# Patient Record
Sex: FEMALE | Race: BLACK OR AFRICAN AMERICAN | Employment: OTHER | URBAN - METROPOLITAN AREA
[De-identification: names, ages, dates, MRNs, and addresses within clinical notes are randomized per-mention and may not be internally consistent; named-entity substitution may affect disease eponyms.]

---

## 2019-04-17 ENCOUNTER — OFFICE VISIT (OUTPATIENT)
Dept: HEMATOLOGY | Age: 69
End: 2019-04-17

## 2019-04-17 ENCOUNTER — HOSPITAL ENCOUNTER (OUTPATIENT)
Dept: LAB | Age: 69
Discharge: HOME | End: 2019-04-17
Payer: MEDICARE

## 2019-04-17 VITALS
SYSTOLIC BLOOD PRESSURE: 113 MMHG | HEART RATE: 103 BPM | DIASTOLIC BLOOD PRESSURE: 55 MMHG | HEIGHT: 63 IN | OXYGEN SATURATION: 98 % | WEIGHT: 223 LBS | TEMPERATURE: 99.8 F | BODY MASS INDEX: 39.51 KG/M2 | RESPIRATION RATE: 16 BRPM

## 2019-04-17 DIAGNOSIS — K74.60 CIRRHOSIS OF LIVER WITHOUT ASCITES, UNSPECIFIED HEPATIC CIRRHOSIS TYPE (HCC): ICD-10-CM

## 2019-04-17 DIAGNOSIS — K74.60 CIRRHOSIS OF LIVER WITHOUT ASCITES, UNSPECIFIED HEPATIC CIRRHOSIS TYPE (HCC): Primary | ICD-10-CM

## 2019-04-17 PROBLEM — R74.8 ELEVATED LIVER ENZYMES: Status: ACTIVE | Noted: 2019-04-17

## 2019-04-17 PROBLEM — D69.6 THROMBOCYTOPENIA (HCC): Status: ACTIVE | Noted: 2019-04-17

## 2019-04-17 PROBLEM — E66.01 SEVERE OBESITY (HCC): Status: ACTIVE | Noted: 2019-04-17

## 2019-04-17 LAB
ALBUMIN SERPL-MCNC: 2.5 G/DL (ref 3.4–5)
ALBUMIN/GLOB SERPL: 0.6 {RATIO} (ref 0.8–1.7)
ALP SERPL-CCNC: 252 U/L (ref 45–117)
ALT SERPL-CCNC: 104 U/L (ref 13–56)
ANION GAP SERPL CALC-SCNC: 8 MMOL/L (ref 3–18)
AST SERPL-CCNC: 114 U/L (ref 15–37)
BASOPHILS # BLD: 0 K/UL (ref 0–0.1)
BASOPHILS NFR BLD: 0 % (ref 0–2)
BILIRUB DIRECT SERPL-MCNC: 0.3 MG/DL (ref 0–0.2)
BILIRUB SERPL-MCNC: 0.5 MG/DL (ref 0.2–1)
BUN SERPL-MCNC: 11 MG/DL (ref 7–18)
BUN/CREAT SERPL: 12 (ref 12–20)
CALCIUM SERPL-MCNC: 8.1 MG/DL (ref 8.5–10.1)
CHLORIDE SERPL-SCNC: 112 MMOL/L (ref 100–108)
CO2 SERPL-SCNC: 24 MMOL/L (ref 21–32)
CREAT SERPL-MCNC: 0.94 MG/DL (ref 0.6–1.3)
DIFFERENTIAL METHOD BLD: ABNORMAL
EOSINOPHIL # BLD: 0.1 K/UL (ref 0–0.4)
EOSINOPHIL NFR BLD: 2 % (ref 0–5)
ERYTHROCYTE [DISTWIDTH] IN BLOOD BY AUTOMATED COUNT: 14.5 % (ref 11.6–14.5)
GLOBULIN SER CALC-MCNC: 4.5 G/DL (ref 2–4)
GLUCOSE SERPL-MCNC: 73 MG/DL (ref 74–99)
HCT VFR BLD AUTO: 28.2 % (ref 35–45)
HGB BLD-MCNC: 8.7 G/DL (ref 12–16)
INR PPP: 1.2 (ref 0.8–1.2)
LYMPHOCYTES # BLD: 2.6 K/UL (ref 0.9–3.6)
LYMPHOCYTES NFR BLD: 40 % (ref 21–52)
MCH RBC QN AUTO: 25.3 PG (ref 24–34)
MCHC RBC AUTO-ENTMCNC: 30.9 G/DL (ref 31–37)
MCV RBC AUTO: 82 FL (ref 74–97)
MONOCYTES # BLD: 1.5 K/UL (ref 0.05–1.2)
MONOCYTES NFR BLD: 22 % (ref 3–10)
NEUTS SEG # BLD: 2.4 K/UL (ref 1.8–8)
NEUTS SEG NFR BLD: 36 % (ref 40–73)
PLATELET # BLD AUTO: 232 K/UL (ref 135–420)
PMV BLD AUTO: 9.6 FL (ref 9.2–11.8)
POTASSIUM SERPL-SCNC: 3.3 MMOL/L (ref 3.5–5.5)
PROT SERPL-MCNC: 7 G/DL (ref 6.4–8.2)
PROTHROMBIN TIME: 15.4 SEC (ref 11.5–15.2)
RBC # BLD AUTO: 3.44 M/UL (ref 4.2–5.3)
SODIUM SERPL-SCNC: 144 MMOL/L (ref 136–145)
WBC # BLD AUTO: 6.6 K/UL (ref 4.6–13.2)

## 2019-04-17 PROCEDURE — 36415 COLL VENOUS BLD VENIPUNCTURE: CPT

## 2019-04-17 PROCEDURE — 82107 ALPHA-FETOPROTEIN L3: CPT

## 2019-04-17 PROCEDURE — 80076 HEPATIC FUNCTION PANEL: CPT

## 2019-04-17 PROCEDURE — 85025 COMPLETE CBC W/AUTO DIFF WBC: CPT

## 2019-04-17 PROCEDURE — 85610 PROTHROMBIN TIME: CPT

## 2019-04-17 PROCEDURE — 80048 BASIC METABOLIC PNL TOTAL CA: CPT

## 2019-04-17 RX ORDER — AMLODIPINE BESYLATE 10 MG/1
10 TABLET ORAL
COMMUNITY
Start: 2019-03-29

## 2019-04-17 RX ORDER — FERROUS FUMARATE 324(106)MG
324 TABLET ORAL
COMMUNITY
Start: 2019-03-29

## 2019-04-17 RX ORDER — LOSARTAN POTASSIUM 100 MG/1
100 TABLET ORAL
COMMUNITY
Start: 2019-03-29 | End: 2020-08-27

## 2019-04-17 RX ORDER — OMEPRAZOLE 20 MG/1
1 TABLET, DELAYED RELEASE ORAL
COMMUNITY
Start: 2017-09-19 | End: 2020-08-27 | Stop reason: DRUGHIGH

## 2019-04-17 RX ORDER — SPIRONOLACTONE 100 MG/1
100 TABLET, FILM COATED ORAL DAILY
Qty: 180 TAB | Refills: 3 | Status: SHIPPED | OUTPATIENT
Start: 2019-04-17 | End: 2022-05-25 | Stop reason: SDUPTHER

## 2019-04-17 RX ORDER — OXYCODONE AND ACETAMINOPHEN 5; 325 MG/1; MG/1
1 TABLET ORAL
COMMUNITY
End: 2020-08-27 | Stop reason: SDUPTHER

## 2019-04-17 RX ORDER — FUROSEMIDE 40 MG/1
40 TABLET ORAL
COMMUNITY
Start: 2019-03-29 | End: 2022-07-21 | Stop reason: SDUPTHER

## 2019-04-17 RX ORDER — ALBUTEROL SULFATE 90 UG/1
2 AEROSOL, METERED RESPIRATORY (INHALATION)
COMMUNITY
Start: 2019-03-29

## 2019-04-17 RX ORDER — BUDESONIDE AND FORMOTEROL FUMARATE DIHYDRATE 160; 4.5 UG/1; UG/1
2 AEROSOL RESPIRATORY (INHALATION)
COMMUNITY
Start: 2019-03-29

## 2019-04-17 RX ORDER — OMEPRAZOLE 40 MG/1
40 CAPSULE, DELAYED RELEASE ORAL
COMMUNITY
Start: 2019-03-29 | End: 2022-07-21 | Stop reason: SDUPTHER

## 2019-04-17 RX ORDER — GLIPIZIDE 5 MG/1
5 TABLET, FILM COATED, EXTENDED RELEASE ORAL
COMMUNITY
Start: 2019-03-29

## 2019-04-17 RX ORDER — CYCLOBENZAPRINE HCL 10 MG
1 TABLET ORAL
COMMUNITY
End: 2020-08-27 | Stop reason: ALTCHOICE

## 2019-04-17 RX ORDER — OXYCODONE HYDROCHLORIDE 5 MG/1
TABLET ORAL
Refills: 0 | COMMUNITY
Start: 2019-04-01 | End: 2020-08-27

## 2019-04-17 RX ORDER — MINOXIDIL 2.5 MG/1
2.5 TABLET ORAL
COMMUNITY
Start: 2019-03-29

## 2019-04-17 RX ORDER — BUTALBITAL, ACETAMINOPHEN AND CAFFEINE 50; 325; 40 MG/1; MG/1; MG/1
TABLET ORAL
Refills: 0 | COMMUNITY
Start: 2019-01-29 | End: 2021-12-02 | Stop reason: SDUPTHER

## 2019-04-17 RX ORDER — ASPIRIN 81 MG/1
81 TABLET ORAL
COMMUNITY
End: 2021-01-05

## 2019-04-17 RX ORDER — HYDROCHLOROTHIAZIDE 25 MG/1
1 TABLET ORAL
COMMUNITY
End: 2020-08-27

## 2019-04-17 NOTE — Clinical Note
4/20/19 Patient: Darryle Jeans YOB: 1950 Date of Visit: 4/17/2019 Jaclyn Burks MD 
[facility] [place] [address] VIA Facsimile: [phone] Dear Jaclyn Burks MD, Thank you for referring Ms. Franklyn Alejandra to [address] [place] [address],[address] for evaluation. My notes for this consultation are attached. If you have questions, please do not hesitate to call me. I look forward to following your patient along with you. Sincerely, Osmar Figueredo MD

## 2019-04-17 NOTE — PROGRESS NOTES
[address] [facility] [place], [place], [address], [place] [place], [place]       Fitos Heidi, ANNEMARIE Cole President, [facility]   Gabby Sharp, SUPRIYA Vargas [facility] [address]    at [address], [address] [address]  22.    [phone]    FAX: 126 [address], [address]    [phone]    FAX: [phone]         Patient Care Team:  Zainab Garcia MD as PCP - General (Family Practice)      Problem List  Never Reviewed          Codes Class Noted    Severe obesity ([facility] [facility] ICD-10-CM: E66.01  ICD-9-CM: 278.01  4/17/2019        Elevated liver enzymes ICD-10-CM: R74.8  ICD-9-CM: 790.5  4/17/2019        Thrombocytopenia ([facility] ICD-10-CM: D69.6  ICD-9-CM: 287.5  4/17/2019        Cirrhosis ([facility] [facility] ICD-10-CM: K74.60  ICD-9-CM: 571.5  4/17/2019                The clinicians listed above have asked me to see Teo Carlton in consultation regarding 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 All medical records sent by the referring physicians were reviewed including imaging studies and pathology. The patient is a 78 y.o.  female who was found to have chronic liver disease in 2007 when she was found to have elevated liver enzymes in the 1000 range. Serologic evaluation for markers of chronic liver disease was positive for VICKI,     The patient underwent a liver biopsy at [facility] in 8/2017. This demonstrated active hepatitis with PMN and stage 3 bridging fibrosis.     The patient was found to have cirrhosis in 2019 when she developed lower extremity edema, thrombocytopenia and an ultrasound suggested cirrhosis    The patient has developed the following complications of cirrhosis: edema,     The patient notes swelling of the lower extremities,     The patient has not experienced fatigue, pain in the right side over the liver, problems concentrating, swelling of the abdomen, hematemesis, hematochezia. The patient completes all daily activities without any functional limitations. She has back surgery scheduled for 4/24/2019      ASSESSMENT AND PLAN:  Autoimmune Hepatitis   The diagnosis was based upon serology, liver biopsy in 2017. A liver biopsy performed in 8/2017 demonstrated moderate to severe inflammation with interface hepatitis, with piecemeal necrosis and stage 2-3 fibrosis. The patient was never treated for AIH with immune suppression. I will request that the liver biopsy slides be sent to me for review. Will perform laboratory testing to monitor liver function and degree of liver injury. This will include BMP, hepatic panel, CBC with platelet count,   Liver transaminases are elevated. ALP is elevated. Liver function is normal.  Serum albumin is depressed. The platelet count is normal.      The need to assess liver fibrosis was discussed. Sheer wave elastography can assess liver fibrosis and determine if a patient has advanced fibrosis or cirrhosis without the need for liver biopsy. This will be scheduled. Cirrhosis  It is not clear if the patient has cirrhosis. The diagnosis of cirrhosis is based upon edema and thrombocytopenia. ANTI-HCV positive HCV RNA negative  This is either due to exposure to HCV many yers ago with spontaneous resolution or a false positive anti-HCV because of the autoimmune disease. The patient is HCV RNA undetectable and does not have HCV. Lower extremity edema  Edema has resolved with current dose of diuretics. Will continue the current dose of diuretics at step 1. Screening for Esophageal varices   The patient has not had an EGD to screen for varices. Will schedule for EGD to assess for varices and need for banding.     Hepatic encephalopathy   Overt HE has not developed to date. There is no need for treatment with lactulose and/or Xifaxan at this time. There is no need to restrict dietary protein at this time. Thrombocytopenia   The 2 most recent platelet counts are either normal or low. This is secondary to cirrhosis. Screening for Hepatocellular Carcinoma  HCC screening has recently been performed and does not suggest [address] The next liver imaging study will be performed in 8/2019. Risk of elective surgery in a patient with chronic liver disease and cirrhosis  The patient has either advanced fibrosis or cirrhosis Child class A and MELD of under 10. The risk of complications including hepatic decompensation is about 20-30%. Operative mortality risk is under 5%. The ultimate decision regarding whether or not to proceed with surgery will depend upon conversations between the surgeon and patient/and/or family and careful assessment of the risk and benefit of the surgical procedure. Treatment of other medical problems in patients with chronic liver disease  There are no contraindications for the patient to take most medications that are necessary for treatment of other medical issues. Counseling for alcohol in patients with chronic liver disease  The patient was counseled regarding alcohol consumption and the effect of alcohol on chronic liver disease. Osteoporosis  The risk of osteoporosis is increased in patients with cirrhosis. DEXA bone density to assess for osteoporosis has not been performed. This should be ordered by the patients primary care physician. Vaccinations   Vaccination for viral hepatitis B is not needed. The patient has serologic evidence of prior exposure or vaccination with immunity. The need for vaccination against viral hepatitis A will be assessed with serologic and instituted as appropriate.   Routine vaccinations against other bacterial and viral agents can be performed as indicated. Annual flu vaccination should be administered if indicated. ALLERGIES  No Known Allergies    MEDICATIONS  Current Outpatient Medications   Medication Sig    omeprazole (PRILOSEC OTC) 20 mg tablet Take 1 Cap by mouth.  albuterol (PROVENTIL HFA) 90 mcg/actuation inhaler Take 2 Puffs by inhalation.  amLODIPine (NORVASC) 10 mg tablet Take 10 mg by mouth.  aspirin delayed-release 81 mg tablet Take 81 mg by mouth.  budesonide-formoterol (SYMBICORT) 160-4.5 mcg/actuation HFAA Take 2 Puffs by inhalation.  butalbital-acetaminophen-caffeine (FIORICET, ESGIC) -40 mg per tablet TAKE 1 TABLET BY MOUTH EVERY 4 HOURS AS NEEDED FOR HEADACHE PAIN    cyclobenzaprine (FLEXERIL) 10 mg tablet Take 1 Tab by mouth.  ferrous fumarate 324 mg (106 mg iron) tab Take 324 mg by mouth.  furosemide (LASIX) 40 mg tablet Take 40 mg by mouth.  glipiZIDE SR (GLUCOTROL XL) 5 mg CR tablet Take 5 mg by mouth.  hydroCHLOROthiazide (HYDRODIURIL) 25 mg tablet Take 1 Tab by mouth.  losartan (COZAAR) 100 mg tablet Take 100 mg by mouth.  minoxidil (LONITEN) 2.5 mg tablet Take 2.5 mg by mouth.  omeprazole (PRILOSEC) 40 mg capsule Take 40 mg by mouth.  oxyCODONE IR (ROXICODONE) 5 mg immediate release tablet TAKE 1 TABLET BY MOUTH EVERY 12 HOURS AS NEEDED FOR PAIN    oxyCODONE-acetaminophen (PERCOCET) 5-325 mg per tablet Take 1 Tab by mouth. No current facility-administered medications for this visit. SYSTEM REVIEW NOT RELATED TO LIVER DISEASE OR REVIEWED ABOVE:  Constitution systems: Negative for fever, chills, weight gain, weight loss. Eyes: Negative for visual changes. ENT: Negative for sore throat, painful swallowing. Respiratory: Negative for cough, hemoptysis, SOB. Cardiology: Swelling of the legs. GI:  Negative for constipation or diarrhea. : Negative for urinary frequency, dysuria, hematuria, nocturia. Skin: Negative for rash.   Hematology: Negative for easy bruising, blood clots. Musculo-skelatal: Negative for back pain, muscle pain, weakness. Neurologic: Negative for headaches, dizziness, vertigo, memory problems not related to HE. Psychology: Negative for anxiety, depression. FAMILY HISTORY:  The father  of MI. The mother  of MI. The following family members have liver disease: a brother    SOCIAL HISTORY:  The patient is . The patient has 2 children, and 8 grandchildren. The patient stopped using tobacco products in . The patient has never consumed significant amounts of alcohol. The patient used to work as as a  at [employer]. The patient retired in . PHYSICAL EXAMINATION:  Visit Vitals  /55 (BP 1 Location: Right arm, BP Patient Position: Sitting)   Pulse (!) 103   Temp 99.8 °F (37.7 °C) (Tympanic)   Resp 16   Ht 5' 3\" (1.6 m)   Wt 223 lb (101.2 kg)   SpO2 98%   BMI 39.50 kg/m²     General: No acute distress. Eyes: Sclera anicteric. ENT: No oral lesions. Thyroid normal.  Nodes: No adenopathy. Skin: No spider angiomata. No jaundice. No palmar erythema. Respiratory: Lungs clear to auscultation. Cardiovascular: Regular heart rate. No murmurs. No JVD. Abdomen: Soft non-tender. Liver size normal to percussion/palpation. Spleen not palpable. No obvious ascites. Extremities: No edema. No muscle wasting. No gross arthritic changes. Neurologic: Alert and oriented. Cranial nerves grossly intact. No asterixis.     LABORATORY STUDIES:  From 2019  AST/ALT/ALP/T Bili/ALB:  323/268/213/2.0/3.0  WBC/HB/PLT/INR:  5.9/12.2/110  BUN/CREAT:  11/0.5    Liver [place] of [address] Units 2019   WBC 4.6 - 13.2 K/uL 6.6   ANC 1.8 - 8.0 K/UL 2.4   HGB 12.0 - 16.0 g/dL 8.7 (L)    - 420 K/uL 232   INR 0.8 - 1.2   1.2   AST 15 - 37 U/L 114 (H)   ALT 13 - 56 U/L 104 (H)   Alk Phos 45 - 117 U/L 252 (H)   Bili, Total 0.2 - 1.0 MG/DL 0.5   Bili, Direct 0.0 - 0.2 MG/DL 0.3 (H)   Albumin 3.4 - 5.0 g/dL 2.5 (L)   BUN 7.0 - 18 MG/DL 11   Creat 0.6 - 1.3 MG/DL 0.94   Na 136 - 145 mmol/L 144   K 3.5 - 5.5 mmol/L 3.3 (L)   Cl 100 - 108 mmol/L 112 (H)   CO2 21 - 32 mmol/L 24   Glucose 74 - 99 mg/dL 73 (L)     SEROLOGIES:  6/2017. Anti-HBsurface positive  9/2017. HBsAntigen negative, anti-HCV positive, HCV RNA undetectable, VICKI positive, ASMA positive, ANCA positive, AMA negative, RF positive, alpha-1-antitrypsin 150    LIVER HISTOLOGY:  8/2017. Active hepatitis with portal and lobular inflammation, interface hepatitis, PMN and stage 2-3 septal fibrosis. Biopsy slides not reviewed by MLS. ENDOSCOPIC PROCEDURES:  Not available or performed    RADIOLOGY:  1/2019. Ultrasound of liver. Echogenic consistent with chronic liver disease. No liver mass lesions. No dilated bile ducts. No ascites. OTHER TESTING:  Not available or performed    FOLLOW-UP:  All of the issues listed above in the Assessment and Plan were discussed with the patient. All questions were answered. The patient expressed a clear understanding of the above. [address] [place] [address] in 2 weeks for elastography to review all data and determine the treatment plan.       Osmar Figueredo MD  [address]  [address] [place] [address], [address]  12 Replaced by [facility]

## 2019-04-19 LAB
AFP L3 MFR SERPL: 7.4 % (ref 0–9.9)
AFP SERPL-MCNC: 19.2 NG/ML (ref 0–8)

## 2019-04-24 ENCOUNTER — TELEPHONE (OUTPATIENT)
Dept: HEMATOLOGY | Age: 69
End: 2019-04-24

## 2019-04-24 NOTE — TELEPHONE ENCOUNTER
----- Message from [address] [place] [address] sent at 4/22/2019  8:14 AM EDT -----      ----- Message -----  From: Nasim Cruz MD  Sent: 4/20/2019   6:10 PM  To: Ren[address] the FU appt to [facility] May [facility] 6 or 8. Make sure she gets US elastography prior to the appointment.   [facility]

## 2019-05-01 ENCOUNTER — HOSPITAL ENCOUNTER (OUTPATIENT)
Dept: ULTRASOUND IMAGING | Age: 69
Discharge: HOME | End: 2019-05-01
Attending: INTERNAL MEDICINE
Payer: MEDICARE

## 2019-05-01 DIAGNOSIS — K74.60 CIRRHOSIS OF LIVER WITHOUT ASCITES, UNSPECIFIED HEPATIC CIRRHOSIS TYPE (HCC): ICD-10-CM

## 2019-05-01 PROCEDURE — 76981 USE PARENCHYMA: CPT

## 2019-05-06 ENCOUNTER — HOSPITAL ENCOUNTER (OUTPATIENT)
Dept: LAB | Age: 69
Discharge: HOME | End: 2019-05-06
Payer: MEDICARE

## 2019-05-06 ENCOUNTER — OFFICE VISIT (OUTPATIENT)
Dept: HEMATOLOGY | Age: 69
End: 2019-05-06

## 2019-05-06 VITALS
SYSTOLIC BLOOD PRESSURE: 145 MMHG | TEMPERATURE: 98.3 F | HEIGHT: 63 IN | BODY MASS INDEX: 38.98 KG/M2 | DIASTOLIC BLOOD PRESSURE: 77 MMHG | WEIGHT: 220 LBS

## 2019-05-06 DIAGNOSIS — K75.4 AUTOIMMUNE HEPATITIS (HCC): ICD-10-CM

## 2019-05-06 DIAGNOSIS — K75.4 AUTOIMMUNE HEPATITIS (HCC): Primary | ICD-10-CM

## 2019-05-06 LAB
ALBUMIN SERPL-MCNC: 2.6 G/DL (ref 3.4–5)
ALBUMIN/GLOB SERPL: 0.5 {RATIO} (ref 0.8–1.7)
ALP SERPL-CCNC: 170 U/L (ref 45–117)
ALT SERPL-CCNC: 88 U/L (ref 13–56)
ANION GAP SERPL CALC-SCNC: 4 MMOL/L (ref 3–18)
AST SERPL-CCNC: 99 U/L (ref 15–37)
BASOPHILS # BLD: 0 K/UL (ref 0–0.1)
BASOPHILS NFR BLD: 0 % (ref 0–2)
BILIRUB SERPL-MCNC: 0.5 MG/DL (ref 0.2–1)
BUN SERPL-MCNC: 11 MG/DL (ref 7–18)
BUN/CREAT SERPL: 11 (ref 12–20)
CALCIUM SERPL-MCNC: 8.2 MG/DL (ref 8.5–10.1)
CHLORIDE SERPL-SCNC: 109 MMOL/L (ref 100–108)
CO2 SERPL-SCNC: 25 MMOL/L (ref 21–32)
CREAT SERPL-MCNC: 0.96 MG/DL (ref 0.6–1.3)
DIFFERENTIAL METHOD BLD: ABNORMAL
EOSINOPHIL # BLD: 0.2 K/UL (ref 0–0.4)
EOSINOPHIL NFR BLD: 2 % (ref 0–5)
ERYTHROCYTE [DISTWIDTH] IN BLOOD BY AUTOMATED COUNT: 16.2 % (ref 11.6–14.5)
GLOBULIN SER CALC-MCNC: 4.9 G/DL (ref 2–4)
GLUCOSE SERPL-MCNC: 133 MG/DL (ref 74–99)
HCT VFR BLD AUTO: 31 % (ref 35–45)
HGB BLD-MCNC: 9.7 G/DL (ref 12–16)
LYMPHOCYTES # BLD: 2.1 K/UL (ref 0.9–3.6)
LYMPHOCYTES NFR BLD: 30 % (ref 21–52)
MCH RBC QN AUTO: 25.4 PG (ref 24–34)
MCHC RBC AUTO-ENTMCNC: 31.3 G/DL (ref 31–37)
MCV RBC AUTO: 81.2 FL (ref 74–97)
MONOCYTES # BLD: 1 K/UL (ref 0.05–1.2)
MONOCYTES NFR BLD: 15 % (ref 3–10)
NEUTS SEG # BLD: 3.5 K/UL (ref 1.8–8)
NEUTS SEG NFR BLD: 53 % (ref 40–73)
PLATELET # BLD AUTO: 240 K/UL (ref 135–420)
PMV BLD AUTO: 9.6 FL (ref 9.2–11.8)
POTASSIUM SERPL-SCNC: 4.6 MMOL/L (ref 3.5–5.5)
PROT SERPL-MCNC: 7.5 G/DL (ref 6.4–8.2)
RBC # BLD AUTO: 3.82 M/UL (ref 4.2–5.3)
SODIUM SERPL-SCNC: 138 MMOL/L (ref 136–145)
WBC # BLD AUTO: 6.8 K/UL (ref 4.6–13.2)

## 2019-05-06 PROCEDURE — 36415 COLL VENOUS BLD VENIPUNCTURE: CPT

## 2019-05-06 PROCEDURE — 80197 ASSAY OF TACROLIMUS: CPT

## 2019-05-06 PROCEDURE — 85025 COMPLETE CBC W/AUTO DIFF WBC: CPT

## 2019-05-06 PROCEDURE — 80053 COMPREHEN METABOLIC PANEL: CPT

## 2019-05-06 RX ORDER — TACROLIMUS 1 MG/1
2 CAPSULE ORAL EVERY 12 HOURS
Qty: 120 CAP | Refills: 6 | Status: SHIPPED | OUTPATIENT
Start: 2019-05-06 | End: 2020-01-25 | Stop reason: SDUPTHER

## 2019-05-06 NOTE — Clinical Note
5/6/19 Patient: Jacqui Henriquez YOB: 1950 Date of Visit: 5/6/2019 Sumit George MD 
[facility] [place] [address] VIA Facsimile: [phone] Dear Sumit George MD, Thank you for referring Ms. Mirta Moss to [facility] Peter Mcghee liborio [facility] for evaluation. My notes for this consultation are attached. If you have questions, please do not hesitate to call me. I look forward to following your patient along with you. Sincerely, Maeve Waters MD

## 2019-05-06 NOTE — PROGRESS NOTES
[address] [facility] Fabi [place] MD, Ajith Sarah, SHAHRIARSLD Escobar Teran, SUPRIYA Parker, ANNEMARIE Segal, [facility]   Juancarlos Gonzales, SUPRIYA Gonzalez [facility] 136 
  at [address] [place], [address] [address] 
  [phone] FAX: [address] 
  [facility] 
  [address], [address] 98 [facility], [address] 
  [phone] FAX: [phone] Patient Care Team: 
Courtney Rivas MD as PCP - [facility]) Cale Marquis MD (Orthopedic Surgery) Ashley Dorantes MD (Gastroenterology) Problem List  Date Reviewed: 4/20/2019 Codes Class Noted Severe [facility] ICD-10-CM: E66.01 
ICD-9-CM: 278.01  4/17/2019 Autoimmune hepatitis ([facility] [facility] ICD-10-CM: K75.4 ICD-9-CM: 571.42  4/17/2019 Thrombocytopenia ([facility] [facility] ICD-10-CM: D69.6 ICD-9-CM: 287.5  4/17/2019 Cirrhosis ([facility] ICD-10-CM: K74.60 ICD-9-CM: 571.5  4/17/2019 Brian Zambrano returns to the [place] [address] for management 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 The active problem list, all pertinent past medical history, medications, liver histology, radiologic findings and laboratory findings related to the liver disorder were reviewed with the patient. The patient is a 78 y.o.  female who was found to have marked elevation ion liver enzymes into the 500-800 range in 2017 The patient was found to have cirrhosis in 2019 when she developed lower extremity edema, thrombocytopenia and an ultrasound suggested cirrhosis The patient has developed the following complications of cirrhosis: edema, The patient notes swelling of the lower extremities,  
 
 The patient has not experienced fatigue, pain in the right side over the liver, problems concentrating, swelling of the abdomen, hematemesis, hematochezia. The patient completes all daily activities without any functional limitations. She has back surgery scheduled for 4/24/2019 Serologic evaluation for markers of chronic liver disease was positive for VICKI,  
 
 
ASSESSMENT AND PLAN: 
Autoimmune Hepatitis The diagnosis was based upon serology, liver biopsy A liver biopsy performed in 8/2017 demonstrated moderate to severe inflammation with interface hepatitis, with piecemeal necrosis and stage 2-3 fibrosis. Elastography performed in 4/2019 suggests Cirrhosis. Liver transaminases are elevated. ALP is elevated. Liver function is normal.  Serum albumin is depressed. The platelet count is normal.   
 
The patient is not currently receiving treatment for the liver disease. Will start tacrolimus at 2 mg BID. Discussed side effects of tacrolimus including hypertension, renal dysfunction and headaches. Will check TAC level in 3 weeks. Will repeat liver enzymes in 3 weeks. Cirrhosis It is not clear if the patient has cirrhosis. The liver biopsy showed on stage 2-3 fibrosis The elastography suggested cirrhosis There is thrombocytopenia. I will request that the liver biopsy slides be sent to me for review. ANTI-HCV positive HCV RNA negative This is either due to exposure to HCV many yers ago with spontaneous resolution or a false positive anti-HCV because of the autoimmune disease. The patient is HCV RNA undetectable and does not have HCV. Lower extremity edema Edema has resolved with current dose of diuretics. Will continue the current dose of diuretics at step 1. Screening for Esophageal varices The patient has not had an EGD to screen for varices. Will schedule for EGD to assess for varices and need for banding. Hepatic encephalopathy Overt HE has not developed to date. There is no need for treatment with lactulose and/or Xifaxan at this time. There is no need to restrict dietary protein at this time. Thrombocytopenia The 2 most recent platelet counts are either normal or low. This may be secondary to cirrhosis or ITP. Can check anti-platelet antibodies. Screening for Hepatocellular Carcinoma [facility] screening has recently been performed and does not suggest [address] The next liver imaging study will be performed in 8/2019. Risk of elective surgery in a patient with chronic liver disease and cirrhosis The patient has either advanced fibrosis or cirrhosis Child class A and MELD of under 10. The risk of complications including hepatic decompensation is about 20-30%. Operative mortality risk is under 5%. The ultimate decision regarding whether or not to proceed with surgery will depend upon conversations between the surgeon and patient/and/or family and careful assessment of the risk and benefit of the surgical procedure. I would suggest holding elective surgery until after the immune liver disease is under control and the lvier enzymes are normal. 
 
Treatment of other medical problems in patients with chronic liver disease There are no contraindications for the patient to take most medications that are necessary for treatment of other medical issues. Counseling for alcohol in patients with chronic liver disease The patient was counseled regarding alcohol consumption and the effect of alcohol on chronic liver disease. Osteoporosis The risk of osteoporosis is increased in patients with cirrhosis. DEXA bone density to assess for osteoporosis has not been performed. This should be ordered by the patients primary care physician. Vaccinations Vaccination for viral hepatitis B is not needed. The patient has serologic evidence of prior exposure or vaccination with immunity. The need for vaccination against viral hepatitis A will be assessed with serologic and instituted as appropriate. Routine vaccinations against other bacterial and viral agents can be performed as indicated. Annual flu vaccination should be administered if indicated. ALLERGIES No Known Allergies MEDICATIONS Current Outpatient Medications Medication Sig  
 tacrolimus (PROGRAF) 1 mg capsule Take 2 Caps by mouth every twelve (12) hours.  omeprazole (PRILOSEC OTC) 20 mg tablet Take 1 Cap by mouth.  albuterol (PROVENTIL HFA) 90 mcg/actuation inhaler Take 2 Puffs by inhalation.  amLODIPine (NORVASC) 10 mg tablet Take 10 mg by mouth.  aspirin delayed-release 81 mg tablet Take 81 mg by mouth.  budesonide-formoterol (SYMBICORT) 160-4.5 mcg/actuation HFAA Take 2 Puffs by inhalation.  butalbital-acetaminophen-caffeine (FIORICET, ESGIC) -40 mg per tablet TAKE 1 TABLET BY MOUTH EVERY 4 HOURS AS NEEDED FOR HEADACHE PAIN  
 cyclobenzaprine (FLEXERIL) 10 mg tablet Take 1 Tab by mouth.  ferrous fumarate 324 mg (106 mg iron) tab Take 324 mg by mouth.  furosemide (LASIX) 40 mg tablet Take 40 mg by mouth.  glipiZIDE SR (GLUCOTROL XL) 5 mg CR tablet Take 5 mg by mouth.  hydroCHLOROthiazide (HYDRODIURIL) 25 mg tablet Take 1 Tab by mouth.  losartan (COZAAR) 100 mg tablet Take 100 mg by mouth.  minoxidil (LONITEN) 2.5 mg tablet Take 2.5 mg by mouth.  omeprazole (PRILOSEC) 40 mg capsule Take 40 mg by mouth.  oxyCODONE IR (ROXICODONE) 5 mg immediate release tablet TAKE 1 TABLET BY MOUTH EVERY 12 HOURS AS NEEDED FOR PAIN  
 oxyCODONE-acetaminophen (PERCOCET) 5-325 mg per tablet Take 1 Tab by mouth.  spironolactone (ALDACTONE) 100 mg tablet Take 1 Tab by mouth daily. No current facility-administered medications for this visit. SYSTEM REVIEW NOT RELATED TO LIVER DISEASE OR REVIEWED ABOVE: 
Constitution systems: Negative for fever, chills, weight gain, weight loss. Eyes: Negative for visual changes. ENT: Negative for sore throat, painful swallowing. Respiratory: Negative for cough, hemoptysis, SOB. Cardiology: Swelling of the legs. GI:  Negative for constipation or diarrhea. : Negative for urinary frequency, dysuria, hematuria, nocturia. Skin: Negative for rash. Hematology: Negative for easy bruising, blood clots. Musculo-skelatal: Negative for back pain, muscle pain, weakness. Neurologic: Negative for headaches, dizziness, vertigo, memory problems not related to HE. Psychology: Negative for anxiety, depression. FAMILY HISTORY: 
The father  of MI. The mother  of MI. The following family members have liver disease: a brother SOCIAL HISTORY: 
The patient is . The patient has 2 children, and 8 grandchildren. The patient stopped using tobacco products in . The patient has never consumed significant amounts of alcohol. The patient used to work as as a  at [employer]. The patient retired in . PHYSICAL EXAMINATION: 
Visit Vitals /77 Temp 98.3 °F (36.8 °C) (Tympanic) Ht 5' 3\" (1.6 m) Wt 220 lb (99.8 kg) BMI 38.97 kg/m² General: No acute distress. Eyes: Sclera anicteric. ENT: No oral lesions. Thyroid normal. 
Nodes: No adenopathy. Skin: No spider angiomata. No jaundice. No palmar erythema. Respiratory: Lungs clear to auscultation. Cardiovascular: Regular heart rate. No murmurs. No JVD. Abdomen: Soft non-tender. Liver size normal to percussion/palpation. Spleen not palpable. No obvious ascites. Extremities: No edema. No muscle wasting. No gross arthritic changes. Neurologic: Alert and oriented. Cranial nerves grossly intact. No asterixis. LABORATORY STUDIES: 
From 2019 AST/ALT/ALP/T Bili/ALB:  323/268/213/2.0/3.0 WBC/HB/PLT/INR:  5.9/12.2/110 BUN/CREAT:  11/0.5 Liver [place] of [address] Units 2019 WBC 4.6 - 13.2 K/uL 6.6 ANC 1.8 - 8.0 K/UL 2.4 HGB 12.0 - 16.0 g/dL 8.7 (L)  - 420 K/uL 232 INR 0.8 - 1.2   1.2 AST 15 - 37 U/L 114 (H) ALT 13 - 56 U/L 104 (H) Alk Phos 45 - 117 U/L 252 (H) Bili, Total 0.2 - 1.0 MG/DL 0.5 Bili, Direct 0.0 - 0.2 MG/DL 0.3 (H) Albumin 3.4 - 5.0 g/dL 2.5 (L) BUN 7.0 - 18 MG/DL 11 Creat 0.6 - 1.3 MG/DL 0.94 Na 136 - 145 mmol/L 144  
K 3.5 - 5.5 mmol/L 3.3 (L) Cl 100 - 108 mmol/L 112 (H)  
CO2 21 - 32 mmol/L 24 Glucose 74 - 99 mg/dL 73 (L) SEROLOGIES: 
6/2017. Anti-HBsurface positive 9/2017. HBsAntigen negative, anti-HCV positive, HCV RNA undetectable, VICKI positive, ASMA positive, ANCA positive, AMA negative, RF positive, alpha-1-antitrypsin 150 LIVER HISTOLOGY: 
8/2017. Active hepatitis with portal and lobular inflammation, interface hepatitis, PMN and stage 2-3 septal fibrosis. Biopsy slides not reviewed by MLS. 
4/2019. Sheer wave elastography performed at [facility]. Range 10.09- 17.05 kPa, Mean 14.07 kPa, Median 14.9 kPa, Standard deviation 2.41 kPa. The results suggested a fibrosis level of F4. ENDOSCOPIC PROCEDURES: 
Not available or performed RADIOLOGY: 
1/2019. Ultrasound of liver. Echogenic consistent with chronic liver disease. No liver mass lesions. No dilated bile ducts. No ascites. OTHER TESTING: 
Not available or performed FOLLOW-UP: 
All of the issues listed above in the Assessment and Plan were discussed with the patient. All questions were answered. The patient expressed a clear understanding of the above. [address] [address] in 4 weeks to assess resposne to treatment. Maeve Waters MD 
[address] [facility] [address] [facility], [address] [facility], [address] 
[phone] [facility]7 [address]

## 2019-05-08 LAB — TACROLIMUS BLD-MCNC: NORMAL NG/ML (ref 2–20)

## 2019-06-10 ENCOUNTER — HOSPITAL ENCOUNTER (OUTPATIENT)
Dept: LAB | Age: 69
Discharge: HOME | End: 2019-06-10
Payer: MEDICARE

## 2019-06-10 ENCOUNTER — OFFICE VISIT (OUTPATIENT)
Dept: HEMATOLOGY | Age: 69
End: 2019-06-10

## 2019-06-10 VITALS
HEART RATE: 112 BPM | TEMPERATURE: 98.7 F | BODY MASS INDEX: 36.94 KG/M2 | HEIGHT: 63 IN | SYSTOLIC BLOOD PRESSURE: 123 MMHG | OXYGEN SATURATION: 99 % | WEIGHT: 208.5 LBS | DIASTOLIC BLOOD PRESSURE: 87 MMHG

## 2019-06-10 DIAGNOSIS — K75.4 AUTOIMMUNE HEPATITIS (HCC): Primary | ICD-10-CM

## 2019-06-10 DIAGNOSIS — K75.4 AUTOIMMUNE HEPATITIS (HCC): ICD-10-CM

## 2019-06-10 PROBLEM — K31.819 GAVE (GASTRIC ANTRAL VASCULAR ECTASIA): Status: ACTIVE | Noted: 2019-06-10

## 2019-06-10 LAB
ALBUMIN SERPL-MCNC: 3 G/DL (ref 3.4–5)
ALBUMIN/GLOB SERPL: 0.6 {RATIO} (ref 0.8–1.7)
ALP SERPL-CCNC: 168 U/L (ref 45–117)
ALT SERPL-CCNC: 68 U/L (ref 13–56)
ANION GAP SERPL CALC-SCNC: 7 MMOL/L (ref 3–18)
AST SERPL-CCNC: 60 U/L (ref 15–37)
BASOPHILS # BLD: 0 K/UL (ref 0–0.1)
BASOPHILS NFR BLD: 0 % (ref 0–2)
BILIRUB DIRECT SERPL-MCNC: 0.3 MG/DL (ref 0–0.2)
BILIRUB SERPL-MCNC: 0.7 MG/DL (ref 0.2–1)
BUN SERPL-MCNC: 14 MG/DL (ref 7–18)
BUN/CREAT SERPL: 10 (ref 12–20)
CALCIUM SERPL-MCNC: 8.7 MG/DL (ref 8.5–10.1)
CHLORIDE SERPL-SCNC: 103 MMOL/L (ref 100–108)
CO2 SERPL-SCNC: 23 MMOL/L (ref 21–32)
CREAT SERPL-MCNC: 1.4 MG/DL (ref 0.6–1.3)
DIFFERENTIAL METHOD BLD: ABNORMAL
EOSINOPHIL # BLD: 0.3 K/UL (ref 0–0.4)
EOSINOPHIL NFR BLD: 5 % (ref 0–5)
ERYTHROCYTE [DISTWIDTH] IN BLOOD BY AUTOMATED COUNT: 16.2 % (ref 11.6–14.5)
GLOBULIN SER CALC-MCNC: 4.9 G/DL (ref 2–4)
GLUCOSE SERPL-MCNC: 273 MG/DL (ref 74–99)
HCT VFR BLD AUTO: 31.3 % (ref 35–45)
HGB BLD-MCNC: 10 G/DL (ref 12–16)
INR PPP: 1.2 (ref 0.8–1.2)
LYMPHOCYTES # BLD: 1.9 K/UL (ref 0.9–3.6)
LYMPHOCYTES NFR BLD: 32 % (ref 21–52)
MAGNESIUM SERPL-MCNC: 1.4 MG/DL (ref 1.6–2.6)
MCH RBC QN AUTO: 24.8 PG (ref 24–34)
MCHC RBC AUTO-ENTMCNC: 31.9 G/DL (ref 31–37)
MCV RBC AUTO: 77.5 FL (ref 74–97)
MONOCYTES # BLD: 1 K/UL (ref 0.05–1.2)
MONOCYTES NFR BLD: 17 % (ref 3–10)
NEUTS SEG # BLD: 2.8 K/UL (ref 1.8–8)
NEUTS SEG NFR BLD: 46 % (ref 40–73)
PLATELET # BLD AUTO: 216 K/UL (ref 135–420)
PMV BLD AUTO: 9.9 FL (ref 9.2–11.8)
POTASSIUM SERPL-SCNC: 4.1 MMOL/L (ref 3.5–5.5)
PROT SERPL-MCNC: 7.9 G/DL (ref 6.4–8.2)
PROTHROMBIN TIME: 15.4 SEC (ref 11.5–15.2)
RBC # BLD AUTO: 4.04 M/UL (ref 4.2–5.3)
SODIUM SERPL-SCNC: 133 MMOL/L (ref 136–145)
WBC # BLD AUTO: 6.1 K/UL (ref 4.6–13.2)

## 2019-06-10 PROCEDURE — 80048 BASIC METABOLIC PNL TOTAL CA: CPT

## 2019-06-10 PROCEDURE — 80076 HEPATIC FUNCTION PANEL: CPT

## 2019-06-10 PROCEDURE — 83735 ASSAY OF MAGNESIUM: CPT

## 2019-06-10 PROCEDURE — 36415 COLL VENOUS BLD VENIPUNCTURE: CPT

## 2019-06-10 PROCEDURE — 80197 ASSAY OF TACROLIMUS: CPT

## 2019-06-10 PROCEDURE — 85610 PROTHROMBIN TIME: CPT

## 2019-06-10 PROCEDURE — 85025 COMPLETE CBC W/AUTO DIFF WBC: CPT

## 2019-06-10 NOTE — Clinical Note
8/3/19 Patient: Lisa Colon YOB: 1950 Date of Visit: 6/10/2019 Garrett Ambriz MD 
[facility] [place] [address] VIA Facsimile: [phone] Dear Garrett Ambriz MD, Thank you for referring Ms. Mendy Nuno to [address] [place] [address],[address] for evaluation. My notes for this consultation are attached. If you have questions, please do not hesitate to call me. I look forward to following your patient along with you. Sincerely, Gypsy Sanchez MD

## 2019-06-10 NOTE — PROGRESS NOTES
[address] [place] [address], Mery ORDAZ Micael Asper, MD Sarita Ireland, ANNEMARIE Ulrich, ACNP-BC     Nida Delacruz, PCSUPRIYA-SHERRY Rizvi-JOSH Almanzar, [facility]       Sandeep Zimmerman 136    at [address], [address] [place] [address], [facility] 22.    [phone]    FAX: [address] [place] [address], [address]    [phone]    FAX: [phone]       Patient Care Team:  Malu Grover MD as PCP - General (Family Practice)  Megha Hinojosa MD (Orthopedic Surgery)  Roni Dolan MD (Gastroenterology)      Problem List  Date Reviewed: 6/10/2019          Codes Class Noted    GAVE (gastric antral vascular ectasia) ICD-10-CM: K31.819  ICD-9-CM: 537.82  6/10/2019        Severe obesity ([facility] ICD-10-CM: E66.01  ICD-9-CM: 278.01  4/17/2019        Autoimmune hepatitis ([facility] ICD-10-CM: K75.4  ICD-9-CM: 571.42  4/17/2019        Thrombocytopenia ([facility] ICD-10-CM: D69.6  ICD-9-CM: 287.5  4/17/2019        Cirrhosis ([facility] ICD-10-CM: K74.60  ICD-9-CM: 571.5  4/17/2019              Army Reagan returns to the [address] for management 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 The active problem list, all pertinent past medical history, medications, liver histology, radiologic findings and laboratory findings related to the liver disorder were reviewed with the patient. The patient is a 78 y.o.   female who was found to have marked elevation ion liver enzymes into the 500-800 range in 2017     The patient was found to have cirrhosis in 2019 when she developed lower extremity edema, thrombocytopenia and an ultrasound suggested cirrhosis    Since the last office appointment the patient has:  Had hematemesis and was hospitalized at [address] [place] [address]. EGD demonstrated GAVE and small esophageal varices. GAVE was treated with APC    The patient has developed the following complications of cirrhosis: edema, esophageal varices, GAVE    The patient notes swelling of the lower extremities,     The patient has not experienced fatigue, pain in the right side over the liver, problems concentrating, swelling of the abdomen, hematemesis, hematochezia. The patient completes all daily activities without any functional limitations. ASSESSMENT AND PLAN:  Autoimmune Hepatitis   The diagnosis was based upon serology, liver biopsy   A liver biopsy performed in 8/2017 demonstrated moderate to severe inflammation with interface hepatitis, with piecemeal necrosis and stage 2-3 fibrosis. Elastography performed in 4/2019 suggests Cirrhosis. Liver transaminases are elevated. ALP is elevated. Liver function is normal.  Serum albumin is depressed. The platelet count is normal.      The patient was started on TAC 2 mg BID in 5/2019. The liver enzymes are comng down from the 100 range to the 60s. Serum albumin has increased from 2.5 to 3 gm. Will continue the current dose of TAC. Cirrhosis  It is not clear if the patient has cirrhosis. The liver biopsy showed stage 2-3 fibrosis  The elastography suggested cirrhosis  There is thrombocytopenia. I will request that the liver biopsy slides be sent to me for review. Acute kidney injury  The patient has developed an elevation in serum creatinine   This is most likely from TAC. Will repeat the BMP  Will encourage the patient to drink plentyu of fluids. 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 The patient is HCV RNA undetectable and does not have HCV.     Lower extremity edema  Edema has resolved with current dose of diuretics. Will continue the current dose of diuretics at step 1. Screening for Esophageal varices   The patient has not had an EGD to screen for varices. Will schedule for EGD to assess for varices and need for banding. Hepatic encephalopathy   Overt HE has not developed to date. There is no need for treatment with lactulose and/or Xifaxan at this time. There is no need to restrict dietary protein at this time. Thrombocytopenia   The 2 most recent platelet counts are either normal or low. This may be secondary to cirrhosis or ITP. Can check anti-platelet antibodies. Screening for Hepatocellular Carcinoma  HCC screening has recently been performed and does not suggest [address] The next liver imaging study will be performed in 8/2019. Risk of elective surgery in a patient with chronic liver disease and cirrhosis  The patient has either advanced fibrosis or cirrhosis Child class A and MELD of under 10. The risk of complications including hepatic decompensation is about 20-30%. Operative mortality risk is under 5%. The ultimate decision regarding whether or not to proceed with surgery will depend upon conversations between the surgeon and patient/and/or family and careful assessment of the risk and benefit of the surgical procedure. I would suggest holding elective surgery until after the immune liver disease is under control and the lvier enzymes are normal.    Treatment of other medical problems in patients with chronic liver disease  There are no contraindications for the patient to take most medications that are necessary for treatment of other medical issues. Counseling for alcohol in patients with chronic liver disease  The patient was counseled regarding alcohol consumption and the effect of alcohol on chronic liver disease. Osteoporosis  The risk of osteoporosis is increased in patients with cirrhosis.     DEXA bone density to assess for osteoporosis has not been performed. This should be ordered by the patients primary care physician. Vaccinations   Vaccination for viral hepatitis B is not needed. The patient has serologic evidence of prior exposure or vaccination with immunity. The need for vaccination against viral hepatitis A will be assessed with serologic and instituted as appropriate. Routine vaccinations against other bacterial and viral agents can be performed as indicated. Annual flu vaccination should be administered if indicated. ALLERGIES  No Known Allergies    MEDICATIONS  Current Outpatient Medications   Medication Sig    tacrolimus (PROGRAF) 1 mg capsule Take 2 Caps by mouth every twelve (12) hours.  omeprazole (PRILOSEC OTC) 20 mg tablet Take 1 Cap by mouth.  albuterol (PROVENTIL HFA) 90 mcg/actuation inhaler Take 2 Puffs by inhalation.  amLODIPine (NORVASC) 10 mg tablet Take 10 mg by mouth.  aspirin delayed-release 81 mg tablet Take 81 mg by mouth.  budesonide-formoterol (SYMBICORT) 160-4.5 mcg/actuation HFAA Take 2 Puffs by inhalation.  butalbital-acetaminophen-caffeine (FIORICET, ESGIC) -40 mg per tablet TAKE 1 TABLET BY MOUTH EVERY 4 HOURS AS NEEDED FOR HEADACHE PAIN    cyclobenzaprine (FLEXERIL) 10 mg tablet Take 1 Tab by mouth.  ferrous fumarate 324 mg (106 mg iron) tab Take 324 mg by mouth.  furosemide (LASIX) 40 mg tablet Take 40 mg by mouth.  glipiZIDE SR (GLUCOTROL XL) 5 mg CR tablet Take 5 mg by mouth.  hydroCHLOROthiazide (HYDRODIURIL) 25 mg tablet Take 1 Tab by mouth.  losartan (COZAAR) 100 mg tablet Take 100 mg by mouth.  minoxidil (LONITEN) 2.5 mg tablet Take 2.5 mg by mouth.  omeprazole (PRILOSEC) 40 mg capsule Take 40 mg by mouth.  oxyCODONE IR (ROXICODONE) 5 mg immediate release tablet TAKE 1 TABLET BY MOUTH EVERY 12 HOURS AS NEEDED FOR PAIN    oxyCODONE-acetaminophen (PERCOCET) 5-325 mg per tablet Take 1 Tab by mouth.     spironolactone (ALDACTONE) 100 mg tablet Take 1 Tab by mouth daily. No current facility-administered medications for this visit. SYSTEM REVIEW NOT RELATED TO LIVER DISEASE OR REVIEWED ABOVE:  Constitution systems: Negative for fever, chills, weight gain, weight loss. Eyes: Negative for visual changes. ENT: Negative for sore throat, painful swallowing. Respiratory: Negative for cough, hemoptysis, SOB. Cardiology: Swelling of the legs. GI:  Negative for constipation or diarrhea. : Negative for urinary frequency, dysuria, hematuria, nocturia. Skin: Negative for rash. Hematology: Negative for easy bruising, blood clots. Musculo-skelatal: Negative for back pain, muscle pain, weakness. Neurologic: Negative for headaches, dizziness, vertigo, memory problems not related to HE. Psychology: Negative for anxiety, depression. FAMILY HISTORY:  The father  of MI. The mother  of MI. The following family members have liver disease: a brother    SOCIAL HISTORY:  The patient is . The patient has 2 children, and 8 grandchildren. The patient stopped using tobacco products in . The patient has never consumed significant amounts of alcohol. The patient used to work as as a  at [employer]. The patient retired in . PHYSICAL EXAMINATION:  Visit Vitals  /87   Pulse (!) 112   Temp 98.7 °F (37.1 °C) (Tympanic)   Ht 5' 3\" (1.6 m)   Wt 208 lb 8 oz (94.6 kg)   SpO2 99%   BMI 36.93 kg/m²     General: No acute distress. Eyes: Sclera anicteric. ENT: No oral lesions. Thyroid normal.  Nodes: No adenopathy. Skin: No spider angiomata. No jaundice. No palmar erythema. Respiratory: Lungs clear to auscultation. Cardiovascular: Regular heart rate. No murmurs. No JVD. Abdomen: Soft non-tender. Liver size normal to percussion/palpation. Spleen not palpable. No obvious ascites. Extremities: No edema. No muscle wasting.   No gross arthritic changes. Neurologic: Alert and oriented. Cranial nerves grossly intact. No asterixis. LABORATORY STUDIES:  Liver [place] of [address] [place] [address] & Units 6/10/2019 5/6/2019   WBC 4.6 - 13.2 K/uL 6.1 6.8   ANC 1.8 - 8.0 K/UL 2.8 3.5   HGB 12.0 - 16.0 g/dL 10.0 (L) 9.7 (L)    - 420 K/uL 216 240   INR 0.8 - 1.2   1.2    AST 15 - 37 U/L 60 (H) 99 (H)   ALT 13 - 56 U/L 68 (H) 88 (H)   Alk Phos 45 - 117 U/L 168 (H) 170 (H)   Bili, Total 0.2 - 1.0 MG/DL 0.7 0.5   Bili, Direct 0.0 - 0.2 MG/DL 0.3 (H)    Albumin 3.4 - 5.0 g/dL 3.0 (L) 2.6 (L)   BUN 7.0 - 18 MG/DL 14 11   Creat 0.6 - 1.3 MG/DL 1.40 (H) 0.96   Na 136 - 145 mmol/L 133 (L) 138   K 3.5 - 5.5 mmol/L 4.1 4.6   Cl 100 - 111 mmol/L 103 109 (H)   CO2 21 - 32 mmol/L 23 25   Glucose 74 - 99 mg/dL 273 (H) 133 (H)   Magnesium 1.6 - 2.6 mg/dL 1.4 (L)      Liver Virology and Transplant Immune Suppression Latest Ref Rng & Units 6/10/2019   Tacrolimus Level 2.0 - 20.0 ng/mL 9.2     Liver Virology and Transplant Immune Suppression Latest Ref Rng & Units 5/6/2019   Tacrolimus Level 2.0 - 20.0 ng/mL None detected       SEROLOGIES:  6/2017. Anti-HBsurface positive  9/2017. HBsAntigen negative, anti-HCV positive, HCV RNA undetectable, VICKI positive, ASMA positive, ANCA positive, AMA negative, RF positive, alpha-1-antitrypsin 150    LIVER HISTOLOGY:  8/2017. Active hepatitis with portal and lobular inflammation, interface hepatitis, PMN and stage 2-3 septal fibrosis. Biopsy slides not reviewed by MLS.  4/2019. Sheer wave elastography performed at THE [facility]. Range 10.09- 17.05 kPa, Mean 14.07 kPa, Median 14.9 kPa, Standard deviation 2.41 kPa. The results suggested a fibrosis level of F4. ENDOSCOPIC PROCEDURES:  Not available or performed    RADIOLOGY:  1/2019. Ultrasound of liver. Echogenic consistent with chronic liver disease. No liver mass lesions. No dilated bile ducts. No ascites.     OTHER TESTING:  Not available or performed    FOLLOW-UP:  All of the issues listed above in the Assessment and Plan were discussed with the patient. All questions were answered. The patient expressed a clear understanding of the above. [address] [address] in 4 weeks to assess resposne to treatment.       Dalton Garner MD  [address] [address], [address] [place] [address], [address] [place] [address] - [address]  12 [facility]

## 2019-06-12 LAB — TACROLIMUS BLD-MCNC: 9.2 NG/ML (ref 2–20)

## 2019-07-31 ENCOUNTER — HOSPITAL ENCOUNTER (OUTPATIENT)
Dept: LAB | Age: 69
Discharge: HOME | End: 2019-07-31

## 2019-07-31 LAB
ANION GAP SERPL CALC-SCNC: 6 MMOL/L (ref 3–18)
BUN SERPL-MCNC: 10 MG/DL (ref 7–18)
BUN/CREAT SERPL: 11 (ref 12–20)
CALCIUM SERPL-MCNC: 8.7 MG/DL (ref 8.5–10.1)
CHLORIDE SERPL-SCNC: 110 MMOL/L (ref 100–111)
CO2 SERPL-SCNC: 25 MMOL/L (ref 21–32)
CREAT SERPL-MCNC: 0.92 MG/DL (ref 0.6–1.3)
GLUCOSE SERPL-MCNC: 149 MG/DL (ref 74–99)
POTASSIUM SERPL-SCNC: 4.7 MMOL/L (ref 3.5–5.5)
SODIUM SERPL-SCNC: 141 MMOL/L (ref 136–145)

## 2019-07-31 PROCEDURE — 80048 BASIC METABOLIC PNL TOTAL CA: CPT

## 2019-08-21 ENCOUNTER — OFFICE VISIT (OUTPATIENT)
Dept: HEMATOLOGY | Age: 69
End: 2019-08-21

## 2019-08-21 ENCOUNTER — HOSPITAL ENCOUNTER (OUTPATIENT)
Dept: LAB | Age: 69
Discharge: HOME | End: 2019-08-21
Payer: MEDICARE

## 2019-08-21 VITALS
SYSTOLIC BLOOD PRESSURE: 132 MMHG | HEIGHT: 63 IN | BODY MASS INDEX: 34.97 KG/M2 | TEMPERATURE: 99.5 F | DIASTOLIC BLOOD PRESSURE: 77 MMHG | HEART RATE: 95 BPM | WEIGHT: 197.38 LBS

## 2019-08-21 DIAGNOSIS — K75.4 AUTOIMMUNE HEPATITIS (HCC): Primary | ICD-10-CM

## 2019-08-21 DIAGNOSIS — K75.4 AUTOIMMUNE HEPATITIS (HCC): ICD-10-CM

## 2019-08-21 LAB
ALBUMIN SERPL-MCNC: 3.2 G/DL (ref 3.4–5)
ALBUMIN/GLOB SERPL: 0.7 {RATIO} (ref 0.8–1.7)
ALP SERPL-CCNC: 139 U/L (ref 45–117)
ALT SERPL-CCNC: 61 U/L (ref 13–56)
ANION GAP SERPL CALC-SCNC: 9 MMOL/L (ref 3–18)
AST SERPL-CCNC: 68 U/L (ref 10–38)
BASOPHILS # BLD: 0 K/UL (ref 0–0.1)
BASOPHILS NFR BLD: 0 % (ref 0–2)
BILIRUB DIRECT SERPL-MCNC: 0.3 MG/DL (ref 0–0.2)
BILIRUB SERPL-MCNC: 0.7 MG/DL (ref 0.2–1)
BUN SERPL-MCNC: 15 MG/DL (ref 7–18)
BUN/CREAT SERPL: 17 (ref 12–20)
CALCIUM SERPL-MCNC: 9 MG/DL (ref 8.5–10.1)
CHLORIDE SERPL-SCNC: 109 MMOL/L (ref 100–111)
CO2 SERPL-SCNC: 24 MMOL/L (ref 21–32)
CREAT SERPL-MCNC: 0.9 MG/DL (ref 0.6–1.3)
DIFFERENTIAL METHOD BLD: ABNORMAL
EOSINOPHIL # BLD: 0.1 K/UL (ref 0–0.4)
EOSINOPHIL NFR BLD: 2 % (ref 0–5)
ERYTHROCYTE [DISTWIDTH] IN BLOOD BY AUTOMATED COUNT: 18.4 % (ref 11.6–14.5)
GLOBULIN SER CALC-MCNC: 4.3 G/DL (ref 2–4)
GLUCOSE SERPL-MCNC: 127 MG/DL (ref 74–99)
HCT VFR BLD AUTO: 31.6 % (ref 35–45)
HGB BLD-MCNC: 10.2 G/DL (ref 12–16)
INR PPP: 1.2 (ref 0.8–1.2)
LYMPHOCYTES # BLD: 1.7 K/UL (ref 0.9–3.6)
LYMPHOCYTES NFR BLD: 31 % (ref 21–52)
MCH RBC QN AUTO: 25.3 PG (ref 24–34)
MCHC RBC AUTO-ENTMCNC: 32.3 G/DL (ref 31–37)
MCV RBC AUTO: 78.4 FL (ref 74–97)
MONOCYTES # BLD: 1.2 K/UL (ref 0.05–1.2)
MONOCYTES NFR BLD: 21 % (ref 3–10)
NEUTS SEG # BLD: 2.6 K/UL (ref 1.8–8)
NEUTS SEG NFR BLD: 46 % (ref 40–73)
PLATELET # BLD AUTO: 170 K/UL (ref 135–420)
PMV BLD AUTO: 10.1 FL (ref 9.2–11.8)
POTASSIUM SERPL-SCNC: 4.1 MMOL/L (ref 3.5–5.5)
PROT SERPL-MCNC: 7.5 G/DL (ref 6.4–8.2)
PROTHROMBIN TIME: 15.2 SEC (ref 11.5–15.2)
RBC # BLD AUTO: 4.03 M/UL (ref 4.2–5.3)
SODIUM SERPL-SCNC: 142 MMOL/L (ref 136–145)
WBC # BLD AUTO: 5.6 K/UL (ref 4.6–13.2)

## 2019-08-21 PROCEDURE — 80197 ASSAY OF TACROLIMUS: CPT

## 2019-08-21 PROCEDURE — 36415 COLL VENOUS BLD VENIPUNCTURE: CPT

## 2019-08-21 PROCEDURE — 80048 BASIC METABOLIC PNL TOTAL CA: CPT

## 2019-08-21 PROCEDURE — 80076 HEPATIC FUNCTION PANEL: CPT

## 2019-08-21 PROCEDURE — 85610 PROTHROMBIN TIME: CPT

## 2019-08-21 PROCEDURE — 85025 COMPLETE CBC W/AUTO DIFF WBC: CPT

## 2019-08-21 NOTE — PROGRESS NOTES
[address] [facility], Jf ORDAZ [address], MD Rexford Carota, PA-C Hildreth Elder, [facility]     Nida Delacruz, [facility]   KEI Carson, [facility]       Sandeep Deputado Carlos De Byrd 136    at [facility]    [address] [facility], [address] [facility], [facility] 22.    [phone]    FAX: [address] [place] [address], [address], [address]    [phone]    FAX: [phone]       Patient Care Team:  Rizwana Mccoy MD as PCP - General (Family Practice)  Netta Banks MD (Orthopedic Surgery)  David Soliman MD (Gastroenterology)      Problem List  Date Reviewed: 8/3/2019          Codes Class Noted    GAVE (gastric antral vascular ectasia) ICD-10-CM: K31.819  ICD-9-CM: 537.82  6/10/2019        Severe obesity ([facility] ICD-10-CM: E66.01  ICD-9-CM: 278.01  4/17/2019        Autoimmune hepatitis ([facility] ICD-10-CM: K75.4  ICD-9-CM: 571.42  4/17/2019        Thrombocytopenia ([facility] ICD-10-CM: D69.6  ICD-9-CM: 287.5  4/17/2019        Cirrhosis ([facility] ICD-10-CM: K74.60  ICD-9-CM: 571.5  4/17/2019                Boy Skinner returns to the [place] [address] for management 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 The active problem list, all pertinent past medical history, medications, liver histology, radiologic findings and laboratory findings related to the liver disorder were reviewed with the patient. The patient is a 78 y.o.   female who was found to have marked elevation ion liver enzymes into the 500-800 range in 2017     The patient was found to have cirrhosis in 2019 when she developed lower extremity edema, thrombocytopenia and an ultrasound suggested cirrhosis    Since the last office appointment the patient has:  Been hospitalized for 5 weeks with DM Coma and BS in the 700s. After hospital discharge she spend 2 weeks in rehab facility  She is still taking TAC for AIH    The patient has developed the following complications of cirrhosis: esophageal varices, GAVE    The patient notes fatigue,     The patient has not experienced pain in the right side over the liver, problems concentrating, swelling of the abdomen, swelling of the lower extremities, hematemesis, hematochezia. The patient has limitations in daily activities due to liver disease and other medical issues. ASSESSMENT AND PLAN:  Autoimmune Hepatitis   The diagnosis was based upon serology, liver biopsy   A liver biopsy performed in 8/2017 demonstrated moderate to severe inflammation with interface hepatitis, with piecemeal necrosis and stage 2-3 fibrosis. Elastography performed in 4/2019 suggests Cirrhosis. Liver transaminases are normal.  ALP is elevated. Liver function is normal.  The platelet count is depressed. The patient was started on TAC 2 mg BID in 5/2019. The liver enzymes are now normal.    Will continue the current dose of TAC. Elevation in ALP  Should perform MRCP to exclude the possibility of PSC. This is most likely due to cirrhosis    Cirrhosis  It is not clear if the patient has cirrhosis. The liver biopsy showed stage 2-3 fibrosis  The elastography suggested cirrhosis  There is thrombocytopenia. I will request that the liver biopsy slides be sent to me for review. Acute kidney injury  The patient has Scr that varies between normal and elevated. This is most likely due to the timing of TAC dosing. 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   The patient is HCV RNA undetectable and does not have HCV.    Lower extremity edema  Edema has resolved with current dose of diuretics. Will continue the current dose of diuretics at step 1. Screening for Esophageal varices   The patient had an EGD at [facility] in 7/2019. This demonstrated small esophageal varices and GAVE    Hepatic encephalopathy   Overt HE has not developed to date. There is no need for treatment with lactulose and/or Xifaxan at this time. There is no need to restrict dietary protein at this time. Thrombocytopenia   The 2 most recent platelet counts are either normal or low. This may be secondary to cirrhosis or ITP. Can check anti-platelet antibodies. Screening for Hepatocellular Carcinoma  HCC screening has recently been performed and does not suggest [address] The next liver imaging study will be performed in 8/2019. Risk of elective surgery in a patient with chronic liver disease and cirrhosis  The patient has either advanced fibrosis or cirrhosis Child class A and MELD of under 10. The risk of complications including hepatic decompensation is about 20-30%. Operative mortality risk is under 5%. The ultimate decision regarding whether or not to proceed with surgery will depend upon conversations between the surgeon and patient/and/or family and careful assessment of the risk and benefit of the surgical procedure. I would suggest holding elective surgery until after the immune liver disease is under control and the lvier enzymes are normal.    Treatment of other medical problems in patients with chronic liver disease  There are no contraindications for the patient to take most medications that are necessary for treatment of other medical issues. Counseling for alcohol in patients with chronic liver disease  The patient was counseled regarding alcohol consumption and the effect of alcohol on chronic liver disease. Osteoporosis  The risk of osteoporosis is increased in patients with cirrhosis.     DEXA bone density to assess for osteoporosis has not been performed. This should be ordered by the patients primary care physician. Vaccinations   Vaccination for viral hepatitis B is not needed. The patient has serologic evidence of prior exposure or vaccination with immunity. The need for vaccination against viral hepatitis A will be assessed with serologic and instituted as appropriate. Routine vaccinations against other bacterial and viral agents can be performed as indicated. Annual flu vaccination should be administered if indicated. ALLERGIES  No Known Allergies    MEDICATIONS  Current Outpatient Medications   Medication Sig    tacrolimus (PROGRAF) 1 mg capsule Take 2 Caps by mouth every twelve (12) hours.  omeprazole (PRILOSEC OTC) 20 mg tablet Take 1 Cap by mouth.  albuterol (PROVENTIL HFA) 90 mcg/actuation inhaler Take 2 Puffs by inhalation.  amLODIPine (NORVASC) 10 mg tablet Take 10 mg by mouth.  budesonide-formoterol (SYMBICORT) 160-4.5 mcg/actuation HFAA Take 2 Puffs by inhalation.  butalbital-acetaminophen-caffeine (FIORICET, ESGIC) -40 mg per tablet TAKE 1 TABLET BY MOUTH EVERY 4 HOURS AS NEEDED FOR HEADACHE PAIN    ferrous fumarate 324 mg (106 mg iron) tab Take 324 mg by mouth.  glipiZIDE SR (GLUCOTROL XL) 5 mg CR tablet Take 5 mg by mouth.  minoxidil (LONITEN) 2.5 mg tablet Take 2.5 mg by mouth.  omeprazole (PRILOSEC) 40 mg capsule Take 40 mg by mouth.  oxyCODONE IR (ROXICODONE) 5 mg immediate release tablet TAKE 1 TABLET BY MOUTH EVERY 12 HOURS AS NEEDED FOR PAIN    oxyCODONE-acetaminophen (PERCOCET) 5-325 mg per tablet Take 1 Tab by mouth.  aspirin delayed-release 81 mg tablet Take 81 mg by mouth.  cyclobenzaprine (FLEXERIL) 10 mg tablet Take 1 Tab by mouth.  furosemide (LASIX) 40 mg tablet Take 40 mg by mouth.  hydroCHLOROthiazide (HYDRODIURIL) 25 mg tablet Take 1 Tab by mouth.  losartan (COZAAR) 100 mg tablet Take 100 mg by mouth.     spironolactone (ALDACTONE) 100 mg tablet Take 1 Tab by mouth daily. No current facility-administered medications for this visit. SYSTEM REVIEW NOT RELATED TO LIVER DISEASE OR REVIEWED ABOVE:  Constitution systems: Negative for fever, chills, weight gain, weight loss. Eyes: Negative for visual changes. ENT: Negative for sore throat, painful swallowing. Respiratory: Negative for cough, hemoptysis, SOB. Cardiology: Swelling of the legs. GI:  Negative for constipation or diarrhea. : Negative for urinary frequency, dysuria, hematuria, nocturia. Skin: Negative for rash. Hematology: Negative for easy bruising, blood clots. Musculo-skelatal: Negative for back pain, muscle pain, weakness. Neurologic: Negative for headaches, dizziness, vertigo, memory problems not related to HE. Psychology: Negative for anxiety, depression. FAMILY HISTORY:  The father  of MI. The mother  of MI. The following family members have liver disease: a brother    SOCIAL HISTORY:  The patient is . The patient has 2 children, and 8 grandchildren. The patient stopped using tobacco products in . The patient has never consumed significant amounts of alcohol. The patient used to work as as a  at [employer]. The patient retired in . PHYSICAL EXAMINATION:  Visit Vitals  /77   Pulse 95   Temp 99.5 °F (37.5 °C) (Tympanic)   Ht 5' 3\" (1.6 m)   Wt 197 lb 6 oz (89.5 kg)   BMI 34.96 kg/m²     General: No acute distress. Eyes: Sclera anicteric. ENT: No oral lesions. Thyroid normal.  Nodes: No adenopathy. Skin: No spider angiomata. No jaundice. No palmar erythema. Respiratory: Lungs clear to auscultation. Cardiovascular: Regular heart rate. No murmurs. No JVD. Abdomen: Soft non-tender. Liver size normal to percussion/palpation. Spleen not palpable. No obvious ascites. Extremities: No edema. No muscle wasting.   No gross arthritic changes. Neurologic: Alert and oriented. Cranial nerves grossly intact. No asterixis. LABORATORY STUDIES:  Liver [place] of [address] Units 8/21/2019   WBC 4.6 - 13.2 K/uL 5.6   ANC 1.8 - 8.0 K/UL 2.6   HGB 12.0 - 16.0 g/dL 10.2 (L)    - 420 K/uL 170   INR 0.8 - 1.2   1.2   AST 10 - 38 U/L 68 (H)   ALT 13 - 56 U/L 61 (H)   Alk Phos 45 - 117 U/L 139 (H)   Bili, Total 0.2 - 1.0 MG/DL 0.7   Bili, Direct 0.0 - 0.2 MG/DL 0.3 (H)   Albumin 3.4 - 5.0 g/dL 3.2 (L)   BUN 7.0 - 18 MG/DL 15   Creat 0.6 - 1.3 MG/DL 0.90   Na 136 - 145 mmol/L 142   K 3.5 - 5.5 mmol/L 4.1   Cl 100 - 111 mmol/L 109   CO2 21 - 32 mmol/L 24   Glucose 74 - 99 mg/dL 127 (H)     Liver Virology and Transplant Immune Suppression Tacrolimus Level   Latest Ref Rng & Units 2.0 - 20.0 ng/mL   8/21/2019 5.0   6/10/2019 9.2     SEROLOGIES:  6/2017. Anti-HBsurface positive  9/2017. HBsAntigen negative, anti-HCV positive, HCV RNA undetectable, VICKI positive, ASMA positive, ANCA positive, AMA negative, RF positive, alpha-1-antitrypsin 150    LIVER HISTOLOGY:  8/2017. Active hepatitis with portal and lobular inflammation, interface hepatitis, PMN and stage 2-3 septal fibrosis. Biopsy slides not reviewed by MLS.  4/2019. Sheer wave elastography performed at THE [facility]. Range 10.09- 17.05 kPa, Mean 14.07 kPa, Median 14.9 kPa, Standard deviation 2.41 kPa. The results suggested a fibrosis level of F4. ENDOSCOPIC PROCEDURES:  EGD by Dr Sofie Mondragon. Small esophageal varices. GAVE. RADIOLOGY:  1/2019. Ultrasound of liver. Echogenic consistent with chronic liver disease. No liver mass lesions. No dilated bile ducts. No ascites. 5/2019. Ultrasound of liver. Echogenic consistent with cirrhosis. No liver mass lesions. No dilated bile ducts. No ascites. OTHER TESTING:  Not available or performed    FOLLOW-UP:  All of the issues listed above in the Assessment and Plan were discussed with the patient.   All questions were answered. The patient expressed a clear understanding of the above. [address] [facility] in 4 weeks for monitoring of tacrolimus level.       Lyndsey Palomino MD  [address]  4 [facility], [address] [place] [address], [address] [place] [address] - [address]  12 [facility]

## 2019-08-23 LAB — TACROLIMUS BLD-MCNC: 5 NG/ML (ref 2–20)

## 2019-10-02 ENCOUNTER — HOSPITAL ENCOUNTER (OUTPATIENT)
Dept: LAB | Age: 69
Discharge: HOME | End: 2019-10-02
Payer: MEDICARE

## 2019-10-02 ENCOUNTER — OFFICE VISIT (OUTPATIENT)
Dept: HEMATOLOGY | Age: 69
End: 2019-10-02

## 2019-10-02 VITALS
HEIGHT: 63 IN | OXYGEN SATURATION: 98 % | SYSTOLIC BLOOD PRESSURE: 166 MMHG | TEMPERATURE: 98.5 F | WEIGHT: 187.06 LBS | DIASTOLIC BLOOD PRESSURE: 91 MMHG | BODY MASS INDEX: 33.14 KG/M2 | HEART RATE: 101 BPM

## 2019-10-02 DIAGNOSIS — K75.4 AUTOIMMUNE HEPATITIS (HCC): Primary | ICD-10-CM

## 2019-10-02 DIAGNOSIS — K75.4 AUTOIMMUNE HEPATITIS (HCC): ICD-10-CM

## 2019-10-02 LAB
ALBUMIN SERPL-MCNC: 3.5 G/DL (ref 3.4–5)
ALBUMIN/GLOB SERPL: 0.8 {RATIO} (ref 0.8–1.7)
ALP SERPL-CCNC: 246 U/L (ref 45–117)
ALT SERPL-CCNC: 56 U/L (ref 13–56)
ANION GAP SERPL CALC-SCNC: 9 MMOL/L (ref 3–18)
AST SERPL-CCNC: 37 U/L (ref 10–38)
BASOPHILS # BLD: 0 K/UL (ref 0–0.1)
BASOPHILS NFR BLD: 0 % (ref 0–2)
BILIRUB DIRECT SERPL-MCNC: 0.2 MG/DL (ref 0–0.2)
BILIRUB SERPL-MCNC: 0.5 MG/DL (ref 0.2–1)
BUN SERPL-MCNC: 20 MG/DL (ref 7–18)
BUN/CREAT SERPL: 14 (ref 12–20)
CALCIUM SERPL-MCNC: 9 MG/DL (ref 8.5–10.1)
CHLORIDE SERPL-SCNC: 106 MMOL/L (ref 100–111)
CO2 SERPL-SCNC: 22 MMOL/L (ref 21–32)
CREAT SERPL-MCNC: 1.41 MG/DL (ref 0.6–1.3)
DIFFERENTIAL METHOD BLD: ABNORMAL
EOSINOPHIL # BLD: 0.1 K/UL (ref 0–0.4)
EOSINOPHIL NFR BLD: 2 % (ref 0–5)
ERYTHROCYTE [DISTWIDTH] IN BLOOD BY AUTOMATED COUNT: 16.4 % (ref 11.6–14.5)
GLOBULIN SER CALC-MCNC: 4.4 G/DL (ref 2–4)
GLUCOSE SERPL-MCNC: 326 MG/DL (ref 74–99)
HCT VFR BLD AUTO: 37.9 % (ref 35–45)
HGB BLD-MCNC: 12.2 G/DL (ref 12–16)
LYMPHOCYTES # BLD: 1.7 K/UL (ref 0.9–3.6)
LYMPHOCYTES NFR BLD: 26 % (ref 21–52)
MCH RBC QN AUTO: 26 PG (ref 24–34)
MCHC RBC AUTO-ENTMCNC: 32.2 G/DL (ref 31–37)
MCV RBC AUTO: 80.6 FL (ref 74–97)
MONOCYTES # BLD: 1 K/UL (ref 0.05–1.2)
MONOCYTES NFR BLD: 15 % (ref 3–10)
NEUTS SEG # BLD: 3.8 K/UL (ref 1.8–8)
NEUTS SEG NFR BLD: 57 % (ref 40–73)
PLATELET # BLD AUTO: 125 K/UL (ref 135–420)
PMV BLD AUTO: 10.5 FL (ref 9.2–11.8)
POTASSIUM SERPL-SCNC: 4.8 MMOL/L (ref 3.5–5.5)
PROT SERPL-MCNC: 7.9 G/DL (ref 6.4–8.2)
RBC # BLD AUTO: 4.7 M/UL (ref 4.2–5.3)
SODIUM SERPL-SCNC: 137 MMOL/L (ref 136–145)
WBC # BLD AUTO: 6.6 K/UL (ref 4.6–13.2)

## 2019-10-02 PROCEDURE — 85025 COMPLETE CBC W/AUTO DIFF WBC: CPT

## 2019-10-02 PROCEDURE — 36415 COLL VENOUS BLD VENIPUNCTURE: CPT

## 2019-10-02 PROCEDURE — 80048 BASIC METABOLIC PNL TOTAL CA: CPT

## 2019-10-02 PROCEDURE — 80076 HEPATIC FUNCTION PANEL: CPT

## 2019-10-02 PROCEDURE — 80197 ASSAY OF TACROLIMUS: CPT

## 2019-10-02 NOTE — Clinical Note
11/23/19 Patient: Jamal Ribeiro YOB: 1950 Date of Visit: 10/2/2019 Rebecca Mauro MD 
[facility] [place] [address] VIA Facsimile: [phone] Dear Rebecca Mauro MD, Thank you for referring Ms. Vamsi Umana to [address] [place] [address],[address] for evaluation. My notes for this consultation are attached. If you have questions, please do not hesitate to call me. I look forward to following your patient along with you. Sincerely, Sada Hernandez MD

## 2019-10-02 NOTE — PROGRESS NOTES
[address] [facility], MD, Roberto Carlos Barrera MD Sandra Donalds, PA-C Zola Bleacher, [facility]     Nida Delacruz, [facility]   Ellis Do DERIK-JOSH Raines, [facility]       Sandeep Gonzalez [address]    at [address], [facility] Wes Marcus  22.    [phone]    FAX: [address] [place] [address], [address]    [phone]    FAX: [phone]       Patient Care Team:  Karely Parrish MD as PCP - General (Family Practice)  Mora Ortez MD (Orthopedic Surgery)  Emily Yi MD (Gastroenterology)  Aaliyah Ortez MD (Internal Medicine)      Problem List  Date Reviewed: 11/23/2019          Codes Class Noted    GAVE (gastric antral vascular ectasia) ICD-10-CM: K31.819  ICD-9-CM: 537.82  6/10/2019        Severe obesity ([facility] [address] ICD-10-CM: E66.01  ICD-9-CM: 278.01  4/17/2019        Autoimmune hepatitis ([facility] [address] ICD-10-CM: K75.4  ICD-9-CM: 571.42  4/17/2019        Thrombocytopenia ([facility] [address] ICD-10-CM: D69.6  ICD-9-CM: 287.5  4/17/2019        Cirrhosis ([facility] [address] ICD-10-CM: K74.60  ICD-9-CM: 571.5  4/17/2019                Felicita Tilley returns to the [address] for management 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 The active problem list, all pertinent past medical history, medications, liver histology, radiologic findings and laboratory findings related to the liver disorder were reviewed with the patient. The patient is a 78 y.o.   female who was found to have marked elevation ion liver enzymes into the 500-800 range in 2017     The patient was found to have cirrhosis in 2019 when she developed lower extremity edema, thrombocytopenia and an ultrasound suggested cirrhosis    The patient has developed the following complications of cirrhosis: esophageal varices, GAVE    The patient notes fatigue,     The patient has not experienced pain in the right side over the liver, problems concentrating, swelling of the abdomen, swelling of the lower extremities, hematemesis, hematochezia. The patient has limitations in daily activities due to liver disease and other medical issues. 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 The diagnosis of cirrhosis is based upon elastography, imaging, laboratory studies, complications of cirrhosis. Autoimmune Hepatitis   The diagnosis was based upon serology, liver biopsy   A liver biopsy performed in 8/2017 demonstrated moderate to severe inflammation with interface hepatitis, with piecemeal necrosis and stage 2-3 fibrosis. Elastography performed in 4/2019 suggests Cirrhosis. Liver transaminases are normal.  ALP is elevated. Liver function is normal.  The platelet count is depressed. The patient is taking TAC 2 mg BID since 5/2019. The liver enzymes are now normal.    Can reduce TAC to 1 mg BID because of intermittent elevation in Scr and normal liver enzymes. Should not have to add cellcept. Elevation in ALP  Should perform MRCP to exclude the possibility of PSC. This is most likely due to cirrhosis    Acute kidney injury  The patient has Scr that varies between normal and elevated. This is most likely due to the timing of TAC dosing. 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 The patient is HCV RNA undetectable and does not have HCV. Lower extremity edema  Edema has resolved with current dose of diuretics.   Will continue the current dose of diuretics at step 1.    Screening for Esophageal varices   The patient had an EGD at [facility] in 7/2019. This demonstrated small esophageal varices and GAVE    Hepatic encephalopathy   Overt HE has not developed to date. There is no need for treatment with lactulose and/or Xifaxan at this time. There is no need to restrict dietary protein at this time. Thrombocytopenia   The most recent platelet counts is 983. This may be secondary to cirrhosis or ITP. Can check anti-platelet antibodies. Screening for Hepatocellular Carcinoma  HCC screening has recently been performed and does not suggest [facility] [address] The next liver imaging study will be performed in 8/2019. Risk of elective surgery in a patient with chronic liver disease and cirrhosis  The patient has either advanced fibrosis or cirrhosis Child class A and MELD of under 10. The risk of complications including hepatic decompensation is about 20-30%. Operative mortality risk is under 5%. The ultimate decision regarding whether or not to proceed with surgery will depend upon conversations between the surgeon and patient/and/or family and careful assessment of the risk and benefit of the surgical procedure. I would suggest holding elective surgery until after the immune liver disease is under control and the lvier enzymes are normal.    Treatment of other medical problems in patients with chronic liver disease  There are no contraindications for the patient to take most medications that are necessary for treatment of other medical issues. Counseling for alcohol in patients with chronic liver disease  The patient was counseled regarding alcohol consumption and the effect of alcohol on chronic liver disease. Osteoporosis  The risk of osteoporosis is increased in patients with cirrhosis. DEXA bone density to assess for osteoporosis has not been performed. This should be ordered by the patients primary care physician.     Vaccinations   Vaccination for viral hepatitis B is not needed. The patient has serologic evidence of prior exposure or vaccination with immunity. The need for vaccination against viral hepatitis A will be assessed with serologic and instituted as appropriate. Routine vaccinations against other bacterial and viral agents can be performed as indicated. Annual flu vaccination should be administered if indicated. ALLERGIES  No Known Allergies    MEDICATIONS  Current Outpatient Medications   Medication Sig    tacrolimus (PROGRAF) 1 mg capsule Take 2 Caps by mouth every twelve (12) hours.  omeprazole (PRILOSEC OTC) 20 mg tablet Take 1 Cap by mouth.  albuterol (PROVENTIL HFA) 90 mcg/actuation inhaler Take 2 Puffs by inhalation.  amLODIPine (NORVASC) 10 mg tablet Take 10 mg by mouth.  aspirin delayed-release 81 mg tablet Take 81 mg by mouth.  budesonide-formoterol (SYMBICORT) 160-4.5 mcg/actuation HFAA Take 2 Puffs by inhalation.  butalbital-acetaminophen-caffeine (FIORICET, ESGIC) -40 mg per tablet TAKE 1 TABLET BY MOUTH EVERY 4 HOURS AS NEEDED FOR HEADACHE PAIN    cyclobenzaprine (FLEXERIL) 10 mg tablet Take 1 Tab by mouth.  ferrous fumarate 324 mg (106 mg iron) tab Take 324 mg by mouth.  furosemide (LASIX) 40 mg tablet Take 40 mg by mouth.  glipiZIDE SR (GLUCOTROL XL) 5 mg CR tablet Take 5 mg by mouth.  hydroCHLOROthiazide (HYDRODIURIL) 25 mg tablet Take 1 Tab by mouth.  losartan (COZAAR) 100 mg tablet Take 100 mg by mouth.  minoxidil (LONITEN) 2.5 mg tablet Take 2.5 mg by mouth.  omeprazole (PRILOSEC) 40 mg capsule Take 40 mg by mouth.  oxyCODONE IR (ROXICODONE) 5 mg immediate release tablet TAKE 1 TABLET BY MOUTH EVERY 12 HOURS AS NEEDED FOR PAIN    oxyCODONE-acetaminophen (PERCOCET) 5-325 mg per tablet Take 1 Tab by mouth.  spironolactone (ALDACTONE) 100 mg tablet Take 1 Tab by mouth daily. No current facility-administered medications for this visit.         SYSTEM REVIEW NOT RELATED TO LIVER DISEASE OR REVIEWED ABOVE:  Constitution systems: Negative for fever, chills, weight gain, weight loss. Eyes: Negative for visual changes. ENT: Negative for sore throat, painful swallowing. Respiratory: Negative for cough, hemoptysis, SOB. Cardiology: Swelling of the legs. GI:  Negative for constipation or diarrhea. : Negative for urinary frequency, dysuria, hematuria, nocturia. Skin: Negative for rash. Hematology: Negative for easy bruising, blood clots. Musculo-skelatal: Negative for back pain, muscle pain, weakness. Neurologic: Negative for headaches, dizziness, vertigo, memory problems not related to HE. Psychology: Negative for anxiety, depression. FAMILY HISTORY:  The father  of MI. The mother  of MI. The following family members have liver disease: a brother    SOCIAL HISTORY:  The patient is . The patient has 2 children, and 8 grandchildren. The patient stopped using tobacco products in . The patient has never consumed significant amounts of alcohol. The patient used to work as as a  at [employer]. The patient retired in . PHYSICAL EXAMINATION:  Visit Vitals  BP (!) 166/91   Pulse (!) 101   Temp 98.5 °F (36.9 °C) (Tympanic)   Ht 5' 3\" (1.6 m)   Wt 187 lb 1 oz (84.9 kg)   SpO2 98%   BMI 33.14 kg/m²     General: No acute distress. Eyes: Sclera anicteric. ENT: No oral lesions. Thyroid normal.  Nodes: No adenopathy. Skin: No spider angiomata. No jaundice. No palmar erythema. Respiratory: Lungs clear to auscultation. Cardiovascular: Regular heart rate. No murmurs. No JVD. Abdomen: Soft non-tender. Liver size normal to percussion/palpation. Spleen not palpable. No obvious ascites. Extremities: No edema. No muscle wasting. No gross arthritic changes. Neurologic: Alert and oriented. Cranial nerves grossly intact. No asterixis.     LABORATORY STUDIES:  [facility] [place] of [address] [place] [address] Ref Rng & Units 10/2/2019 8/21/2019   WBC 4.6 - 13.2 K/uL 6.6 5.6   ANC 1.8 - 8.0 K/UL 3.8 2.6   HGB 12.0 - 16.0 g/dL 12.2 10.2 (L)    - 420 K/uL 125 (L) 170   INR 0.8 - 1.2    1.2   AST 10 - 38 U/L 37 68 (H)   ALT 13 - 56 U/L 56 61 (H)   Alk Phos 45 - 117 U/L 246 (H) 139 (H)   Bili, Total 0.2 - 1.0 MG/DL 0.5 0.7   Bili, Direct 0.0 - 0.2 MG/DL 0.2 0.3 (H)   Albumin 3.4 - 5.0 g/dL 3.5 3.2 (L)   BUN 7.0 - 18 MG/DL 20 (H) 15   Creat 0.6 - 1.3 MG/DL 1.41 (H) 0.90   Na 136 - 145 mmol/L 137 142   K 3.5 - 5.5 mmol/L 4.8 4.1   Cl 100 - 111 mmol/L 106 109   CO2 21 - 32 mmol/L 22 24   Glucose 74 - 99 mg/dL 326 (H) 127 (H)     Liver Virology and Transplant Immune Suppression Tacrolimus Level   Latest Ref Rng & Units 2.0 - 20.0 ng/mL   10/2/2019 8.8   8/21/2019 5.0   6/10/2019 9.2       SEROLOGIES:  6/2017. Anti-HBsurface positive  9/2017. HBsAntigen negative, anti-HCV positive, HCV RNA undetectable, VICKI positive, ASMA positive, ANCA positive, AMA negative, RF positive, alpha-1-antitrypsin 150    LIVER HISTOLOGY:  8/2017. Active hepatitis with portal and lobular inflammation, interface hepatitis, PMN and stage 2-3 septal fibrosis. Biopsy slides not reviewed by MLS.  4/2019. Sheer wave elastography performed at [facility]. Range 10.09- 17.05 kPa, Mean 14.07 kPa, Median 14.9 kPa, Standard deviation 2.41 kPa. The results suggested a fibrosis level of F4. ENDOSCOPIC PROCEDURES:  7/2019. EGD by Dr Shasha Rivas. Small esophageal varices. GAVE. RADIOLOGY:  1/2019. Ultrasound of liver. Echogenic consistent with chronic liver disease. No liver mass lesions. No dilated bile ducts. No ascites. 5/2019. Ultrasound of liver. Echogenic consistent with cirrhosis. No liver mass lesions. No dilated bile ducts. No ascites. OTHER TESTING:  Not available or performed    FOLLOW-UP:  All of the issues listed above in the Assessment and Plan were discussed with the patient. All questions were answered.   The patient expressed a clear understanding of the above.     [facility] 87 in 3 months for monitoring       Jeffery Sinclair MD  [address]  [address] [place], [place] [address]liborio LudwigCrittenton Behavioral Health [facility], [address]  [address] [place] [address]

## 2019-10-04 LAB — TACROLIMUS BLD-MCNC: 8.8 NG/ML (ref 2–20)

## 2020-01-06 ENCOUNTER — OFFICE VISIT (OUTPATIENT)
Dept: HEMATOLOGY | Age: 70
End: 2020-01-06

## 2020-01-06 ENCOUNTER — HOSPITAL ENCOUNTER (OUTPATIENT)
Dept: LAB | Age: 70
Discharge: HOME | End: 2020-01-06
Payer: MEDICARE

## 2020-01-06 VITALS
DIASTOLIC BLOOD PRESSURE: 89 MMHG | RESPIRATION RATE: 18 BRPM | HEART RATE: 105 BPM | HEIGHT: 63 IN | BODY MASS INDEX: 34.55 KG/M2 | SYSTOLIC BLOOD PRESSURE: 138 MMHG | TEMPERATURE: 99 F | OXYGEN SATURATION: 98 % | WEIGHT: 195 LBS

## 2020-01-06 DIAGNOSIS — D59.10 AIHA (AUTOIMMUNE HEMOLYTIC ANEMIA) (HCC): Primary | ICD-10-CM

## 2020-01-06 DIAGNOSIS — K75.4 AUTOIMMUNE HEPATITIS (HCC): ICD-10-CM

## 2020-01-06 LAB
ALBUMIN SERPL-MCNC: 3.4 G/DL (ref 3.4–5)
ALBUMIN/GLOB SERPL: 0.9 {RATIO} (ref 0.8–1.7)
ALP SERPL-CCNC: 189 U/L (ref 45–117)
ALT SERPL-CCNC: 27 U/L (ref 13–56)
ANION GAP SERPL CALC-SCNC: 3 MMOL/L (ref 3–18)
AST SERPL-CCNC: 26 U/L (ref 10–38)
BASOPHILS # BLD: 0 K/UL (ref 0–0.1)
BASOPHILS NFR BLD: 0 % (ref 0–2)
BILIRUB DIRECT SERPL-MCNC: 0.2 MG/DL (ref 0–0.2)
BILIRUB SERPL-MCNC: 0.4 MG/DL (ref 0.2–1)
BUN SERPL-MCNC: 15 MG/DL (ref 7–18)
BUN/CREAT SERPL: 11 (ref 12–20)
CALCIUM SERPL-MCNC: 8.9 MG/DL (ref 8.5–10.1)
CHLORIDE SERPL-SCNC: 111 MMOL/L (ref 100–111)
CO2 SERPL-SCNC: 26 MMOL/L (ref 21–32)
CREAT SERPL-MCNC: 1.36 MG/DL (ref 0.6–1.3)
DIFFERENTIAL METHOD BLD: ABNORMAL
EOSINOPHIL # BLD: 0.1 K/UL (ref 0–0.4)
EOSINOPHIL NFR BLD: 1 % (ref 0–5)
ERYTHROCYTE [DISTWIDTH] IN BLOOD BY AUTOMATED COUNT: 15.8 % (ref 11.6–14.5)
GLOBULIN SER CALC-MCNC: 3.9 G/DL (ref 2–4)
GLUCOSE SERPL-MCNC: 107 MG/DL (ref 74–99)
HCT VFR BLD AUTO: 33.1 % (ref 35–45)
HGB BLD-MCNC: 10.6 G/DL (ref 12–16)
LYMPHOCYTES # BLD: 2 K/UL (ref 0.9–3.6)
LYMPHOCYTES NFR BLD: 30 % (ref 21–52)
MCH RBC QN AUTO: 25.1 PG (ref 24–34)
MCHC RBC AUTO-ENTMCNC: 32 G/DL (ref 31–37)
MCV RBC AUTO: 78.4 FL (ref 74–97)
MONOCYTES # BLD: 1.1 K/UL (ref 0.05–1.2)
MONOCYTES NFR BLD: 15 % (ref 3–10)
NEUTS SEG # BLD: 3.6 K/UL (ref 1.8–8)
NEUTS SEG NFR BLD: 54 % (ref 40–73)
PLATELET # BLD AUTO: 218 K/UL (ref 135–420)
PMV BLD AUTO: 9.9 FL (ref 9.2–11.8)
POTASSIUM SERPL-SCNC: 4.5 MMOL/L (ref 3.5–5.5)
PROT SERPL-MCNC: 7.3 G/DL (ref 6.4–8.2)
RBC # BLD AUTO: 4.22 M/UL (ref 4.2–5.3)
SODIUM SERPL-SCNC: 140 MMOL/L (ref 136–145)
WBC # BLD AUTO: 6.8 K/UL (ref 4.6–13.2)

## 2020-01-06 PROCEDURE — 80076 HEPATIC FUNCTION PANEL: CPT

## 2020-01-06 PROCEDURE — 85025 COMPLETE CBC W/AUTO DIFF WBC: CPT

## 2020-01-06 PROCEDURE — 36415 COLL VENOUS BLD VENIPUNCTURE: CPT

## 2020-01-06 PROCEDURE — 80048 BASIC METABOLIC PNL TOTAL CA: CPT

## 2020-01-06 RX ORDER — INSULIN ASPART 100 [IU]/ML
INJECTION, SUSPENSION SUBCUTANEOUS
COMMUNITY
End: 2022-10-20 | Stop reason: ALTCHOICE

## 2020-01-06 NOTE — PROGRESS NOTES
[address] [facility], Ajith ORDAZ Theone Norway, MD Veleta Spice, ANNEMARIE Segal, [facility]     Nida Delacruz, [facility]   Juancarlos Gonzales DERIK-JOSH Walton [facility]       Sandeep Zavala [facility]    at [address]    [address] [place] [address], [facility] Wes Marcus  22.    [phone]    FAX: [address] [place] [address], [address]    [phone]    FAX: [phone]       Patient Care Team:  Courtney Rivas MD as PCP - General (Family Practice)  Cale Marquis MD (Orthopedic Surgery)  Ashley Dorantes MD (Gastroenterology)  Ricco Hernández MD (Internal Medicine)      Problem List  Date Reviewed: 11/23/2019          Codes Class Noted    GAVE (gastric antral vascular ectasia) ICD-10-CM: K31.819  ICD-9-CM: 537.82  6/10/2019        Severe obesity ([facility] [address] ICD-10-CM: E66.01  ICD-9-CM: 278.01  4/17/2019        Autoimmune hepatitis ([facility] [address] ICD-10-CM: K75.4  ICD-9-CM: 571.42  4/17/2019        Thrombocytopenia ([facility] [address] ICD-10-CM: D69.6  ICD-9-CM: 287.5  4/17/2019        Cirrhosis ([facility] [address] ICD-10-CM: K74.60  ICD-9-CM: 571.5  4/17/2019                Brian Zambrano [facility] the [facility] for management 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 The active problem list, all pertinent past medical history, medications, liver histology, radiologic findings and laboratory findings related to the liver disorder were reviewed with the patient. The patient is a 78 y.o.   female who was found to have marked elevation in liver enzymes into the 500-800 range in 2017     The patient was found to have cirrhosis in 2019 when she developed lower extremity edema, thrombocytopenia and an ultrasound suggested cirrhosis    The patient has developed the following complications of cirrhosis: esophageal varices, GAVE    The patient notes fatigue,     The patient has not experienced pain in the right side over the liver, problems concentrating, swelling of the abdomen, swelling of the lower extremities, hematemesis, hematochezia. The patient has limitations in daily activities due to liver disease and other medical issues. 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 The diagnosis of cirrhosis is based upon elastography, imaging, laboratory studies, complications of cirrhosis. The patient has normal liver function. The CTP is 7. Child class B. The MELD score is 12. Autoimmune Hepatitis   The diagnosis was based upon serology, liver biopsy   A liver biopsy performed in 8/2017 demonstrated moderate to severe inflammation with interface hepatitis, with piecemeal necrosis and stage 2-3 fibrosis. Elastography performed in 4/2019 suggests Cirrhosis. Liver transaminases are normal.  ALP is elevated. Liver function is normal.  The platelet count is normal.    The patient is taking TAC 1 mg BID since 10/2019. At this lower dose the SCr has declined from 1.41 to 1.36 mg.    Elevation in ALP  Should perform MRCP to exclude the possibility of PSC. The MRI was ordered but she never has this scheduled. I have asked her to get this scheduled. The elevation in ALP is most likely due to cirrhosis    Acute kidney injury  The patient has an elevation in SCr to 1.41 mg  THis is now down to 1.36 with reduction in FAC dose to 1 mg BID. ANTI-HCV positive HCV RNA negative  This is either due to exposure to HCV many yers ago with spontaneous resolution or a false positive anti-HCV because of the autoimmune disease. The patient is HCV RNA undetectable and does not have HCV. Lower extremity edema  Edema has resolved with current dose of diuretics.   Will continue the current dose of diuretics at step 1. Screening for Esophageal varices   The patient had an EGD at [facility] in 7/2019. This demonstrated small esophageal varices and GAVE    Hepatic encephalopathy   Overt HE has not developed to date. There is no need for treatment with lactulose and/or Xifaxan at this time. There is no need to restrict dietary protein at this time. Thrombocytopenia   The most recent platelet counts is 427. This may be secondary to cirrhosis or ITP. Can check anti-platelet antibodies. Screening for Hepatocellular Carcinoma  HCC screening has recently been performed and does not suggest [address] The next liver imaging study will be performed in 8/2019. Risk of elective surgery in a patient with chronic liver disease and cirrhosis  The patient has either advanced fibrosis or cirrhosis Child class A and MELD of under 10. The risk of complications including hepatic decompensation is about 20-30%. Operative mortality risk is under 5%. The ultimate decision regarding whether or not to proceed with surgery will depend upon conversations between the surgeon and patient/and/or family and careful assessment of the risk and benefit of the surgical procedure. I would suggest holding elective surgery until after the immune liver disease is under control and the lvier enzymes are normal.    Treatment of other medical problems in patients with chronic liver disease  There are no contraindications for the patient to take most medications that are necessary for treatment of other medical issues. Counseling for alcohol in patients with chronic liver disease  The patient was counseled regarding alcohol consumption and the effect of alcohol on chronic liver disease. Osteoporosis  The risk of osteoporosis is increased in patients with cirrhosis. DEXA bone density to assess for osteoporosis has not been performed.     This should be ordered by the patients primary care physician. Vaccinations   Vaccination for viral hepatitis B is not needed. The patient has serologic evidence of prior exposure or vaccination with immunity. The need for vaccination against viral hepatitis A will be assessed with serologic and instituted as appropriate. Routine vaccinations against other bacterial and viral agents can be performed as indicated. Annual flu vaccination should be administered if indicated. ALLERGIES  Allergies   Allergen Reactions    Adhesive Tape-Silicones Other (comments)    Naproxen Nausea Only    Penicillins Other (comments)    Tramadol Nausea Only    Vicodin [Hydrocodone-Acetaminophen] Hives       MEDICATIONS  Current Outpatient Medications   Medication Sig    insulin aspart protamine/insulin aspart (NOVOLOG MIX 70-30FLEXPEN U-100) 100 unit/mL (70-30) inpn by SubCUTAneous route.  tacrolimus (PROGRAF) 1 mg capsule Take 2 Caps by mouth every twelve (12) hours.  omeprazole (PRILOSEC OTC) 20 mg tablet Take 1 Cap by mouth.  albuterol (PROVENTIL HFA) 90 mcg/actuation inhaler Take 2 Puffs by inhalation.  amLODIPine (NORVASC) 10 mg tablet Take 10 mg by mouth.  budesonide-formoterol (SYMBICORT) 160-4.5 mcg/actuation HFAA Take 2 Puffs by inhalation.  butalbital-acetaminophen-caffeine (FIORICET, ESGIC) -40 mg per tablet TAKE 1 TABLET BY MOUTH EVERY 4 HOURS AS NEEDED FOR HEADACHE PAIN    ferrous fumarate 324 mg (106 mg iron) tab Take 324 mg by mouth.  furosemide (LASIX) 40 mg tablet Take 40 mg by mouth.  glipiZIDE SR (GLUCOTROL XL) 5 mg CR tablet Take 5 mg by mouth.  hydroCHLOROthiazide (HYDRODIURIL) 25 mg tablet Take 1 Tab by mouth.  omeprazole (PRILOSEC) 40 mg capsule Take 40 mg by mouth.  oxyCODONE IR (ROXICODONE) 5 mg immediate release tablet TAKE 1 TABLET BY MOUTH EVERY 12 HOURS AS NEEDED FOR PAIN    oxyCODONE-acetaminophen (PERCOCET) 5-325 mg per tablet Take 1 Tab by mouth.     spironolactone (ALDACTONE) 100 mg tablet Take 1 Tab by mouth daily.  aspirin delayed-release 81 mg tablet Take 81 mg by mouth.  cyclobenzaprine (FLEXERIL) 10 mg tablet Take 1 Tab by mouth.  losartan (COZAAR) 100 mg tablet Take 100 mg by mouth.  minoxidil (LONITEN) 2.5 mg tablet Take 2.5 mg by mouth. No current facility-administered medications for this visit. SYSTEM REVIEW NOT RELATED TO LIVER DISEASE OR REVIEWED ABOVE:  Constitution systems: Negative for fever, chills, weight gain, weight loss. Eyes: Negative for visual changes. ENT: Negative for sore throat, painful swallowing. Respiratory: Negative for cough, hemoptysis, SOB. Cardiology: Swelling of the legs. GI:  Negative for constipation or diarrhea. : Negative for urinary frequency, dysuria, hematuria, nocturia. Skin: Negative for rash. Hematology: Negative for easy bruising, blood clots. Musculo-skelatal: Negative for back pain, muscle pain, weakness. Neurologic: Negative for headaches, dizziness, vertigo, memory problems not related to HE. Psychology: Negative for anxiety, depression. FAMILY HISTORY:  The father  of MI. The mother  of MI. The following family members have liver disease: a brother    SOCIAL HISTORY:  The patient is . The patient has 2 children, and 8 grandchildren. The patient stopped using tobacco products in . The patient has never consumed significant amounts of alcohol. The patient used to work as as a  at [employer]. The patient retired in . PHYSICAL EXAMINATION:  Visit Vitals  /89 (BP 1 Location: Right arm, BP Patient Position: Sitting)   Pulse (!) 105   Temp 99 °F (37.2 °C) (Tympanic)   Resp 18   Ht 5' 3\" (1.6 m)   Wt 195 lb (88.5 kg)   SpO2 98%   BMI 34.54 kg/m²     General: No acute distress. Eyes: Sclera anicteric. ENT: No oral lesions. Thyroid normal.  Nodes: No adenopathy. Skin: No spider angiomata. No jaundice. No palmar erythema.   Respiratory: Lungs clear to auscultation. Cardiovascular: Regular heart rate. No murmurs. No JVD. Abdomen: Soft non-tender. Liver size normal to percussion/palpation. Spleen not palpable. No obvious ascites. Extremities: No edema. No muscle wasting. No gross arthritic changes. Neurologic: Alert and oriented. Cranial nerves grossly intact. No asterixis. LABORATORY STUDIES:  [place] [address] Units 1/6/2020 10/2/2019   WBC 4.6 - 13.2 K/uL 6.8 6.6   ANC 1.8 - 8.0 K/UL 3.6 3.8   HGB 12.0 - 16.0 g/dL 10.6 (L) 12.2    - 420 K/uL 218 125 (L)   INR 0.8 - 1.2       AST 10 - 38 U/L 26 37   ALT 13 - 56 U/L 27 56   Alk Phos 45 - 117 U/L 189 (H) 246 (H)   Bili, Total 0.2 - 1.0 MG/DL 0.4 0.5   Bili, Direct 0.0 - 0.2 MG/DL 0.2 0.2   Albumin 3.4 - 5.0 g/dL 3.4 3.5   BUN 7.0 - 18 MG/DL 15 20 (H)   Creat 0.6 - 1.3 MG/DL 1.36 (H) 1.41 (H)   Na 136 - 145 mmol/L 140 137   K 3.5 - 5.5 mmol/L 4.5 4.8   Cl 100 - 111 mmol/L 111 106   CO2 21 - 32 mmol/L 26 22   Glucose 74 - 99 mg/dL 107 (H) 326 (H)     Liver Virology and Transplant Immune Suppression Tacrolimus Level   Latest Ref Rng & Units 2.0 - 20.0 ng/mL   10/2/2019 8.8   8/21/2019 5.0   6/10/2019 9.2       SEROLOGIES:  6/2017. Anti-HBsurface positive  9/2017. HBsAntigen negative, anti-HCV positive, HCV RNA undetectable, VICKI positive, ASMA positive, ANCA positive, AMA negative, RF positive, alpha-1-antitrypsin 150    LIVER HISTOLOGY:  8/2017. Active hepatitis with portal and lobular inflammation, interface hepatitis, PMN and stage 2-3 septal fibrosis. Biopsy slides not reviewed by MLS.  4/2019. Sheer wave elastography performed at [facility]. Range 10.09- 17.05 kPa, Mean 14.07 kPa, Median 14.9 kPa, Standard deviation 2.41 kPa. The results suggested a fibrosis level of F4. ENDOSCOPIC PROCEDURES:  7/2019. EGD by Dr Gabby Cook. Small esophageal varices. GAVE. RADIOLOGY:  1/2019. Ultrasound of liver. Echogenic consistent with chronic liver disease. No liver mass lesions. No dilated bile ducts. No ascites. 5/2019. Ultrasound of liver. Echogenic consistent with cirrhosis. No liver mass lesions. No dilated bile ducts. No ascites. OTHER TESTING:  Not available or performed    FOLLOW-UP:  All of the issues listed above in the Assessment and Plan were discussed with the patient. All questions were answered. The patient expressed a clear understanding of the above.     [address] [address] in 4 weeks for monitoring       Richie Altamirano MD  [address]  [address] [place] [address] 58, [address]  [address] [place] [address]

## 2020-01-06 NOTE — Clinical Note
1/25/20 Patient: Edmund Phalen YOB: 1950 Date of Visit: 1/6/2020 Simin Anderson MD 
[facility] [place] [address] VIA Facsimile: [phone] Dear Simin Anderson MD, Thank you for referring Ms. Lemuel Weems to [address] for evaluation. My notes for this consultation are attached. If you have questions, please do not hesitate to call me. I look forward to following your patient along with you. Sincerely, Moisés Burns MD

## 2020-01-25 RX ORDER — TACROLIMUS 1 MG/1
1 CAPSULE ORAL EVERY 12 HOURS
Qty: 120 CAP | Refills: 6
Start: 2020-01-25 | End: 2022-05-25 | Stop reason: SDUPTHER

## 2020-02-18 ENCOUNTER — HOSPITAL ENCOUNTER (OUTPATIENT)
Dept: LAB | Age: 70
Discharge: HOME | End: 2020-02-18
Payer: MEDICARE

## 2020-02-18 LAB
ALBUMIN SERPL-MCNC: 3.1 G/DL (ref 3.4–5)
ALBUMIN/GLOB SERPL: 0.7 {RATIO} (ref 0.8–1.7)
ALP SERPL-CCNC: 193 U/L (ref 45–117)
ALT SERPL-CCNC: 43 U/L (ref 13–56)
AMMONIA PLAS-SCNC: 46 UMOL/L (ref 11–32)
ANION GAP SERPL CALC-SCNC: 5 MMOL/L (ref 3–18)
AST SERPL-CCNC: 54 U/L (ref 10–38)
BASOPHILS # BLD: 0 K/UL (ref 0–0.1)
BASOPHILS NFR BLD: 0 % (ref 0–2)
BILIRUB DIRECT SERPL-MCNC: 0.2 MG/DL (ref 0–0.2)
BILIRUB SERPL-MCNC: 0.4 MG/DL (ref 0.2–1)
BUN SERPL-MCNC: 15 MG/DL (ref 7–18)
BUN/CREAT SERPL: 12 (ref 12–20)
CALCIUM SERPL-MCNC: 8.8 MG/DL (ref 8.5–10.1)
CHLORIDE SERPL-SCNC: 104 MMOL/L (ref 100–111)
CO2 SERPL-SCNC: 29 MMOL/L (ref 21–32)
CREAT SERPL-MCNC: 1.3 MG/DL (ref 0.6–1.3)
DIFFERENTIAL METHOD BLD: ABNORMAL
EOSINOPHIL # BLD: 0.7 K/UL (ref 0–0.4)
EOSINOPHIL NFR BLD: 8 % (ref 0–5)
ERYTHROCYTE [DISTWIDTH] IN BLOOD BY AUTOMATED COUNT: 16.5 % (ref 11.6–14.5)
GLOBULIN SER CALC-MCNC: 4.4 G/DL (ref 2–4)
GLUCOSE SERPL-MCNC: 141 MG/DL (ref 74–99)
HCT VFR BLD AUTO: 31.6 % (ref 35–45)
HGB BLD-MCNC: 9.9 G/DL (ref 12–16)
LYMPHOCYTES # BLD: 1.7 K/UL (ref 0.9–3.6)
LYMPHOCYTES NFR BLD: 20 % (ref 21–52)
MCH RBC QN AUTO: 24.6 PG (ref 24–34)
MCHC RBC AUTO-ENTMCNC: 31.3 G/DL (ref 31–37)
MCV RBC AUTO: 78.6 FL (ref 74–97)
MONOCYTES # BLD: 1.4 K/UL (ref 0.05–1.2)
MONOCYTES NFR BLD: 16 % (ref 3–10)
NEUTS SEG # BLD: 4.9 K/UL (ref 1.8–8)
NEUTS SEG NFR BLD: 56 % (ref 40–73)
PLATELET # BLD AUTO: 279 K/UL (ref 135–420)
PMV BLD AUTO: 8.9 FL (ref 9.2–11.8)
POTASSIUM SERPL-SCNC: 4.7 MMOL/L (ref 3.5–5.5)
PROT SERPL-MCNC: 7.5 G/DL (ref 6.4–8.2)
RBC # BLD AUTO: 4.02 M/UL (ref 4.2–5.3)
SODIUM SERPL-SCNC: 138 MMOL/L (ref 136–145)
WBC # BLD AUTO: 8.6 K/UL (ref 4.6–13.2)

## 2020-02-18 PROCEDURE — 82248 BILIRUBIN DIRECT: CPT

## 2020-02-18 PROCEDURE — 82140 ASSAY OF AMMONIA: CPT

## 2020-02-18 PROCEDURE — 85025 COMPLETE CBC W/AUTO DIFF WBC: CPT

## 2020-02-18 PROCEDURE — 80053 COMPREHEN METABOLIC PANEL: CPT

## 2020-02-19 ENCOUNTER — HOSPITAL ENCOUNTER (OUTPATIENT)
Dept: LAB | Age: 70
Discharge: HOME | End: 2020-02-19
Payer: MEDICARE

## 2020-02-19 LAB
APPEARANCE UR: CLEAR
BILIRUB UR QL: NEGATIVE
COLOR UR: YELLOW
GLUCOSE UR STRIP.AUTO-MCNC: NEGATIVE MG/DL
HGB UR QL STRIP: NEGATIVE
KETONES UR QL STRIP.AUTO: NEGATIVE MG/DL
LEUKOCYTE ESTERASE UR QL STRIP.AUTO: NEGATIVE
NITRITE UR QL STRIP.AUTO: NEGATIVE
PH UR STRIP: 7.5 [PH] (ref 5–8)
PROT UR STRIP-MCNC: NEGATIVE MG/DL
SP GR UR REFRACTOMETRY: 1.01 (ref 1–1.03)
UROBILINOGEN UR QL STRIP.AUTO: 1 EU/DL (ref 0.2–1)

## 2020-02-19 PROCEDURE — 87086 URINE CULTURE/COLONY COUNT: CPT

## 2020-02-19 PROCEDURE — 87077 CULTURE AEROBIC IDENTIFY: CPT

## 2020-02-19 PROCEDURE — 87186 SC STD MICRODIL/AGAR DIL: CPT

## 2020-02-19 PROCEDURE — 81003 URINALYSIS AUTO W/O SCOPE: CPT

## 2020-02-21 LAB
BACTERIA SPEC CULT: ABNORMAL
SERVICE CMNT-IMP: ABNORMAL

## 2020-02-23 ENCOUNTER — HOSPITAL ENCOUNTER (EMERGENCY)
Age: 70
Discharge: HOME | End: 2020-02-23
Attending: EMERGENCY MEDICINE
Payer: MEDICARE

## 2020-02-23 ENCOUNTER — APPOINTMENT (OUTPATIENT)
Dept: GENERAL RADIOLOGY | Age: 70
End: 2020-02-23
Attending: PHYSICIAN ASSISTANT
Payer: MEDICARE

## 2020-02-23 VITALS
WEIGHT: 197.5 LBS | HEIGHT: 63 IN | OXYGEN SATURATION: 100 % | RESPIRATION RATE: 17 BRPM | DIASTOLIC BLOOD PRESSURE: 67 MMHG | SYSTOLIC BLOOD PRESSURE: 126 MMHG | BODY MASS INDEX: 34.99 KG/M2 | TEMPERATURE: 98 F | HEART RATE: 118 BPM

## 2020-02-23 DIAGNOSIS — F11.90 OPIOID USE: ICD-10-CM

## 2020-02-23 DIAGNOSIS — K59.00 CONSTIPATION, UNSPECIFIED CONSTIPATION TYPE: Primary | ICD-10-CM

## 2020-02-23 LAB
ALBUMIN SERPL-MCNC: 3.4 G/DL (ref 3.4–5)
ALBUMIN/GLOB SERPL: 0.7 {RATIO} (ref 0.8–1.7)
ALP SERPL-CCNC: 189 U/L (ref 45–117)
ALT SERPL-CCNC: 44 U/L (ref 13–56)
ANION GAP SERPL CALC-SCNC: 10 MMOL/L (ref 3–18)
APPEARANCE UR: CLEAR
AST SERPL-CCNC: 44 U/L (ref 10–38)
BASOPHILS # BLD: 0 K/UL (ref 0–0.1)
BASOPHILS NFR BLD: 1 % (ref 0–2)
BILIRUB SERPL-MCNC: 0.8 MG/DL (ref 0.2–1)
BILIRUB UR QL: NEGATIVE
BUN SERPL-MCNC: 15 MG/DL (ref 7–18)
BUN/CREAT SERPL: 11 (ref 12–20)
CALCIUM SERPL-MCNC: 9.3 MG/DL (ref 8.5–10.1)
CHLORIDE SERPL-SCNC: 105 MMOL/L (ref 100–111)
CO2 SERPL-SCNC: 23 MMOL/L (ref 21–32)
COLOR UR: YELLOW
CREAT SERPL-MCNC: 1.33 MG/DL (ref 0.6–1.3)
DIFFERENTIAL METHOD BLD: ABNORMAL
EOSINOPHIL # BLD: 0.1 K/UL (ref 0–0.4)
EOSINOPHIL NFR BLD: 2 % (ref 0–5)
ERYTHROCYTE [DISTWIDTH] IN BLOOD BY AUTOMATED COUNT: 17.1 % (ref 11.6–14.5)
GLOBULIN SER CALC-MCNC: 4.7 G/DL (ref 2–4)
GLUCOSE SERPL-MCNC: 160 MG/DL (ref 74–99)
GLUCOSE UR STRIP.AUTO-MCNC: NEGATIVE MG/DL
HCT VFR BLD AUTO: 33.7 % (ref 35–45)
HGB BLD-MCNC: 10.6 G/DL (ref 12–16)
HGB UR QL STRIP: NEGATIVE
KETONES UR QL STRIP.AUTO: NEGATIVE MG/DL
LEUKOCYTE ESTERASE UR QL STRIP.AUTO: NEGATIVE
LIPASE SERPL-CCNC: 29 U/L (ref 73–393)
LYMPHOCYTES # BLD: 1.6 K/UL (ref 0.9–3.6)
LYMPHOCYTES NFR BLD: 21 % (ref 21–52)
MCH RBC QN AUTO: 24.7 PG (ref 24–34)
MCHC RBC AUTO-ENTMCNC: 31.5 G/DL (ref 31–37)
MCV RBC AUTO: 78.4 FL (ref 74–97)
MONOCYTES # BLD: 1.2 K/UL (ref 0.05–1.2)
MONOCYTES NFR BLD: 16 % (ref 3–10)
NEUTS SEG # BLD: 4.7 K/UL (ref 1.8–8)
NEUTS SEG NFR BLD: 60 % (ref 40–73)
NITRITE UR QL STRIP.AUTO: NEGATIVE
PH UR STRIP: 6.5 [PH] (ref 5–8)
PLATELET # BLD AUTO: 253 K/UL (ref 135–420)
PMV BLD AUTO: 8.7 FL (ref 9.2–11.8)
POTASSIUM SERPL-SCNC: 4.2 MMOL/L (ref 3.5–5.5)
PROT SERPL-MCNC: 8.1 G/DL (ref 6.4–8.2)
PROT UR STRIP-MCNC: NEGATIVE MG/DL
RBC # BLD AUTO: 4.3 M/UL (ref 4.2–5.3)
SODIUM SERPL-SCNC: 138 MMOL/L (ref 136–145)
SP GR UR REFRACTOMETRY: 1.01 (ref 1–1.03)
UROBILINOGEN UR QL STRIP.AUTO: 1 EU/DL (ref 0.2–1)
WBC # BLD AUTO: 7.7 K/UL (ref 4.6–13.2)

## 2020-02-23 PROCEDURE — 81003 URINALYSIS AUTO W/O SCOPE: CPT

## 2020-02-23 PROCEDURE — 74011250636 HC RX REV CODE- 250/636: Performed by: PHYSICIAN ASSISTANT

## 2020-02-23 PROCEDURE — 83690 ASSAY OF LIPASE: CPT

## 2020-02-23 PROCEDURE — 74018 RADEX ABDOMEN 1 VIEW: CPT

## 2020-02-23 PROCEDURE — 99285 EMERGENCY DEPT VISIT HI MDM: CPT

## 2020-02-23 PROCEDURE — 80053 COMPREHEN METABOLIC PANEL: CPT

## 2020-02-23 PROCEDURE — 87086 URINE CULTURE/COLONY COUNT: CPT

## 2020-02-23 PROCEDURE — 85025 COMPLETE CBC W/AUTO DIFF WBC: CPT

## 2020-02-23 RX ADMIN — SODIUM CHLORIDE 1000 ML: 900 INJECTION, SOLUTION INTRAVENOUS at 14:40

## 2020-02-23 NOTE — ED NOTES
Pt continent of a very large formed brown bm in bsc. Pt dressed and put in gown and adult diaper to go home.  Pt put in wheelchair and taken to front door at er lobby exit. dgt to take pt back to facility

## 2020-02-23 NOTE — ED NOTES
Pt attempted to use bedpan but missed and wet entire bed. All new linens applied and pt cleaned.  purewick applied because of pt complaint of how much it hurts to move around in bed with recent back surgery

## 2020-02-23 NOTE — ED PROVIDER NOTES
EMERGENCY DEPARTMENT HISTORY AND PHYSICAL EXAM    Date: 2/23/2020  Patient Name: Huber Gloria    History of Presenting Illness     Chief Complaint   Patient presents with    Abdominal Pain         History Provided By: Patient    Chief Complaint: abdominal pain    HPI(Context):   1:48 PM  Huber Gloria is a 78 y.o. female with PMHX of DMII, constipation, HTN, COPD who presents to the emergency department C/O constipation. Associated sxs include rectal pain. Sxs x 1-2 days. No relief with mag citrate. Pt states she can feel stool at her anus but cannot pass bowels. Pt denies fever, chills, nausea, vomiting, and any other sxs or complaints. Pt is on oxycodone following surgery on lower back    PCP: Yee Gibson MD    Current Outpatient Medications   Medication Sig Dispense Refill    insulin aspart protamine/insulin aspart (NOVOLOG MIX 70-30FLEXPEN U-100) 100 unit/mL (70-30) inpn by SubCUTAneous route.  albuterol (PROVENTIL HFA) 90 mcg/actuation inhaler Take 2 Puffs by inhalation.  amLODIPine (NORVASC) 10 mg tablet Take 10 mg by mouth.  budesonide-formoterol (SYMBICORT) 160-4.5 mcg/actuation HFAA Take 2 Puffs by inhalation.  glipiZIDE SR (GLUCOTROL XL) 5 mg CR tablet Take 5 mg by mouth.  minoxidil (LONITEN) 2.5 mg tablet Take 2.5 mg by mouth.  omeprazole (PRILOSEC) 40 mg capsule Take 40 mg by mouth.  oxyCODONE-acetaminophen (PERCOCET) 5-325 mg per tablet Take 1 Tab by mouth.  spironolactone (ALDACTONE) 100 mg tablet Take 1 Tab by mouth daily. 180 Tab 3    tacrolimus (PROGRAF) 1 mg capsule Take 1 Cap by mouth every twelve (12) hours. 120 Cap 6    omeprazole (PRILOSEC OTC) 20 mg tablet Take 1 Cap by mouth.  aspirin delayed-release 81 mg tablet Take 81 mg by mouth.       butalbital-acetaminophen-caffeine (FIORICET, ESGIC) -40 mg per tablet TAKE 1 TABLET BY MOUTH EVERY 4 HOURS AS NEEDED FOR HEADACHE PAIN  0    cyclobenzaprine (FLEXERIL) 10 mg tablet Take 1 Tab by mouth.  ferrous fumarate 324 mg (106 mg iron) tab Take 324 mg by mouth.  furosemide (LASIX) 40 mg tablet Take 40 mg by mouth.  hydroCHLOROthiazide (HYDRODIURIL) 25 mg tablet Take 1 Tab by mouth.  losartan (COZAAR) 100 mg tablet Take 100 mg by mouth.  oxyCODONE IR (ROXICODONE) 5 mg immediate release tablet TAKE 1 TABLET BY MOUTH EVERY 12 HOURS AS NEEDED FOR PAIN  0       Past History     Past Medical History:  Past Medical History:   Diagnosis Date    Arthritis     Autoimmune disease ([facility]     Chronic obstructive pulmonary disease ([facility]     Diabetes ([facility]     Gastrointestinal disorder     Hypertension     Liver disease        Past Surgical History:  Past Surgical History:   Procedure Laterality Date    BIOPSY LIVER      HX ORTHOPAEDIC         Family History:  History reviewed. No pertinent family history. Social History:  Social History     Tobacco Use    Smoking status: Never Smoker    Smokeless tobacco: Never Used   Substance Use Topics    Alcohol use: Yes     Comment: CELEBRATORY    Drug use: Never       Allergies: Allergies   Allergen Reactions    Adhesive Tape-Silicones Other (comments)    Naproxen Nausea Only    Penicillins Other (comments)    Tramadol Nausea Only    Vicodin [Hydrocodone-Acetaminophen] Hives         Review of Systems   Review of Systems   Constitutional: Negative for fever. Gastrointestinal: Positive for constipation. Negative for abdominal pain, diarrhea, nausea and vomiting. Rectal pain     Genitourinary: Negative for dysuria. Musculoskeletal: Negative for back pain. All other systems reviewed and are negative. Physical Exam     Vitals:    02/23/20 1530 02/23/20 1600 02/23/20 1630 02/23/20 1813   BP: 131/78 130/76 133/64 126/67   Pulse: (!) 116 (!) 122 (!) 118    Resp: 19 15 17    Temp:       SpO2:    100%   Weight:       Height:         Physical Exam  Vitals signs and nursing note reviewed.  Exam conducted with a chaperone present. Constitutional:       General: She is not in acute distress. Appearance: She is well-developed. She is not diaphoretic. Comments: AA geriatric female that appears uncomfortable. Daughter at bedside. HENT:      Head: Normocephalic and atraumatic. Right Ear: External ear normal.      Left Ear: External ear normal. Tympanic membrane is not bulging. Nose: Nose normal.   Eyes:      General: No scleral icterus. Right eye: No discharge. Left eye: No discharge. Conjunctiva/sclera: Conjunctivae normal.   Neck:      Musculoskeletal: Normal range of motion. Cardiovascular:      Rate and Rhythm: Normal rate and regular rhythm. Heart sounds: Normal heart sounds. No murmur. No friction rub. No gallop. Pulmonary:      Effort: Pulmonary effort is normal. No tachypnea, accessory muscle usage or respiratory distress. Breath sounds: Normal breath sounds. No decreased breath sounds, wheezing, rhonchi or rales. Abdominal:      Palpations: Abdomen is soft. Tenderness: There is no abdominal tenderness. There is no right CVA tenderness, left CVA tenderness, guarding or rebound. Negative signs include Cardoza's sign. Genitourinary:     Rectum: No mass or tenderness. Normal anal tone. Musculoskeletal: Normal range of motion. Skin:     General: Skin is warm and dry. Neurological:      Mental Status: She is alert and oriented to person, place, and time. Psychiatric:         Judgment: Judgment normal.             Diagnostic Study Results     Labs -   No results found for this or any previous visit (from the past 12 hour(s)). XR ABD (KUB)   Final Result   IMPRESSION:      No bowel obstruction seen.         CT Results  (Last 48 hours)    None        CXR Results  (Last 48 hours)    None          Medications given in the ED-  Medications   sodium chloride 0.9 % bolus infusion 1,000 mL (0 mL IntraVENous IV Completed 2/23/20 4867)         Medical Decision Making I am the first provider for this patient. I reviewed the vital signs, available nursing notes, past medical history, past surgical history, family history and social history. Vital Signs-Reviewed the patient's vital signs. Pulse Oximetry Analysis - 100% on RA     Records Reviewed: Nursing Notes and Old Medical Records    Provider Notes (Medical Decision Making): constipation, hemorrhoid, fissure, fecal impaction, UTI    Procedures:  Procedures    ED Course:   1:48 PM Initial assessment performed. The patients presenting problems have been discussed, and they are in agreement with the care plan formulated and outlined with them. I have encouraged them to ask questions as they arise throughout their visit. Diagnosis and Disposition       Manually disimpacted patient with copious hard stool removed. Soap suds enema performed with large BM in ED and resolution of sxs. PO hydration, Miralax BID. High fiber diet. Restrict narcs as much as possible. Reasons to RTED discussed with pt. All questions answered. Pt feels comfortable going home at this time. Pt expressed understanding and she agrees with plan. 1. Constipation, unspecified constipation type    2. Opioid use        PLAN:  1. D/C Home  2. Discharge Medication List as of 2/23/2020  6:17 PM        3. Follow-up Information     Follow up With Specialties Details Why [address] [facility] [address], [address] [place] [address]  [phone]      [facility] EMERGENCY DEPT Emergency Medicine  As needed, If symptoms worsen [address] [place] [address]  [phone]        _______________________________    Attestations: This note is prepared by Nelson Mayen PA-C.  _______________________________        Please note that this dictation was completed with [employer], the [employer] voice recognition software.   Quite often unanticipated grammatical, syntax, homophones, and other interpretive errors are inadvertently transcribed by the computer software. Please disregard these errors. Please excuse any errors that have escaped final proofreading.

## 2020-02-23 NOTE — ED NOTES
Pt tolerated the remainder of a full liter of soap suds enema. Held for 10 min. Pt placed on bsc.  While placing enema tubing rn felt hard stool and had to maneuver tubing back and forth gently while infusing enema until got past the hard stool

## 2020-02-25 LAB
BACTERIA SPEC CULT: NORMAL
SERVICE CMNT-IMP: NORMAL

## 2020-06-10 ENCOUNTER — HOSPITAL ENCOUNTER (OUTPATIENT)
Dept: MRI IMAGING | Age: 70
Discharge: HOME | End: 2020-06-10
Attending: INTERNAL MEDICINE
Payer: MEDICARE

## 2020-06-10 LAB — CREAT UR-MCNC: 1 MG/DL (ref 0.6–1.3)

## 2020-06-10 PROCEDURE — 82565 ASSAY OF CREATININE: CPT

## 2020-06-10 PROCEDURE — A9575 INJ GADOTERATE MEGLUMI 0.1ML: HCPCS | Performed by: INTERNAL MEDICINE

## 2020-06-10 PROCEDURE — 74011636320 HC RX REV CODE- 636/320: Performed by: INTERNAL MEDICINE

## 2020-06-10 PROCEDURE — 74183 MRI ABD W/O CNTR FLWD CNTR: CPT

## 2020-06-10 RX ADMIN — GADOTERATE MEGLUMINE 15 ML: 376.9 INJECTION INTRAVENOUS at 10:09

## 2020-08-27 ENCOUNTER — TELEPHONE (OUTPATIENT)
Dept: HEMATOLOGY | Age: 70
End: 2020-08-27

## 2020-08-27 ENCOUNTER — HOSPITAL ENCOUNTER (OUTPATIENT)
Dept: LAB | Age: 70
Discharge: HOME | End: 2020-08-27
Payer: MEDICARE

## 2020-08-27 ENCOUNTER — OFFICE VISIT (OUTPATIENT)
Dept: HEMATOLOGY | Age: 70
End: 2020-08-27

## 2020-08-27 VITALS
WEIGHT: 216.4 LBS | HEART RATE: 96 BPM | RESPIRATION RATE: 18 BRPM | BODY MASS INDEX: 38.34 KG/M2 | HEIGHT: 63 IN | TEMPERATURE: 96.7 F | DIASTOLIC BLOOD PRESSURE: 84 MMHG | SYSTOLIC BLOOD PRESSURE: 143 MMHG | OXYGEN SATURATION: 98 %

## 2020-08-27 DIAGNOSIS — K74.60 CIRRHOSIS OF LIVER WITHOUT ASCITES, UNSPECIFIED HEPATIC CIRRHOSIS TYPE (HCC): ICD-10-CM

## 2020-08-27 DIAGNOSIS — K74.60 CIRRHOSIS OF LIVER WITHOUT ASCITES, UNSPECIFIED HEPATIC CIRRHOSIS TYPE (HCC): Primary | ICD-10-CM

## 2020-08-27 LAB
ALBUMIN SERPL-MCNC: 3.4 G/DL (ref 3.4–5)
ALBUMIN/GLOB SERPL: 0.7 {RATIO} (ref 0.8–1.7)
ALP SERPL-CCNC: 212 U/L (ref 45–117)
ALT SERPL-CCNC: 86 U/L (ref 13–56)
ANION GAP SERPL CALC-SCNC: 4 MMOL/L (ref 3–18)
AST SERPL-CCNC: 69 U/L (ref 10–38)
BASOPHILS # BLD: 0 K/UL (ref 0–0.1)
BASOPHILS NFR BLD: 0 % (ref 0–2)
BILIRUB DIRECT SERPL-MCNC: 0.3 MG/DL (ref 0–0.2)
BILIRUB SERPL-MCNC: 0.7 MG/DL (ref 0.2–1)
BUN SERPL-MCNC: 11 MG/DL (ref 7–18)
BUN/CREAT SERPL: 11 (ref 12–20)
CALCIUM SERPL-MCNC: 8.8 MG/DL (ref 8.5–10.1)
CHLORIDE SERPL-SCNC: 106 MMOL/L (ref 100–111)
CO2 SERPL-SCNC: 28 MMOL/L (ref 21–32)
CREAT SERPL-MCNC: 1.04 MG/DL (ref 0.6–1.3)
DIFFERENTIAL METHOD BLD: ABNORMAL
EOSINOPHIL # BLD: 0.3 K/UL (ref 0–0.4)
EOSINOPHIL NFR BLD: 7 % (ref 0–5)
ERYTHROCYTE [DISTWIDTH] IN BLOOD BY AUTOMATED COUNT: 14.8 % (ref 11.6–14.5)
GLOBULIN SER CALC-MCNC: 4.9 G/DL (ref 2–4)
GLUCOSE SERPL-MCNC: 163 MG/DL (ref 74–99)
HCT VFR BLD AUTO: 37.7 % (ref 35–45)
HGB BLD-MCNC: 12.1 G/DL (ref 12–16)
INR PPP: 1.2 (ref 0.8–1.2)
LYMPHOCYTES # BLD: 1.8 K/UL (ref 0.9–3.6)
LYMPHOCYTES NFR BLD: 35 % (ref 21–52)
MCH RBC QN AUTO: 27.3 PG (ref 24–34)
MCHC RBC AUTO-ENTMCNC: 32.1 G/DL (ref 31–37)
MCV RBC AUTO: 84.9 FL (ref 74–97)
MONOCYTES # BLD: 0.7 K/UL (ref 0.05–1.2)
MONOCYTES NFR BLD: 13 % (ref 3–10)
NEUTS SEG # BLD: 2.3 K/UL (ref 1.8–8)
NEUTS SEG NFR BLD: 45 % (ref 40–73)
PLATELET # BLD AUTO: 214 K/UL (ref 135–420)
PMV BLD AUTO: 9.9 FL (ref 9.2–11.8)
POTASSIUM SERPL-SCNC: 3.4 MMOL/L (ref 3.5–5.5)
PROT SERPL-MCNC: 8.3 G/DL (ref 6.4–8.2)
PROTHROMBIN TIME: 15.2 SEC (ref 11.5–15.2)
RBC # BLD AUTO: 4.44 M/UL (ref 4.2–5.3)
SODIUM SERPL-SCNC: 138 MMOL/L (ref 136–145)
WBC # BLD AUTO: 5.2 K/UL (ref 4.6–13.2)

## 2020-08-27 PROCEDURE — 86038 ANTINUCLEAR ANTIBODIES: CPT

## 2020-08-27 PROCEDURE — 82107 ALPHA-FETOPROTEIN L3: CPT

## 2020-08-27 PROCEDURE — 80048 BASIC METABOLIC PNL TOTAL CA: CPT

## 2020-08-27 PROCEDURE — 86256 FLUORESCENT ANTIBODY TITER: CPT

## 2020-08-27 PROCEDURE — 80197 ASSAY OF TACROLIMUS: CPT

## 2020-08-27 PROCEDURE — 83516 IMMUNOASSAY NONANTIBODY: CPT

## 2020-08-27 PROCEDURE — 36415 COLL VENOUS BLD VENIPUNCTURE: CPT

## 2020-08-27 PROCEDURE — 85610 PROTHROMBIN TIME: CPT

## 2020-08-27 PROCEDURE — 80076 HEPATIC FUNCTION PANEL: CPT

## 2020-08-27 PROCEDURE — 85025 COMPLETE CBC W/AUTO DIFF WBC: CPT

## 2020-08-27 RX ORDER — INSULIN LISPRO 100 [IU]/ML
INJECTION, SOLUTION INTRAVENOUS; SUBCUTANEOUS ONCE
COMMUNITY
Start: 2019-07-26 | End: 2022-03-18

## 2020-08-27 RX ORDER — OXYCODONE AND ACETAMINOPHEN 5; 325 MG/1; MG/1
1 TABLET ORAL
Qty: 45 TAB | Refills: 0 | Status: SHIPPED | OUTPATIENT
Start: 2020-08-27 | End: 2020-09-26

## 2020-08-27 RX ORDER — SODIUM BICARBONATE 650 MG/1
1300 TABLET ORAL 4 TIMES DAILY
COMMUNITY
Start: 2020-08-06 | End: 2022-03-10 | Stop reason: ALTCHOICE

## 2020-08-27 RX ORDER — METHOCARBAMOL 750 MG/1
750 TABLET, FILM COATED ORAL
COMMUNITY
Start: 2020-08-06 | End: 2022-03-18

## 2020-08-27 RX ORDER — NALOXONE HYDROCHLORIDE 4 MG/.1ML
4 SPRAY NASAL
COMMUNITY
Start: 2020-02-09 | End: 2022-03-18

## 2020-08-27 RX ORDER — ONDANSETRON 4 MG/1
4 TABLET, ORALLY DISINTEGRATING ORAL
COMMUNITY
Start: 2020-08-19 | End: 2022-03-18

## 2020-08-27 NOTE — PROGRESS NOTES
[address] [place] [address], MD, Scarlet Chaudhary MD Allen Hoose, PA-C Reymundo Furlong, [facility]     Nida ELAM Jayme, [facility]   Maurizio Renee FNP-JOSH Ferguson, [facility]       [facility] 136    at [address], [address] [place] [address], [facility] 22.    [phone]    FAX: [address] [place] [address], [address]    [phone]    FAX: [phone]       Patient Care Team:  Erica Grace MD as PCP - General (Family Medicine)  Bryson Lees MD (Orthopedic Surgery)  Sharri Caputo MD (Gastroenterology)  Marcelo Chin MD (Internal Medicine)      Problem List  Date Reviewed: 8/27/2020          Codes Class Noted    GAVE (gastric antral vascular ectasia) ICD-10-CM: K31.819  ICD-9-CM: 537.82  6/10/2019        Severe obesity ([facility] ICD-10-CM: E66.01  ICD-9-CM: 278.01  4/17/2019        Autoimmune hepatitis ([facility] ICD-10-CM: K75.4  ICD-9-CM: 571.42  4/17/2019        Thrombocytopenia ([facility] ICD-10-CM: D69.6  ICD-9-CM: 287.5  4/17/2019        Cirrhosis ([facility] ICD-10-CM: K74.60  ICD-9-CM: 571.5  4/17/2019                Sachin Wyatt returns to the [address] for management 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 The active problem list, all pertinent past medical history, medications, liver histology, radiologic findings and laboratory findings related to the liver disorder were reviewed with the patient. The patient is a 69 y.o.   female who was found to have marked elevation in liver enzymes into the 500-800 range in 2017     The patient was found to have cirrhosis in 2019 when she developed lower extremity edema, thrombocytopenia and an ultrasound suggested cirrhosis    The patient has developed the following complications of cirrhosis: esophageal varices, GAVE    The patient notes fatigue. The patient has not experienced pain in the right side over the liver, problems concentrating, swelling of the abdomen, swelling of the lower extremities, hematemesis, hematochezia. The patient has limitations in daily activities due to liver disease and other medical issues. 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 The diagnosis of cirrhosis is based upon elastography, imaging, laboratory studies, complications of cirrhosis. The patient has normal liver function. The CTP is 6. Child class A. The MELD score is 8. Autoimmune Hepatitis   The diagnosis was based upon serology, liver biopsy   A liver biopsy performed in 8/2017 demonstrated moderate to severe inflammation with interface hepatitis, with piecemeal necrosis and stage 2-3 fibrosis. Elastography performed in 4/2019 suggests Cirrhosis. Liver transaminases are normal.  ALP is elevated. Liver function is normal.  The platelet count is normal.    The patient is taking TAC 1 mg QD since 10/2019. At this lower dose the SCr has declined from 1.41 to 1.04 mg. I believe the patient misunderstood the direction to reduce her tacrolimus to 1 mg BID. After lab review, I have called the patient and instructed her to increase the tacrolimus to 1 mg BID. Will repeat labs in 4 weeks. Primary Sclerosing Cholangitis. This diagnosis is based on MRI/MRCP performed in 06/2020. Intrahepatic biliary ducts have a mildly beaded appearance, suggestive of primary sclerosing cholangitis. The natural history of PSC was discussed in detail. I explained that there is cure for PSC. I explained that 75% of people affected by the disease are male. I explained that at least 75% of those people who suffer from [facility] also have ulcerative colitis.   I explained that the single greatest risk factor associated with PSC is cholangiocarcinomas. Elevation in ALP  The elevation in ALP is most likely due to cirrhosis and PSC. Acute kidney injury  The patient had an elevation in SCr to 1.41 mg  THis is now down to 1.04 with reduction in TAC dose to 1 mg every day. ANTI-HCV positive HCV RNA negative  This is either due to exposure to HCV many yers ago with spontaneous resolution or a false positive anti-HCV because of the autoimmune disease. The patient is HCV RNA undetectable and does not have HCV. Lower extremity edema  Edema has resolved with current dose of diuretics. Will continue the current dose of diuretics at step 1. Screening for Esophageal varices   The patient had an EGD at [facility] in 7/2019. This demonstrated small esophageal varices and GAVE  Repeat EGD ordered today. Hepatic encephalopathy   Overt HE has not developed to date. There is no need for treatment with lactulose and/or Xifaxan at this time. There is no need to restrict dietary protein at this time. Thrombocytopenia   The most recent platelet counts is 224. Screening for Hepatocellular Carcinoma  HCC screening has recently been performed and does not suggest [facility] The next liver imaging study will be performed in 12/2020. Ultrasound was ordered today. Treatment of other medical problems in patients with chronic liver disease  There are no contraindications for the patient to take most medications that are necessary for treatment of other medical issues. Counseling for alcohol in patients with chronic liver disease  The patient was counseled regarding alcohol consumption and the effect of alcohol on chronic liver disease. Osteoporosis  The risk of osteoporosis is increased in patients with cirrhosis. DEXA bone density to assess for osteoporosis has not been performed. This should be ordered by the patients primary care physician.     Vaccinations Vaccination for viral hepatitis B is not needed. The patient has serologic evidence of prior exposure or vaccination with immunity. The need for vaccination against viral hepatitis A will be assessed with serologic and instituted as appropriate. Routine vaccinations against other bacterial and viral agents can be performed as indicated. Annual flu vaccination should be administered if indicated. ALLERGIES  Allergies   Allergen Reactions    Adhesive Tape-Silicones Other (comments)    Naproxen Nausea Only    Penicillins Other (comments)    Tramadol Nausea Only    Vicodin [Hydrocodone-Acetaminophen] Hives       MEDICATIONS  Current Outpatient Medications   Medication Sig    tacrolimus (PROGRAF) 1 mg capsule Take 1 Cap by mouth every twelve (12) hours.  insulin aspart protamine/insulin aspart (NOVOLOG MIX 70-30FLEXPEN U-100) 100 unit/mL (70-30) inpn by SubCUTAneous route.  omeprazole (PRILOSEC OTC) 20 mg tablet Take 1 Cap by mouth.  albuterol (PROVENTIL HFA) 90 mcg/actuation inhaler Take 2 Puffs by inhalation.  amLODIPine (NORVASC) 10 mg tablet Take 10 mg by mouth.  aspirin delayed-release 81 mg tablet Take 81 mg by mouth.  budesonide-formoterol (SYMBICORT) 160-4.5 mcg/actuation HFAA Take 2 Puffs by inhalation.  butalbital-acetaminophen-caffeine (FIORICET, ESGIC) -40 mg per tablet TAKE 1 TABLET BY MOUTH EVERY 4 HOURS AS NEEDED FOR HEADACHE PAIN    cyclobenzaprine (FLEXERIL) 10 mg tablet Take 1 Tab by mouth.  ferrous fumarate 324 mg (106 mg iron) tab Take 324 mg by mouth.  furosemide (LASIX) 40 mg tablet Take 40 mg by mouth.  glipiZIDE SR (GLUCOTROL XL) 5 mg CR tablet Take 5 mg by mouth.  hydroCHLOROthiazide (HYDRODIURIL) 25 mg tablet Take 1 Tab by mouth.  losartan (COZAAR) 100 mg tablet Take 100 mg by mouth.  minoxidil (LONITEN) 2.5 mg tablet Take 2.5 mg by mouth.  omeprazole (PRILOSEC) 40 mg capsule Take 40 mg by mouth.     oxyCODONE IR (ROXICODONE) 5 mg immediate release tablet TAKE 1 TABLET BY MOUTH EVERY 12 HOURS AS NEEDED FOR PAIN    oxyCODONE-acetaminophen (PERCOCET) 5-325 mg per tablet Take 1 Tab by mouth.  spironolactone (ALDACTONE) 100 mg tablet Take 1 Tab by mouth daily. No current facility-administered medications for this visit. SYSTEM REVIEW NOT RELATED TO LIVER DISEASE OR REVIEWED ABOVE:  Constitution systems: Negative for fever, chills, weight gain, weight loss. Eyes: Negative for visual changes. ENT: Negative for sore throat, painful swallowing. Respiratory: Negative for cough, hemoptysis, SOB. Cardiology: Swelling of the legs. GI:  Negative for constipation or diarrhea. : Negative for urinary frequency, dysuria, hematuria, nocturia. Skin: Negative for rash. Hematology: Negative for easy bruising, blood clots. Musculo-skelatal: Negative for back pain, muscle pain, weakness. Neurologic: Negative for headaches, dizziness, vertigo, memory problems not related to HE. Psychology: Negative for anxiety, depression. FAMILY HISTORY:  The father  of MI. The mother  of MI. The following family members have liver disease: a brother    SOCIAL HISTORY:  The patient is . The patient has 2 children, and 8 grandchildren. The patient stopped using tobacco products in . The patient has never consumed significant amounts of alcohol. The patient used to work as as a  at [employer]. The patient retired in . PHYSICAL EXAMINATION:  Visit Vitals  /84 (BP 1 Location: Right arm, BP Patient Position: Sitting)   Pulse 96   Temp (!) 96.7 °F (35.9 °C)   Resp 18   Ht 5' 3\" (1.6 m)   Wt 216 lb 6.4 oz (98.2 kg)   SpO2 98%   BMI 38.33 kg/m²     General: No acute distress. Eyes: Sclera anicteric. ENT: No oral lesions. Thyroid normal.  Nodes: No adenopathy. Skin: No spider angiomata. No jaundice. No palmar erythema. Respiratory: Lungs clear to auscultation. Cardiovascular: Regular heart rate. No murmurs. No JVD. Abdomen: Soft non-tender. Liver size normal to percussion/palpation. Spleen not palpable. No obvious ascites. Extremities: No edema. No muscle wasting. No gross arthritic changes. Neurologic: Alert and oriented. Cranial nerves grossly intact. No asterixis. LABORATORY STUDIES:  Liver [address] & Units 8/27/2020   WBC 4.6 - 13.2 K/uL 5.2   ANC 1.8 - 8.0 K/UL 2.3   HGB 12.0 - 16.0 g/dL 12.1    - 420 K/uL 214   INR 0.8 - 1.2      AST 10 - 38 U/L 69 (H)   ALT 13 - 56 U/L 86 (H)   Alk Phos 45 - 117 U/L 212 (H)   Bili, Total 0.2 - 1.0 MG/DL 0.7   Bili, Direct 0.0 - 0.2 MG/DL 0.3 (H)   Albumin 3.4 - 5.0 g/dL 3.4   BUN 7.0 - 18 MG/DL 11   Creat 0.6 - 1.3 MG/DL 1.04   Creat (iSTAT) 0.6 - 1.3 MG/DL    Na 136 - 145 mmol/L 138   K 3.5 - 5.5 mmol/L 3.4 (L)   Cl 100 - 111 mmol/L 106   CO2 21 - 32 mmol/L 28   Glucose 74 - 99 mg/dL 163 (H)   Magnesium 1.6 - 2.6 mg/dL    Ammonia 11 - 32 UMOL/L      Liver [facility] Latest Ref Rng & Units 1/6/2020 10/2/2019   WBC 4.6 - 13.2 K/uL 6.8 6.6   ANC 1.8 - 8.0 K/UL 3.6 3.8   HGB 12.0 - 16.0 g/dL 10.6 (L) 12.2    - 420 K/uL 218 125 (L)   INR 0.8 - 1.2       AST 10 - 38 U/L 26 37   ALT 13 - 56 U/L 27 56   Alk Phos 45 - 117 U/L 189 (H) 246 (H)   Bili, Total 0.2 - 1.0 MG/DL 0.4 0.5   Bili, Direct 0.0 - 0.2 MG/DL 0.2 0.2   Albumin 3.4 - 5.0 g/dL 3.4 3.5   BUN 7.0 - 18 MG/DL 15 20 (H)   Creat 0.6 - 1.3 MG/DL 1.36 (H) 1.41 (H)   Na 136 - 145 mmol/L 140 137   K 3.5 - 5.5 mmol/L 4.5 4.8   Cl 100 - 111 mmol/L 111 106   CO2 21 - 32 mmol/L 26 22   Glucose 74 - 99 mg/dL 107 (H) 326 (H)     Liver Virology and Transplant Immune Suppression Tacrolimus Level   Latest Ref Rng & Units 2.0 - 20.0 ng/mL   10/2/2019 8.8   8/21/2019 5.0   6/10/2019 9.2       SEROLOGIES:  6/2017. Anti-HBsurface positive  9/2017.   HBsAntigen negative, anti-HCV positive, HCV RNA undetectable, VICKI positive, ASMA positive, ANCA positive, AMA negative, RF positive, alpha-1-antitrypsin 150    LIVER HISTOLOGY:  8/2017. Active hepatitis with portal and lobular inflammation, interface hepatitis, PMN and stage 2-3 septal fibrosis. Biopsy slides not reviewed by MLS.  4/2019. Sheer wave elastography performed at [facility]. Range 10.09- 17.05 kPa, Mean 14.07 kPa, Median 14.9 kPa, Standard deviation 2.41 kPa. The results suggested a fibrosis level of F4. ENDOSCOPIC PROCEDURES:  7/2019. EGD by Dr Leny Oliver. Small esophageal varices. GAVE. RADIOLOGY:  1/2019. Ultrasound of liver. Echogenic consistent with chronic liver disease. No liver mass lesions. No dilated bile ducts. No ascites. 5/2019. Ultrasound of liver. Echogenic consistent with cirrhosis. No liver mass lesions. No dilated bile ducts. No ascites. 06/2020. MRI/MRCP w/wo contrast.  Liver has a mildly nodular contour. No suspicious liver lesions. Hepatic biliary ducts have a mildly beaded appearance, which is suggestive of mild primary sclerosing cholangitis. There is no focal area of intrahepatic biliary dilation to suggest a dominant stricture. OTHER TESTING:  Not available or performed    FOLLOW-UP:  All of the issues listed above in the Assessment and Plan were discussed with the patient. All questions were answered. The patient expressed a clear understanding of the above. [facility] 87 in 4 months, after the ultrasound and EGD.     KEI Ortiz  Liver [place] [facility]  [address], [address]   [address], [facility]   [phone]

## 2020-08-27 NOTE — TELEPHONE ENCOUNTER
Instructed the patient to increase her tacrolimus to 1 mg BID. Will repeat labs in 4 weeks. Patient verbalized understanding of these instructions.

## 2020-08-28 LAB
ACTIN IGG SERPL-ACNC: 59 UNITS (ref 0–19)
AFP L3 MFR SERPL: NORMAL % (ref 0–9.9)
AFP SERPL-MCNC: 4.8 NG/ML (ref 0–8)
ANA TITR SER IF: NEGATIVE {TITER}

## 2020-08-29 LAB — TACROLIMUS BLD-MCNC: 1.7 NG/ML (ref 2–20)

## 2020-08-31 LAB
C-ANCA TITR SER IF: NORMAL TITER
P-ANCA ATYPICAL TITR SER IF: NORMAL TITER
P-ANCA TITR SER IF: NORMAL TITER

## 2020-10-06 DIAGNOSIS — Z01.812 PRE-PROCEDURE LAB EXAM: Primary | ICD-10-CM

## 2020-10-08 ENCOUNTER — HOSPITAL ENCOUNTER (OUTPATIENT)
Dept: PREADMISSION TESTING | Age: 70
Discharge: HOME | End: 2020-10-08
Payer: MEDICAID

## 2020-10-08 PROCEDURE — 87635 SARS-COV-2 COVID-19 AMP PRB: CPT

## 2020-10-09 LAB — SARS-COV-2, COV2NT: NOT DETECTED

## 2020-10-11 ENCOUNTER — ANESTHESIA EVENT (OUTPATIENT)
Dept: ENDOSCOPY | Age: 70
End: 2020-10-11
Payer: MEDICAID

## 2020-10-11 RX ORDER — INSULIN LISPRO 100 [IU]/ML
INJECTION, SOLUTION INTRAVENOUS; SUBCUTANEOUS ONCE
Status: CANCELLED | OUTPATIENT
Start: 2020-10-11 | End: 2020-10-12

## 2020-10-11 RX ORDER — SODIUM CHLORIDE, SODIUM LACTATE, POTASSIUM CHLORIDE, CALCIUM CHLORIDE 600; 310; 30; 20 MG/100ML; MG/100ML; MG/100ML; MG/100ML
125 INJECTION, SOLUTION INTRAVENOUS CONTINUOUS
Status: CANCELLED | OUTPATIENT
Start: 2020-10-11

## 2020-10-11 RX ORDER — MAGNESIUM SULFATE 100 %
4 CRYSTALS MISCELLANEOUS AS NEEDED
Status: CANCELLED | OUTPATIENT
Start: 2020-10-11

## 2020-10-11 RX ORDER — SODIUM CHLORIDE 0.9 % (FLUSH) 0.9 %
5-40 SYRINGE (ML) INJECTION EVERY 8 HOURS
Status: CANCELLED | OUTPATIENT
Start: 2020-10-11

## 2020-10-11 RX ORDER — SODIUM CHLORIDE 0.9 % (FLUSH) 0.9 %
5-40 SYRINGE (ML) INJECTION AS NEEDED
Status: CANCELLED | OUTPATIENT
Start: 2020-10-11

## 2020-10-12 ENCOUNTER — HOSPITAL ENCOUNTER (OUTPATIENT)
Age: 70
Setting detail: OUTPATIENT SURGERY
Discharge: HOME | End: 2020-10-12
Attending: INTERNAL MEDICINE | Admitting: INTERNAL MEDICINE
Payer: MEDICAID

## 2020-10-12 ENCOUNTER — ANESTHESIA (OUTPATIENT)
Dept: ENDOSCOPY | Age: 70
End: 2020-10-12
Payer: MEDICAID

## 2020-10-12 VITALS
OXYGEN SATURATION: 98 % | HEIGHT: 63 IN | WEIGHT: 216 LBS | DIASTOLIC BLOOD PRESSURE: 85 MMHG | HEART RATE: 88 BPM | TEMPERATURE: 97.8 F | BODY MASS INDEX: 38.27 KG/M2 | SYSTOLIC BLOOD PRESSURE: 130 MMHG | RESPIRATION RATE: 16 BRPM

## 2020-10-12 DIAGNOSIS — K31.7 MULTIPLE GASTRIC POLYPS: ICD-10-CM

## 2020-10-12 LAB — GLUCOSE BLD STRIP.AUTO-MCNC: 150 MG/DL (ref 70–110)

## 2020-10-12 PROCEDURE — 88305 TISSUE EXAM BY PATHOLOGIST: CPT

## 2020-10-12 PROCEDURE — 2709999900 HC NON-CHARGEABLE SUPPLY: Performed by: INTERNAL MEDICINE

## 2020-10-12 PROCEDURE — 77030013991 HC SNR POLYP ENDOSC BSC -A: Performed by: INTERNAL MEDICINE

## 2020-10-12 PROCEDURE — 82962 GLUCOSE BLOOD TEST: CPT

## 2020-10-12 PROCEDURE — 76040000007: Performed by: INTERNAL MEDICINE

## 2020-10-12 PROCEDURE — 74011250636 HC RX REV CODE- 250/636: Performed by: INTERNAL MEDICINE

## 2020-10-12 PROCEDURE — 74011000250 HC RX REV CODE- 250: Performed by: ANESTHESIOLOGY

## 2020-10-12 PROCEDURE — 74011250636 HC RX REV CODE- 250/636: Performed by: ANESTHESIOLOGY

## 2020-10-12 PROCEDURE — 76060000032 HC ANESTHESIA 0.5 TO 1 HR: Performed by: INTERNAL MEDICINE

## 2020-10-12 PROCEDURE — 43251 EGD REMOVE LESION SNARE: CPT | Performed by: INTERNAL MEDICINE

## 2020-10-12 RX ORDER — GLYCOPYRROLATE 0.2 MG/ML
INJECTION INTRAMUSCULAR; INTRAVENOUS AS NEEDED
Status: DISCONTINUED | OUTPATIENT
Start: 2020-10-12 | End: 2020-10-12 | Stop reason: HOSPADM

## 2020-10-12 RX ORDER — SODIUM CHLORIDE 9 MG/ML
100 INJECTION, SOLUTION INTRAVENOUS CONTINUOUS
Status: CANCELLED | OUTPATIENT
Start: 2020-10-12 | End: 2020-10-12

## 2020-10-12 RX ORDER — FLUMAZENIL 0.1 MG/ML
0.2 INJECTION INTRAVENOUS
Status: CANCELLED | OUTPATIENT
Start: 2020-10-12 | End: 2020-10-12

## 2020-10-12 RX ORDER — ATROPINE SULFATE 0.1 MG/ML
0.5 INJECTION INTRAVENOUS
Status: CANCELLED | OUTPATIENT
Start: 2020-10-12 | End: 2020-10-13

## 2020-10-12 RX ORDER — DEXTROMETHORPHAN/PSEUDOEPHED 2.5-7.5/.8
1.2 DROPS ORAL
Status: CANCELLED | OUTPATIENT
Start: 2020-10-12

## 2020-10-12 RX ORDER — SODIUM CHLORIDE 0.9 % (FLUSH) 0.9 %
5-40 SYRINGE (ML) INJECTION AS NEEDED
Status: CANCELLED | OUTPATIENT
Start: 2020-10-12

## 2020-10-12 RX ORDER — PROPOFOL 10 MG/ML
INJECTION, EMULSION INTRAVENOUS AS NEEDED
Status: DISCONTINUED | OUTPATIENT
Start: 2020-10-12 | End: 2020-10-12 | Stop reason: HOSPADM

## 2020-10-12 RX ORDER — EPINEPHRINE 0.1 MG/ML
1 INJECTION INTRACARDIAC; INTRAVENOUS
Status: CANCELLED | OUTPATIENT
Start: 2020-10-12 | End: 2020-10-13

## 2020-10-12 RX ORDER — MIDAZOLAM HYDROCHLORIDE 1 MG/ML
INJECTION, SOLUTION INTRAMUSCULAR; INTRAVENOUS AS NEEDED
Status: DISCONTINUED | OUTPATIENT
Start: 2020-10-12 | End: 2020-10-12 | Stop reason: HOSPADM

## 2020-10-12 RX ORDER — SODIUM CHLORIDE 9 MG/ML
150 INJECTION, SOLUTION INTRAVENOUS CONTINUOUS
Status: DISCONTINUED | OUTPATIENT
Start: 2020-10-12 | End: 2020-10-12 | Stop reason: HOSPADM

## 2020-10-12 RX ORDER — NALOXONE HYDROCHLORIDE 0.4 MG/ML
0.4 INJECTION, SOLUTION INTRAMUSCULAR; INTRAVENOUS; SUBCUTANEOUS
Status: CANCELLED | OUTPATIENT
Start: 2020-10-12 | End: 2020-10-12

## 2020-10-12 RX ORDER — LIDOCAINE HYDROCHLORIDE 20 MG/ML
INJECTION, SOLUTION EPIDURAL; INFILTRATION; INTRACAUDAL; PERINEURAL AS NEEDED
Status: DISCONTINUED | OUTPATIENT
Start: 2020-10-12 | End: 2020-10-12 | Stop reason: HOSPADM

## 2020-10-12 RX ORDER — SODIUM CHLORIDE 0.9 % (FLUSH) 0.9 %
5-40 SYRINGE (ML) INJECTION EVERY 8 HOURS
Status: CANCELLED | OUTPATIENT
Start: 2020-10-12

## 2020-10-12 RX ORDER — ONDANSETRON 2 MG/ML
INJECTION INTRAMUSCULAR; INTRAVENOUS AS NEEDED
Status: DISCONTINUED | OUTPATIENT
Start: 2020-10-12 | End: 2020-10-12 | Stop reason: HOSPADM

## 2020-10-12 RX ADMIN — PROPOFOL 20 MG: 10 INJECTION, EMULSION INTRAVENOUS at 09:39

## 2020-10-12 RX ADMIN — PROPOFOL 40 MG: 10 INJECTION, EMULSION INTRAVENOUS at 09:27

## 2020-10-12 RX ADMIN — PROPOFOL 20 MG: 10 INJECTION, EMULSION INTRAVENOUS at 09:42

## 2020-10-12 RX ADMIN — GLYCOPYRROLATE 0.1 MG: 0.2 INJECTION INTRAMUSCULAR; INTRAVENOUS at 09:18

## 2020-10-12 RX ADMIN — PROPOFOL 20 MG: 10 INJECTION, EMULSION INTRAVENOUS at 09:35

## 2020-10-12 RX ADMIN — PROPOFOL 20 MG: 10 INJECTION, EMULSION INTRAVENOUS at 09:37

## 2020-10-12 RX ADMIN — PROPOFOL 20 MG: 10 INJECTION, EMULSION INTRAVENOUS at 09:41

## 2020-10-12 RX ADMIN — PROPOFOL 20 MG: 10 INJECTION, EMULSION INTRAVENOUS at 09:43

## 2020-10-12 RX ADMIN — SODIUM CHLORIDE 150 ML/HR: 900 INJECTION, SOLUTION INTRAVENOUS at 09:15

## 2020-10-12 RX ADMIN — LIDOCAINE HYDROCHLORIDE 100 MG: 20 INJECTION, SOLUTION EPIDURAL; INFILTRATION; INTRACAUDAL; PERINEURAL at 09:26

## 2020-10-12 RX ADMIN — PROPOFOL 20 MG: 10 INJECTION, EMULSION INTRAVENOUS at 09:34

## 2020-10-12 RX ADMIN — ONDANSETRON HYDROCHLORIDE 4 MG: 2 INJECTION INTRAMUSCULAR; INTRAVENOUS at 09:18

## 2020-10-12 RX ADMIN — MIDAZOLAM HYDROCHLORIDE 2 MG: 1 INJECTION, SOLUTION INTRAMUSCULAR; INTRAVENOUS at 09:18

## 2020-10-12 RX ADMIN — PROPOFOL 40 MG: 10 INJECTION, EMULSION INTRAVENOUS at 09:28

## 2020-10-12 RX ADMIN — PROPOFOL 20 MG: 10 INJECTION, EMULSION INTRAVENOUS at 09:44

## 2020-10-12 RX ADMIN — PROPOFOL 20 MG: 10 INJECTION, EMULSION INTRAVENOUS at 09:31

## 2020-10-12 RX ADMIN — PROPOFOL 50 MG: 10 INJECTION, EMULSION INTRAVENOUS at 09:26

## 2020-10-12 NOTE — ANESTHESIA PREPROCEDURE EVALUATION
Relevant Problems   No relevant active problems       Anesthetic History   No history of anesthetic complications            Review of Systems / Medical History  Patient summary reviewed, nursing notes reviewed and pertinent labs reviewed    Pulmonary    COPD               Neuro/Psych   Within defined limits           Cardiovascular  Within defined limits  Hypertension              Exercise tolerance: >4 METS     GI/Hepatic/Renal           Liver disease     Endo/Other    Diabetes    Morbid obesity and arthritis     Other Findings              Physical Exam    Airway  Mallampati: III  TM Distance: 4 - 6 cm  Neck ROM: decreased range of motion        Cardiovascular  Regular rate and rhythm,  S1 and S2 normal,  no murmur, click, rub, or gallop  Rhythm: regular  Rate: normal         Dental    Dentition: Caps/crowns     Pulmonary  Breath sounds clear to auscultation               Abdominal  GI exam deferred       Other Findings            Anesthetic Plan    ASA: 3  Anesthesia type: MAC            Anesthetic plan and risks discussed with: Patient and Spouse

## 2020-10-12 NOTE — ANESTHESIA POSTPROCEDURE EVALUATION
Post-Anesthesia Evaluation and Assessment    Cardiovascular Function/Vital Signs  Visit Vitals  BP (!) 143/85   Pulse 99   Temp 36.6 °C (97.8 °F)   Resp 18   Ht 5' 3\" (1.6 m)   Wt 98 kg (216 lb)   SpO2 100%   BMI 38.26 kg/m²       Patient is status post Procedure(s):  EGD WITH MAC POLYPECTOMY. Nausea/Vomiting: Controlled. Postoperative hydration reviewed and adequate. Pain:  Pain Scale 1: Numeric (0 - 10) (10/12/20 0956)  Pain Intensity 1: 0 (10/12/20 0956)   Managed. Neurological Status: At baseline. Mental Status and Level of Consciousness: Baseline and stable. Pulmonary Status:   O2 Device: Room air (10/12/20 0956)   Adequate oxygenation and airway patent. Complications related to anesthesia: None    Post-anesthesia assessment completed. No concerns. Patient has met all discharge requirements.     Signed By: Jorge Martinez MD

## 2020-10-12 NOTE — PROCEDURES
[address] [facility], MD, [address], [place] Deneen Barfield, Maryjean Leech, MD Chaya Favre, ANNEMARIE Mckeon, [facility] Delacruz [facility]   Kristy Mina, KEI Villasenor, [facility]       Sandeep Zavala [facility] [address] 136    at [address], [facility] Wes Marcus  22.    [phone]    FAX: [address] [place] [address], [address]    [phone]    FAX: [phone]         UPPER ENDOSCOPY PROCEDURE NOTE    Lemuel Ferguson  [birth year]    INDICATION: Cirrhosis. Screening for esophageal varices with variceal ligation if  indicated. : Nakul Mix MD    SURGICAL ASSISTANT:  None    PROSTHETIC DEVISES, TISSUE GRAFTS, ORGAN TRANSPLANTS:  Not applicable     ANESTHESIA/SEDATION: Sedation per anesthesiology      PROCEDURE DESCRIPTION:  [employer] consent was obtained from the patient for the procedure. All risks and benefits of the procedure explained. The procedure was performed in the endoscopy suite. The patient was laying on a stretcher and moved to the left lateral decubitus position prior to administration of sedation. Sedation was administered by anesthesiology. See their note for details. The endoscope was inserted into the mouth and advanced under direct vision to the second portion of the duodenum. Careful inspection of upper gastrointestinal tract was made as the endoscope was inserted and withdrawn. Retroflexion of the endoscope to view of the cardia of the stomach was performed. The findings and interventions are described below.       FINDINGS:  Esophagus:    Normal.      Stomach:   No portal hypertensive gastropathy   Multiple large gastric polyps in the antrum  3 polys removed by snare polypectomy. Mucosal ulcerations left by polypectomy clean without bleeding    No gastric varices    Duodenum:   Normal bulb and second portion    INTERVENTION:   3 polyps removed from the antrum by snare technique. COMPLICATIONS: None. The patient tolerated the procedure well. EBL: Negligible. RECOMMENDATIONS:  Observe until discharge parameters are achieved. May resume previous diet. Repeat endoscopy to reassess varices and need for banding in   Check biopsy results. Repeat EGD to remove rest of polyps depending upon pathology in 1-2 months  [address] office as scheduled.       Noah Sage MD  [address]  4 [facility], [address] [place] [address], [address] [place] [address] Street - [address]  12 [facility]

## 2020-10-12 NOTE — DISCHARGE INSTRUCTIONS
0320 [facility], MD, [address], [facility] [place] Deneen Barfield, MD Zoë Armendariz PA-C Alvis Langton, [facility]     Nida Delacruz, [facility]   KEI Gray Arm, [facility]       Sandeep Zavala [facility] [address]    at [address], [place] Wes Marcus  22.    [phone]    FAX: [address] [place] [address], [address]    [phone]    FAX: [phone]         ENDOSCOPY DISCHARGE INSTRUCTIONS      Penelope Almaraz  [birth year]  Date: 10/12/2020    DISCOMFORT:  Use lozenges or warm salt water gargle for sore thoat  Apply warm compress to IV site if red. If redness or soreness persists call the office. You may experience gas and bloating. Walking and belching will help relieve this. You may experience chest discomfort or pressure especially if banding of esophageal varices was performed. DIET:  You may resume your normal diet. ACTIVITY:  Spend the remainder of the day resting. Avoid any strenuous activity. You may not operate a vehicle for 12 hours. You may not engage in an occupation involving machinery or appliances for rest of today. Avoid making any critical or financial decisions for at least 24 hour. Call the [facility] [phone] if you have any of the following:  Increasing chest or abdominal pain, nausea, vomiting, vomiting blood, abdominal distension or swelling, fever or chills, bloody discharge from nose or mouth or shortness of breath. Follow-up Instructions:  Call Dr. Ale Winters for any questions or problems at the phone number listed above. If a biopsy was performed, you will be contacted by the office staff or Dr Ale Winters within 1 week.    If you have not heard from us by then you may call the office at the phone number listed above to inquire about the results. ENDOSCOPY FINDINGS:  Your endoscopy demonstrated Several polyps in the stomach. 3 biopsy of the stomach were removed. Will repeat EGD to look for development of varices in 1-2 months depending upon pathology result. The office will call to schedule this. DISCHARGE SUMMARY from the Nurse: The following personal items collected during your admission are returned to you:   Dental Appliance: Dental Appliances: At home  Vision: Visual Aid: None  Hearing Aid:    Jewelry:    Clothing:    Other Valuables:    Valuables sent to safe:                          Learning About Coronavirus (COVID-19)  Coronavirus (COVID-19): Overview  What is coronavirus (COVID-19)? The coronavirus disease (COVID-19) is caused by a virus. It is an illness that was first found in [place] in December 2019. It has since spread worldwide. The virus can cause fever, cough, and trouble breathing. In severe cases, it can cause pneumonia and make it hard to breathe without help. It can cause death. Coronaviruses are a large group of viruses. They cause the common cold. They also cause more serious illnesses like Middle East respiratory syndrome (MERS) and severe acute respiratory syndrome (SARS). COVID-19 is caused by a novel coronavirus. That means it's a new type that has not been seen in people before. This virus spreads person-to-person through droplets from coughing and sneezing. It can also spread when you are close to someone who is infected. And it can spread when you touch something that has the virus on it, such as a doorknob or a tabletop. What can you do to protect yourself from coronavirus (COVID-19)? The best way to protect yourself from getting sick is to:  · Avoid areas where there is an outbreak. · Avoid contact with people who may be infected. · Wash your hands often with soap or alcohol-based hand sanitizers.   · Avoid crowds and try to stay at least 6 feet away from other people. · Wash your hands often, especially after you cough or sneeze. Use soap and water, and scrub for at least 20 seconds. If soap and water aren't available, use an alcohol-based hand . · Avoid touching your mouth, nose, and eyes. What can you do to avoid spreading the virus to others? To help avoid spreading the virus to others:  · Cover your mouth with a tissue when you cough or sneeze. Then throw the tissue in the trash. · Use a disinfectant to clean things that you touch often. · Stay home if you are sick or have been exposed to the virus. Don't go to school, work, or public areas. And don't use public transportation. · If you are sick:  ? Leave your home only if you need to get medical care. But call the doctor's office first so they know you're coming. And wear a face mask, if you have one.  ? If you have a face mask, wear it whenever you're around other people. It can help stop the spread of the virus when you cough or sneeze. ? Clean and disinfect your home every day. Use household  and disinfectant wipes or sprays. Take special care to clean things that you grab with your hands. These include doorknobs, remote controls, phones, and handles on your refrigerator and microwave. And don't forget countertops, tabletops, bathrooms, and computer keyboards. When to call for help  Call 911 anytime you think you may need emergency care. For example, call if:  · You have severe trouble breathing. (You can't talk at all.)  · You have constant chest pain or pressure. · You are severely dizzy or lightheaded. · You are confused or can't think clearly. · Your face and lips have a blue color. · You pass out (lose consciousness) or are very hard to wake up. Call your doctor now if you develop symptoms such as:  · Shortness of breath. · Fever. · Cough. If you need to get care, call ahead to the doctor's office for instructions before you go. Make sure you wear a face mask, if you have one, to prevent exposing other people to the virus. Where can you get the latest information? The following health organizations are tracking and studying this virus. Their websites contain the most up-to-date information. Brionna Prince also learn what to do if you think you may have been exposed to the virus. · U.S. Centers for Disease Control and Prevention (CDC): The CDC provides updated news about the disease and travel advice. The website also tells you how to prevent the spread of infection. www.cdc.gov  · World Health Organization [facility]): WHO offers information about the virus outbreaks. WHO also has travel advice. www.who.int  Current as of: April 1, 2020               Content Version: 12.4  © 2006-2020 [employer]. Care instructions adapted under license by your healthcare professional. If you have questions about a medical condition or this instruction, always ask your healthcare professional. [address] any warranty or liability for your use of this information. DISCHARGE SUMMARY from Nurse    PATIENT INSTRUCTIONS:    After general anesthesia or intravenous sedation, for 24 hours or while taking prescription Narcotics:  · Limit your activities  · Do not drive and operate hazardous machinery  · Do not make important personal or business decisions  · Do  not drink alcoholic beverages  · If you have not urinated within 8 hours after discharge, please contact your surgeon on call.     Report the following to your surgeon:  · Excessive pain, swelling, redness or odor of or around the surgical area  · Temperature over 100.5  · Nausea and vomiting lasting longer than 4 hours or if unable to take medications  · Any signs of decreased circulation or nerve impairment to extremity: change in color, persistent  numbness, tingling, coldness or increase pain  · Any questions    What to do at Home:  Recommended activity: AS ABOVE,     If you experience any of the following symptoms AS ABOVE, please follow up with .DR. GONZALEZ    *  Please give a list of your current medications to your Primary Care Provider. *  Please update this list whenever your medications are discontinued, doses are      changed, or new medications (including over-the-counter products) are added. *  Please carry medication information at all times in case of emergency situations. These are general instructions for a healthy lifestyle:    No smoking/ No tobacco products/ Avoid exposure to second hand smoke  Surgeon General's Warning:  Quitting smoking now greatly reduces serious risk to your health. Obesity, smoking, and sedentary lifestyle greatly increases your risk for illness    A healthy diet, regular physical exercise & weight monitoring are important for maintaining a healthy lifestyle    You may be retaining fluid if you have a history of heart failure or if you experience any of the following symptoms:  Weight gain of 3 pounds or more overnight or 5 pounds in a week, increased swelling in our hands or feet or shortness of breath while lying flat in bed. Please call your doctor as soon as you notice any of these symptoms; do not wait until your next office visit. The discharge information has been reviewed with the patient. The patient verbalized understanding. Discharge medications reviewed with the patient and appropriate educational materials and side effects teaching were provided.   ___________________________________________________________________________________________________________________________________    Patient armband removed and shredded

## 2020-12-10 ENCOUNTER — HOSPITAL ENCOUNTER (OUTPATIENT)
Dept: ULTRASOUND IMAGING | Age: 70
Discharge: HOME | End: 2020-12-10
Payer: MEDICARE

## 2020-12-10 ENCOUNTER — HOSPITAL ENCOUNTER (EMERGENCY)
Age: 70
Discharge: HOME | End: 2020-12-10
Attending: EMERGENCY MEDICINE
Payer: MEDICARE

## 2020-12-10 VITALS
DIASTOLIC BLOOD PRESSURE: 80 MMHG | RESPIRATION RATE: 15 BRPM | OXYGEN SATURATION: 100 % | SYSTOLIC BLOOD PRESSURE: 129 MMHG | HEIGHT: 63 IN | HEART RATE: 93 BPM | BODY MASS INDEX: 37.39 KG/M2 | TEMPERATURE: 98.4 F | WEIGHT: 211 LBS

## 2020-12-10 DIAGNOSIS — E16.2 HYPOGLYCEMIA: Primary | ICD-10-CM

## 2020-12-10 DIAGNOSIS — K74.60 CIRRHOSIS OF LIVER WITHOUT ASCITES, UNSPECIFIED HEPATIC CIRRHOSIS TYPE (HCC): ICD-10-CM

## 2020-12-10 LAB
GLUCOSE BLD STRIP.AUTO-MCNC: 170 MG/DL (ref 70–110)
GLUCOSE BLD STRIP.AUTO-MCNC: 64 MG/DL (ref 70–110)

## 2020-12-10 PROCEDURE — 90471 IMMUNIZATION ADMIN: CPT

## 2020-12-10 PROCEDURE — 76705 ECHO EXAM OF ABDOMEN: CPT

## 2020-12-10 PROCEDURE — 90686 IIV4 VACC NO PRSV 0.5 ML IM: CPT | Performed by: EMERGENCY MEDICINE

## 2020-12-10 PROCEDURE — 74011250636 HC RX REV CODE- 250/636: Performed by: EMERGENCY MEDICINE

## 2020-12-10 PROCEDURE — G0008 ADMIN INFLUENZA VIRUS VAC: HCPCS

## 2020-12-10 PROCEDURE — 99285 EMERGENCY DEPT VISIT HI MDM: CPT

## 2020-12-10 PROCEDURE — 82962 GLUCOSE BLOOD TEST: CPT

## 2020-12-10 RX ADMIN — INFLUENZA VIRUS VACCINE 0.5 ML: 15; 15; 15; 15 SUSPENSION INTRAMUSCULAR at 11:22

## 2020-12-10 NOTE — ED TRIAGE NOTES
Pt arrives from radiology registration w/ c/o dizziness and generalized weakness. Pt reports her BG prior to appt for US was 200 and took her insulin per sliding scale but did not eat. Pt BG 64 mg/dL during triage, given orange juice and crackers.

## 2020-12-10 NOTE — ED PROVIDER NOTES
EMERGENCY DEPARTMENT HISTORY AND PHYSICAL EXAM    Date: 12/10/2020  Patient Name: Jf Reyes    History of Presenting Illness     Chief Complaint   Patient presents with    Low Blood Sugar         History Provided By: Patient    Jf Reyes is a 69 y.o. female who presents to the emergency department C/O dizziness/lightheadedness/fatigue. Patient states she was at our facility waiting for an outpatient ultrasound when she start developing the symptoms. She does admit to being a diabetic and took her Metformin and insulin this morning. She states she had to stay n.p.o. for the ultrasound   Of her liver and gallbladder area. Patient states she felt the symptoms coming on and figured it was due to her blood sugar being low and she has similar symptoms in the past.    PCP: Araceli Leos MD    Current Outpatient Medications   Medication Sig Dispense Refill    insulin lispro (HUMALOG) 100 unit/mL injection by SubCUTAneous route.  methocarbamoL (ROBAXIN) 750 mg tablet Take 750 mg by mouth every eight (8) hours as needed.  naloxone (NARCAN) 4 mg/actuation nasal spray 4 mg.  ondansetron (ZOFRAN ODT) 4 mg disintegrating tablet Take 4 mg by mouth every eight (8) hours as needed.  sodium bicarbonate 650 mg tablet Take 1,300 mg by mouth four (4) times daily.  tacrolimus (PROGRAF) 1 mg capsule Take 1 Cap by mouth every twelve (12) hours. 120 Cap 6    insulin aspart protamine/insulin aspart (NOVOLOG MIX 70-30FLEXPEN U-100) 100 unit/mL (70-30) inpn by SubCUTAneous route.  albuterol (PROVENTIL HFA) 90 mcg/actuation inhaler Take 2 Puffs by inhalation.  amLODIPine (NORVASC) 10 mg tablet Take 10 mg by mouth.  aspirin delayed-release 81 mg tablet Take 81 mg by mouth.  budesonide-formoterol (SYMBICORT) 160-4.5 mcg/actuation HFAA Take 2 Puffs by inhalation.       butalbital-acetaminophen-caffeine (FIORICET, ESGIC) -40 mg per tablet TAKE 1 TABLET BY MOUTH EVERY 4 HOURS AS NEEDED FOR HEADACHE PAIN  0    ferrous fumarate 324 mg (106 mg iron) tab Take 324 mg by mouth.  furosemide (LASIX) 40 mg tablet Take 40 mg by mouth.  glipiZIDE SR (GLUCOTROL XL) 5 mg CR tablet Take 5 mg by mouth.  minoxidil (LONITEN) 2.5 mg tablet Take 2.5 mg by mouth.  omeprazole (PRILOSEC) 40 mg capsule Take 40 mg by mouth.  spironolactone (ALDACTONE) 100 mg tablet Take 1 Tab by mouth daily. 180 Tab 3       Past History     Past Medical History:  Past Medical History:   Diagnosis Date    Arthritis     Autoimmune disease [facility]     HEPATITIS     Chronic obstructive pulmonary disease ([facility]     Diabetes ([facility]     Gastrointestinal disorder     Hypertension     Liver disease        Past Surgical History:  Past Surgical History:   Procedure Laterality Date    BIOPSY LIVER      HX ORTHOPAEDIC  03/2020    BACK X5       Family History:  History reviewed. No pertinent family history. Social History:  Social History     Tobacco Use    Smoking status: Never Smoker    Smokeless tobacco: Never Used   Substance Use Topics    Alcohol use: Yes     Comment: CELEBRATORY    Drug use: Never       Allergies: Allergies   Allergen Reactions    Adhesive Tape-Silicones Other (comments)    Naproxen Nausea Only    Penicillins Other (comments)    Tramadol Nausea Only    Vicodin [Hydrocodone-Acetaminophen] Hives         Review of Systems   Review of Systems   Constitutional: Negative for fever. Respiratory: Negative for shortness of breath. Cardiovascular: Negative for chest pain. Gastrointestinal: Positive for nausea. Neurological: Positive for dizziness, weakness and light-headedness. All other systems reviewed and are negative.     Physical Exam     Vitals:    12/10/20 1045 12/10/20 1051 12/10/20 1100 12/10/20 1115   BP: (!) 144/79  122/70 129/80   Pulse:  (!) 101 92 93   Resp:  21 21 15   Temp:       SpO2: 100% 100% 100% 100%   Weight:       Height:         Physical Exam    Nursing notes and vital signs reviewed    Airway: intact, speaking normally  Breathing: No apparent distress, no cyanosis  Circulation: Peripheral pulses equal    Constitutional: Non toxic appearing, no acute distress, appearing stated age  [de-identified]:  Head: Normocephalic, Atraumatic  Eyes: PERRL, EOMI, No conjunctival injection  Ears: external ears normal  Nose: No rhinorrhea, external nose normal  Throat: mucous membranes moist  Neck: symmetric, trachea midline, no obvious swelling, no JVD  Cardiovascular: Regular rate and rhythm, no murmurs  Lungs: Clear to ausculation bilaterally, No stridor, Normal work of breathing and chest excursion bilaterally  Abdomen: Soft, non tender, non distended, normoactive bowel sounds, No rigidity, no peritoneal signs  Musculoskeletal:  No evidence of obvious deformity to the back, neck or extremities, no LE edema  Skin: Warm, dry, No obvious rashes  Neuro: Alert and oriented x 3, CN 2-12 intact, normal speech, strength and sensation full and symmetric bilaterally  Psychiatric: Normal mood and affect      Diagnostic Study Results     Labs -     Recent Results (from the past 72 hour(s))   GLUCOSE, POC    Collection Time: 12/10/20 10:33 AM   Result Value Ref Range    Glucose (POC) 64 (L) 70 - 110 mg/dL   GLUCOSE, POC    Collection Time: 12/10/20 11:26 AM   Result Value Ref Range    Glucose (POC) 170 (H) 70 - 110 mg/dL       Radiologic Studies -   No orders to display     CT Results  (Last 48 hours)    None        CXR Results  (Last 48 hours)    None          Medications given in the ED-  Medications   influenza vaccine 2020-21 (6 mos+)(PF) (FLUARIX/FLULAVAL/FLUZONE QUAD) injection 0.5 mL (0.5 mL IntraMUSCular Given 12/10/20 1122)         Medical Decision Making     I reviewed the vital signs, available nursing notes, past medical history, past surgical history, family history and social history. Vital Signs interpretation- I have reviewed the patient's vital signs.     Pulse Oximetry interpretation - 100% on Room air     Cardiac Monitor interpretation:  Rate: 101 bpm  Rhythm: sinus      Records Reviewed: Nursing Notes and Old Medical Records    Procedures:  Procedures    ED Course & MDM:   Risk stratification: Patient's blood sugar noted to be 64. Likely the cause of her symptoms. Patient states she is fine with only receiving the glucose test and declined any further IV or blood work. We will give juice and some food and reassess her blood sugar and symptoms    Data review: Patient's repeat blood sugar normal.  She states she is feeling much better. States she is ready to go home    Problem follow up:  Discussed with the patient to continue with her diabetes regimen and follow-up with her primary care physician for long-term management of her diabetes otherwise come back to the emergency department if symptoms worsen. She understands and agrees to plan    Diagnosis and Disposition       DISCHARGE NOTE:    Kailey Masters  results have been reviewed with her. She has been counseled regarding her diagnosis, treatment, and plan. She verbally conveys understanding and agreement of the signs, symptoms, diagnosis, treatment and prognosis and additionally agrees to follow up as discussed. She also agrees with the care-plan and conveys that all of her questions have been answered. I have also provided discharge instructions for her that include: educational information regarding their diagnosis and treatment, and list of reasons why they would want to return to the ED prior to their follow-up appointment, should her condition change. She has been provided with education for proper emergency department utilization. CLINICAL IMPRESSION:    1. Hypoglycemia        PLAN:  1. D/C Home  2. Discharge Medication List as of 12/10/2020 11:39 AM        3.    Follow-up Information     Follow up With Specialties Details Why Contact Esdras Mccollum MD Family Medicine Schedule an appointment as soon as possible for a visit  As needed [address]  [address] [facility]      [facility] EMERGENCY DEPT Emergency Medicine Go to  If symptoms worsen [address]  [address] [facility] [address] [phone]        _______________________________      Please note that this dictation was completed with [employer], the computer voice recognition software. Quite often unanticipated grammatical, syntax, homophones, and other interpretive errors are inadvertently transcribed by the computer software. Please disregard these errors. Please excuse any errors that have escaped final proofreading.

## 2020-12-15 ENCOUNTER — TELEPHONE (OUTPATIENT)
Dept: HEMATOLOGY | Age: 70
End: 2020-12-15

## 2021-01-05 ENCOUNTER — OFFICE VISIT (OUTPATIENT)
Dept: HEMATOLOGY | Age: 71
End: 2021-01-05
Payer: MEDICARE

## 2021-01-05 ENCOUNTER — HOSPITAL ENCOUNTER (OUTPATIENT)
Dept: LAB | Age: 71
Discharge: HOME | End: 2021-01-05
Payer: MEDICAID

## 2021-01-05 VITALS
RESPIRATION RATE: 19 BRPM | BODY MASS INDEX: 44.3 KG/M2 | TEMPERATURE: 97 F | HEIGHT: 63 IN | SYSTOLIC BLOOD PRESSURE: 140 MMHG | OXYGEN SATURATION: 98 % | WEIGHT: 250 LBS | DIASTOLIC BLOOD PRESSURE: 86 MMHG | HEART RATE: 104 BPM

## 2021-01-05 DIAGNOSIS — K74.60 CIRRHOSIS OF LIVER WITHOUT ASCITES, UNSPECIFIED HEPATIC CIRRHOSIS TYPE (HCC): ICD-10-CM

## 2021-01-05 DIAGNOSIS — K74.60 CIRRHOSIS OF LIVER WITHOUT ASCITES, UNSPECIFIED HEPATIC CIRRHOSIS TYPE (HCC): Primary | ICD-10-CM

## 2021-01-05 LAB
ALBUMIN SERPL-MCNC: 3.3 G/DL (ref 3.4–5)
ALBUMIN/GLOB SERPL: 0.7 {RATIO} (ref 0.8–1.7)
ALP SERPL-CCNC: 241 U/L (ref 45–117)
ALT SERPL-CCNC: 46 U/L (ref 13–56)
ANION GAP SERPL CALC-SCNC: 6 MMOL/L (ref 3–18)
AST SERPL-CCNC: 35 U/L (ref 10–38)
BASOPHILS # BLD: 0 K/UL (ref 0–0.1)
BASOPHILS NFR BLD: 0 % (ref 0–2)
BILIRUB DIRECT SERPL-MCNC: 0.2 MG/DL (ref 0–0.2)
BILIRUB SERPL-MCNC: 0.5 MG/DL (ref 0.2–1)
BUN SERPL-MCNC: 20 MG/DL (ref 7–18)
BUN/CREAT SERPL: 14 (ref 12–20)
CALCIUM SERPL-MCNC: 9.2 MG/DL (ref 8.5–10.1)
CHLORIDE SERPL-SCNC: 107 MMOL/L (ref 100–111)
CO2 SERPL-SCNC: 26 MMOL/L (ref 21–32)
CREAT SERPL-MCNC: 1.44 MG/DL (ref 0.6–1.3)
DIFFERENTIAL METHOD BLD: ABNORMAL
EOSINOPHIL # BLD: 0.3 K/UL (ref 0–0.4)
EOSINOPHIL NFR BLD: 5 % (ref 0–5)
ERYTHROCYTE [DISTWIDTH] IN BLOOD BY AUTOMATED COUNT: 14.8 % (ref 11.6–14.5)
GLOBULIN SER CALC-MCNC: 4.6 G/DL (ref 2–4)
GLUCOSE SERPL-MCNC: 173 MG/DL (ref 74–99)
HCT VFR BLD AUTO: 40.5 % (ref 35–45)
HGB BLD-MCNC: 13.2 G/DL (ref 12–16)
INR PPP: 1.3 (ref 0.8–1.2)
LYMPHOCYTES # BLD: 1.6 K/UL (ref 0.9–3.6)
LYMPHOCYTES NFR BLD: 31 % (ref 21–52)
MCH RBC QN AUTO: 27.4 PG (ref 24–34)
MCHC RBC AUTO-ENTMCNC: 32.6 G/DL (ref 31–37)
MCV RBC AUTO: 84 FL (ref 74–97)
MONOCYTES # BLD: 0.9 K/UL (ref 0.05–1.2)
MONOCYTES NFR BLD: 16 % (ref 3–10)
NEUTS SEG # BLD: 2.5 K/UL (ref 1.8–8)
NEUTS SEG NFR BLD: 48 % (ref 40–73)
PLATELET # BLD AUTO: 196 K/UL (ref 135–420)
PMV BLD AUTO: 10 FL (ref 9.2–11.8)
POTASSIUM SERPL-SCNC: 4.6 MMOL/L (ref 3.5–5.5)
PROT SERPL-MCNC: 7.9 G/DL (ref 6.4–8.2)
PROTHROMBIN TIME: 15.8 SEC (ref 11.5–15.2)
RBC # BLD AUTO: 4.82 M/UL (ref 4.2–5.3)
SODIUM SERPL-SCNC: 139 MMOL/L (ref 136–145)
WBC # BLD AUTO: 5.3 K/UL (ref 4.6–13.2)

## 2021-01-05 PROCEDURE — G8417 CALC BMI ABV UP PARAM F/U: HCPCS | Performed by: NURSE PRACTITIONER

## 2021-01-05 PROCEDURE — 85025 COMPLETE CBC W/AUTO DIFF WBC: CPT

## 2021-01-05 PROCEDURE — 85610 PROTHROMBIN TIME: CPT

## 2021-01-05 PROCEDURE — G8536 NO DOC ELDER MAL SCRN: HCPCS | Performed by: NURSE PRACTITIONER

## 2021-01-05 PROCEDURE — 1101F PT FALLS ASSESS-DOCD LE1/YR: CPT | Performed by: NURSE PRACTITIONER

## 2021-01-05 PROCEDURE — G8427 DOCREV CUR MEDS BY ELIG CLIN: HCPCS | Performed by: NURSE PRACTITIONER

## 2021-01-05 PROCEDURE — G8400 PT W/DXA NO RESULTS DOC: HCPCS | Performed by: NURSE PRACTITIONER

## 2021-01-05 PROCEDURE — 3017F COLORECTAL CA SCREEN DOC REV: CPT | Performed by: NURSE PRACTITIONER

## 2021-01-05 PROCEDURE — 80076 HEPATIC FUNCTION PANEL: CPT

## 2021-01-05 PROCEDURE — 36415 COLL VENOUS BLD VENIPUNCTURE: CPT

## 2021-01-05 PROCEDURE — G8432 DEP SCR NOT DOC, RNG: HCPCS | Performed by: NURSE PRACTITIONER

## 2021-01-05 PROCEDURE — 80048 BASIC METABOLIC PNL TOTAL CA: CPT

## 2021-01-05 PROCEDURE — 99214 OFFICE O/P EST MOD 30 MIN: CPT | Performed by: NURSE PRACTITIONER

## 2021-01-05 PROCEDURE — 1090F PRES/ABSN URINE INCON ASSESS: CPT | Performed by: NURSE PRACTITIONER

## 2021-01-05 PROCEDURE — 82107 ALPHA-FETOPROTEIN L3: CPT

## 2021-01-05 RX ORDER — OXYCODONE AND ACETAMINOPHEN 5; 325 MG/1; MG/1
1 TABLET ORAL
COMMUNITY
End: 2021-03-29 | Stop reason: SDUPTHER

## 2021-01-05 RX ORDER — BACITRACIN 500 [USP'U]/G
OINTMENT TOPICAL 3 TIMES DAILY
COMMUNITY
Start: 2020-04-30 | End: 2022-03-18

## 2021-01-05 NOTE — PROGRESS NOTES
[address] [place] [address], MD, MD Mahendra Fleming PA-C Jacqui Lord, [facility]-BC     Nida Delacruz, [facility]   Sanaz Amin FNP-JOSH Perez, [facility]       Sandeep Deputado [address]    at [address], 81 [facility], [facility] 22.    [phone]    FAX: [address] [place] [address], [address], [address]    [phone]    FAX: [phone]       Patient Care Team:  Steff Martel MD as PCP - General (Family Medicine)  Shanda Bang MD (Gastroenterology)  Ubaldo Kenyon MD (Neurosurgery)      Problem List  Date Reviewed: 1/5/2021          Codes Class Noted    Hypoglycemia ICD-10-CM: E16.2  ICD-9-CM: 251.2  12/10/2020        GAVE (gastric antral vascular ectasia) ICD-10-CM: K31.819  ICD-9-CM: 537.82  6/10/2019        Severe obesity ([facility] ICD-10-CM: E66.01  ICD-9-CM: 278.01  4/17/2019        Autoimmune hepatitis ([facility] ICD-10-CM: K75.4  ICD-9-CM: 571.42  4/17/2019        Thrombocytopenia ([facility] ICD-10-CM: D69.6  ICD-9-CM: 287.5  4/17/2019        Cirrhosis ([facility] ICD-10-CM: K74.60  ICD-9-CM: 571.5  4/17/2019                Altagracia Carlton returns to the [address] for management 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 The active problem list, all pertinent past medical history, medications, liver histology, radiologic findings and laboratory findings related to the liver disorder were reviewed with the patient. The patient is a 69 y.o.   female who was found to have marked elevation in liver enzymes into the 500-800 range in 2017     The patient was found to have cirrhosis in 2019 when she developed lower extremity edema, thrombocytopenia and an ultrasound suggested cirrhosis    The patient has developed the following complications of cirrhosis: esophageal varices, GAVE    The patient notes fatigue. The patient has not experienced pain in the right side over the liver, problems concentrating, swelling of the abdomen, swelling of the lower extremities, hematemesis, hematochezia. The patient has limitations in daily activities due to liver disease and other medical issues. 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 The diagnosis of cirrhosis is based upon elastography, imaging, laboratory studies, complications of cirrhosis. The patient has normal liver function. The CTP is 6. Child class A. The MELD score is 8. Autoimmune Hepatitis   The diagnosis was based upon serology, liver biopsy   A liver biopsy performed in 8/2017 demonstrated moderate to severe inflammation with interface hepatitis, with piecemeal necrosis and stage 2-3 fibrosis. Elastography performed in 4/2019 suggests Cirrhosis. Liver transaminases are normal.  ALP is elevated. Liver function is normal.  The platelet count is normal.    The patient is taking TAC 1 mg QD since 10/2019. Primary Sclerosing Cholangitis. This diagnosis is based on MRI/MRCP performed in 06/2020. Intrahepatic biliary ducts have a mildly beaded appearance, suggestive of primary sclerosing cholangitis. The natural history of PSC was discussed in detail. I explained that there is cure for PSC. I explained that 75% of people affected by the disease are male. I explained that at least 75% of those people who suffer from [facility] [place] by [facility] also have ulcerative colitis. I explained that the single greatest risk factor associated with PSC is cholangiocarcinomas. Elevation in ALP  The elevation in ALP is most likely due to cirrhosis and PSC. Acute kidney injury  The patient had an elevation in SCr to 1.44 mg on today's labs. Will continue TAC at 1 mg BID for now.   Will continue diuretics at step one for now. ANTI-HCV positive HCV RNA negative  This is either due to exposure to HCV many yers ago with spontaneous resolution or a false positive anti-HCV because of the autoimmune disease. The patient is HCV RNA undetectable and does not have HCV. Lower extremity edema  Edema has resolved with current dose of diuretics. Will continue the current dose of diuretics at step 1. Screening for Esophageal varices   The patient had an EGD at [facility] in 7/2019. This demonstrated small esophageal varices and GAVE  Dr. Dang Stroud performed a repeat EGD in 10/2020. Multiple large gastric polyps in the antrum. 3 polys removed. Repeat EGD ordered today. Hepatic encephalopathy   Overt HE has not developed to date. There is no need for treatment with lactulose and/or Xifaxan at this time. There is no need to restrict dietary protein at this time. Thrombocytopenia   The most recent platelet counts is 376. Screening for Hepatocellular Carcinoma  HCC screening has recently been performed and does not suggest [address] The next liver imaging study will be performed in 06/2021. Treatment of other medical problems in patients with chronic liver disease  There are no contraindications for the patient to take most medications that are necessary for treatment of other medical issues. Counseling for alcohol in patients with chronic liver disease  The patient was counseled regarding alcohol consumption and the effect of alcohol on chronic liver disease. Osteoporosis  The risk of osteoporosis is increased in patients with cirrhosis. DEXA bone density to assess for osteoporosis has not been performed. This should be ordered by the patients primary care physician. Vaccinations   Vaccination for viral hepatitis B is not needed. The patient has serologic evidence of prior exposure or vaccination with immunity.   The need for vaccination against viral hepatitis A will be assessed with serologic and instituted as appropriate. Routine vaccinations against other bacterial and viral agents can be performed as indicated. Annual flu vaccination should be administered if indicated. ALLERGIES  Allergies   Allergen Reactions    Adhesive Tape-Silicones Other (comments)    Naproxen Nausea Only    Penicillins Other (comments)    Tramadol Nausea Only    Vicodin [Hydrocodone-Acetaminophen] Hives       MEDICATIONS  Current Outpatient Medications   Medication Sig    bacitracin (BACITRACIN) 500 unit/gram oint Apply  to affected area three (3) times daily.  oxyCODONE-acetaminophen (Percocet) 5-325 mg per tablet Take 1 Tab by mouth.  insulin lispro (HUMALOG) 100 unit/mL injection by SubCUTAneous route.  methocarbamoL (ROBAXIN) 750 mg tablet Take 750 mg by mouth every eight (8) hours as needed.  naloxone (NARCAN) 4 mg/actuation nasal spray 4 mg.  ondansetron (ZOFRAN ODT) 4 mg disintegrating tablet Take 4 mg by mouth every eight (8) hours as needed.  sodium bicarbonate 650 mg tablet Take 1,300 mg by mouth four (4) times daily.  tacrolimus (PROGRAF) 1 mg capsule Take 1 Cap by mouth every twelve (12) hours.  insulin aspart protamine/insulin aspart (NOVOLOG MIX 70-30FLEXPEN U-100) 100 unit/mL (70-30) inpn by SubCUTAneous route.  albuterol (PROVENTIL HFA) 90 mcg/actuation inhaler Take 2 Puffs by inhalation.  amLODIPine (NORVASC) 10 mg tablet Take 10 mg by mouth.  budesonide-formoterol (SYMBICORT) 160-4.5 mcg/actuation HFAA Take 2 Puffs by inhalation.  butalbital-acetaminophen-caffeine (FIORICET, ESGIC) -40 mg per tablet TAKE 1 TABLET BY MOUTH EVERY 4 HOURS AS NEEDED FOR HEADACHE PAIN    ferrous fumarate 324 mg (106 mg iron) tab Take 324 mg by mouth.  furosemide (LASIX) 40 mg tablet Take 40 mg by mouth.  glipiZIDE SR (GLUCOTROL XL) 5 mg CR tablet Take 5 mg by mouth.  minoxidil (LONITEN) 2.5 mg tablet Take 2.5 mg by mouth.     omeprazole (PRILOSEC) 40 mg capsule Take 40 mg by mouth.  spironolactone (ALDACTONE) 100 mg tablet Take 1 Tab by mouth daily. No current facility-administered medications for this visit. SYSTEM REVIEW NOT RELATED TO LIVER DISEASE OR REVIEWED ABOVE:  Constitution systems: Negative for fever, chills, weight gain, weight loss. Eyes: Negative for visual changes. ENT: Negative for sore throat, painful swallowing. Respiratory: Negative for cough, hemoptysis, SOB. Cardiology: Swelling of the legs. GI:  Negative for constipation or diarrhea. : Negative for urinary frequency, dysuria, hematuria, nocturia. Skin: Negative for rash. Hematology: Negative for easy bruising, blood clots. Musculo-skelatal: Negative for back pain, muscle pain, weakness. Neurologic: Negative for headaches, dizziness, vertigo, memory problems not related to HE. Psychology: Negative for anxiety, depression. FAMILY HISTORY:  The father  of MI. The mother  of MI. The following family members have liver disease: a brother    SOCIAL HISTORY:  The patient is . The patient has 2 children, and 8 grandchildren. The patient stopped using tobacco products in . The patient has never consumed significant amounts of alcohol. The patient used to work as as a  at [employer]. The patient retired in . PHYSICAL EXAMINATION:  Visit Vitals  BP (!) 140/86 (BP 1 Location: Right arm, BP Patient Position: Sitting)   Pulse (!) 104   Temp 97 °F (36.1 °C)   Resp 19   Ht 5' 3\" (1.6 m)   Wt 250 lb (113.4 kg)   SpO2 98%   BMI 44.29 kg/m²     General: No acute distress. Eyes: Sclera anicteric. ENT: No oral lesions. Thyroid normal.  Nodes: No adenopathy. Skin: No spider angiomata. No jaundice. No palmar erythema. Respiratory: Lungs clear to auscultation. Cardiovascular: Regular heart rate. No murmurs. No JVD. Abdomen: Soft non-tender.   Liver size normal to percussion/palpation. Spleen not palpable. No obvious ascites. Extremities: No edema. No muscle wasting. No gross arthritic changes. Neurologic: Alert and oriented. Cranial nerves grossly intact. No asterixis. LABORATORY STUDIES:  Liver [place] [address] & Units 1/5/2021 8/27/2020   WBC 4.6 - 13.2 K/uL 5.3 5.2   ANC 1.8 - 8.0 K/UL 2.5 2.3   HGB 12.0 - 16.0 g/dL 13.2 12.1    - 420 K/uL 196 214   INR 0.8 - 1.2   1.3 (H) 1.2   AST 10 - 38 U/L 35 69 (H)   ALT 13 - 56 U/L 46 86 (H)   Alk Phos 45 - 117 U/L 241 (H) 212 (H)   Bili, Total 0.2 - 1.0 MG/DL 0.5 0.7   Bili, Direct 0.0 - 0.2 MG/DL 0.2 0.3 (H)   Albumin 3.4 - 5.0 g/dL 3.3 (L) 3.4   BUN 7.0 - 18 MG/DL 20 (H) 11   Creat 0.6 - 1.3 MG/DL 1.44 (H) 1.04   Creat (iSTAT) 0.6 - 1.3 MG/DL     Na 136 - 145 mmol/L 139 138   K 3.5 - 5.5 mmol/L 4.6 3.4 (L)   Cl 100 - 111 mmol/L 107 106   CO2 21 - 32 mmol/L 26 28   Glucose 74 - 99 mg/dL 173 (H) 163 (H)   Magnesium 1.6 - 2.6 mg/dL     Ammonia 11 - 32 UMOL/L       Cancer Screening Latest Ref Rng & Units 8/27/2020   AFP, Serum 0.0 - 8.0 ng/mL 4.8   AFP-L3% 0.0 - 9.9 % Comment     Liver Virology and Transplant Immune Suppression Latest Ref Rng & Units 8/27/2020   Tacrolimus Level 2.0 - 20.0 ng/mL 1.7 (L)     SEROLOGIES:  6/2017. Anti-HBsurface positive  9/2017. HBsAntigen negative, anti-HCV positive, HCV RNA undetectable, VICKI positive, ASMA positive, ANCA positive, AMA negative, RF positive, alpha-1-antitrypsin 150    LIVER HISTOLOGY:  8/2017. Active hepatitis with portal and lobular inflammation, interface hepatitis, PMN and stage 2-3 septal fibrosis. Biopsy slides not reviewed by MLS.  4/2019. Sheer wave elastography performed at [facility]. Range 10.09- 17.05 kPa, Mean 14.07 kPa, Median 14.9 kPa, Standard deviation 2.41 kPa. The results suggested a fibrosis level of F4. ENDOSCOPIC PROCEDURES:  7/2019. EGD by Dr Bill Barbosa. Small esophageal varices. GAVE. 10/2020. EGD by Dr. Gayathri Mendez. Dr. Macdonald Ahr performed a repeat EGD in 10/2020. Multiple large gastric polyps in the antrum. 3 polys removed. Repeat EGD in 1 to 2 months. Repeat EGD ordered today. RADIOLOGY:  1/2019. Ultrasound of liver. Echogenic consistent with chronic liver disease. No liver mass lesions. No dilated bile ducts. No ascites. 5/2019. Ultrasound of liver. Echogenic consistent with cirrhosis. No liver mass lesions. No dilated bile ducts. No ascites. 06/2020. MRI/MRCP w/wo contrast.  Liver has a mildly nodular contour. No suspicious liver lesions. Hepatic biliary ducts have a mildly beaded appearance, which is suggestive of mild primary sclerosing cholangitis. There is no focal area of intrahepatic biliary dilation to suggest a dominant stricture. 12/2020. Ultrasound of the liver. Lobular surface contour in course and periapical echotexture redemonstrated. No focal mass. OTHER TESTING:  Not available or performed    FOLLOW-UP:  All of the issues listed above in the Assessment and Plan were discussed with the patient. All questions were answered. The patient expressed a clear understanding of the above. [address] in 1 month to repeat labs and review EGD.      KEI Gunderson  Liver [place] [facility]  [address], [address] [facility], [facility]0 [facility]   [phone]

## 2021-01-06 LAB
AFP L3 MFR SERPL: NORMAL % (ref 0–9.9)
AFP SERPL-MCNC: 5.6 NG/ML (ref 0–8)

## 2021-02-04 DIAGNOSIS — K74.60 CIRRHOSIS OF LIVER WITHOUT ASCITES, UNSPECIFIED HEPATIC CIRRHOSIS TYPE (HCC): ICD-10-CM

## 2021-02-09 ENCOUNTER — HOSPITAL ENCOUNTER (OUTPATIENT)
Dept: PREADMISSION TESTING | Age: 71
Discharge: HOME | End: 2021-02-09
Payer: MEDICARE

## 2021-02-09 PROCEDURE — U0003 INFECTIOUS AGENT DETECTION BY NUCLEIC ACID (DNA OR RNA); SEVERE ACUTE RESPIRATORY SYNDROME CORONAVIRUS 2 (SARS-COV-2) (CORONAVIRUS DISEASE [COVID-19]), AMPLIFIED PROBE TECHNIQUE, MAKING USE OF HIGH THROUGHPUT TECHNOLOGIES AS DESCRIBED BY CMS-2020-01-R: HCPCS

## 2021-02-10 LAB — SARS-COV-2, COV2NT: NOT DETECTED

## 2021-02-15 ENCOUNTER — ANESTHESIA (OUTPATIENT)
Dept: ENDOSCOPY | Age: 71
End: 2021-02-15
Payer: MEDICARE

## 2021-02-15 ENCOUNTER — OFFICE VISIT (OUTPATIENT)
Dept: HEMATOLOGY | Age: 71
End: 2021-02-15

## 2021-02-15 ENCOUNTER — ANESTHESIA EVENT (OUTPATIENT)
Dept: ENDOSCOPY | Age: 71
End: 2021-02-15
Payer: MEDICARE

## 2021-02-15 ENCOUNTER — HOSPITAL ENCOUNTER (OUTPATIENT)
Age: 71
Setting detail: OUTPATIENT SURGERY
Discharge: HOME | End: 2021-02-15
Attending: INTERNAL MEDICINE | Admitting: INTERNAL MEDICINE
Payer: MEDICARE

## 2021-02-15 VITALS
OXYGEN SATURATION: 98 % | TEMPERATURE: 96.8 F | HEART RATE: 86 BPM | HEIGHT: 63 IN | BODY MASS INDEX: 44.3 KG/M2 | WEIGHT: 250 LBS | RESPIRATION RATE: 16 BRPM | DIASTOLIC BLOOD PRESSURE: 58 MMHG | SYSTOLIC BLOOD PRESSURE: 131 MMHG

## 2021-02-15 DIAGNOSIS — K31.7 MULTIPLE GASTRIC POLYPS: ICD-10-CM

## 2021-02-15 LAB — GLUCOSE BLD STRIP.AUTO-MCNC: 195 MG/DL (ref 70–110)

## 2021-02-15 PROCEDURE — 88305 TISSUE EXAM BY PATHOLOGIST: CPT

## 2021-02-15 PROCEDURE — 74011636637 HC RX REV CODE- 636/637: Performed by: INTERNAL MEDICINE

## 2021-02-15 PROCEDURE — 88342 IMHCHEM/IMCYTCHM 1ST ANTB: CPT

## 2021-02-15 PROCEDURE — 77030007289 HC DEV ROTH NET RETRV STRC -B: Performed by: INTERNAL MEDICINE

## 2021-02-15 PROCEDURE — 82962 GLUCOSE BLOOD TEST: CPT

## 2021-02-15 PROCEDURE — 74011250636 HC RX REV CODE- 250/636: Performed by: NURSE ANESTHETIST, CERTIFIED REGISTERED

## 2021-02-15 PROCEDURE — 76040000007: Performed by: INTERNAL MEDICINE

## 2021-02-15 PROCEDURE — 2709999900 HC NON-CHARGEABLE SUPPLY: Performed by: INTERNAL MEDICINE

## 2021-02-15 PROCEDURE — 74011250636 HC RX REV CODE- 250/636: Performed by: INTERNAL MEDICINE

## 2021-02-15 PROCEDURE — 74011000250 HC RX REV CODE- 250: Performed by: NURSE ANESTHETIST, CERTIFIED REGISTERED

## 2021-02-15 PROCEDURE — 43251 EGD REMOVE LESION SNARE: CPT | Performed by: INTERNAL MEDICINE

## 2021-02-15 PROCEDURE — 76060000032 HC ANESTHESIA 0.5 TO 1 HR: Performed by: INTERNAL MEDICINE

## 2021-02-15 PROCEDURE — 77030013992 HC SNR POLYP ENDOSC BSC -B: Performed by: INTERNAL MEDICINE

## 2021-02-15 RX ORDER — NALOXONE HYDROCHLORIDE 0.4 MG/ML
0.4 INJECTION, SOLUTION INTRAMUSCULAR; INTRAVENOUS; SUBCUTANEOUS
Status: CANCELLED | OUTPATIENT
Start: 2021-02-15 | End: 2021-02-15

## 2021-02-15 RX ORDER — ONDANSETRON 4 MG/1
4 TABLET, FILM COATED ORAL
COMMUNITY
End: 2021-03-29

## 2021-02-15 RX ORDER — INSULIN LISPRO 100 [IU]/ML
INJECTION, SOLUTION INTRAVENOUS; SUBCUTANEOUS ONCE
Status: CANCELLED | OUTPATIENT
Start: 2021-02-15 | End: 2021-02-15

## 2021-02-15 RX ORDER — MAGNESIUM SULFATE 100 %
4 CRYSTALS MISCELLANEOUS AS NEEDED
Status: CANCELLED | OUTPATIENT
Start: 2021-02-15

## 2021-02-15 RX ORDER — SODIUM CHLORIDE 0.9 % (FLUSH) 0.9 %
5-40 SYRINGE (ML) INJECTION EVERY 8 HOURS
Status: CANCELLED | OUTPATIENT
Start: 2021-02-15

## 2021-02-15 RX ORDER — DEXTROSE MONOHYDRATE 100 MG/ML
125-250 INJECTION, SOLUTION INTRAVENOUS AS NEEDED
Status: CANCELLED | OUTPATIENT
Start: 2021-02-15

## 2021-02-15 RX ORDER — SODIUM CHLORIDE 0.9 % (FLUSH) 0.9 %
5-40 SYRINGE (ML) INJECTION AS NEEDED
Status: CANCELLED | OUTPATIENT
Start: 2021-02-15

## 2021-02-15 RX ORDER — ATROPINE SULFATE 0.1 MG/ML
0.5 INJECTION INTRAVENOUS
Status: CANCELLED | OUTPATIENT
Start: 2021-02-15 | End: 2021-02-16

## 2021-02-15 RX ORDER — EPINEPHRINE 0.1 MG/ML
1 INJECTION INTRACARDIAC; INTRAVENOUS
Status: CANCELLED | OUTPATIENT
Start: 2021-02-15 | End: 2021-02-16

## 2021-02-15 RX ORDER — NALOXONE HYDROCHLORIDE 0.4 MG/ML
0.2 INJECTION, SOLUTION INTRAMUSCULAR; INTRAVENOUS; SUBCUTANEOUS AS NEEDED
Status: CANCELLED | OUTPATIENT
Start: 2021-02-15

## 2021-02-15 RX ORDER — SODIUM CHLORIDE 9 MG/ML
100 INJECTION, SOLUTION INTRAVENOUS CONTINUOUS
Status: DISCONTINUED | OUTPATIENT
Start: 2021-02-15 | End: 2021-02-15 | Stop reason: HOSPADM

## 2021-02-15 RX ORDER — PROPOFOL 10 MG/ML
INJECTION, EMULSION INTRAVENOUS AS NEEDED
Status: DISCONTINUED | OUTPATIENT
Start: 2021-02-15 | End: 2021-02-15 | Stop reason: HOSPADM

## 2021-02-15 RX ORDER — DEXTROMETHORPHAN/PSEUDOEPHED 2.5-7.5/.8
1.2 DROPS ORAL
Status: CANCELLED | OUTPATIENT
Start: 2021-02-15

## 2021-02-15 RX ORDER — FENTANYL CITRATE 50 UG/ML
25 INJECTION, SOLUTION INTRAMUSCULAR; INTRAVENOUS
Status: CANCELLED | OUTPATIENT
Start: 2021-02-15

## 2021-02-15 RX ORDER — FENTANYL CITRATE 50 UG/ML
25 INJECTION, SOLUTION INTRAMUSCULAR; INTRAVENOUS AS NEEDED
Status: CANCELLED | OUTPATIENT
Start: 2021-02-15

## 2021-02-15 RX ORDER — LIDOCAINE HYDROCHLORIDE 20 MG/ML
INJECTION, SOLUTION EPIDURAL; INFILTRATION; INTRACAUDAL; PERINEURAL AS NEEDED
Status: DISCONTINUED | OUTPATIENT
Start: 2021-02-15 | End: 2021-02-15 | Stop reason: HOSPADM

## 2021-02-15 RX ORDER — INSULIN LISPRO 100 [IU]/ML
INJECTION, SOLUTION INTRAVENOUS; SUBCUTANEOUS ONCE
Status: COMPLETED | OUTPATIENT
Start: 2021-02-15 | End: 2021-02-15

## 2021-02-15 RX ORDER — SODIUM CHLORIDE, SODIUM LACTATE, POTASSIUM CHLORIDE, CALCIUM CHLORIDE 600; 310; 30; 20 MG/100ML; MG/100ML; MG/100ML; MG/100ML
75 INJECTION, SOLUTION INTRAVENOUS CONTINUOUS
Status: CANCELLED | OUTPATIENT
Start: 2021-02-15

## 2021-02-15 RX ORDER — FLUMAZENIL 0.1 MG/ML
0.2 INJECTION INTRAVENOUS
Status: CANCELLED | OUTPATIENT
Start: 2021-02-15 | End: 2021-02-15

## 2021-02-15 RX ADMIN — PROPOFOL 50 MG: 10 INJECTION, EMULSION INTRAVENOUS at 09:05

## 2021-02-15 RX ADMIN — LIDOCAINE HYDROCHLORIDE 80 MG: 20 INJECTION, SOLUTION EPIDURAL; INFILTRATION; INTRACAUDAL; PERINEURAL at 08:35

## 2021-02-15 RX ADMIN — PROPOFOL 50 MG: 10 INJECTION, EMULSION INTRAVENOUS at 08:50

## 2021-02-15 RX ADMIN — SODIUM CHLORIDE 100 ML/HR: 900 INJECTION, SOLUTION INTRAVENOUS at 08:10

## 2021-02-15 RX ADMIN — PROPOFOL 50 MG: 10 INJECTION, EMULSION INTRAVENOUS at 09:06

## 2021-02-15 RX ADMIN — PROPOFOL 30 MG: 10 INJECTION, EMULSION INTRAVENOUS at 09:01

## 2021-02-15 RX ADMIN — PROPOFOL 50 MG: 10 INJECTION, EMULSION INTRAVENOUS at 08:44

## 2021-02-15 RX ADMIN — INSULIN LISPRO 3 UNITS: 100 INJECTION, SOLUTION INTRAVENOUS; SUBCUTANEOUS at 08:15

## 2021-02-15 RX ADMIN — PROPOFOL 50 MG: 10 INJECTION, EMULSION INTRAVENOUS at 09:07

## 2021-02-15 RX ADMIN — PROPOFOL 50 MG: 10 INJECTION, EMULSION INTRAVENOUS at 08:38

## 2021-02-15 RX ADMIN — PROPOFOL 50 MG: 10 INJECTION, EMULSION INTRAVENOUS at 08:35

## 2021-02-15 RX ADMIN — PROPOFOL 50 MG: 10 INJECTION, EMULSION INTRAVENOUS at 08:36

## 2021-02-15 RX ADMIN — PROPOFOL 50 MG: 10 INJECTION, EMULSION INTRAVENOUS at 08:41

## 2021-02-15 NOTE — PROCEDURES
Esteban Potter MD, Yrn Duckworth MD, MPH      Kelly Ramirez, ANNEMARIE Vega, [facility]     Nida Delacruz, [facility]   Julia Graff ELLIE Alejandre, [facility]       Sandeep Zavala [facility] 136    at [address], [address] [place] [address], [place]  22.    [phone]    FAX: [address] [place] [address], [address]    [phone]    FAX: [phone]         UPPER ENDOSCOPY PROCEDURE NOTE    Doug Robertson  8/01/2492    INDICATION: Cirrhosis. Screening for esophageal varices with variceal ligation if  indicated. : Valma Dakins, MD    SURGICAL ASSISTANT:  None    PROSTHETIC DEVISES, TISSUE GRAFTS, ORGAN TRANSPLANTS:  Not applicable     ANESTHESIA/SEDATION: Sedation per anesthesiology      PROCEDURE DESCRIPTION:  [employer] consent was obtained from the patient for the procedure. All risks and benefits of the procedure explained. The procedure was performed in the endoscopy suite. The patient was laying on a stretcher and moved to the left lateral decubitus position prior to administration of sedation. Sedation was administered by anesthesiology. See their note for details. The endoscope was inserted into the mouth and advanced under direct vision to the second portion of the duodenum. Careful inspection of upper gastrointestinal tract was made as the endoscope was inserted and withdrawn. Retroflexion of the endoscope to view of the cardia of the stomach was performed. The findings and interventions are described below. FINDINGS:  Esophagus:    Normal.      Stomach:   No portal hypertensive gastropathy   At least least 10 polyps in the antrum.   Several of these were > 5 mm in diameter. 7 of the polyps were resected by snare cautery. All bases were noted to be clear and without bleeding or perforation at end of procedure. 3-4 polyps with wide bases were left behind. No gastric varices identified. Duodenum:   Normal bulb and second portion    SPECIMENS COLLECTED:   7 polyps removed from the antrum  The specimens were placed in preservative and transported to Pathology after the procedure. INTERVENTIONS:  None    COMPLICATIONS: None. The patient tolerated the procedure well. EBL: Negligible. RECOMMENDATIONS:  Observe until discharge parameters are achieved. May resume previous diet. Repeat endoscopy to reassess varices and need for banding in 3 months. Follow-up Liver [place] [facility] office as scheduled.       Antonio Mckenzie MD  [address]  4 [facility], [address] [place] [address] - [address]  [address] [place] [address]

## 2021-02-15 NOTE — ANESTHESIA PREPROCEDURE EVALUATION
Relevant Problems   No relevant active problems       Anesthetic History   No history of anesthetic complications            Review of Systems / Medical History  Patient summary reviewed, nursing notes reviewed and pertinent labs reviewed    Pulmonary    COPD               Neuro/Psych   Within defined limits           Cardiovascular    Hypertension: well controlled                   GI/Hepatic/Renal     GERD: well controlled      Liver disease     Endo/Other    Diabetes: poorly controlled    Morbid obesity and arthritis     Other Findings              Physical Exam    Airway  Mallampati: I  TM Distance: 4 - 6 cm  Neck ROM: normal range of motion   Mouth opening: Normal     Cardiovascular    Rhythm: regular  Rate: normal         Dental    Dentition: Caps/crowns     Pulmonary  Breath sounds clear to auscultation               Abdominal  GI exam deferred       Other Findings            Anesthetic Plan    ASA: 3  Anesthesia type: MAC          Induction: Intravenous  Anesthetic plan and risks discussed with: Patient and Family

## 2021-02-15 NOTE — ANESTHESIA POSTPROCEDURE EVALUATION
Post-Anesthesia Evaluation and Assessment    Cardiovascular Function/Vital Signs  Visit Vitals  BP (!) 131/58   Pulse 86   Temp 36 °C (96.8 °F)   Resp 16   Ht 5' 3\" (1.6 m)   Wt 113.4 kg (250 lb)   SpO2 98%   BMI 44.29 kg/m²       Patient is status post Procedure(s):  EGD WITH MAC/polypectomy /RETRIEVED WITH MCKENZIE NET/. Nausea/Vomiting: Controlled. Postoperative hydration reviewed and adequate. Pain:  Pain Scale 1: Numeric (0 - 10) (02/15/21 6536)  Pain Intensity 1: 0 (02/15/21 6744)   Managed. Neurological Status: At baseline. Mental Status and Level of Consciousness: Arousable. Pulmonary Status:   O2 Device: Room air (02/15/21 1605)   Adequate oxygenation and airway patent. Complications related to anesthesia: None    Post-anesthesia assessment completed. No concerns. Patient has met all discharge requirements.     Signed By: Vandana Paredes CRNA    February 15, 2021

## 2021-02-15 NOTE — H&P
Stephon Palacio MD, Akin Wadsworth MD, MPH      Jus Liu, ANNEMARIE Holden, ACNP-BC     Nida Delacruz [facility]   Alexei Centeno FNDERIK-JOSH Hardwick [facility]       Sandeep Zavala [address]    at [place] [id number], [address] [facility], [place]  22.    [phone]    FAX: [address] [place] [address], [address]    [phone]    FAX: [phone]         PRE-PROCEDURE NOTE - EGD    H and P from last office visit reviewed. Allergies reviewed. Out-patient medicaton list reviewed. Patient Active Problem List   Diagnosis Code    Severe obesity ([facility] E66.01    Autoimmune hepatitis ([facility] K75.4    Thrombocytopenia (HCC) D69.6    Cirrhosis ([facility] K74.60    GAVE (gastric antral vascular ectasia) K31.819    Hypoglycemia E16.2       Allergies   Allergen Reactions    Adhesive Tape-Silicones Other (comments)    Naproxen Nausea Only    Penicillins Other (comments)    Tramadol Nausea Only    Vicodin [Hydrocodone-Acetaminophen] Hives       No current facility-administered medications on file prior to encounter. Current Outpatient Medications on File Prior to Encounter   Medication Sig Dispense Refill    ondansetron hcl (Zofran) 4 mg tablet Take 4 mg by mouth every eight (8) hours as needed for Nausea or Vomiting.  oxyCODONE-acetaminophen (Percocet) 5-325 mg per tablet Take 1 Tab by mouth.  bacitracin (BACITRACIN) 500 unit/gram oint Apply  to affected area three (3) times daily.  insulin lispro (HUMALOG) 100 unit/mL injection by SubCUTAneous route.  methocarbamoL (ROBAXIN) 750 mg tablet Take 750 mg by mouth every eight (8) hours as needed.       ondansetron (ZOFRAN ODT) 4 mg disintegrating tablet Take 4 mg by mouth every eight (8) hours as needed.  sodium bicarbonate 650 mg tablet Take 1,300 mg by mouth four (4) times daily.  tacrolimus (PROGRAF) 1 mg capsule Take 1 Cap by mouth every twelve (12) hours. 120 Cap 6    insulin aspart protamine/insulin aspart (NOVOLOG MIX 70-30FLEXPEN U-100) 100 unit/mL (70-30) inpn by SubCUTAneous route.  albuterol (PROVENTIL HFA) 90 mcg/actuation inhaler Take 2 Puffs by inhalation.  amLODIPine (NORVASC) 10 mg tablet Take 10 mg by mouth.  budesonide-formoterol (SYMBICORT) 160-4.5 mcg/actuation HFAA Take 2 Puffs by inhalation.  butalbital-acetaminophen-caffeine (FIORICET, ESGIC) -40 mg per tablet TAKE 1 TABLET BY MOUTH EVERY 4 HOURS AS NEEDED FOR HEADACHE PAIN  0    ferrous fumarate 324 mg (106 mg iron) tab Take 324 mg by mouth.  glipiZIDE SR (GLUCOTROL XL) 5 mg CR tablet Take 5 mg by mouth.  minoxidil (LONITEN) 2.5 mg tablet Take 2.5 mg by mouth.  omeprazole (PRILOSEC) 40 mg capsule Take 40 mg by mouth.  spironolactone (ALDACTONE) 100 mg tablet Take 1 Tab by mouth daily. 180 Tab 3    naloxone (NARCAN) 4 mg/actuation nasal spray 4 mg.  furosemide (LASIX) 40 mg tablet Take 40 mg by mouth. For EGD to assess for esophageal and gastric varices. Plan to perform banding if indicated based upon variceal size and appearance. The risks of the procedure were discussed with the patient. These included reaction to anesthesia, pain, perforation and bleeding. All questions were answered. The patient wishes to proceed with the procedure. The patient was counseled at length about the risks of suzie Covid-19 in the cnydie-operative and post-operative states including the recovery window of their procedure.   The patient was made aware that suzie Covid-19 after a surgical procedure may worsen their prognosis for recovering from the virus and lend to a higher morbidity and or mortality risk.  The patient was given the options of postponing their procedure. All of the risks, benefits, and alternatives were discussed. The patient does  wish to proceed with the procedure. PHYSICAL EXAMINATION:  Visit Vitals  BP (!) 152/72   Pulse 90   Temp 97.8 °F (36.6 °C)   Resp 16   Ht 5' 3\" (1.6 m)   Wt 250 lb (113.4 kg)   SpO2 98%   BMI 44.29 kg/m²       General: No acute distress. Eyes: Sclera anicteric. ENT: No oral lesions. Thyroid normal.  Nodes: No adenopathy. Skin: No spider angiomata. No jaundice. No palmar erythema. Respiratory: Lungs clear to auscultation. Cardiovascular: Regular heart rate. No murmurs. No JVD. Abdomen: Soft non-tender, liver size normal to percussion/palpation. Spleen not palpable. No obvious ascites. Extremities: No edema. No muscle wasting. No gross arthritic changes. Neurologic: Alert and oriented. Cranial nerves grossly intact. No asterixis. MOST RECENT LABORATORY STUDIES:  Lab Results   Component Value Date/Time    WBC 5.3 01/05/2021 10:18 AM    HGB 13.2 01/05/2021 10:18 AM    HCT 40.5 01/05/2021 10:18 AM    PLATELET 173 21/22/6854 10:18 AM    MCV 84.0 01/05/2021 10:18 AM     Lab Results   Component Value Date/Time    INR 1.3 (H) 01/05/2021 10:18 AM    INR 1.2 08/27/2020 11:16 AM    INR 1.2 08/21/2019 12:24 PM    Prothrombin time 15.8 (H) 01/05/2021 10:18 AM    Prothrombin time 15.2 08/27/2020 11:16 AM    Prothrombin time 15.2 08/21/2019 12:24 PM       ASSESSMENT AND PLAN:  EGD to assess for esophageal and/or gastric varices.   Sedation per anesthesiology      Edith Peabody, MD [address], [address]  [phone]  [address] [place] [address]

## 2021-02-15 NOTE — DISCHARGE INSTRUCTIONS
[facility]      David Villavicencio MD, FACP, FACG, FAASLD    Fazal Carr MD, MPH      KASHMIR Johnson-JOSH Singh, [facility]     April S Jayme, [facility]   Elvin Dooley, [facility]-    Mila Hobbs, [facility]    at [facility]    [address], [address]    [place]  [address] [phone]    FAX: [phone]   [facility]    at [facility]    [address], [address]    [place]  [address]    [phone]    FAX: [phone]         ENDOSCOPY DISCHARGE INSTRUCTIONS      Luisa Alejandro  1950  Date: 2/15/2021    DISCOMFORT:  Use lozenges or warm salt water gargle for sore thoat  Apply warm compress to IV site if red.  If redness or soreness persists call the office.  You may experience gas and bloating.  Walking and belching will help relieve this.  You may experience chest discomfort or pressure especially if banding of esophageal varices was performed.    DIET:  You may resume your normal diet.    ACTIVITY:  Spend the remainder of the day resting.  Avoid any strenuous activity.  You may not operate a vehicle for 12 hours.  You may not engage in an occupation involving machinery or appliances for rest of today.   Avoid making any critical or financial decisions for at least 24 hour.    Call the [facility] Office if you have any of the following:  Increasing chest or abdominal pain, nausea, vomiting, vomiting blood, abdominal distension or swelling, fever or chills, bloody discharge from nose or mouth or shortness of breath.    Follow-up Instructions:  Call Dr. Villavicencio for any questions or problems at the phone number listed above.  If a biopsy was performed, you will be contacted by the office staff or Dr Villavicencio within 1 week.   If you have not heard from us by then  you may call the office at the phone number listed above to inquire about the results. ENDOSCOPY FINDINGS:  Your endoscopy demonstrated at least polyps in the stoamch. 7 of the polyps were removed. Will repeat EGD to look for development of varices in 3 months. The office will call to schedule this. Keep follow-up appointment with Severa Both on 2/22/2021. DISCHARGE SUMMARY from the Nurse: The following personal items collected during your admission are returned to you:   Dental Appliance: Dental Appliances: At home  Vision: Visual Aid: Glasses  Hearing Aid:    Jewelry:    Clothing:    Other Valuables:    Valuables sent to safe:          Learning About Coronavirus (COVID-19)  Coronavirus (COVID-19): Overview  What is coronavirus (COVID-19)? The coronavirus disease (COVID-19) is caused by a virus. It is an illness that was first found in [place], [place], in December 2019. It has since spread worldwide. The virus can cause fever, cough, and trouble breathing. In severe cases, it can cause pneumonia and make it hard to breathe without help. It can cause death. Coronaviruses are a large group of viruses. They cause the common cold. They also cause more serious illnesses like Middle East respiratory syndrome (MERS) and severe acute respiratory syndrome (SARS). COVID-19 is caused by a novel coronavirus. That means it's a new type that has not been seen in people before. This virus spreads person-to-person through droplets from coughing and sneezing. It can also spread when you are close to someone who is infected. And it can spread when you touch something that has the virus on it, such as a doorknob or a tabletop. What can you do to protect yourself from coronavirus (COVID-19)? The best way to protect yourself from getting sick is to:  · Avoid areas where there is an outbreak. · Avoid contact with people who may be infected. · Wash your hands often with soap or alcohol-based hand sanitizers.   · Avoid crowds and try to stay at least 6 feet away from other people. · Wash your hands often, especially after you cough or sneeze. Use soap and water, and scrub for at least 20 seconds. If soap and water aren't available, use an alcohol-based hand . · Avoid touching your mouth, nose, and eyes. What can you do to avoid spreading the virus to others? To help avoid spreading the virus to others:  · Cover your mouth with a tissue when you cough or sneeze. Then throw the tissue in the trash. · Use a disinfectant to clean things that you touch often. · Stay home if you are sick or have been exposed to the virus. Don't go to school, work, or public areas. And don't use public transportation. · If you are sick:  ? Leave your home only if you need to get medical care. But call the doctor's office first so they know you're coming. And wear a face mask, if you have one.  ? If you have a face mask, wear it whenever you're around other people. It can help stop the spread of the virus when you cough or sneeze. ? Clean and disinfect your home every day. Use household  and disinfectant wipes or sprays. Take special care to clean things that you grab with your hands. These include doorknobs, remote controls, phones, and handles on your refrigerator and microwave. And don't forget countertops, tabletops, bathrooms, and computer keyboards. When to call for help  Call 911 anytime you think you may need emergency care. For example, call if:  · You have severe trouble breathing. (You can't talk at all.)  · You have constant chest pain or pressure. · You are severely dizzy or lightheaded. · You are confused or can't think clearly. · Your face and lips have a blue color. · You pass out (lose consciousness) or are very hard to wake up. Call your doctor now if you develop symptoms such as:  · Shortness of breath. · Fever. · Cough. If you need to get care, call ahead to the doctor's office for instructions before you go. Make sure you wear a face mask, if you have one, to prevent exposing other people to the virus. Where can you get the latest information? The following health organizations are tracking and studying this virus. Their websites contain the most up-to-date information. Julia Rojas also learn what to do if you think you may have been exposed to the virus. · U.[place]. Centers for Disease Control and Prevention (CDC): The CDC provides updated news about the disease and travel advice. The website also tells you how to prevent the spread of infection. www.cdc.gov  · World Health Organization [facility]): WHO offers information about the virus outbreaks. WHO also has travel advice. www.who.int  Current as of: April 1, 2020               Content Version: 12.4  © 2006-2020 [employer]. Care instructions adapted under license by your healthcare professional. If you have questions about a medical condition or this instruction, always ask your healthcare professional. [address] any warranty or liability for your use of this information. DISCHARGE SUMMARY from Nurse    The following personal items collected during your admission are returned to you:   Dental Appliance: Dental Appliances: At home  Vision: Visual Aid: Glasses  Hearing Aid:    Jewelry:    Clothing:    Other Valuables:    Valuables sent to safe:              PATIENT INSTRUCTIONS:    After general anesthesia or intravenous sedation, for 24 hours or while taking prescription Narcotics:  · Limit your activities  · Do not drive and operate hazardous machinery  · Do not make important personal or business decisions  · Do  not drink alcoholic beverages  · If you have not urinated within 8 hours after discharge, please contact your surgeon on call.     Report the following to your surgeon:  · Excessive pain, swelling, redness or odor of or around the surgical area  · Temperature over 100.5  · Nausea and vomiting lasting longer than 4 hours or if unable to take medications  · Any signs of decreased circulation or nerve impairment to extremity: change in color, persistent  numbness, tingling, coldness or increase pain  · Any questions      Follow-up Appointments   Procedures    FOLLOW UP VISIT Appointment in: Other ([address]) As scheduled     As scheduled     Standing Status:   Standing     Number of Occurrences:   1     Order Specific Question:   Appointment in     Answer: Other (Specify)       What to do at Home:  Recommended activity: as above,     If you experience any of the following symptoms as above, please follow up with Dr. Lesly Lamar. *  Please give a list of your current medications to your Primary Care Provider. *  Please update this list whenever your medications are discontinued, doses are      changed, or new medications (including over-the-counter products) are added. *  Please carry medication information at all times in case of emergency situations. These are general instructions for a healthy lifestyle:    No smoking/ No tobacco products/ Avoid exposure to second hand smoke    Surgeon General's Warning:  Quitting smoking now greatly reduces serious risk to your health. Obesity, smoking, and sedentary lifestyle greatly increases your risk for illness    A healthy diet, regular physical exercise & weight monitoring are important for maintaining a healthy lifestyle    You may be retaining fluid if you have a history of heart failure or if you experience any of the following symptoms:  Weight gain of 3 pounds or more overnight or 5 pounds in a week, increased swelling in our hands or feet or shortness of breath while lying flat in bed. Please call your doctor as soon as you notice any of these symptoms; do not wait until your next office visit.     Recognize signs and symptoms of STROKE:    F-face looks uneven    A-arms unable to move or move unevenly    S-speech slurred or non-existent    T-time-call 911 as soon as signs and symptoms begin-DO NOT go       Back to bed or wait to see if you get better-TIME IS BRAIN. The discharge information has been reviewed with the patient and caregiver. The patient and caregiver verbalized understanding. Warning Signs of HEART ATTACK     Call 911 if you have these symptoms:   Chest discomfort. Most heart attacks involve discomfort in the center of the chest that lasts more than a few minutes, or that goes away and comes back. It can feel like uncomfortable pressure, squeezing, fullness, or pain.  Discomfort in other areas of the upper body. Symptoms can include pain or discomfort in one or both arms, the back, neck, jaw, or stomach.  Shortness of breath with or without chest discomfort.  Other signs may include breaking out in a cold sweat, nausea, or lightheadedness. Don't wait more than five minutes to call 911 - MINUTES MATTER! Fast action can save your life. Calling 911 is almost always the fastest way to get lifesaving treatment. Emergency Medical Services staff can begin treatment when they arrive -- up to an hour sooner than if someone gets to the hospital by car. The discharge information has been reviewed with the patient and caregiver. The patient and caregiver verbalized understanding. Discharge medications reviewed with the patient and guardian and appropriate educational materials and side effects teaching were provided.     Patient armband removed and shredded

## 2021-03-29 ENCOUNTER — HOSPITAL ENCOUNTER (OUTPATIENT)
Dept: LAB | Age: 71
Discharge: HOME | End: 2021-03-29
Payer: MEDICARE

## 2021-03-29 ENCOUNTER — OFFICE VISIT (OUTPATIENT)
Dept: HEMATOLOGY | Age: 71
End: 2021-03-29
Payer: MEDICARE

## 2021-03-29 VITALS
HEIGHT: 63 IN | RESPIRATION RATE: 17 BRPM | TEMPERATURE: 98 F | DIASTOLIC BLOOD PRESSURE: 110 MMHG | OXYGEN SATURATION: 98 % | BODY MASS INDEX: 39.69 KG/M2 | HEART RATE: 89 BPM | WEIGHT: 224 LBS | SYSTOLIC BLOOD PRESSURE: 157 MMHG

## 2021-03-29 DIAGNOSIS — K74.60 CIRRHOSIS OF LIVER WITHOUT ASCITES, UNSPECIFIED HEPATIC CIRRHOSIS TYPE (HCC): Primary | ICD-10-CM

## 2021-03-29 DIAGNOSIS — K74.60 CIRRHOSIS OF LIVER WITHOUT ASCITES, UNSPECIFIED HEPATIC CIRRHOSIS TYPE (HCC): ICD-10-CM

## 2021-03-29 PROBLEM — K83.01 PRIMARY SCLEROSING CHOLANGITIS: Status: ACTIVE | Noted: 2021-03-29

## 2021-03-29 LAB
ALBUMIN SERPL-MCNC: 3.5 G/DL (ref 3.4–5)
ALBUMIN/GLOB SERPL: 0.8 {RATIO} (ref 0.8–1.7)
ALP SERPL-CCNC: 207 U/L (ref 45–117)
ALT SERPL-CCNC: 48 U/L (ref 13–56)
ANION GAP SERPL CALC-SCNC: 2 MMOL/L (ref 3–18)
AST SERPL-CCNC: 39 U/L (ref 10–38)
BASOPHILS # BLD: 0 K/UL (ref 0–0.1)
BASOPHILS NFR BLD: 0 % (ref 0–2)
BILIRUB DIRECT SERPL-MCNC: 0.1 MG/DL (ref 0–0.2)
BILIRUB SERPL-MCNC: 0.5 MG/DL (ref 0.2–1)
BUN SERPL-MCNC: 16 MG/DL (ref 7–18)
BUN/CREAT SERPL: 15 (ref 12–20)
CALCIUM SERPL-MCNC: 9 MG/DL (ref 8.5–10.1)
CHLORIDE SERPL-SCNC: 110 MMOL/L (ref 100–111)
CO2 SERPL-SCNC: 27 MMOL/L (ref 21–32)
CREAT SERPL-MCNC: 1.1 MG/DL (ref 0.6–1.3)
DIFFERENTIAL METHOD BLD: ABNORMAL
EOSINOPHIL # BLD: 0.2 K/UL (ref 0–0.4)
EOSINOPHIL NFR BLD: 4 % (ref 0–5)
ERYTHROCYTE [DISTWIDTH] IN BLOOD BY AUTOMATED COUNT: 14.3 % (ref 11.6–14.5)
EST. AVERAGE GLUCOSE BLD GHB EST-MCNC: 148 MG/DL
GLOBULIN SER CALC-MCNC: 4.5 G/DL (ref 2–4)
GLUCOSE SERPL-MCNC: 162 MG/DL (ref 74–99)
HBA1C MFR BLD: 6.8 % (ref 4.2–5.6)
HCT VFR BLD AUTO: 39.6 % (ref 35–45)
HGB BLD-MCNC: 12.8 G/DL (ref 12–16)
INR PPP: 1.3 (ref 0.8–1.2)
LYMPHOCYTES # BLD: 1.9 K/UL (ref 0.9–3.6)
LYMPHOCYTES NFR BLD: 34 % (ref 21–52)
MCH RBC QN AUTO: 27.3 PG (ref 24–34)
MCHC RBC AUTO-ENTMCNC: 32.3 G/DL (ref 31–37)
MCV RBC AUTO: 84.4 FL (ref 74–97)
MONOCYTES # BLD: 0.8 K/UL (ref 0.05–1.2)
MONOCYTES NFR BLD: 14 % (ref 3–10)
NEUTS SEG # BLD: 2.6 K/UL (ref 1.8–8)
NEUTS SEG NFR BLD: 48 % (ref 40–73)
PLATELET # BLD AUTO: 202 K/UL (ref 135–420)
PMV BLD AUTO: 9.8 FL (ref 9.2–11.8)
POTASSIUM SERPL-SCNC: 4.1 MMOL/L (ref 3.5–5.5)
PROT SERPL-MCNC: 8 G/DL (ref 6.4–8.2)
PROTHROMBIN TIME: 15.6 SEC (ref 11.5–15.2)
RBC # BLD AUTO: 4.69 M/UL (ref 4.2–5.3)
SODIUM SERPL-SCNC: 139 MMOL/L (ref 136–145)
WBC # BLD AUTO: 5.6 K/UL (ref 4.6–13.2)

## 2021-03-29 PROCEDURE — G8536 NO DOC ELDER MAL SCRN: HCPCS | Performed by: NURSE PRACTITIONER

## 2021-03-29 PROCEDURE — G8427 DOCREV CUR MEDS BY ELIG CLIN: HCPCS | Performed by: NURSE PRACTITIONER

## 2021-03-29 PROCEDURE — 3017F COLORECTAL CA SCREEN DOC REV: CPT | Performed by: NURSE PRACTITIONER

## 2021-03-29 PROCEDURE — 99214 OFFICE O/P EST MOD 30 MIN: CPT | Performed by: NURSE PRACTITIONER

## 2021-03-29 PROCEDURE — 82107 ALPHA-FETOPROTEIN L3: CPT

## 2021-03-29 PROCEDURE — 80048 BASIC METABOLIC PNL TOTAL CA: CPT

## 2021-03-29 PROCEDURE — 85610 PROTHROMBIN TIME: CPT

## 2021-03-29 PROCEDURE — G8432 DEP SCR NOT DOC, RNG: HCPCS | Performed by: NURSE PRACTITIONER

## 2021-03-29 PROCEDURE — 1090F PRES/ABSN URINE INCON ASSESS: CPT | Performed by: NURSE PRACTITIONER

## 2021-03-29 PROCEDURE — 36415 COLL VENOUS BLD VENIPUNCTURE: CPT

## 2021-03-29 PROCEDURE — G8400 PT W/DXA NO RESULTS DOC: HCPCS | Performed by: NURSE PRACTITIONER

## 2021-03-29 PROCEDURE — 85025 COMPLETE CBC W/AUTO DIFF WBC: CPT

## 2021-03-29 PROCEDURE — 80197 ASSAY OF TACROLIMUS: CPT

## 2021-03-29 PROCEDURE — 80076 HEPATIC FUNCTION PANEL: CPT

## 2021-03-29 PROCEDURE — G8417 CALC BMI ABV UP PARAM F/U: HCPCS | Performed by: NURSE PRACTITIONER

## 2021-03-29 PROCEDURE — 83036 HEMOGLOBIN GLYCOSYLATED A1C: CPT

## 2021-03-29 PROCEDURE — 1101F PT FALLS ASSESS-DOCD LE1/YR: CPT | Performed by: NURSE PRACTITIONER

## 2021-03-29 RX ORDER — TRAZODONE HYDROCHLORIDE 50 MG/1
50 TABLET ORAL
COMMUNITY
Start: 2021-01-19 | End: 2022-03-18

## 2021-03-29 RX ORDER — OXYCODONE AND ACETAMINOPHEN 5; 325 MG/1; MG/1
1 TABLET ORAL
Qty: 60 TAB | Refills: 0 | Status: SHIPPED | OUTPATIENT
Start: 2021-03-29 | End: 2021-04-28

## 2021-03-29 NOTE — PROGRESS NOTES
[address] [place] [address], MD, MD Mahendra Fleming PAALYSHA Boyer, [facility]     April MARIALUISA Delacruz, [facility]   Sanaz Amin P-JOSH Perez, [facility]       Sandeepava Zavala [facility] 136    at [address]    217 [facility], [address] [place] [address], [facility] 22.    [phone]    FAX: [address] [place] [address], [address]    [phone]    FAX: [phone]       Patient Care Team:  Steff Martel MD as PCP - General (Family Medicine)  Shanda Bang MD (Gastroenterology)  Ubaldo Kenyon MD (Neurosurgery)      Problem List  Date Reviewed: 1/5/2021          Codes Class Noted    Hypoglycemia ICD-10-CM: E16.2  ICD-9-CM: 251.2  12/10/2020        GAVE (gastric antral vascular ectasia) ICD-10-CM: K31.819  ICD-9-CM: 537.82  6/10/2019        Severe obesity ([facility] ICD-10-CM: E66.01  ICD-9-CM: 278.01  4/17/2019        Autoimmune hepatitis ([facility] ICD-10-CM: K75.4  ICD-9-CM: 571.42  4/17/2019        Thrombocytopenia ([facility] ICD-10-CM: D69.6  ICD-9-CM: 287.5  4/17/2019        Cirrhosis ([facility] ICD-10-CM: K74.60  ICD-9-CM: 571.5  4/17/2019                Altagracia Carlton returns to the [facility] [employer] of [address] [place] [address] for management 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 The active problem list, all pertinent past medical history, medications, liver histology, radiologic findings and laboratory findings related to the liver disorder were reviewed with the patient. The patient is a 69 y.o.   female who was found to have marked elevation in liver enzymes into the 500-800 range in 2017     The patient was found to have cirrhosis in 2019 when she developed lower extremity edema, thrombocytopenia and an ultrasound suggested cirrhosis    The patient has developed the following complications of cirrhosis: esophageal varices, GAVE    The patient notes fatigue. The patient has not experienced pain in the right side over the liver, problems concentrating, swelling of the abdomen, swelling of the lower extremities, hematemesis, hematochezia. The patient has limitations in daily activities due to liver disease and other medical issues. Since the last office appointment:  Had one vaccination to COVID 19. 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 The diagnosis of cirrhosis is based upon elastography, imaging, laboratory studies, complications of cirrhosis. The patient has normal liver function. The CTP is 6. Child class A. The MELD score is 8. Autoimmune Hepatitis   The diagnosis was based upon serology, liver biopsy   A liver biopsy performed in 8/2017 demonstrated moderate to severe inflammation with interface hepatitis, with piecemeal necrosis and stage 2-3 fibrosis. Elastography performed in 4/2019 suggests Cirrhosis. Liver transaminases are normal.  ALP is elevated. Liver function is normal.  The platelet count is normal.    The patient is taking TAC 1 mg QD since 10/2019. Primary Sclerosing Cholangitis. This diagnosis is based on MRI/MRCP performed in 06/2020. Intrahepatic biliary ducts have a mildly beaded appearance, suggestive of primary sclerosing cholangitis. The natural history of PSC was discussed in detail. I explained that there is cure for PSC. I explained that 75% of people affected by the disease are male. I explained that at least 75% of those people who suffer from [facility] [place] by [facility] also have ulcerative colitis. I explained that the single greatest risk factor associated with PSC is cholangiocarcinomas. Elevation in ALP  The elevation in ALP is most likely due to cirrhosis and PSC.      Acute kidney injury  The patient had an elevation in SCr to 1.44 mg on today's labs. Will continue TAC at 1 mg BID for now. Will continue diuretics at step one for now. ANTI-HCV positive HCV RNA negative  This is either due to exposure to HCV many yers ago with spontaneous resolution or a false positive anti-HCV because of the autoimmune disease. The patient is HCV RNA undetectable and does not have HCV. Lower extremity edema  Edema has resolved with current dose of diuretics. Will continue the current dose of diuretics at step 1. Screening for Esophageal varices   The patient had an EGD at [facility] in 7/2019. This demonstrated small esophageal varices and GAVE  Dr. Harrison Etienne performed a repeat EGD in 10/2020. Multiple large gastric polyps in the antrum. 3 polys removed. EGD was performed by Dr. Harrison Etienne in 02/2021. No  portal hypertensive gastropathy. There were at least least 10 polyps in the antrum. Several of these were > 5 mm in diameter. 7 of the polyps were resected by snare cautery. All bases were noted to be clear and without bleeding or perforation at end of procedure. 3-4 polyps with wide bases were left behind. No gastric varices identified. Repeat EGD was ordered today to be performed in 052021. Hepatic encephalopathy   Overt HE has not developed to date. There is no need for treatment with lactulose and/or Xifaxan at this time. There is no need to restrict dietary protein at this time. Thrombocytopenia   The most recent platelet counts is 377. Screening for Hepatocellular Carcinoma  HCC screening has recently been performed and does not suggest [address] The next liver imaging study will be performed in 06/2021. This was ordered today. Treatment of other medical problems in patients with chronic liver disease  There are no contraindications for the patient to take most medications that are necessary for treatment of other medical issues.     Counseling for alcohol in patients with chronic liver disease  The patient was counseled regarding alcohol consumption and the effect of alcohol on chronic liver disease. Osteoporosis  The risk of osteoporosis is increased in patients with cirrhosis. DEXA bone density to assess for osteoporosis has not been performed. This should be ordered by the patients primary care physician. Vaccinations   Vaccination for viral hepatitis B is not needed. The patient has serologic evidence of prior exposure or vaccination with immunity. The need for vaccination against viral hepatitis A will be assessed with serologic and instituted as appropriate. Routine vaccinations against other bacterial and viral agents can be performed as indicated. Annual flu vaccination should be administered if indicated. ALLERGIES  Allergies   Allergen Reactions    Adhesive Tape-Silicones Other (comments)    Naproxen Nausea Only    Penicillins Other (comments)    Tramadol Nausea Only    Vicodin [Hydrocodone-Acetaminophen] Hives       MEDICATIONS  Current Outpatient Medications   Medication Sig    ondansetron hcl (Zofran) 4 mg tablet Take 4 mg by mouth every eight (8) hours as needed for Nausea or Vomiting.  oxyCODONE-acetaminophen (Percocet) 5-325 mg per tablet Take 1 Tab by mouth.  bacitracin (BACITRACIN) 500 unit/gram oint Apply  to affected area three (3) times daily.  insulin lispro (HUMALOG) 100 unit/mL injection by SubCUTAneous route.  methocarbamoL (ROBAXIN) 750 mg tablet Take 750 mg by mouth every eight (8) hours as needed.  naloxone (NARCAN) 4 mg/actuation nasal spray 4 mg.  ondansetron (ZOFRAN ODT) 4 mg disintegrating tablet Take 4 mg by mouth every eight (8) hours as needed.  sodium bicarbonate 650 mg tablet Take 1,300 mg by mouth four (4) times daily.  tacrolimus (PROGRAF) 1 mg capsule Take 1 Cap by mouth every twelve (12) hours.  insulin aspart protamine/insulin aspart (NOVOLOG MIX 70-30FLEXPEN U-100) 100 unit/mL (70-30) inpn by SubCUTAneous route.  albuterol (PROVENTIL HFA) 90 mcg/actuation inhaler Take 2 Puffs by inhalation.  amLODIPine (NORVASC) 10 mg tablet Take 10 mg by mouth.  budesonide-formoterol (SYMBICORT) 160-4.5 mcg/actuation HFAA Take 2 Puffs by inhalation.  butalbital-acetaminophen-caffeine (FIORICET, ESGIC) -40 mg per tablet TAKE 1 TABLET BY MOUTH EVERY 4 HOURS AS NEEDED FOR HEADACHE PAIN    ferrous fumarate 324 mg (106 mg iron) tab Take 324 mg by mouth.  furosemide (LASIX) 40 mg tablet Take 40 mg by mouth.  glipiZIDE SR (GLUCOTROL XL) 5 mg CR tablet Take 5 mg by mouth.  minoxidil (LONITEN) 2.5 mg tablet Take 2.5 mg by mouth.  omeprazole (PRILOSEC) 40 mg capsule Take 40 mg by mouth.  spironolactone (ALDACTONE) 100 mg tablet Take 1 Tab by mouth daily. No current facility-administered medications for this visit. SYSTEM REVIEW NOT RELATED TO LIVER DISEASE OR REVIEWED ABOVE:  Constitution systems: Negative for fever, chills, weight gain, weight loss. Eyes: Negative for visual changes. ENT: Negative for sore throat, painful swallowing. Respiratory: Negative for cough, hemoptysis, SOB. Cardiology: Swelling of the legs. GI:  Negative for constipation or diarrhea. : Negative for urinary frequency, dysuria, hematuria, nocturia. Skin: Negative for rash. Hematology: Negative for easy bruising, blood clots. Musculo-skelatal: Negative for back pain, muscle pain, weakness. Neurologic: Negative for headaches, dizziness, vertigo, memory problems not related to HE. Psychology: Negative for anxiety, depression. FAMILY HISTORY:  The father  of MI. The mother  of MI. The following family members have liver disease: a brother    SOCIAL HISTORY:  The patient is . The patient has 2 children, and 8 grandchildren. The patient stopped using tobacco products in . The patient has never consumed significant amounts of alcohol.     The patient used to work as as a  at the [employer]. The patient retired in 2005. PHYSICAL EXAMINATION:  Visit Vitals  BP (!) 157/110   Pulse 89   Temp 98 °F (36.7 °C)   Resp 17   Ht 5' 3\" (1.6 m)   Wt 224 lb (101.6 kg)   SpO2 98%   BMI 39.68 kg/m²     General: No acute distress. Eyes: Sclera anicteric. ENT: No oral lesions. Thyroid normal.  Nodes: No adenopathy. Skin: No spider angiomata. No jaundice. No palmar erythema. Respiratory: Lungs clear to auscultation. Cardiovascular: Regular heart rate. No murmurs. No JVD. Abdomen: Soft non-tender. Liver size normal to percussion/palpation. Spleen not palpable. No obvious ascites. Extremities: No edema. No muscle wasting. No gross arthritic changes. Neurologic: Alert and oriented. Cranial nerves grossly intact. No asterixis. LABORATORY STUDIES:  [facility] of [address] [place] [address] & Units 1/5/2021 8/27/2020   WBC 4.6 - 13.2 K/uL 5.3 5.2   ANC 1.8 - 8.0 K/UL 2.5 2.3   HGB 12.0 - 16.0 g/dL 13.2 12.1    - 420 K/uL 196 214   INR 0.8 - 1.2   1.3 (H) 1.2   AST 10 - 38 U/L 35 69 (H)   ALT 13 - 56 U/L 46 86 (H)   Alk Phos 45 - 117 U/L 241 (H) 212 (H)   Bili, Total 0.2 - 1.0 MG/DL 0.5 0.7   Bili, Direct 0.0 - 0.2 MG/DL 0.2 0.3 (H)   Albumin 3.4 - 5.0 g/dL 3.3 (L) 3.4   BUN 7.0 - 18 MG/DL 20 (H) 11   Creat 0.6 - 1.3 MG/DL 1.44 (H) 1.04   Creat (iSTAT) 0.6 - 1.3 MG/DL     Na 136 - 145 mmol/L 139 138   K 3.5 - 5.5 mmol/L 4.6 3.4 (L)   Cl 100 - 111 mmol/L 107 106   CO2 21 - 32 mmol/L 26 28   Glucose 74 - 99 mg/dL 173 (H) 163 (H)   Magnesium 1.6 - 2.6 mg/dL     Ammonia 11 - 32 UMOL/L       Cancer Screening Latest Ref Rng & Units 8/27/2020   AFP, Serum 0.0 - 8.0 ng/mL 4.8   AFP-L3% 0.0 - 9.9 % Comment     Liver Virology and Transplant Immune Suppression Latest Ref Rng & Units 8/27/2020   Tacrolimus Level 2.0 - 20.0 ng/mL 1.7 (L)     SEROLOGIES:  6/2017. Anti-HBsurface positive  9/2017.   HBsAntigen negative, anti-HCV positive, HCV RNA undetectable, VICKI positive, ASMA positive, ANCA positive, AMA negative, RF positive, alpha-1-antitrypsin 150    LIVER HISTOLOGY:  8/2017. Active hepatitis with portal and lobular inflammation, interface hepatitis, PMN and stage 2-3 septal fibrosis. Biopsy slides not reviewed by MLS.  4/2019. Sheer wave elastography performed at [facility]. Range 10.09- 17.05 kPa, Mean 14.07 kPa, Median 14.9 kPa, Standard deviation 2.41 kPa. The results suggested a fibrosis level of F4. ENDOSCOPIC PROCEDURES:  7/2019. EGD by Dr Flaco Salas. Small esophageal varices. GAVE. 10/2020. EGD by Dr. Kali Martin. Dr. Kali Martin performed a repeat EGD in 10/2020. Multiple large gastric polyps in the antrum. 3 polys removed. Repeat EGD in 1 to 2 months. 02/2021. EGD by Dr. Kali Martin. Normal esophagus. No portal hypertensive gastropathy. At least least 10 polyps in the antrum. Several of these were > 5 mm in diameter. 7 of the polyps were resected by snare cautery. All bases were noted to be clear and without bleeding or perforation at end of procedure. 3-4 polyps with wide bases were left behind. No gastric varices identified. Repeat in 3 months. RADIOLOGY:  1/2019. Ultrasound of liver. Echogenic consistent with chronic liver disease. No liver mass lesions. No dilated bile ducts. No ascites. 5/2019. Ultrasound of liver. Echogenic consistent with cirrhosis. No liver mass lesions. No dilated bile ducts. No ascites. 06/2020. MRI/MRCP w/wo contrast.  Liver has a mildly nodular contour. No suspicious liver lesions. Hepatic biliary ducts have a mildly beaded appearance, which is suggestive of mild primary sclerosing cholangitis. There is no focal area of intrahepatic biliary dilation to suggest a dominant stricture. 12/2020. Ultrasound of the liver. Lobular surface contour in course and periapical echotexture redemonstrated. No focal mass.      OTHER TESTING:  Not available or performed    FOLLOW-UP:  All of the issues listed above in the Assessment and Plan were discussed with the patient. All questions were answered. The patient expressed a clear understanding of the above. [address] in 3 month to repeat labs and review EGD and ultrasound.      KEI Haney  [facility] [place] of [address], [address] [place] [address]   [phone]

## 2021-03-30 LAB
AFP L3 MFR SERPL: NORMAL % (ref 0–9.9)
AFP SERPL-MCNC: 4.8 NG/ML (ref 0–8)

## 2021-04-01 LAB — TACROLIMUS BLD-MCNC: 4.4 NG/ML (ref 2–20)

## 2021-06-01 ENCOUNTER — DOCUMENTATION ONLY (OUTPATIENT)
Dept: HEMATOLOGY | Age: 71
End: 2021-06-01

## 2021-06-02 DIAGNOSIS — Z01.812 PRE-PROCEDURE LAB EXAM: Primary | ICD-10-CM

## 2021-06-05 ENCOUNTER — ANESTHESIA EVENT (OUTPATIENT)
Dept: ENDOSCOPY | Age: 71
End: 2021-06-05
Payer: MEDICARE

## 2021-06-06 RX ORDER — DEXTROSE 50 % IN WATER (D50W) INTRAVENOUS SYRINGE
25-50 AS NEEDED
Status: CANCELLED | OUTPATIENT
Start: 2021-06-06

## 2021-06-06 RX ORDER — MAGNESIUM SULFATE 100 %
16 CRYSTALS MISCELLANEOUS AS NEEDED
Status: CANCELLED | OUTPATIENT
Start: 2021-06-06

## 2021-06-06 RX ORDER — MAGNESIUM SULFATE 100 %
4 CRYSTALS MISCELLANEOUS AS NEEDED
Status: CANCELLED | OUTPATIENT
Start: 2021-06-06

## 2021-06-06 RX ORDER — SODIUM CHLORIDE 9 MG/ML
125 INJECTION, SOLUTION INTRAVENOUS CONTINUOUS
Status: CANCELLED | OUTPATIENT
Start: 2021-06-06

## 2021-06-06 RX ORDER — FENTANYL CITRATE 50 UG/ML
50 INJECTION, SOLUTION INTRAMUSCULAR; INTRAVENOUS
Status: CANCELLED | OUTPATIENT
Start: 2021-06-06

## 2021-06-06 RX ORDER — NALOXONE HYDROCHLORIDE 0.4 MG/ML
0.1 INJECTION, SOLUTION INTRAMUSCULAR; INTRAVENOUS; SUBCUTANEOUS AS NEEDED
Status: CANCELLED | OUTPATIENT
Start: 2021-06-06

## 2021-06-06 RX ORDER — ONDANSETRON 2 MG/ML
4 INJECTION INTRAMUSCULAR; INTRAVENOUS ONCE
Status: CANCELLED | OUTPATIENT
Start: 2021-06-06 | End: 2021-06-06

## 2021-06-06 RX ORDER — DEXTROSE 50 % IN WATER (D50W) INTRAVENOUS SYRINGE
25 AS NEEDED
Status: CANCELLED | OUTPATIENT
Start: 2021-06-06

## 2021-06-06 RX ORDER — FENTANYL CITRATE 50 UG/ML
25 INJECTION, SOLUTION INTRAMUSCULAR; INTRAVENOUS AS NEEDED
Status: CANCELLED | OUTPATIENT
Start: 2021-06-06

## 2021-06-07 ENCOUNTER — OFFICE VISIT (OUTPATIENT)
Dept: HEMATOLOGY | Age: 71
End: 2021-06-07

## 2021-06-07 ENCOUNTER — ANESTHESIA (OUTPATIENT)
Dept: ENDOSCOPY | Age: 71
End: 2021-06-07
Payer: MEDICARE

## 2021-06-07 ENCOUNTER — HOSPITAL ENCOUNTER (OUTPATIENT)
Age: 71
Setting detail: OUTPATIENT SURGERY
Discharge: HOME | End: 2021-06-07
Attending: INTERNAL MEDICINE | Admitting: INTERNAL MEDICINE
Payer: MEDICARE

## 2021-06-07 DIAGNOSIS — K31.7 MULTIPLE GASTRIC POLYPS: ICD-10-CM

## 2021-06-07 DIAGNOSIS — I85.10 SECONDARY ESOPHAGEAL VARICES WITHOUT BLEEDING (HCC): ICD-10-CM

## 2021-06-07 LAB — GLUCOSE BLD STRIP.AUTO-MCNC: 222 MG/DL (ref 70–110)

## 2021-06-07 PROCEDURE — 77030014243 HC BND LIG VRCES BSC -D: Performed by: INTERNAL MEDICINE

## 2021-06-07 PROCEDURE — 43244 EGD VARICES LIGATION: CPT | Performed by: INTERNAL MEDICINE

## 2021-06-07 PROCEDURE — 76060000031 HC ANESTHESIA FIRST 0.5 HR: Performed by: INTERNAL MEDICINE

## 2021-06-07 PROCEDURE — 74011250636 HC RX REV CODE- 250/636: Performed by: SPECIALIST

## 2021-06-07 PROCEDURE — 76040000019: Performed by: INTERNAL MEDICINE

## 2021-06-07 PROCEDURE — 82962 GLUCOSE BLOOD TEST: CPT

## 2021-06-07 PROCEDURE — 2709999900 HC NON-CHARGEABLE SUPPLY: Performed by: INTERNAL MEDICINE

## 2021-06-07 PROCEDURE — 74011000250 HC RX REV CODE- 250: Performed by: SPECIALIST

## 2021-06-07 PROCEDURE — 74011636637 HC RX REV CODE- 636/637: Performed by: SPECIALIST

## 2021-06-07 PROCEDURE — 74011250636 HC RX REV CODE- 250/636: Performed by: INTERNAL MEDICINE

## 2021-06-07 RX ORDER — INSULIN LISPRO 100 [IU]/ML
INJECTION, SOLUTION INTRAVENOUS; SUBCUTANEOUS ONCE
Status: CANCELLED | OUTPATIENT
Start: 2021-06-07 | End: 2021-06-07

## 2021-06-07 RX ORDER — LIDOCAINE HYDROCHLORIDE 20 MG/ML
INJECTION, SOLUTION EPIDURAL; INFILTRATION; INTRACAUDAL; PERINEURAL AS NEEDED
Status: DISCONTINUED | OUTPATIENT
Start: 2021-06-07 | End: 2021-06-07 | Stop reason: HOSPADM

## 2021-06-07 RX ORDER — SODIUM CHLORIDE 9 MG/ML
100 INJECTION, SOLUTION INTRAVENOUS CONTINUOUS
Status: DISCONTINUED | OUTPATIENT
Start: 2021-06-07 | End: 2021-06-07 | Stop reason: HOSPADM

## 2021-06-07 RX ORDER — SODIUM CHLORIDE 0.9 % (FLUSH) 0.9 %
5-40 SYRINGE (ML) INJECTION AS NEEDED
Status: CANCELLED | OUTPATIENT
Start: 2021-06-07

## 2021-06-07 RX ORDER — EPINEPHRINE 0.1 MG/ML
1 INJECTION INTRACARDIAC; INTRAVENOUS
Status: CANCELLED | OUTPATIENT
Start: 2021-06-07 | End: 2021-06-08

## 2021-06-07 RX ORDER — ATROPINE SULFATE 0.1 MG/ML
0.5 INJECTION INTRAVENOUS
Status: CANCELLED | OUTPATIENT
Start: 2021-06-07 | End: 2021-06-08

## 2021-06-07 RX ORDER — PROPOFOL 10 MG/ML
INJECTION, EMULSION INTRAVENOUS AS NEEDED
Status: DISCONTINUED | OUTPATIENT
Start: 2021-06-07 | End: 2021-06-07 | Stop reason: HOSPADM

## 2021-06-07 RX ORDER — FLUMAZENIL 0.1 MG/ML
0.2 INJECTION INTRAVENOUS
Status: CANCELLED | OUTPATIENT
Start: 2021-06-07 | End: 2021-06-07

## 2021-06-07 RX ORDER — DEXTROSE 50 % IN WATER (D50W) INTRAVENOUS SYRINGE
25 AS NEEDED
Status: CANCELLED | OUTPATIENT
Start: 2021-06-07

## 2021-06-07 RX ORDER — NALOXONE HYDROCHLORIDE 0.4 MG/ML
0.4 INJECTION, SOLUTION INTRAMUSCULAR; INTRAVENOUS; SUBCUTANEOUS
Status: CANCELLED | OUTPATIENT
Start: 2021-06-07 | End: 2021-06-07

## 2021-06-07 RX ORDER — DEXTROMETHORPHAN/PSEUDOEPHED 2.5-7.5/.8
1.2 DROPS ORAL
Status: CANCELLED | OUTPATIENT
Start: 2021-06-07

## 2021-06-07 RX ORDER — SODIUM CHLORIDE 0.9 % (FLUSH) 0.9 %
5-40 SYRINGE (ML) INJECTION EVERY 8 HOURS
Status: CANCELLED | OUTPATIENT
Start: 2021-06-07

## 2021-06-07 RX ORDER — MAGNESIUM SULFATE 100 %
16 CRYSTALS MISCELLANEOUS AS NEEDED
Status: CANCELLED | OUTPATIENT
Start: 2021-06-07

## 2021-06-07 RX ORDER — INSULIN LISPRO 100 [IU]/ML
INJECTION, SOLUTION INTRAVENOUS; SUBCUTANEOUS ONCE
Status: COMPLETED | OUTPATIENT
Start: 2021-06-07 | End: 2021-06-07

## 2021-06-07 RX ADMIN — LIDOCAINE HYDROCHLORIDE 100 MG: 20 INJECTION, SOLUTION INTRAVENOUS at 09:10

## 2021-06-07 RX ADMIN — PROPOFOL 50 MG: 10 INJECTION, EMULSION INTRAVENOUS at 09:12

## 2021-06-07 RX ADMIN — SODIUM CHLORIDE 100 ML/HR: 900 INJECTION, SOLUTION INTRAVENOUS at 08:28

## 2021-06-07 RX ADMIN — PROPOFOL 30 MG: 10 INJECTION, EMULSION INTRAVENOUS at 09:14

## 2021-06-07 RX ADMIN — PROPOFOL 40 MG: 10 INJECTION, EMULSION INTRAVENOUS at 09:19

## 2021-06-07 RX ADMIN — PROPOFOL 40 MG: 10 INJECTION, EMULSION INTRAVENOUS at 09:16

## 2021-06-07 RX ADMIN — PROPOFOL 50 MG: 10 INJECTION, EMULSION INTRAVENOUS at 09:10

## 2021-06-07 RX ADMIN — PROPOFOL 50 MG: 10 INJECTION, EMULSION INTRAVENOUS at 09:09

## 2021-06-07 RX ADMIN — PROPOFOL 40 MG: 10 INJECTION, EMULSION INTRAVENOUS at 09:11

## 2021-06-07 RX ADMIN — INSULIN LISPRO 6 UNITS: 100 INJECTION, SOLUTION INTRAVENOUS; SUBCUTANEOUS at 08:46

## 2021-06-07 RX ADMIN — PROPOFOL 30 MG: 10 INJECTION, EMULSION INTRAVENOUS at 09:17

## 2021-06-07 NOTE — PROCEDURES
Aminta Escalona MD, Diana Collins MD, MPH      ANNEMARIE Delgado, ACNP-BC     April S Jayme, [facility]-BC   SHERRY Johnson-JOSH Nunes, [facility]       aSndeep Zavala On license of [facility] 136    at [place] [address], [address] Wes Marcus  22.    [phone]    FAX: [address] [place] [address], [address] [place] [address] - [address]    [phone]    FAX: [phone]         UPPER ENDOSCOPY PROCEDURE NOTE    Keiry Lund  [birth year]    INDICATION: Cirrhosis. Screening for esophageal varices with variceal ligation if indicated. : Brittani Aguilar MD    SURGICAL ASSISTANT:  None    PROSTHETIC DEVISES, TISSUE GRAFTS, ORGAN TRANSPLANTS:  Not applicable     ANESTHESIA/SEDATION: Propofol was administered by anesthesia      PROCEDURE DESCRIPTION:  Informed consent was obtained from the patient for the procedure. All risks and benefits of the procedure explained. The procedure was performed in the endoscopy suite. The patient was laying on a stretcher and moved to the left lateral decubitus position prior to administration of sedation. Sedation was administered by anesthesiology. See their note for details. The endoscope was inserted into the mouth and advanced under direct vision to the second portion of the duodenum. Careful inspection of upper gastrointestinal tract was made as the endoscope was inserted and withdrawn. Retroflexion of the endoscope to view of the cardia of the stomach was performed. After withdrawing the endoscope the banding devise was placed on the tip of the endoscope. The scope was then reinserted under direct inspection and advanced to the esophagus.   Banding of esophageal varices was performed as described below. The scope was then removed. FINDINGS:  Esophagus:    Two Medium varices were present. There was no stigmata of recent bleeding. Banding of esophageal varices was performed. Good hemostsis with no active bleeding was observed at the end of the procedure. Stomach:   Mild portal hypertensive gastropathy of the body of the stomach  No gastric varices identified. Mulitple gastric polyps remain in the antrum  Polyp mass much small then at previous EGD 1 month ago when multiple large polyps resected with snare. Pathology demonstrated those were all hyperplastic polyps. HPylori negative    Duodenum:   Normal bulb and second portion    SPECIMENS COLLECTED:   None    INTERVENTIONS:   2 bands placed were placed on esophageal varices. COMPLICATIONS: None. The patient tolerated the procedure well. EBL: Negligible. RECOMMENDATIONS:  Observe until discharge parameters are achieved. Liquid diet today. Soft food tomorrow. Resume general diet thereafter. Repeat endoscopy to reassess varices and need for additional banding in 3 months. Follow-up Liver [facility] office as scheduled.       Luisa Lake MD  [address]  [address], [address] [place] [address] [place], [address]  12 [facility]

## 2021-06-07 NOTE — ANESTHESIA PREPROCEDURE EVALUATION
Relevant Problems   No relevant active problems       Anesthetic History   No history of anesthetic complications     Pertinent negatives: No PONV       Review of Systems / Medical History  Patient summary reviewed, nursing notes reviewed and pertinent labs reviewed    Pulmonary    COPD: mild          Pertinent negatives: No asthma and smoker     Neuro/Psych   Within defined limits           Cardiovascular    Hypertension: well controlled            Pertinent negatives: No past MI and CAD       GI/Hepatic/Renal     GERD: well controlled      Liver disease     Endo/Other    Diabetes: poorly controlled    Morbid obesity and arthritis     Other Findings              Physical Exam    Airway  Mallampati: I  TM Distance: 4 - 6 cm  Neck ROM: normal range of motion   Mouth opening: Normal     Cardiovascular               Dental    Dentition: Caps/crowns     Pulmonary                 Abdominal         Other Findings            Anesthetic Plan    ASA: 3  Anesthesia type: MAC          Induction: Intravenous  Anesthetic plan and risks discussed with: Patient

## 2021-06-07 NOTE — DISCHARGE INSTRUCTIONS
DISCHARGE SUMMARY from Nurse    PATIENT INSTRUCTIONS:    After general anesthesia or intravenous sedation, for 24 hours or while taking prescription Narcotics:  · Limit your activities  · Do not drive and operate hazardous machinery  · Do not make important personal or business decisions  · Do  not drink alcoholic beverages  · If you have not urinated within 8 hours after discharge, please contact your surgeon on call. Report the following to your surgeon:  · Excessive pain, swelling, redness or odor of or around the surgical area  · Temperature over 100.5  · Nausea and vomiting lasting longer than 4 hours or if unable to take medications  · Any signs of decreased circulation or nerve impairment to extremity: change in color, persistent  numbness, tingling, coldness or increase pain  · Any questions    What to do at Home:  Recommended activity: as above ,   If you experience any of the following symptoms as above , please follow up with Doctor Felipe Barrera. *  Please give a list of your current medications to your Primary Care Provider. *  Please update this list whenever your medications are discontinued, doses are      changed, or new medications (including over-the-counter products) are added. *  Please carry medication information at all times in case of emergency situations. These are general instructions for a healthy lifestyle:    No smoking/ No tobacco products/ Avoid exposure to second hand smoke  Surgeon General's Warning:  Quitting smoking now greatly reduces serious risk to your health.     Obesity, smoking, and sedentary lifestyle greatly increases your risk for illness    A healthy diet, regular physical exercise & weight monitoring are important for maintaining a healthy lifestyle    You may be retaining fluid if you have a history of heart failure or if you experience any of the following symptoms:  Weight gain of 3 pounds or more overnight or 5 pounds in a week, increased swelling in our hands or feet or shortness of breath while lying flat in bed. Please call your doctor as soon as you notice any of these symptoms; do not wait until your next office visit. The discharge information has been reviewed with the patient and caregiver. The patient and caregiver verbalized understanding. Discharge medications reviewed with the patient and caregiver and appropriate educational materials and side effects teaching were provided. ___________________________________________________________________________________________________________________________________BON Cassy Lee MD, Alondra Mccarthy, Rafat Davis MD, MPH      Myrna Mcmillan, PA-JOSH Ovalle, ACNP-BC     April S Jayme, [facility]   Ezio Alcantar, P[facility]    Arleen Pratt, [facility]       Sandeep Zavala [place] [address]    at [address], [address] [address]  [address].    [phone]    FAX: [address] [place] [address], [address]    [phone]    FAX: [phone]         ENDOSCOPY WITH BANDING DISCHARGE INSTRUCTIONS    Gustabo Valerio  [birth year]  Date: 6/7/2021    DISCOMFORT:  Use lozenges or warm salt water gargle for sore thoat  Apply warm compress to IV site if red. If redness or soreness persists call the office. You may experience gas and bloating. Walking and belching will help relieve this. You may experience chest pain or discomfort or feel as though food is \"sticking\" in your food pipe for a few days after the procedure. This is a normal feeling after banding of esophageal varices. CHEST PRESSURE:  Banding of dilated veins (varices) in the food tube (esophagus) can be painful in some persons.   The pain is caused by squeezing the varices and lining of the esophagus by the bands used to seal the varices. You can take liquid pain medication either acetaminophen or alternative. The best treatment for this is to drink a an \"Icee\" or \"Slurpee\" since the ice crystals will freeze the nerve endings in the food tube and relieve the pain. DIET:  Regular food may dislodge the bands placed on the varices. For this reason you should only have liquid for the rest of today. Eat only soft food that does not need to be chewed all day tomorrow. You may advance to your regular diet in 2 days. ACTIVITY:  Spend the remainder of the day resting. Avoid any strenuous activity. You may not operate a vehicle for 12 hours. You may not engage in an occupation involving machinery or appliances for rest of today. Avoid making any critical decisions for at least 24 hour. Call the [facility] [phone] if you have any of the following:  Increasing chest or abdominal pain, nausea, vomiting, vomiting blood, abdominal distension or swelling, fever or chills, bloody discharge from nose or mouth or shortness of breath. Follow-up Instructions:  Call Dr. Auther Habermann for any questions or problems at the phone number listed above. If a biopsy was performed, you will be contacted by the office staff or Dr Auther Habermann within 1 week. If you have not heard from us by then you may call the office at the phone number listed above to inquire about the results. ENDOSCOPY FINDINGS:  Two varices were found in the esophagus (food tube). 2 bands were placed to seal the varices and reduce the risk of bleeding. Will repeat EGD to evaluate for additional varices and need for more banding in 3 months. Keep follow-up appointment with Dayday Peters on 6/15/2021. DISCHARGE SUMMARY from the Nurse: The following personal items collected during your admission are returned to you:   Dental Appliance: Dental Appliances:  At home, Uppers, Partials  Vision: Visual Aid: Glasses, With patient (w/ pt)  Hearing Aid:    Jewelry:    Clothing:    Other Valuables:    Valuables sent to safe:                Learning About Coronavirus (COVID-19)  Coronavirus (COVID-19): Overview  What is coronavirus (COVID-19)? The coronavirus disease (COVID-19) is caused by a virus. It is an illness that was first found in [place], [place], in December 2019. It has since spread worldwide. The virus can cause fever, cough, and trouble breathing. In severe cases, it can cause pneumonia and make it hard to breathe without help. It can cause death. Coronaviruses are a large group of viruses. They cause the common cold. They also cause more serious illnesses like Middle East respiratory syndrome (MERS) and severe acute respiratory syndrome (SARS). COVID-19 is caused by a novel coronavirus. That means it's a new type that has not been seen in people before. This virus spreads person-to-person through droplets from coughing and sneezing. It can also spread when you are close to someone who is infected. And it can spread when you touch something that has the virus on it, such as a doorknob or a tabletop. What can you do to protect yourself from coronavirus (COVID-19)? The best way to protect yourself from getting sick is to:  · Avoid areas where there is an outbreak. · Avoid contact with people who may be infected. · Wash your hands often with soap or alcohol-based hand sanitizers. · Avoid crowds and try to stay at least 6 feet away from other people. · Wash your hands often, especially after you cough or sneeze. Use soap and water, and scrub for at least 20 seconds. If soap and water aren't available, use an alcohol-based hand . · Avoid touching your mouth, nose, and eyes. What can you do to avoid spreading the virus to others? To help avoid spreading the virus to others:  · Cover your mouth with a tissue when you cough or sneeze. Then throw the tissue in the trash.   · Use a disinfectant to clean things that you touch often. · Stay home if you are sick or have been exposed to the virus. Don't go to school, work, or public areas. And don't use public transportation. · If you are sick:  ? Leave your home only if you need to get medical care. But call the doctor's office first so they know you're coming. And wear a face mask, if you have one.  ? If you have a face mask, wear it whenever you're around other people. It can help stop the spread of the virus when you cough or sneeze. ? Clean and disinfect your home every day. Use household  and disinfectant wipes or sprays. Take special care to clean things that you grab with your hands. These include doorknobs, remote controls, phones, and handles on your refrigerator and microwave. And don't forget countertops, tabletops, bathrooms, and computer keyboards. When to call for help  Call 911 anytime you think you may need emergency care. For example, call if:  · You have severe trouble breathing. (You can't talk at all.)  · You have constant chest pain or pressure. · You are severely dizzy or lightheaded. · You are confused or can't think clearly. · Your face and lips have a blue color. · You pass out (lose consciousness) or are very hard to wake up. Call your doctor now if you develop symptoms such as:  · Shortness of breath. · Fever. · Cough. If you need to get care, call ahead to the doctor's office for instructions before you go. Make sure you wear a face mask, if you have one, to prevent exposing other people to the virus. Where can you get the latest information? The following health organizations are tracking and studying this virus. Their websites contain the most up-to-date information. Hugo Laughlin also learn what to do if you think you may have been exposed to the virus. · U.S. Centers for Disease Control and Prevention (CDC): The CDC provides updated news about the disease and travel advice.  The website also tells you how to prevent the spread of infection. www.cdc.gov  · World Health Organization [facility]): WHO offers information about the virus outbreaks. WHO also has travel advice. www.who.int  Current as of: April 1, 2020               Content Version: 12.4  © 3526-9473 [employer], [employer]orporated. Care instructions adapted under license by your healthcare professional. If you have questions about a medical condition or this instruction, always ask your healthcare professional. [place] [address] any warranty or liability for your use of this information.   Patient armband removed and shredded

## 2021-06-07 NOTE — ANESTHESIA POSTPROCEDURE EVALUATION
Procedure(s):  EGD WITH MAC; BANDING x2. MAC    Anesthesia Post Evaluation        Comments: Post-Anesthesia Evaluation and Assessment    Cardiovascular Function/Vital Signs  BP (!) 143/87   Pulse 79   Temp 37.1 °C (98.7 °F)   Resp 16   Ht 5' 3\" (1.6 m)   Wt 100.7 kg (222 lb)   SpO2 100%   Breastfeeding No   BMI 39.33 kg/m²     Patient is status post Procedure(s):  EGD WITH MAC; BANDING x2.    Nausea/Vomiting: Controlled. Postoperative hydration reviewed and adequate. Pain:  Pain Scale 1: Numeric (0 - 10) (06/07/21 1015)  Pain Intensity 1: 9 (06/07/21 0949)   Managed. Neurological Status: At baseline. Mental Status and Level of Consciousness: Arousable. Pulmonary Status:   O2 Device: None (Room air) (06/07/21 1015)   Adequate oxygenation and airway patent. Complications related to anesthesia: None    Post-anesthesia assessment completed. No concerns. Patient has met all discharge requirements.     Signed By: Beronica Castro MD    June 7, 2021                     INITIAL Post-op Vital signs:   Vitals Value Taken Time   /87 06/07/21 1017   Temp 37.1 °C (98.7 °F) 06/07/21 0954   Pulse 79 06/07/21 1017   Resp 16 06/07/21 1017   SpO2 100 % 06/07/21 1015

## 2021-06-07 NOTE — H&P
Ai Gaviria MD, Larry Isaacs MD, MPH      Moe Venegas, ANNEMARIE Russell, [facility]-BC     Nida ELAM Jayme, [facility]   Cecy Craig, [facility]-C    Isra Lee, [facility]       Sandeep Zavala [address]    at [facility]    [address], [address] [place] [address]  [address]. [phone]    FAX: [address] [place] [address], [address]    [phone]    FAX: [phone]         PRE-PROCEDURE NOTE - EGD    H and P from last office visit reviewed. Allergies reviewed. Out-patient medicaton list reviewed. Patient Active Problem List   Diagnosis Code    Severe obesity ([facility] E66.01    Autoimmune hepatitis ([facility] K75.4    Thrombocytopenia (HCC) D69.6    [facility] K74.60    GAVE (gastric antral vascular ectasia) K31.819    Hypoglycemia E16.2    Primary sclerosing cholangitis K83.01       Allergies   Allergen Reactions    Adhesive Tape-Silicones Other (comments)    Naproxen Nausea Only    Penicillins Hives    Tramadol Nausea Only    Tuberculin Ppd Hives    Vicodin [Hydrocodone-Acetaminophen] Hives       No current facility-administered medications on file prior to encounter. Current Outpatient Medications on File Prior to Encounter   Medication Sig Dispense Refill    traZODone (DESYREL) 50 mg tablet Take 50 mg by mouth nightly.  bacitracin (BACITRACIN) 500 unit/gram oint Apply  to affected area three (3) times daily.  insulin lispro (HUMALOG) 100 unit/mL injection by SubCUTAneous route once.  methocarbamoL (ROBAXIN) 750 mg tablet Take 750 mg by mouth every eight (8) hours as needed.  ondansetron (ZOFRAN ODT) 4 mg disintegrating tablet Take 4 mg by mouth every eight (8) hours as needed.       sodium bicarbonate 650 mg tablet Take 1,300 mg by mouth four (4) times daily.  tacrolimus (PROGRAF) 1 mg capsule Take 1 Cap by mouth every twelve (12) hours. 120 Cap 6    insulin aspart protamine/insulin aspart (NOVOLOG MIX 70-30FLEXPEN U-100) 100 unit/mL (70-30) inpn by SubCUTAneous route. Sliding scale      albuterol (PROVENTIL HFA) 90 mcg/actuation inhaler Take 2 Puffs by inhalation.  amLODIPine (NORVASC) 10 mg tablet Take 10 mg by mouth.  budesonide-formoterol (SYMBICORT) 160-4.5 mcg/actuation HFAA Take 2 Puffs by inhalation.  butalbital-acetaminophen-caffeine (FIORICET, ESGIC) -40 mg per tablet TAKE 1 TABLET BY MOUTH EVERY 4 HOURS AS NEEDED FOR HEADACHE PAIN  0    ferrous fumarate 324 mg (106 mg iron) tab Take 324 mg by mouth.  furosemide (LASIX) 40 mg tablet Take 40 mg by mouth.  glipiZIDE SR (GLUCOTROL XL) 5 mg CR tablet Take 5 mg by mouth.  minoxidil (LONITEN) 2.5 mg tablet Take 2.5 mg by mouth.  omeprazole (PRILOSEC) 40 mg capsule Take 40 mg by mouth.  spironolactone (ALDACTONE) 100 mg tablet Take 1 Tab by mouth daily. 180 Tab 3    naloxone (NARCAN) 4 mg/actuation nasal spray 4 mg. For EGD to assess for esophageal and gastric varices. Plan to perform banding if indicated based upon variceal size and appearance. The risks of the procedure were discussed with the patient. These included reaction to anesthesia, pain, perforation and bleeding. All questions were answered. The patient wishes to proceed with the procedure. The patient was counseled at length about the risks of suzie Covid-19 in the cyndie-operative and post-operative states including the recovery window of their procedure. The patient was made aware that suzie Covid-19 after a surgical procedure may worsen their prognosis for recovering from the virus and lend to a higher morbidity and or mortality risk.   The patient was given the options of postponing their procedure. All of the risks, benefits, and alternatives were discussed. The patient does  wish to proceed with the procedure. PHYSICAL EXAMINATION:  Visit Vitals  BP (!) 151/79 (BP 1 Location: Left upper arm, BP Patient Position: At rest;Sitting)   Pulse 90   Temp 98.5 °F (36.9 °C)   Resp 16   Ht 5' 3\" (1.6 m)   Wt 222 lb (100.7 kg)   SpO2 99%   Breastfeeding No   BMI 39.33 kg/m²       General: No acute distress. Eyes: Sclera anicteric. ENT: No oral lesions. Thyroid normal.  Nodes: No adenopathy. Skin: No spider angiomata. No jaundice. No palmar erythema. Respiratory: Lungs clear to auscultation. Cardiovascular: Regular heart rate. No murmurs. No JVD. Abdomen: Soft non-tender, liver size normal to percussion/palpation. Spleen not palpable. No obvious ascites. Extremities: No edema. No muscle wasting. No gross arthritic changes. Neurologic: Alert and oriented. Cranial nerves grossly intact. No asterixis. MOST RECENT LABORATORY STUDIES:  Lab Results   Component Value Date/Time    WBC 5.6 03/29/2021 05:08 PM    HGB 12.8 03/29/2021 05:08 PM    HCT 39.6 03/29/2021 05:08 PM    PLATELET 420 47/76/9348 05:08 PM    MCV 84.4 03/29/2021 05:08 PM     Lab Results   Component Value Date/Time    INR 1.3 (H) 03/29/2021 05:08 PM    INR 1.3 (H) 01/05/2021 10:18 AM    INR 1.2 08/27/2020 11:16 AM    Prothrombin time 15.6 (H) 03/29/2021 05:08 PM    Prothrombin time 15.8 (H) 01/05/2021 10:18 AM    Prothrombin time 15.2 08/27/2020 11:16 AM       ASSESSMENT AND PLAN:  EGD to assess for esophageal and/or gastric varices.   Sedation per anesthesiology      Britton Be MD  [facility]  [address], [address]  [phone] [address]

## 2021-06-25 VITALS
OXYGEN SATURATION: 100 % | DIASTOLIC BLOOD PRESSURE: 87 MMHG | SYSTOLIC BLOOD PRESSURE: 143 MMHG | HEART RATE: 79 BPM | RESPIRATION RATE: 16 BRPM | HEIGHT: 63 IN | BODY MASS INDEX: 39.34 KG/M2 | WEIGHT: 222 LBS | TEMPERATURE: 98.7 F

## 2021-09-01 ENCOUNTER — HOSPITAL ENCOUNTER (OUTPATIENT)
Dept: LAB | Age: 71
Discharge: HOME | End: 2021-09-01
Payer: MEDICAID

## 2021-09-01 ENCOUNTER — OFFICE VISIT (OUTPATIENT)
Dept: HEMATOLOGY | Age: 71
End: 2021-09-01
Payer: MEDICARE

## 2021-09-01 VITALS
HEIGHT: 63 IN | WEIGHT: 221 LBS | DIASTOLIC BLOOD PRESSURE: 79 MMHG | OXYGEN SATURATION: 97 % | HEART RATE: 103 BPM | RESPIRATION RATE: 25 BRPM | BODY MASS INDEX: 39.16 KG/M2 | TEMPERATURE: 99.1 F | SYSTOLIC BLOOD PRESSURE: 137 MMHG

## 2021-09-01 DIAGNOSIS — K74.60 CIRRHOSIS OF LIVER WITHOUT ASCITES, UNSPECIFIED HEPATIC CIRRHOSIS TYPE (HCC): ICD-10-CM

## 2021-09-01 DIAGNOSIS — K74.60 CIRRHOSIS OF LIVER WITHOUT ASCITES, UNSPECIFIED HEPATIC CIRRHOSIS TYPE (HCC): Primary | ICD-10-CM

## 2021-09-01 LAB
ALBUMIN SERPL-MCNC: 3.5 G/DL (ref 3.4–5)
ALBUMIN/GLOB SERPL: 0.9 {RATIO} (ref 0.8–1.7)
ALP SERPL-CCNC: 136 U/L (ref 45–117)
ALT SERPL-CCNC: 35 U/L (ref 13–56)
ANION GAP SERPL CALC-SCNC: 5 MMOL/L (ref 3–18)
AST SERPL-CCNC: 32 U/L (ref 10–38)
BASOPHILS # BLD: 0 K/UL (ref 0–0.1)
BASOPHILS NFR BLD: 1 % (ref 0–2)
BILIRUB DIRECT SERPL-MCNC: 0.2 MG/DL (ref 0–0.2)
BILIRUB SERPL-MCNC: 0.6 MG/DL (ref 0.2–1)
BUN SERPL-MCNC: 15 MG/DL (ref 7–18)
BUN/CREAT SERPL: 12 (ref 12–20)
CALCIUM SERPL-MCNC: 8.9 MG/DL (ref 8.5–10.1)
CHLORIDE SERPL-SCNC: 107 MMOL/L (ref 100–111)
CO2 SERPL-SCNC: 28 MMOL/L (ref 21–32)
CREAT SERPL-MCNC: 1.23 MG/DL (ref 0.6–1.3)
DIFFERENTIAL METHOD BLD: ABNORMAL
EOSINOPHIL # BLD: 0.1 K/UL (ref 0–0.4)
EOSINOPHIL NFR BLD: 2 % (ref 0–5)
ERYTHROCYTE [DISTWIDTH] IN BLOOD BY AUTOMATED COUNT: 14.6 % (ref 11.6–14.5)
GLOBULIN SER CALC-MCNC: 4.1 G/DL (ref 2–4)
GLUCOSE SERPL-MCNC: 100 MG/DL (ref 74–99)
HCT VFR BLD AUTO: 38.7 % (ref 35–45)
HGB BLD-MCNC: 12.5 G/DL (ref 12–16)
INR PPP: 1.1 (ref 0.8–1.2)
LYMPHOCYTES # BLD: 1.6 K/UL (ref 0.9–3.6)
LYMPHOCYTES NFR BLD: 23 % (ref 21–52)
MCH RBC QN AUTO: 26.5 PG (ref 24–34)
MCHC RBC AUTO-ENTMCNC: 32.3 G/DL (ref 31–37)
MCV RBC AUTO: 82 FL (ref 78–100)
MONOCYTES # BLD: 0.9 K/UL (ref 0.05–1.2)
MONOCYTES NFR BLD: 14 % (ref 3–10)
NEUTS SEG # BLD: 4.2 K/UL (ref 1.8–8)
NEUTS SEG NFR BLD: 61 % (ref 40–73)
PLATELET # BLD AUTO: 209 K/UL (ref 135–420)
PMV BLD AUTO: 10.4 FL (ref 9.2–11.8)
POTASSIUM SERPL-SCNC: 4 MMOL/L (ref 3.5–5.5)
PROT SERPL-MCNC: 7.6 G/DL (ref 6.4–8.2)
PROTHROMBIN TIME: 13.7 SEC (ref 11.5–15.2)
RBC # BLD AUTO: 4.72 M/UL (ref 4.2–5.3)
SODIUM SERPL-SCNC: 140 MMOL/L (ref 136–145)
WBC # BLD AUTO: 6.9 K/UL (ref 4.6–13.2)

## 2021-09-01 PROCEDURE — 3017F COLORECTAL CA SCREEN DOC REV: CPT | Performed by: NURSE PRACTITIONER

## 2021-09-01 PROCEDURE — 80197 ASSAY OF TACROLIMUS: CPT

## 2021-09-01 PROCEDURE — 85025 COMPLETE CBC W/AUTO DIFF WBC: CPT

## 2021-09-01 PROCEDURE — 85610 PROTHROMBIN TIME: CPT

## 2021-09-01 PROCEDURE — 82107 ALPHA-FETOPROTEIN L3: CPT

## 2021-09-01 PROCEDURE — G8417 CALC BMI ABV UP PARAM F/U: HCPCS | Performed by: NURSE PRACTITIONER

## 2021-09-01 PROCEDURE — 80048 BASIC METABOLIC PNL TOTAL CA: CPT

## 2021-09-01 PROCEDURE — 1090F PRES/ABSN URINE INCON ASSESS: CPT | Performed by: NURSE PRACTITIONER

## 2021-09-01 PROCEDURE — G8400 PT W/DXA NO RESULTS DOC: HCPCS | Performed by: NURSE PRACTITIONER

## 2021-09-01 PROCEDURE — 80076 HEPATIC FUNCTION PANEL: CPT

## 2021-09-01 PROCEDURE — G8432 DEP SCR NOT DOC, RNG: HCPCS | Performed by: NURSE PRACTITIONER

## 2021-09-01 PROCEDURE — G8427 DOCREV CUR MEDS BY ELIG CLIN: HCPCS | Performed by: NURSE PRACTITIONER

## 2021-09-01 PROCEDURE — 36415 COLL VENOUS BLD VENIPUNCTURE: CPT

## 2021-09-01 PROCEDURE — 1101F PT FALLS ASSESS-DOCD LE1/YR: CPT | Performed by: NURSE PRACTITIONER

## 2021-09-01 PROCEDURE — G8536 NO DOC ELDER MAL SCRN: HCPCS | Performed by: NURSE PRACTITIONER

## 2021-09-01 PROCEDURE — 99214 OFFICE O/P EST MOD 30 MIN: CPT | Performed by: NURSE PRACTITIONER

## 2021-09-01 RX ORDER — DICYCLOMINE HYDROCHLORIDE 20 MG/1
20 TABLET ORAL
COMMUNITY
Start: 2021-04-09 | End: 2022-03-18

## 2021-09-01 RX ORDER — GABAPENTIN 300 MG/1
300 CAPSULE ORAL 3 TIMES DAILY
COMMUNITY
Start: 2021-07-21 | End: 2022-07-21 | Stop reason: SDUPTHER

## 2021-09-01 RX ORDER — PREDNISOLONE ACETATE 10 MG/ML
1 SUSPENSION/ DROPS OPHTHALMIC 4 TIMES DAILY
COMMUNITY
Start: 2021-08-20 | End: 2021-09-21

## 2021-09-01 RX ORDER — PREDNISONE 10 MG/1
5 TABLET ORAL
COMMUNITY
Start: 2021-06-01 | End: 2022-07-21 | Stop reason: ALTCHOICE

## 2021-09-01 RX ORDER — OFLOXACIN 3 MG/ML
SOLUTION/ DROPS OPHTHALMIC
COMMUNITY
Start: 2021-08-20 | End: 2021-12-02

## 2021-09-01 NOTE — PROGRESS NOTES
[address] [place] [address], MD, MD Que Montanez PA-C Juanita Mola, [facility]     April S Jayme, [facility]   Carolyn Lindquist P-JOSH Hollins, [facility]       Sandeep Deputado Carlos [address]    at [address], [address] [place] [address], [facility] 22.    [phone]    FAX: [address] [place] [address], [address]    [phone]    FAX: [phone]       Patient Care Team:  Jeremy Grove MD as PCP - General (Family Medicine)  Dave Izaguirre MD (Gastroenterology)  Barbie Herrera MD (Neurosurgery)      Problem List  Date Reviewed: 9/1/2021        Codes Class Noted    Primary sclerosing cholangitis ICD-10-CM: K83.01  ICD-9-CM: 576.1  3/29/2021        Hypoglycemia ICD-10-CM: E16.2  ICD-9-CM: 251.2  12/10/2020        GAVE (gastric antral vascular ectasia) ICD-10-CM: K31.819  ICD-9-CM: 537.82  6/10/2019        Severe obesity ([facility] [address] ICD-10-CM: E66.01  ICD-9-CM: 278.01  4/17/2019        Autoimmune hepatitis ([facility] [address] ICD-10-CM: K75.4  ICD-9-CM: 571.42  4/17/2019        Thrombocytopenia ([facility] [address] ICD-10-CM: D69.6  ICD-9-CM: 287.5  4/17/2019        Cirrhosis ([facility] [address] ICD-10-CM: K74.60  ICD-9-CM: 571.5  4/17/2019              Nelly Jessica returns to the 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 The active problem list, all pertinent past medical history, medications, liver histology, radiologic findings and laboratory findings related to the liver disorder were reviewed with the patient.       The patient is a 66 y.o.  female who was found to have marked elevation in liver enzymes into the 500-800 range in 2017     The patient was found to have cirrhosis in 2019 when she developed lower extremity edema, thrombocytopenia and an ultrasound suggested cirrhosis. The patient has developed the following complications of cirrhosis: esophageal varices, GAVE    The patient notes fatigue. Continues to c/o of severe back and hip pain. She needs pain management. The patient has not experienced pain in the right side over the liver, problems concentrating, swelling of the abdomen, swelling of the lower extremities, hematemesis, hematochezia. The patient has limitations in daily activities due to liver disease and other medical issues. Since the last office appointment:  Completed vaccination to COVID 19. 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 The diagnosis of cirrhosis is based upon elastography, imaging, laboratory studies, complications of cirrhosis. The patient has normal liver function. The CTP is 6. Child class A. The MELD score is 8. Autoimmune Hepatitis   The diagnosis was based upon serology, liver biopsy   A liver biopsy performed in 8/2017 demonstrated moderate to severe inflammation with interface hepatitis, with piecemeal necrosis and stage 2-3 fibrosis. Elastography performed in 4/2019 suggests Cirrhosis. Liver transaminases are normal.  ALP is elevated. Liver function is normal.  The platelet count is normal.    The patient is taking TAC 1 mg BID since 10/2019. Primary Sclerosing Cholangitis. This diagnosis is based on MRI/MRCP performed in 06/2020. Intrahepatic biliary ducts have a mildly beaded appearance, suggestive of primary sclerosing cholangitis. The natural history of PSC was discussed in detail. I explained that there is cure for PSC. I explained that at least 75% of those people who suffer from [facility] also have ulcerative colitis. I explained that the single greatest risk factor associated with PSC is cholangiocarcinomas.     Elevation in ALP  The elevation in ALP is most likely due to cirrhosis and PSC. Acute kidney injury  The patient had an elevation in SCr to 1.23 mg on today's labs. Will continue TAC at 1 mg BID for now. Will continue diuretics at step one for now. ANTI-HCV positive HCV RNA negative  This is either due to exposure to HCV many yers ago with spontaneous resolution or a false positive anti-HCV because of the autoimmune disease. The patient is HCV RNA undetectable and does not have HCV. Lower extremity edema  Edema has resolved with current dose of diuretics. Will continue the current dose of diuretics at step 1. Screening for Esophageal varices   The patient had an EGD at [facility] in 7/2019. This demonstrated small esophageal varices and GAVE  Dr. Yakov Figueroa performed a repeat EGD in 10/2020. Multiple large gastric polyps in the antrum. 3 polys removed. EGD was performed by Dr. Yakov Figueroa in 06/2021. Two medium esophageal varices were present. These were banded. Repeat EGD was ordered today. Hepatic encephalopathy   Overt HE has not developed to date. There is no need for treatment with lactulose and/or Xifaxan at this time. There is no need to restrict dietary protein at this time. Thrombocytopenia   The most recent platelet counts is 928. Screening for Hepatocellular Carcinoma  HCC screening has recently been performed and does not suggest [address] The previously ordered ultrasound was not performed. Re-ordered today. Treatment of other medical problems in patients with chronic liver disease  There are no contraindications for the patient to take most medications that are necessary for treatment of other medical issues. Counseling for alcohol in patients with chronic liver disease  The patient was counseled regarding alcohol consumption and the effect of alcohol on chronic liver disease. Osteoporosis  The risk of osteoporosis is increased in patients with cirrhosis.     DEXA bone density to assess for osteoporosis has not been performed. This should be ordered by the patients primary care physician. Vaccinations   Vaccination for viral hepatitis B is not needed. The patient has serologic evidence of prior exposure or vaccination with immunity. The need for vaccination against viral hepatitis A will be assessed with serologic and instituted as appropriate. Routine vaccinations against other bacterial and viral agents can be performed as indicated. Annual flu vaccination should be administered if indicated. ALLERGIES  Allergies   Allergen Reactions    Adhesive Tape-Silicones Other (comments)    Naproxen Nausea Only    Penicillins Hives    Tramadol Nausea Only    Tuberculin Ppd Hives    Vicodin [Hydrocodone-Acetaminophen] Hives       MEDICATIONS  Current Outpatient Medications   Medication Sig    traZODone (DESYREL) 50 mg tablet Take 50 mg by mouth nightly.  bacitracin (BACITRACIN) 500 unit/gram oint Apply  to affected area three (3) times daily.  insulin lispro (HUMALOG) 100 unit/mL injection by SubCUTAneous route once.  methocarbamoL (ROBAXIN) 750 mg tablet Take 750 mg by mouth every eight (8) hours as needed.  naloxone (NARCAN) 4 mg/actuation nasal spray 4 mg.  ondansetron (ZOFRAN ODT) 4 mg disintegrating tablet Take 4 mg by mouth every eight (8) hours as needed.  sodium bicarbonate 650 mg tablet Take 1,300 mg by mouth four (4) times daily.  tacrolimus (PROGRAF) 1 mg capsule Take 1 Cap by mouth every twelve (12) hours.  insulin aspart protamine/insulin aspart (NOVOLOG MIX 70-30FLEXPEN U-100) 100 unit/mL (70-30) inpn by SubCUTAneous route. Sliding scale    albuterol (PROVENTIL HFA) 90 mcg/actuation inhaler Take 2 Puffs by inhalation.  amLODIPine (NORVASC) 10 mg tablet Take 10 mg by mouth.  budesonide-formoterol (SYMBICORT) 160-4.5 mcg/actuation HFAA Take 2 Puffs by inhalation.     butalbital-acetaminophen-caffeine (FIORICET, ESGIC) -40 mg per tablet TAKE 1 TABLET BY MOUTH EVERY 4 HOURS AS NEEDED FOR HEADACHE PAIN    ferrous fumarate 324 mg (106 mg iron) tab Take 324 mg by mouth.  furosemide (LASIX) 40 mg tablet Take 40 mg by mouth.  glipiZIDE SR (GLUCOTROL XL) 5 mg CR tablet Take 5 mg by mouth.  minoxidil (LONITEN) 2.5 mg tablet Take 2.5 mg by mouth.  omeprazole (PRILOSEC) 40 mg capsule Take 40 mg by mouth.  spironolactone (ALDACTONE) 100 mg tablet Take 1 Tab by mouth daily. No current facility-administered medications for this visit. SYSTEM REVIEW NOT RELATED TO LIVER DISEASE OR REVIEWED ABOVE:  Constitution systems: Negative for fever, chills, weight gain, weight loss. Eyes: Negative for visual changes. ENT: Negative for sore throat, painful swallowing. Respiratory: Negative for cough, hemoptysis, SOB. Cardiology: Swelling of the legs. GI:  Negative for constipation or diarrhea. : Negative for urinary frequency, dysuria, hematuria, nocturia. Skin: Negative for rash. Hematology: Negative for easy bruising, blood clots. Musculo-skelatal: Negative for back pain, muscle pain, weakness. Neurologic: Negative for headaches, dizziness, vertigo, memory problems not related to HE. Psychology: Negative for anxiety, depression. FAMILY HISTORY:  The father  of MI. The mother  of MI. The following family members have liver disease: a brother    SOCIAL HISTORY:  The patient is . The patient has 2 children, and 8 grandchildren. The patient stopped using tobacco products in . The patient has never consumed significant amounts of alcohol. The patient used to work as as a  at [employer]. The patient retired in .       PHYSICAL EXAMINATION:  Visit Vitals  /79 (BP 1 Location: Left upper arm, BP Patient Position: Sitting, BP Cuff Size: Small adult)   Pulse (!) 103   Temp 99.1 °F (37.3 °C)   Resp 25   Ht 5' 3\" (1.6 m)   Wt 221 lb (100.2 kg)   SpO2 97%   BMI 39.15 kg/m² General: No acute distress. Eyes: Sclera anicteric. ENT: No oral lesions. Thyroid normal.  Nodes: No adenopathy. Skin: No spider angiomata. No jaundice. No palmar erythema. Respiratory: Lungs clear to auscultation. Cardiovascular: Regular heart rate. No murmurs. No JVD. Abdomen: Soft non-tender. Liver size normal to percussion/palpation. Spleen not palpable. No obvious ascites. Extremities: No edema. No muscle wasting. No gross arthritic changes. Neurologic: Alert and oriented. Cranial nerves grossly intact. No asterixis. LABORATORY STUDIES:  L[facility] [place] of [address] & Units 9/1/2021 3/29/2021 1/5/2021   WBC 4.6 - 13.2 K/uL 6.9 5.6 5.3   ANC 1.8 - 8.0 K/UL 4.2 2.6 2.5   HGB 12.0 - 16.0 g/dL 12.5 12.8 13.2    - 420 K/uL 209 202 196   INR 0.8 - 1.2   1.1 1.3 (H) 1.3 (H)   AST 10 - 38 U/L 32 39 (H) 35   ALT 13 - 56 U/L 35 48 46   Alk Phos 45 - 117 U/L 136 (H) 207 (H) 241 (H)   Bili, Total 0.2 - 1.0 MG/DL 0.6 0.5 0.5   Bili, Direct 0.0 - 0.2 MG/DL 0.2 0.1 0.2   Albumin 3.4 - 5.0 g/dL 3.5 3.5 3.3 (L)   BUN 7.0 - 18 MG/DL 15 16 20 (H)   Creat 0.6 - 1.3 MG/DL 1.23 1.10 1.44 (H)   Creat (iSTAT) 0.6 - 1.3 MG/DL      Na 136 - 145 mmol/L 140 139 139   K 3.5 - 5.5 mmol/L 4.0 4.1 4.6   Cl 100 - 111 mmol/L 107 110 107   CO2 21 - 32 mmol/L 28 27 26   Glucose 74 - 99 mg/dL 100 (H) 162 (H) 173 (H)   Magnesium 1.6 - 2.6 mg/dL      Ammonia 11 - 32 UMOL/L        Cancer Screening Latest Ref Rng & Units 9/1/2021 3/29/2021 1/5/2021   AFP, Serum 0.0 - 8.0 ng/mL 4.8 4.8 5.6   AFP-L3% 0.0 - 9.9 % Comment Comment Comment     Liver Virology and Transplant Immune Suppression Latest Ref Rng & Units 3/29/2021   Tacrolimus Level 2.0 - 20.0 ng/mL 4.4     Liver Virology and Transplant Immune Suppression Latest Ref Rng & Units 8/27/2020   Tacrolimus Level 2.0 - 20.0 ng/mL 1.7 (L)       SEROLOGIES:  6/2017. Anti-HBsurface positive  9/2017.   HBsAntigen negative, anti-HCV positive, HCV RNA undetectable, VICKI positive, ASMA positive, ANCA positive, AMA negative, RF positive, alpha-1-antitrypsin 150    LIVER HISTOLOGY:  8/2017. Active hepatitis with portal and lobular inflammation, interface hepatitis, PMN and stage 2-3 septal fibrosis. Biopsy slides not reviewed by MLS.  4/2019. Sheer wave elastography performed at [facility]. Range 10.09- 17.05 kPa, Mean 14.07 kPa, Median 14.9 kPa, Standard deviation 2.41 kPa. The results suggested a fibrosis level of F4. ENDOSCOPIC PROCEDURES:  7/2019. EGD by Dr Rob Adan. Small esophageal varices. GAVE. 10/2020. EGD by Dr. Shelbi Ferraro. Dr. Shelbi Ferraro performed a repeat EGD in 10/2020. Multiple large gastric polyps in the antrum. 3 polys removed. Repeat EGD in 1 to 2 months. 02/2021. EGD by Dr. Shelbi Ferraro. Normal esophagus. No portal hypertensive gastropathy. At least least 10 polyps in the antrum. Several of these were > 5 mm in diameter. 7 of the polyps were resected by snare cautery. All bases were noted to be clear and without bleeding or perforation at end of procedure. 3-4 polyps with wide bases were left behind. No gastric varices identified. Repeat in 3 months. 06/2021. EGD by Dr. Shelbi Ferraro. Two Medium varices were present. There was no stigmata of recent bleeding. Banding of esophageal varices was performed. RADIOLOGY:  1/2019. Ultrasound of liver. Echogenic consistent with chronic liver disease. No liver mass lesions. No dilated bile ducts. No ascites. 5/2019. Ultrasound of liver. Echogenic consistent with cirrhosis. No liver mass lesions. No dilated bile ducts. No ascites. 06/2020. MRI/MRCP w/wo contrast.  Liver has a mildly nodular contour. No suspicious liver lesions. Hepatic biliary ducts have a mildly beaded appearance, which is suggestive of mild primary sclerosing cholangitis. There is no focal area of intrahepatic biliary dilation to suggest a dominant stricture. 12/2020. Ultrasound of the liver.   Lobular surface contour in course and periapical echotexture redemonstrated. No focal mass. 04/2021. Abdominal ultrasound ([facility]). Coarsened heterogeneous texture to the liver with nodular contour, which can be seen in setting of chronic liver disease/cirrhosis. OTHER TESTING:  Not available or performed    FOLLOW-UP:  All of the issues listed above in the Assessment and Plan were discussed with the patient. All questions were answered. The patient expressed a clear understanding of the above. [address] in 3 month to repeat labs and review EGD and ultrasound.      SHERRY Ferrer-JOSH  [facility]  [facility], [address]   [address] [facility] [place], [address]   [phone]

## 2021-09-02 LAB
AFP L3 MFR SERPL: NORMAL % (ref 0–9.9)
AFP SERPL-MCNC: 4.8 NG/ML (ref 0–8)

## 2021-09-07 LAB — TACROLIMUS BLD-MCNC: 1.2 NG/ML (ref 2–20)

## 2021-09-14 ENCOUNTER — TRANSCRIBE ORDER (OUTPATIENT)
Dept: SCHEDULING | Age: 71
End: 2021-09-14

## 2021-09-14 DIAGNOSIS — Z09 SURGICAL FOLLOWUP: ICD-10-CM

## 2021-09-14 DIAGNOSIS — Z98.1 S/P LUMBAR FUSION: Primary | ICD-10-CM

## 2021-09-14 DIAGNOSIS — M54.16 LUMBAR RADICULOPATHY: ICD-10-CM

## 2021-10-06 ENCOUNTER — HOSPITAL ENCOUNTER (OUTPATIENT)
Dept: PREADMISSION TESTING | Age: 71
Discharge: HOME | End: 2021-10-06
Payer: MEDICARE

## 2021-10-06 PROCEDURE — U0003 INFECTIOUS AGENT DETECTION BY NUCLEIC ACID (DNA OR RNA); SEVERE ACUTE RESPIRATORY SYNDROME CORONAVIRUS 2 (SARS-COV-2) (CORONAVIRUS DISEASE [COVID-19]), AMPLIFIED PROBE TECHNIQUE, MAKING USE OF HIGH THROUGHPUT TECHNOLOGIES AS DESCRIBED BY CMS-2020-01-R: HCPCS

## 2021-10-07 LAB — SARS-COV-2, NAA: NOT DETECTED

## 2021-10-11 ENCOUNTER — OFFICE VISIT (OUTPATIENT)
Dept: HEMATOLOGY | Age: 71
End: 2021-10-11

## 2021-10-11 ENCOUNTER — ANESTHESIA (OUTPATIENT)
Dept: ENDOSCOPY | Age: 71
End: 2021-10-11
Payer: MEDICARE

## 2021-10-11 ENCOUNTER — ANESTHESIA EVENT (OUTPATIENT)
Dept: ENDOSCOPY | Age: 71
End: 2021-10-11
Payer: MEDICARE

## 2021-10-11 ENCOUNTER — HOSPITAL ENCOUNTER (OUTPATIENT)
Age: 71
Setting detail: OUTPATIENT SURGERY
Discharge: HOME | End: 2021-10-11
Attending: INTERNAL MEDICINE | Admitting: INTERNAL MEDICINE
Payer: MEDICARE

## 2021-10-11 VITALS
DIASTOLIC BLOOD PRESSURE: 61 MMHG | TEMPERATURE: 97.4 F | WEIGHT: 220 LBS | BODY MASS INDEX: 38.98 KG/M2 | RESPIRATION RATE: 18 BRPM | HEIGHT: 63 IN | HEART RATE: 86 BPM | SYSTOLIC BLOOD PRESSURE: 103 MMHG | OXYGEN SATURATION: 97 %

## 2021-10-11 DIAGNOSIS — K31.819 GAVE (GASTRIC ANTRAL VASCULAR ECTASIA): ICD-10-CM

## 2021-10-11 DIAGNOSIS — I85.10 SECONDARY ESOPHAGEAL VARICES WITHOUT BLEEDING (HCC): ICD-10-CM

## 2021-10-11 LAB — GLUCOSE BLD STRIP.AUTO-MCNC: 155 MG/DL (ref 70–110)

## 2021-10-11 PROCEDURE — 74011250636 HC RX REV CODE- 250/636: Performed by: INTERNAL MEDICINE

## 2021-10-11 PROCEDURE — 74011000250 HC RX REV CODE- 250: Performed by: ANESTHESIOLOGY

## 2021-10-11 PROCEDURE — 2709999900 HC NON-CHARGEABLE SUPPLY: Performed by: INTERNAL MEDICINE

## 2021-10-11 PROCEDURE — 76060000031 HC ANESTHESIA FIRST 0.5 HR: Performed by: INTERNAL MEDICINE

## 2021-10-11 PROCEDURE — 74011250636 HC RX REV CODE- 250/636: Performed by: ANESTHESIOLOGY

## 2021-10-11 PROCEDURE — 76040000019: Performed by: INTERNAL MEDICINE

## 2021-10-11 PROCEDURE — 43235 EGD DIAGNOSTIC BRUSH WASH: CPT | Performed by: INTERNAL MEDICINE

## 2021-10-11 PROCEDURE — 82962 GLUCOSE BLOOD TEST: CPT

## 2021-10-11 RX ORDER — SODIUM CHLORIDE 9 MG/ML
100 INJECTION, SOLUTION INTRAVENOUS CONTINUOUS
Status: DISCONTINUED | OUTPATIENT
Start: 2021-10-11 | End: 2021-10-11 | Stop reason: HOSPADM

## 2021-10-11 RX ORDER — NALOXONE HYDROCHLORIDE 0.4 MG/ML
0.4 INJECTION, SOLUTION INTRAMUSCULAR; INTRAVENOUS; SUBCUTANEOUS
Status: CANCELLED | OUTPATIENT
Start: 2021-10-11 | End: 2021-10-11

## 2021-10-11 RX ORDER — LIDOCAINE HYDROCHLORIDE 20 MG/ML
INJECTION, SOLUTION EPIDURAL; INFILTRATION; INTRACAUDAL; PERINEURAL AS NEEDED
Status: DISCONTINUED | OUTPATIENT
Start: 2021-10-11 | End: 2021-10-11 | Stop reason: HOSPADM

## 2021-10-11 RX ORDER — ATROPINE SULFATE 0.1 MG/ML
0.5 INJECTION INTRAVENOUS
Status: CANCELLED | OUTPATIENT
Start: 2021-10-11 | End: 2021-10-12

## 2021-10-11 RX ORDER — EPINEPHRINE 0.1 MG/ML
1 INJECTION INTRACARDIAC; INTRAVENOUS
Status: CANCELLED | OUTPATIENT
Start: 2021-10-11 | End: 2021-10-12

## 2021-10-11 RX ORDER — PROPOFOL 10 MG/ML
INJECTION, EMULSION INTRAVENOUS AS NEEDED
Status: DISCONTINUED | OUTPATIENT
Start: 2021-10-11 | End: 2021-10-11 | Stop reason: HOSPADM

## 2021-10-11 RX ORDER — DEXTROMETHORPHAN/PSEUDOEPHED 2.5-7.5/.8
1.2 DROPS ORAL
Status: CANCELLED | OUTPATIENT
Start: 2021-10-11

## 2021-10-11 RX ORDER — SODIUM CHLORIDE 0.9 % (FLUSH) 0.9 %
5-40 SYRINGE (ML) INJECTION AS NEEDED
Status: CANCELLED | OUTPATIENT
Start: 2021-10-11

## 2021-10-11 RX ORDER — FLUMAZENIL 0.1 MG/ML
0.2 INJECTION INTRAVENOUS
Status: CANCELLED | OUTPATIENT
Start: 2021-10-11 | End: 2021-10-11

## 2021-10-11 RX ORDER — SODIUM CHLORIDE 0.9 % (FLUSH) 0.9 %
5-40 SYRINGE (ML) INJECTION EVERY 8 HOURS
Status: CANCELLED | OUTPATIENT
Start: 2021-10-11

## 2021-10-11 RX ADMIN — LIDOCAINE HYDROCHLORIDE 80 MG: 20 INJECTION, SOLUTION INTRAVENOUS at 09:16

## 2021-10-11 RX ADMIN — PROPOFOL 20 MG: 10 INJECTION, EMULSION INTRAVENOUS at 09:18

## 2021-10-11 RX ADMIN — PROPOFOL 50 MG: 10 INJECTION, EMULSION INTRAVENOUS at 09:16

## 2021-10-11 RX ADMIN — PROPOFOL 30 MG: 10 INJECTION, EMULSION INTRAVENOUS at 09:17

## 2021-10-11 RX ADMIN — PROPOFOL 20 MG: 10 INJECTION, EMULSION INTRAVENOUS at 09:20

## 2021-10-11 RX ADMIN — SODIUM CHLORIDE 100 ML/HR: 900 INJECTION, SOLUTION INTRAVENOUS at 08:47

## 2021-10-11 NOTE — ANESTHESIA POSTPROCEDURE EVALUATION
Post-Anesthesia Evaluation and Assessment    Cardiovascular Function/Vital Signs  Visit Vitals  BP (!) 102/59   Pulse 86   Temp 36.3 °C (97.4 °F)   Resp 20   Ht 5' 3\" (1.6 m)   Wt 99.8 kg (220 lb)   SpO2 94%   Breastfeeding No   BMI 38.97 kg/m²       Patient is status post Procedure(s):  EGD WITH MAC. Nausea/Vomiting: Controlled. Postoperative hydration reviewed and adequate. Pain:  Pain Scale 1: Numeric (0 - 10) (10/11/21 0927)  Pain Intensity 1: 8 (10/11/21 0927) 4  Managed. Neurological Status: At baseline. Mental Status and Level of Consciousness: Baseline and stable. Pulmonary Status:   O2 Device: None (Room air) (10/11/21 0927)   Adequate oxygenation and airway patent. Complications related to anesthesia: None    Post-anesthesia assessment completed. No concerns. Patient has met all discharge requirements.     Signed By: Reji Robertson MD

## 2021-10-11 NOTE — ANESTHESIA PREPROCEDURE EVALUATION
Relevant Problems   GASTROINTESTINAL   (+) Autoimmune hepatitis (HCC)   (+) Cirrhosis (HCC)      ENDOCRINE   (+) Severe obesity (HCC)       Anesthetic History   No history of anesthetic complications            Review of Systems / Medical History  Patient summary reviewed, nursing notes reviewed and pertinent labs reviewed    Pulmonary    COPD               Neuro/Psych   Within defined limits           Cardiovascular    Hypertension              Exercise tolerance: >4 METS     GI/Hepatic/Renal     GERD      Liver disease     Endo/Other    Diabetes    Morbid obesity and arthritis     Other Findings              Physical Exam    Airway  Mallampati: III  TM Distance: 4 - 6 cm    Mouth opening: Diminished (comment)     Cardiovascular  Regular rate and rhythm,  S1 and S2 normal,  no murmur, click, rub, or gallop  Rhythm: regular  Rate: normal         Dental  No notable dental hx       Pulmonary  Breath sounds clear to auscultation               Abdominal  GI exam deferred       Other Findings            Anesthetic Plan    ASA: 4  Anesthesia type: MAC            Anesthetic plan and risks discussed with: Patient

## 2021-10-11 NOTE — DISCHARGE INSTRUCTIONS
Stephon Palacio MD, Akin Wadsworth MD, MPH      ANNEMARIE Briscoe, [facility] Delacruz [facility]   Alexei Centeno ELLIE Hardwick [facility]       Sandeep Zavala [facility] [address]    at [address], [facility] Wes Marcus  22.    [phone]    FAX: [address] [place] [address], [address]    [phone]    FAX: [phone]         ENDOSCOPY DISCHARGE INSTRUCTIONS      Joi Lux  [birth year]  Date: 10/11/2021    DISCOMFORT:  Use lozenges or warm salt water gargle for sore thoat  Apply warm compress to IV site if red. If redness or soreness persists call the office. You may experience gas and bloating. Walking and belching will help relieve this. You may experience chest discomfort or pressure especially if banding of esophageal varices was performed. DIET:  You may resume your normal diet. ACTIVITY:  Spend the remainder of the day resting. Avoid any strenuous activity. You may not operate a vehicle for 12 hours. You may not engage in an occupation involving machinery or appliances for rest of today. Avoid making any critical or financial decisions for at least 24 hour. Call the [employer] [address] if you have any of the following:  Increasing chest or abdominal pain, nausea, vomiting, vomiting blood, abdominal distension or swelling, fever or chills, bloody discharge from nose or mouth or shortness of breath. Follow-up Instructions:  Call Dr. Gayathri Mendez for any questions or problems at the phone number listed above. If a biopsy was performed, you will be contacted by the office staff or Dr Gayathri Mendez within 1 week.    If you have not heard from us by then you may call the office at the phone number listed above to inquire about the results. ENDOSCOPY FINDINGS:  Your endoscopy demonstrated swelling of the stomach. Will repeat EGD to look for development of varices in 1 year. Keep follow-up appointment with Ben Bradshaw on 12/2/2021. DISCHARGE SUMMARY from the Nurse: The following personal items collected during your admission are returned to you:   Dental Appliance: Dental Appliances: None  Vision: Visual Aid: Glasses, At bedside  Hearing Aid:    Jewelry:    Clothing:    Other Valuables:    Valuables sent to safe:                          Learning About Coronavirus (COVID-19)  Coronavirus (COVID-19): Overview  What is coronavirus (ATQBI-16)? The coronavirus disease (COVID-19) is caused by a virus. It is an illness that was first found in [place], [place], in December 2019. It has since spread worldwide. The virus can cause fever, cough, and trouble breathing. In severe cases, it can cause pneumonia and make it hard to breathe without help. It can cause death. Coronaviruses are a large group of viruses. They cause the common cold. They also cause more serious illnesses like Middle East respiratory syndrome (MERS) and severe acute respiratory syndrome (SARS). COVID-19 is caused by a novel coronavirus. That means it's a new type that has not been seen in people before. This virus spreads person-to-person through droplets from coughing and sneezing. It can also spread when you are close to someone who is infected. And it can spread when you touch something that has the virus on it, such as a doorknob or a tabletop. What can you do to protect yourself from coronavirus (COVID-19)? The best way to protect yourself from getting sick is to:  · Avoid areas where there is an outbreak. · Avoid contact with people who may be infected. · Wash your hands often with soap or alcohol-based hand sanitizers.   · Avoid crowds and try to stay at least 6 feet away from other people. · Wash your hands often, especially after you cough or sneeze. Use soap and water, and scrub for at least 20 seconds. If soap and water aren't available, use an alcohol-based hand . · Avoid touching your mouth, nose, and eyes. What can you do to avoid spreading the virus to others? To help avoid spreading the virus to others:  · Cover your mouth with a tissue when you cough or sneeze. Then throw the tissue in the trash. · Use a disinfectant to clean things that you touch often. · Stay home if you are sick or have been exposed to the virus. Don't go to school, work, or public areas. And don't use public transportation. · If you are sick:  ? Leave your home only if you need to get medical care. But call the doctor's office first so they know you're coming. And wear a face mask, if you have one.  ? If you have a face mask, wear it whenever you're around other people. It can help stop the spread of the virus when you cough or sneeze. ? Clean and disinfect your home every day. Use household  and disinfectant wipes or sprays. Take special care to clean things that you grab with your hands. These include doorknobs, remote controls, phones, and handles on your refrigerator and microwave. And don't forget countertops, tabletops, bathrooms, and computer keyboards. When to call for help  Call 911 anytime you think you may need emergency care. For example, call if:  · You have severe trouble breathing. (You can't talk at all.)  · You have constant chest pain or pressure. · You are severely dizzy or lightheaded. · You are confused or can't think clearly. · Your face and lips have a blue color. · You pass out (lose consciousness) or are very hard to wake up. Call your doctor now if you develop symptoms such as:  · Shortness of breath. · Fever. · Cough. If you need to get care, call ahead to the doctor's office for instructions before you go.  Make sure you wear a face mask, if you have one, to prevent exposing other people to the virus. Where can you get the latest information? The following health organizations are tracking and studying this virus. Their websites contain the most up-to-date information. Parag Ao also learn what to do if you think you may have been exposed to the virus. · U.S. Centers for Disease Control and Prevention (CDC): The CDC provides updated news about the disease and travel advice. The website also tells you how to prevent the spread of infection. www.cdc.gov  · World Health Organization [facility]): WHO offers information about the virus outbreaks. WHO also has travel advice. www.who.int  Current as of: April 1, 2020               Content Version: 12.4  © 2006-2020 Health[employer]. Care instructions adapted under license by your healthcare professional. If you have questions about a medical condition or this instruction, always ask your healthcare professional. [address] any warranty or liability for your use of this information. ___________________________________________________________  DISCHARGE SUMMARY from Nurse    PATIENT INSTRUCTIONS:    After general anesthesia or intravenous sedation, for 24 hours or while taking prescription Narcotics:  · Limit your activities  · Do not drive and operate hazardous machinery  · Do not make important personal or business decisions  · Do  not drink alcoholic beverages  · If you have not urinated within 8 hours after discharge, please contact your surgeon on call.     Report the following to your surgeon:  · Excessive pain, swelling, redness or odor of or around the surgical area  · Temperature over 100.5  · Nausea and vomiting lasting longer than 4 hours or if unable to take medications  · Any signs of decreased circulation or nerve impairment to extremity: change in color, persistent  numbness, tingling, coldness or increase pain  · Any questions    What to do at Home:  Recommended activity: As above,     If you experience any of the following symptoms as above, please follow up with Dr. Rolando Jansen. *  Please give a list of your current medications to your Primary Care Provider. *  Please update this list whenever your medications are discontinued, doses are      changed, or new medications (including over-the-counter products) are added. *  Please carry medication information at all times in case of emergency situations. These are general instructions for a healthy lifestyle:    No smoking/ No tobacco products/ Avoid exposure to second hand smoke  Surgeon General's Warning:  Quitting smoking now greatly reduces serious risk to your health. Obesity, smoking, and sedentary lifestyle greatly increases your risk for illness    A healthy diet, regular physical exercise & weight monitoring are important for maintaining a healthy lifestyle    You may be retaining fluid if you have a history of heart failure or if you experience any of the following symptoms:  Weight gain of 3 pounds or more overnight or 5 pounds in a week, increased swelling in our hands or feet or shortness of breath while lying flat in bed. Please call your doctor as soon as you notice any of these symptoms; do not wait until your next office visit. The discharge information has been reviewed with the patient and boyrfiend. The patient and boyfriend verbalized understanding. Discharge medications reviewed with the patient and boyfriend and appropriate educational materials and side effects teaching were provided. Patient armband removed and shredded.   ___________________________________________________________________________________________________________________________________

## 2021-10-11 NOTE — PROCEDURES
[address] [facility] Genelle Overland, MD, MPH      Vale Iqbal, ANNEMARIE Bustamante, [facility]     April MARIALUISA Delacruz, [facility]   KEI Holliday [facility]       Sandeep Zavala [facility] 136    at [address], [facility] Wes Marcus  22.    [phone]    FAX: [address] [place] [address], [address]    [phone]    FAX: [phone]         UPPER ENDOSCOPY PROCEDURE NOTE    Hammad Smith  [birth year]    INDICATION: Cirrhosis. Screening for esophageal varices with variceal ligation if  indicated. : Kuldeep Goldman MD    SURGICAL ASSISTANT:  None    PROSTHETIC DEVISES, TISSUE GRAFTS, ORGAN TRANSPLANTS:  Not applicable     ANESTHESIA/SEDATION: Sedation per anesthesiology      PROCEDURE DESCRIPTION:  [employer] consent was obtained from the patient for the procedure. All risks and benefits of the procedure explained. The procedure was performed in the endoscopy suite. The patient was laying on a stretcher and moved to the left lateral decubitus position prior to administration of sedation. Sedation was administered by anesthesiology. See their note for details. The endoscope was inserted into the mouth and advanced under direct vision to the second portion of the duodenum. Careful inspection of upper gastrointestinal tract was made as the endoscope was inserted and withdrawn. Retroflexion of the endoscope to view of the cardia of the stomach was performed. The findings and interventions are described below. FINDINGS:  Esophagus:    Small esophageal varices. No banding performed.     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 No gastric varices identified. Duodenum:   Normal bulb and second portion    SPECIMENS COLLECTED:   None    INTERVENTIONS:  None    COMPLICATIONS: None. The patient tolerated the procedure well. EBL: Negligible. RECOMMENDATIONS:  Observe until discharge parameters are achieved. May resume previous diet. Repeat endoscopy to reassess varices and need for banding in 1 year. Follow-up [facility] office as scheduled.       Renata Mueller MD  [address]  4 [facility], [address] [facility], [address] [place] [address] Street [address]  [address] [place] [address]

## 2021-10-11 NOTE — H&P
Cal Roberts MD, Donneta Holter, MD, MPH      Arnulfo Saba, ANNEMARIE Pimentel, [facility]     Nida Delacruz, [facility]   Will Caballero DERIK-JOSH Eduardo, [facility]       Sandeep Zavala [facility] 136    at [address], [facility] Wes Marcus  22.    [phone]    FAX: [address] [place] [address], [address]    [phone]    FAX: [phone]         PRE-PROCEDURE NOTE - EGD    H and P from last office visit reviewed. Allergies reviewed. Out-patient medicaton list reviewed. 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 Current Outpatient Medications on File Prior to Encounter   Medication Sig Dispense Refill    dicyclomine (BENTYL) 20 mg tablet Take 20 mg by mouth.  gabapentin (NEURONTIN) 300 mg capsule Take 300 mg by mouth three (3) times daily.  predniSONE (DELTASONE) 10 mg tablet Take 5 mg by mouth.  traZODone (DESYREL) 50 mg tablet Take 50 mg by mouth nightly.  insulin lispro (HUMALOG) 100 unit/mL injection by SubCUTAneous route once.  methocarbamoL (ROBAXIN) 750 mg tablet Take 750 mg by mouth every eight (8) hours as needed.  ondansetron (ZOFRAN ODT) 4 mg disintegrating tablet Take 4 mg by mouth every eight (8) hours as needed.  sodium bicarbonate 650 mg tablet Take 1,300 mg by mouth four (4) times daily.  tacrolimus (PROGRAF) 1 mg capsule Take 1 Cap by mouth every twelve (12) hours. 120 Cap 6    insulin aspart protamine/insulin aspart (NOVOLOG MIX 70-30FLEXPEN U-100) 100 unit/mL (70-30) inpn by SubCUTAneous route. Sliding scale      albuterol (PROVENTIL HFA) 90 mcg/actuation inhaler Take 2 Puffs by inhalation.  amLODIPine (NORVASC) 10 mg tablet Take 10 mg by mouth.  budesonide-formoterol (SYMBICORT) 160-4.5 mcg/actuation HFAA Take 2 Puffs by inhalation.  ferrous fumarate 324 mg (106 mg iron) tab Take 324 mg by mouth.  furosemide (LASIX) 40 mg tablet Take 40 mg by mouth.  glipiZIDE SR (GLUCOTROL XL) 5 mg CR tablet Take 5 mg by mouth.  minoxidil (LONITEN) 2.5 mg tablet Take 2.5 mg by mouth.  omeprazole (PRILOSEC) 40 mg capsule Take 40 mg by mouth.  spironolactone (ALDACTONE) 100 mg tablet Take 1 Tab by mouth daily. 180 Tab 3    ofloxacin (FLOXIN) 0.3 % ophthalmic solution INSTILL 1 DROP INTO LEFT EYE 4 TIMES DAILY FOR 11 DAYS BEGIN 3 DAYS PRIOR TO SURGERY. STOP 7 DAYS AFTER (Patient not taking: Reported on 10/11/2021)      bacitracin (BACITRACIN) 500 unit/gram oint Apply  to affected area three (3) times daily. (Patient not taking: Reported on 10/11/2021)      naloxone [facility]) 4 mg/actuation nasal spray 4 mg.  butalbital-acetaminophen-caffeine (FIORICET, ESGIC) -40 mg per tablet TAKE 1 TABLET BY MOUTH EVERY 4 HOURS AS NEEDED FOR HEADACHE PAIN (Patient not taking: Reported on 10/11/2021)  0       For EGD to assess for esophageal and gastric varices. Plan to perform banding if indicated based upon variceal size and appearance. The risks of the procedure were discussed with the patient. These included reaction to anesthesia, pain, perforation and bleeding.   All questions were answered. The patient wishes to proceed with the procedure. The patient was counseled at length about the risks of suzie Covid-19 in the cyndie-operative and post-operative states including the recovery window of their procedure. The patient was made aware that suzie Covid-19 after a surgical procedure may worsen their prognosis for recovering from the virus and lend to a higher morbidity and or mortality risk. The patient was given the options of postponing their procedure. All of the risks, benefits, and alternatives were discussed. The patient does  wish to proceed with the procedure. PHYSICAL EXAMINATION:  Visit Vitals  /79   Pulse 97   Temp 97.9 °F (36.6 °C)   Resp 14   Ht 5' 3\" (1.6 m)   Wt 220 lb (99.8 kg)   SpO2 100%   Breastfeeding No   BMI 38.97 kg/m²       General: No acute distress. Eyes: Sclera anicteric. ENT: No oral lesions. Thyroid normal.  Nodes: No adenopathy. Skin: No spider angiomata. No jaundice. No palmar erythema. Respiratory: Lungs clear to auscultation. Cardiovascular: Regular heart rate. No murmurs. No JVD. Abdomen: Soft non-tender, liver size normal to percussion/palpation. Spleen not palpable. No obvious ascites. Extremities: No edema. No muscle wasting. No gross arthritic changes. Neurologic: Alert and oriented. Cranial nerves grossly intact. No asterixis.       MOST RECENT LABORATORY STUDIES:  Lab Results   Component Value Date/Time    WBC 6.9 09/01/2021 03:41 PM    HGB 12.5 09/01/2021 03:41 PM    HCT 38.7 09/01/2021 03:41 PM    PLATELET 415 63/21/9580 03:41 PM    MCV 82.0 09/01/2021 03:41 PM     Lab Results   Component Value Date/Time    INR 1.1 09/01/2021 03:41 PM    INR 1.3 (H) 03/29/2021 05:08 PM    INR 1.3 (H) 01/05/2021 10:18 AM    Prothrombin time 13.7 09/01/2021 03:41 PM    Prothrombin time 15.6 (H) 03/29/2021 05:08 PM    Prothrombin time 15.8 (H) 01/05/2021 10:18 AM       ASSESSMENT AND PLAN:  EGD to assess for esophageal and/or gastric varices.   Sedation per anesthesiology      MD Teresa Woodward 13 of [facility]  4 [facility], [facility], 300 [facility] [address]  [phone] [address] [facility]

## 2021-10-21 ENCOUNTER — HOSPITAL ENCOUNTER (OUTPATIENT)
Dept: MRI IMAGING | Age: 71
Discharge: HOME | End: 2021-10-21
Attending: PHYSICIAN ASSISTANT
Payer: MEDICARE

## 2021-10-21 DIAGNOSIS — Z09 SURGICAL FOLLOWUP: ICD-10-CM

## 2021-10-21 DIAGNOSIS — Z98.1 S/P LUMBAR FUSION: ICD-10-CM

## 2021-10-21 DIAGNOSIS — M54.16 LUMBAR RADICULOPATHY: ICD-10-CM

## 2021-10-21 PROCEDURE — 72148 MRI LUMBAR SPINE W/O DYE: CPT

## 2021-12-02 ENCOUNTER — HOSPITAL ENCOUNTER (OUTPATIENT)
Dept: LAB | Age: 71
Discharge: HOME | End: 2021-12-02
Payer: MEDICARE

## 2021-12-02 ENCOUNTER — OFFICE VISIT (OUTPATIENT)
Dept: HEMATOLOGY | Age: 71
End: 2021-12-02
Payer: MEDICARE

## 2021-12-02 VITALS
HEIGHT: 63 IN | RESPIRATION RATE: 25 BRPM | DIASTOLIC BLOOD PRESSURE: 71 MMHG | TEMPERATURE: 97.6 F | BODY MASS INDEX: 38.45 KG/M2 | WEIGHT: 217 LBS | OXYGEN SATURATION: 98 % | SYSTOLIC BLOOD PRESSURE: 127 MMHG | HEART RATE: 100 BPM

## 2021-12-02 DIAGNOSIS — K74.60 CIRRHOSIS OF LIVER WITHOUT ASCITES, UNSPECIFIED HEPATIC CIRRHOSIS TYPE (HCC): Primary | ICD-10-CM

## 2021-12-02 DIAGNOSIS — K74.60 CIRRHOSIS OF LIVER WITHOUT ASCITES, UNSPECIFIED HEPATIC CIRRHOSIS TYPE (HCC): ICD-10-CM

## 2021-12-02 LAB
ALBUMIN SERPL-MCNC: 3.4 G/DL (ref 3.4–5)
ALBUMIN/GLOB SERPL: 0.7 {RATIO} (ref 0.8–1.7)
ALP SERPL-CCNC: 159 U/L (ref 45–117)
ALT SERPL-CCNC: 43 U/L (ref 13–56)
ANION GAP SERPL CALC-SCNC: 6 MMOL/L (ref 3–18)
AST SERPL-CCNC: 28 U/L (ref 10–38)
BASOPHILS # BLD: 0 K/UL (ref 0–0.1)
BASOPHILS NFR BLD: 1 % (ref 0–2)
BILIRUB DIRECT SERPL-MCNC: 0.2 MG/DL (ref 0–0.2)
BILIRUB SERPL-MCNC: 0.8 MG/DL (ref 0.2–1)
BUN SERPL-MCNC: 16 MG/DL (ref 7–18)
BUN/CREAT SERPL: 14 (ref 12–20)
CALCIUM SERPL-MCNC: 9.1 MG/DL (ref 8.5–10.1)
CHLORIDE SERPL-SCNC: 106 MMOL/L (ref 100–111)
CO2 SERPL-SCNC: 25 MMOL/L (ref 21–32)
CREAT SERPL-MCNC: 1.13 MG/DL (ref 0.6–1.3)
DIFFERENTIAL METHOD BLD: ABNORMAL
EOSINOPHIL # BLD: 0.1 K/UL (ref 0–0.4)
EOSINOPHIL NFR BLD: 1 % (ref 0–5)
ERYTHROCYTE [DISTWIDTH] IN BLOOD BY AUTOMATED COUNT: 14.2 % (ref 11.6–14.5)
GLOBULIN SER CALC-MCNC: 4.8 G/DL (ref 2–4)
GLUCOSE SERPL-MCNC: 249 MG/DL (ref 74–99)
HCT VFR BLD AUTO: 41.8 % (ref 35–45)
HGB BLD-MCNC: 13.4 G/DL (ref 12–16)
IMM GRANULOCYTES # BLD AUTO: 0 K/UL (ref 0–0.04)
IMM GRANULOCYTES NFR BLD AUTO: 0 % (ref 0–0.5)
INR PPP: 1.2 (ref 0.8–1.2)
LYMPHOCYTES # BLD: 1.3 K/UL (ref 0.9–3.6)
LYMPHOCYTES NFR BLD: 20 % (ref 21–52)
MCH RBC QN AUTO: 26.4 PG (ref 24–34)
MCHC RBC AUTO-ENTMCNC: 32.1 G/DL (ref 31–37)
MCV RBC AUTO: 82.3 FL (ref 78–100)
MONOCYTES # BLD: 0.7 K/UL (ref 0.05–1.2)
MONOCYTES NFR BLD: 11 % (ref 3–10)
NEUTS SEG # BLD: 4.3 K/UL (ref 1.8–8)
NEUTS SEG NFR BLD: 67 % (ref 40–73)
NRBC # BLD: 0 K/UL (ref 0–0.01)
NRBC BLD-RTO: 0 PER 100 WBC
PLATELET # BLD AUTO: 221 K/UL (ref 135–420)
PMV BLD AUTO: 10.4 FL (ref 9.2–11.8)
POTASSIUM SERPL-SCNC: 4.4 MMOL/L (ref 3.5–5.5)
PROT SERPL-MCNC: 8.2 G/DL (ref 6.4–8.2)
PROTHROMBIN TIME: 14.7 SEC (ref 11.5–15.2)
RBC # BLD AUTO: 5.08 M/UL (ref 4.2–5.3)
SODIUM SERPL-SCNC: 137 MMOL/L (ref 136–145)
WBC # BLD AUTO: 6.4 K/UL (ref 4.6–13.2)

## 2021-12-02 PROCEDURE — 80048 BASIC METABOLIC PNL TOTAL CA: CPT

## 2021-12-02 PROCEDURE — G8400 PT W/DXA NO RESULTS DOC: HCPCS | Performed by: NURSE PRACTITIONER

## 2021-12-02 PROCEDURE — G8432 DEP SCR NOT DOC, RNG: HCPCS | Performed by: NURSE PRACTITIONER

## 2021-12-02 PROCEDURE — 1090F PRES/ABSN URINE INCON ASSESS: CPT | Performed by: NURSE PRACTITIONER

## 2021-12-02 PROCEDURE — G8427 DOCREV CUR MEDS BY ELIG CLIN: HCPCS | Performed by: NURSE PRACTITIONER

## 2021-12-02 PROCEDURE — 82107 ALPHA-FETOPROTEIN L3: CPT

## 2021-12-02 PROCEDURE — 1101F PT FALLS ASSESS-DOCD LE1/YR: CPT | Performed by: NURSE PRACTITIONER

## 2021-12-02 PROCEDURE — 36415 COLL VENOUS BLD VENIPUNCTURE: CPT

## 2021-12-02 PROCEDURE — 85610 PROTHROMBIN TIME: CPT

## 2021-12-02 PROCEDURE — 99214 OFFICE O/P EST MOD 30 MIN: CPT | Performed by: NURSE PRACTITIONER

## 2021-12-02 PROCEDURE — 3017F COLORECTAL CA SCREEN DOC REV: CPT | Performed by: NURSE PRACTITIONER

## 2021-12-02 PROCEDURE — 80076 HEPATIC FUNCTION PANEL: CPT

## 2021-12-02 PROCEDURE — G8536 NO DOC ELDER MAL SCRN: HCPCS | Performed by: NURSE PRACTITIONER

## 2021-12-02 PROCEDURE — 85025 COMPLETE CBC W/AUTO DIFF WBC: CPT

## 2021-12-02 PROCEDURE — G8417 CALC BMI ABV UP PARAM F/U: HCPCS | Performed by: NURSE PRACTITIONER

## 2021-12-02 RX ORDER — BUTALBITAL, ACETAMINOPHEN AND CAFFEINE 50; 325; 40 MG/1; MG/1; MG/1
TABLET ORAL
Qty: 60 TABLET | Refills: 1 | Status: SHIPPED | OUTPATIENT
Start: 2021-12-02 | End: 2022-03-18

## 2021-12-02 NOTE — PROGRESS NOTES
Thien Pearl MD, MD David Martin PA-C Milta Foerster, [facility]     April S Jayme, [facility]   Stephania Quarles P-JOSH Biggsbreana Kamari, [facility]       Sandeep Gonzalez [address]    at [address], [address] [place] [address], [facility] 22.    [phone]    FAX: [address] [place] [address], [address]    [phone]    FAX: [phone]       Patient Care Team:  Jackelyn Montero MD as PCP - General (Family Medicine)  Stephane Augustin MD (Gastroenterology)  Jimmy Medina MD (Neurosurgery)      Problem List  Date Reviewed: 12/2/2021          Codes Class Noted    Primary sclerosing cholangitis ICD-10-CM: K83.01  ICD-9-CM: 576.1  3/29/2021        Hypoglycemia ICD-10-CM: E16.2  ICD-9-CM: 251.2  12/10/2020        GAVE (gastric antral vascular ectasia) ICD-10-CM: K31.819  ICD-9-CM: 537.82  6/10/2019        Severe obesity ([facility] [address] ICD-10-CM: E66.01  ICD-9-CM: 278.01  4/17/2019        Autoimmune hepatitis ([facility] [address] ICD-10-CM: K75.4  ICD-9-CM: 571.42  4/17/2019        Thrombocytopenia ([facility] [address] ICD-10-CM: D69.6  ICD-9-CM: 287.5  4/17/2019        Cirrhosis ([facility] [address] ICD-10-CM: K74.60  ICD-9-CM: 571.5  4/17/2019              Luisa Trujillo returns to the [facility] for management 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 The active problem list, all pertinent past medical history, medications, liver histology, radiologic findings and laboratory findings related to the liver disorder were reviewed with the patient.       The patient is a 66 y.o.  female who was found to have marked elevation in liver enzymes into the 500-800 range in 2017     The patient was found to have cirrhosis in 2019 when she developed lower extremity edema, thrombocytopenia and an ultrasound suggested cirrhosis. The patient has developed the following complications of cirrhosis: esophageal varices, GAVE    The patient notes fatigue. Continues to c/o of severe back and hip pain. She needs pain management. The patient has not experienced pain in the right side over the liver, problems concentrating, swelling of the abdomen, swelling of the lower extremities, hematemesis, hematochezia. The patient has limitations in daily activities due to liver disease and other medical issues. Since the last office appointment:  Completed vaccination to COVID 19. 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 The diagnosis of cirrhosis is based upon elastography, imaging, laboratory studies, complications of cirrhosis. Complications of cirrhosis were discussed in detail. We discussed thrombocytopenia, portal hypertension, varices, GI bleeding, peripheral edema, ascites, hepatic encephalopathy, and hepatocellular carcinoma. We discussed the need for follow ups on a regular basis, at 3 month intervals to monitor for complications. We discussed the need for every 6 month liver imaging studies. The patient has normal liver function. The CTP is 6. Child class A. The MELD score is 8. Autoimmune Hepatitis   The diagnosis was based upon serology and liver biopsy. A liver biopsy performed in 8/2017 demonstrated moderate to severe inflammation with interface hepatitis, with piecemeal necrosis and stage 2-3 fibrosis. Elastography performed in 4/2019 suggests Cirrhosis. The most recent laboratory studies indicate that the liver transaminases are normal, alkaline phosphatase is elevated, tests of hepatic synthetic and metabolic function are normal, and the platelet count is normal.      The patient is taking TAC 1 mg BID since 10/2019. Primary Sclerosing Cholangitis.     This diagnosis is based on MRI/MRCP performed in 06/2020. Intrahepatic biliary ducts have a mildly beaded appearance, suggestive of primary sclerosing cholangitis. The natural history of PSC was discussed in detail. I explained that there is cure for PSC. I explained that at least 75% of those people who suffer from [facility] also have ulcerative colitis. I explained that the single greatest risk factor associated with PSC is cholangiocarcinomas. Elevation in ALP  The elevation in ALP is most likely due to cirrhosis and PSC. Acute kidney injury  Serum creatinine is 1.13 on today's labs. Will continue TAC at 1 mg BID for now. Will continue diuretics at step one for now. ANTI-HCV positive HCV RNA negative  Treated in 2008 by Dr. Jennifer Giraldo in clinical trial.   The patient is HCV RNA undetectable and does not have HCV. Lower extremity edema  Edema has resolved with current dose of diuretics. Will continue the current dose of diuretics at step 1. Screening for Esophageal varices   The patient had an EGD at [facility] in 7/2019. This demonstrated small esophageal varices and GAVE  Dr. Jennifer Giraldo performed a repeat EGD in 10/2020. Multiple large gastric polyps in the antrum. 3 polys removed. EGD was performed by Dr. Jennifer Giraldo in 06/2021. Two medium esophageal varices were present. These were banded. Dr. Jennifer Giraldo repeated the EGD in 10/2021. There were small esophageal varices, no banding was performed. Repeat the EGD in 10/2022. She is not on a beta blocker. Hepatic encephalopathy   Overt HE has not developed to date. There is no need for treatment with lactulose and/or Xifaxan at this time. There is no need to restrict dietary protein at this time. Thrombocytopenia   The most recent platelet counts is 731. Screening for Hepatocellular Carcinoma  HCC screening has recently been performed and does not suggest [address] The previously ordered ultrasound was not performed. Re-ordered today. Treatment of other medical problems in patients with chronic liver disease  There are no contraindications for the patient to take most medications that are necessary for treatment of other medical issues. Counseling for alcohol in patients with chronic liver disease  The patient was counseled regarding alcohol consumption and the effect of alcohol on chronic liver disease. Osteoporosis  The risk of osteoporosis is increased in patients with cirrhosis. DEXA bone density to assess for osteoporosis has not been performed. This should be ordered by the patients primary care physician. Vaccinations   Vaccination for viral hepatitis B is not needed. The patient has serologic evidence of prior exposure or vaccination with immunity. The need for vaccination against viral hepatitis A will be assessed with serologic and instituted as appropriate. Routine vaccinations against other bacterial and viral agents can be performed as indicated. Annual flu vaccination should be administered if indicated. ALLERGIES  Allergies   Allergen Reactions    Adhesive Tape-Silicones Other (comments)    Naproxen Nausea Only    Penicillins Hives    Tramadol Nausea Only    Tuberculin Ppd Hives    Vicodin [Hydrocodone-Acetaminophen] Hives       MEDICATIONS  Current Outpatient Medications   Medication Sig    bacitracin (BACITRACIN) 500 unit/gram oint Apply  to affected area three (3) times daily.  butalbital-acetaminophen-caffeine (FIORICET, ESGIC) -40 mg per tablet TAKE 1 TABLET BY MOUTH EVERY 4 HOURS AS NEEDED FOR HEADACHE PAIN    dicyclomine (BENTYL) 20 mg tablet Take 20 mg by mouth.  gabapentin (NEURONTIN) 300 mg capsule Take 300 mg by mouth three (3) times daily.  predniSONE (DELTASONE) 10 mg tablet Take 5 mg by mouth.  traZODone (DESYREL) 50 mg tablet Take 50 mg by mouth nightly.  insulin lispro (HUMALOG) 100 unit/mL injection by SubCUTAneous route once.     methocarbamoL (ROBAXIN) 750 mg tablet Take 750 mg by mouth every eight (8) hours as needed.  naloxone (NARCAN) 4 mg/actuation nasal spray 4 mg.  ondansetron (ZOFRAN ODT) 4 mg disintegrating tablet Take 4 mg by mouth every eight (8) hours as needed.  sodium bicarbonate 650 mg tablet Take 1,300 mg by mouth four (4) times daily.  tacrolimus (PROGRAF) 1 mg capsule Take 1 Cap by mouth every twelve (12) hours.  insulin aspart protamine/insulin aspart (NOVOLOG MIX 70-30FLEXPEN U-100) 100 unit/mL (70-30) inpn by SubCUTAneous route. Sliding scale    albuterol (PROVENTIL HFA) 90 mcg/actuation inhaler Take 2 Puffs by inhalation.  amLODIPine (NORVASC) 10 mg tablet Take 10 mg by mouth.  budesonide-formoterol (SYMBICORT) 160-4.5 mcg/actuation HFAA Take 2 Puffs by inhalation.  ferrous fumarate 324 mg (106 mg iron) tab Take 324 mg by mouth.  furosemide (LASIX) 40 mg tablet Take 40 mg by mouth.  glipiZIDE SR (GLUCOTROL XL) 5 mg CR tablet Take 5 mg by mouth.  minoxidil (LONITEN) 2.5 mg tablet Take 2.5 mg by mouth.  omeprazole (PRILOSEC) 40 mg capsule Take 40 mg by mouth.  spironolactone (ALDACTONE) 100 mg tablet Take 1 Tab by mouth daily. No current facility-administered medications for this visit. SYSTEM REVIEW NOT RELATED TO LIVER DISEASE OR REVIEWED ABOVE:  Constitution systems: Negative for fever, chills, weight gain, weight loss. Eyes: Negative for visual changes. ENT: Negative for sore throat, painful swallowing. Respiratory: Negative for cough, hemoptysis, SOB. Cardiology: Swelling of the legs. GI:  Negative for constipation or diarrhea. : Negative for urinary frequency, dysuria, hematuria, nocturia. Skin: Negative for rash. Hematology: Negative for easy bruising, blood clots. Musculo-skelatal: Negative for back pain, muscle pain, weakness. Neurologic: Negative for headaches, dizziness, vertigo, memory problems not related to HE.   Psychology: Negative for anxiety, depression. FAMILY HISTORY:  The father  of MI. The mother  of MI. The following family members have liver disease: a brother    SOCIAL HISTORY:  The patient is . The patient has 2 children, and 8 grandchildren. The patient stopped using tobacco products in . The patient has never consumed significant amounts of alcohol. The patient used to work as as a  at [employer]. The patient retired in . PHYSICAL EXAMINATION:  Visit Vitals  /71 (BP 1 Location: Left upper arm, BP Patient Position: Sitting, BP Cuff Size: Large adult)   Pulse 100   Temp 97.6 °F (36.4 °C)   Resp 25   Ht 5' 3\" (1.6 m)   Wt 217 lb (98.4 kg)   SpO2 98%   BMI 38.44 kg/m²     General: No acute distress. Eyes: Sclera anicteric. ENT: No oral lesions. Thyroid normal.  Nodes: No adenopathy. Skin: No spider angiomata. No jaundice. No palmar erythema. Respiratory: Lungs clear to auscultation. Cardiovascular: Regular heart rate. No murmurs. No JVD. Abdomen: Soft non-tender. Liver size normal to percussion/palpation. Spleen not palpable. No obvious ascites. Extremities: No edema. No muscle wasting. No gross arthritic changes. Neurologic: Alert and oriented. Cranial nerves grossly intact. No asterixis.     LABORATORY STUDIES:  Liver [place] of [id number] Units 2021 2021 3/29/2021   WBC 4.6 - 13.2 K/uL 6.4 6.9 5.6   ANC 1.8 - 8.0 K/UL 4.3 4.2 2.6   HGB 12.0 - 16.0 g/dL 13.4 12.5 12.8    - 420 K/uL 221 209 202   INR 0.8 - 1.2   1.2 1.1 1.3 (H)   AST 10 - 38 U/L 28 32 39 (H)   ALT 13 - 56 U/L 43 35 48   Alk Phos 45 - 117 U/L 159 (H) 136 (H) 207 (H)   Bili, Total 0.2 - 1.0 MG/DL 0.8 0.6 0.5   Bili, Direct 0.0 - 0.2 MG/DL 0.2 0.2 0.1   Albumin 3.4 - 5.0 g/dL 3.4 3.5 3.5   BUN 7.0 - 18 MG/DL 16 15 16   Creat 0.6 - 1.3 MG/DL 1.13 1.23 1.10   Creat (iSTAT) 0.6 - 1.3 MG/DL      Na 136 - 145 mmol/L 137 140 139   K 3.5 - 5.5 mmol/L 4.4 4.0 4.1   Cl 100 - 111 mmol/L 106 107 110   CO2 21 - 32 mmol/L 25 28 27   Glucose 74 - 99 mg/dL 249 (H) 100 (H) 162 (H)   Magnesium 1.6 - 2.6 mg/dL      Ammonia 11 - 32 UMOL/L        Cancer Screening Latest Ref Rng & Units 9/1/2021 3/29/2021 1/5/2021   AFP, Serum 0.0 - 8.0 ng/mL 4.8 4.8 5.6   AFP-L3% 0.0 - 9.9 % Comment Comment Comment     Liver Virology and Transplant Immune Suppression Latest Ref Rng & Units 3/29/2021   Tacrolimus Level 2.0 - 20.0 ng/mL 4.4     Liver Virology and Transplant Immune Suppression Latest Ref Rng & Units 8/27/2020   Tacrolimus Level 2.0 - 20.0 ng/mL 1.7 (L)       SEROLOGIES:  6/2017. Anti-HBsurface positive  9/2017. HBsAntigen negative, anti-HCV positive, HCV RNA undetectable, VICKI positive, ASMA positive, ANCA positive, AMA negative, RF positive, alpha-1-antitrypsin 150    LIVER HISTOLOGY:  8/2017. Active hepatitis with portal and lobular inflammation, interface hepatitis, PMN and stage 2-3 septal fibrosis. Biopsy slides not reviewed by MLS.  4/2019. Sheer wave elastography performed at [facility]. Range 10.09- 17.05 kPa, Mean 14.07 kPa, Median 14.9 kPa, Standard deviation 2.41 kPa. The results suggested a fibrosis level of F4. ENDOSCOPIC PROCEDURES:  7/2019. EGD by Dr Lucia Walsh. Small esophageal varices. GAVE. 10/2020. EGD by Dr. Robert Curtis. Dr. Robert Curtis performed a repeat EGD in 10/2020. Multiple large gastric polyps in the antrum. 3 polys removed. Repeat EGD in 1 to 2 months. 02/2021. EGD by Dr. Robert Curtis. Normal esophagus. No portal hypertensive gastropathy. At least least 10 polyps in the antrum. Several of these were > 5 mm in diameter. 7 of the polyps were resected by snare cautery. All bases were noted to be clear and without bleeding or perforation at end of procedure. 3-4 polyps with wide bases were left behind. No gastric varices identified. Repeat in 3 months. 06/2021. EGD by Dr. Robert Curtis. Two Medium varices were present.   There was no stigmata of recent bleeding. Banding of esophageal varices was performed. 10/2021. EGD by Dr. Desiree Barber. Small esophageal varices. No banding performed. RADIOLOGY:  1/2019. Ultrasound of liver. Echogenic consistent with chronic liver disease. No liver mass lesions. No dilated bile ducts. No ascites. 5/2019. Ultrasound of liver. Echogenic consistent with cirrhosis. No liver mass lesions. No dilated bile ducts. No ascites. 06/2020. MRI/MRCP w/wo contrast.  Liver has a mildly nodular contour. No suspicious liver lesions. Hepatic biliary ducts have a mildly beaded appearance, which is suggestive of mild primary sclerosing cholangitis. There is no focal area of intrahepatic biliary dilation to suggest a dominant stricture. 12/2020. Ultrasound of the liver. Lobular surface contour in course and periapical echotexture redemonstrated. No focal mass. [facility]2021[facility] Coarsened heterogeneous texture to the liver with nodular contour, which can be seen in setting of chronic liver disease/cirrhosis. OTHER TESTING:  Not available or performed    FOLLOW-UP:  All of the issues listed above in the Assessment and Plan were discussed with the patient. All questions were answered. The patient expressed a clear understanding of the above. [address] in 3 month to repeat labs and review ultrasound.      KEI Smart  Liver [place]  Srini Hernandez  [address] [facility], [address] [facility]   [address], [address] [facility]   [phone]

## 2021-12-03 LAB
AFP L3 MFR SERPL: NORMAL % (ref 0–9.9)
AFP SERPL-MCNC: 4.2 NG/ML (ref 0–8)

## 2022-02-08 ENCOUNTER — HOSPITAL ENCOUNTER (OUTPATIENT)
Dept: ULTRASOUND IMAGING | Age: 72
Discharge: HOME | End: 2022-02-08
Payer: MEDICARE

## 2022-02-08 DIAGNOSIS — K74.60 CIRRHOSIS OF LIVER WITHOUT ASCITES, UNSPECIFIED HEPATIC CIRRHOSIS TYPE (HCC): ICD-10-CM

## 2022-02-08 PROCEDURE — 76981 USE PARENCHYMA: CPT

## 2022-02-21 NOTE — PROGRESS NOTES
Please let her know that her ultrasound is unremarkable. No hepatic masses. Elastography suggests a Metavir fibrosis score of F2, mild hepatic fibrosis. Thank you.

## 2022-02-24 ENCOUNTER — TELEPHONE (OUTPATIENT)
Dept: HEMATOLOGY | Age: 72
End: 2022-02-24

## 2022-02-24 NOTE — TELEPHONE ENCOUNTER
----- Message from Zulay Barreto NP sent at 2/21/2022  5:16 PM EST -----  Please let her know that her ultrasound is unremarkable. No hepatic masses. Elastography suggests a Metavir fibrosis score of F2, mild hepatic fibrosis. Thank you.

## 2022-03-10 ENCOUNTER — HOSPITAL ENCOUNTER (OUTPATIENT)
Dept: LAB | Age: 72
Discharge: HOME | End: 2022-03-10
Payer: MEDICAID

## 2022-03-10 ENCOUNTER — OFFICE VISIT (OUTPATIENT)
Dept: HEMATOLOGY | Age: 72
End: 2022-03-10
Payer: MEDICARE

## 2022-03-10 VITALS
BODY MASS INDEX: 39.33 KG/M2 | OXYGEN SATURATION: 98 % | HEART RATE: 85 BPM | DIASTOLIC BLOOD PRESSURE: 75 MMHG | WEIGHT: 222 LBS | TEMPERATURE: 96.9 F | SYSTOLIC BLOOD PRESSURE: 157 MMHG

## 2022-03-10 DIAGNOSIS — K74.60 CIRRHOSIS OF LIVER WITHOUT ASCITES, UNSPECIFIED HEPATIC CIRRHOSIS TYPE (HCC): ICD-10-CM

## 2022-03-10 DIAGNOSIS — K74.60 CIRRHOSIS OF LIVER WITHOUT ASCITES, UNSPECIFIED HEPATIC CIRRHOSIS TYPE (HCC): Primary | ICD-10-CM

## 2022-03-10 LAB
ALBUMIN SERPL-MCNC: 3.5 G/DL (ref 3.4–5)
ALBUMIN/GLOB SERPL: 0.8 {RATIO} (ref 0.8–1.7)
ALP SERPL-CCNC: 145 U/L (ref 45–117)
ALT SERPL-CCNC: 40 U/L (ref 13–56)
ANION GAP SERPL CALC-SCNC: 5 MMOL/L (ref 3–18)
AST SERPL-CCNC: 27 U/L (ref 10–38)
BASOPHILS # BLD: 0 K/UL (ref 0–0.1)
BASOPHILS NFR BLD: 1 % (ref 0–2)
BILIRUB DIRECT SERPL-MCNC: 0.1 MG/DL (ref 0–0.2)
BILIRUB SERPL-MCNC: 0.4 MG/DL (ref 0.2–1)
BUN SERPL-MCNC: 14 MG/DL (ref 7–18)
BUN/CREAT SERPL: 13 (ref 12–20)
CALCIUM SERPL-MCNC: 9.1 MG/DL (ref 8.5–10.1)
CHLORIDE SERPL-SCNC: 108 MMOL/L (ref 100–111)
CO2 SERPL-SCNC: 25 MMOL/L (ref 21–32)
CREAT SERPL-MCNC: 1.04 MG/DL (ref 0.6–1.3)
DIFFERENTIAL METHOD BLD: ABNORMAL
EOSINOPHIL # BLD: 0.2 K/UL (ref 0–0.4)
EOSINOPHIL NFR BLD: 4 % (ref 0–5)
ERYTHROCYTE [DISTWIDTH] IN BLOOD BY AUTOMATED COUNT: 14.5 % (ref 11.6–14.5)
GLOBULIN SER CALC-MCNC: 4.5 G/DL (ref 2–4)
GLUCOSE SERPL-MCNC: 196 MG/DL (ref 74–99)
HCT VFR BLD AUTO: 40.3 % (ref 35–45)
HGB BLD-MCNC: 12.6 G/DL (ref 12–16)
IMM GRANULOCYTES # BLD AUTO: 0 K/UL (ref 0–0.04)
IMM GRANULOCYTES NFR BLD AUTO: 0 % (ref 0–0.5)
INR PPP: 1.2 (ref 0.8–1.2)
LYMPHOCYTES # BLD: 1.4 K/UL (ref 0.9–3.6)
LYMPHOCYTES NFR BLD: 25 % (ref 21–52)
MCH RBC QN AUTO: 26.2 PG (ref 24–34)
MCHC RBC AUTO-ENTMCNC: 31.3 G/DL (ref 31–37)
MCV RBC AUTO: 83.8 FL (ref 78–100)
MONOCYTES # BLD: 0.7 K/UL (ref 0.05–1.2)
MONOCYTES NFR BLD: 13 % (ref 3–10)
NEUTS SEG # BLD: 3.1 K/UL (ref 1.8–8)
NEUTS SEG NFR BLD: 57 % (ref 40–73)
NRBC # BLD: 0 K/UL (ref 0–0.01)
NRBC BLD-RTO: 0 PER 100 WBC
PLATELET # BLD AUTO: 226 K/UL (ref 135–420)
PMV BLD AUTO: 10.3 FL (ref 9.2–11.8)
POTASSIUM SERPL-SCNC: 4 MMOL/L (ref 3.5–5.5)
PROT SERPL-MCNC: 8 G/DL (ref 6.4–8.2)
PROTHROMBIN TIME: 14.4 SEC (ref 11.5–15.2)
RBC # BLD AUTO: 4.81 M/UL (ref 4.2–5.3)
SODIUM SERPL-SCNC: 138 MMOL/L (ref 136–145)
WBC # BLD AUTO: 5.5 K/UL (ref 4.6–13.2)

## 2022-03-10 PROCEDURE — 85610 PROTHROMBIN TIME: CPT

## 2022-03-10 PROCEDURE — 85025 COMPLETE CBC W/AUTO DIFF WBC: CPT

## 2022-03-10 PROCEDURE — 99214 OFFICE O/P EST MOD 30 MIN: CPT | Performed by: NURSE PRACTITIONER

## 2022-03-10 PROCEDURE — 82107 ALPHA-FETOPROTEIN L3: CPT

## 2022-03-10 PROCEDURE — 80048 BASIC METABOLIC PNL TOTAL CA: CPT

## 2022-03-10 PROCEDURE — 36415 COLL VENOUS BLD VENIPUNCTURE: CPT

## 2022-03-10 PROCEDURE — 80076 HEPATIC FUNCTION PANEL: CPT

## 2022-03-10 PROCEDURE — 80197 ASSAY OF TACROLIMUS: CPT

## 2022-03-10 RX ORDER — AMITRIPTYLINE HYDROCHLORIDE 10 MG/1
TABLET, FILM COATED ORAL
COMMUNITY
Start: 2022-03-01 | End: 2022-03-18

## 2022-03-10 RX ORDER — MELOXICAM 7.5 MG/1
7.5 TABLET ORAL DAILY
COMMUNITY
Start: 2022-02-07 | End: 2022-07-21

## 2022-03-10 RX ORDER — MORPHINE SULFATE 15 MG/1
TABLET ORAL
COMMUNITY
Start: 2022-03-03 | End: 2022-10-20

## 2022-03-10 NOTE — PROGRESS NOTES
[facility], [place], [address], [place], [place]      Zac Umaña, ANNEMARIE La, [facility]     April S Jayme, [facility]   Debbie Hanna FNP-JOSH Mora, [facility]       Sandeep Zavala [address]    at [address], [address] [place] [address], [facility] 22.    [phone]    FAX: [address] [place] [address]    at [address], [address]    [phone]    FAX: [phone]       Patient Care Team:  Shweta Bourgeois MD as PCP - General (Family Medicine)  Dai Durham MD (Gastroenterology)  oTva Llanes MD (Neurosurgery)      Problem List  Date Reviewed: 12/2/2021          Codes Class Noted    Primary sclerosing cholangitis ICD-10-CM: K83.01  ICD-9-CM: 576.1  3/29/2021        Hypoglycemia ICD-10-CM: E16.2  ICD-9-CM: 251.2  12/10/2020        GAVE (gastric antral vascular ectasia) ICD-10-CM: K31.819  ICD-9-CM: 537.82  6/10/2019        Severe obesity ([facility] ICD-10-CM: E66.01  ICD-9-CM: 278.01  4/17/2019        Autoimmune hepatitis ([facility] ICD-10-CM: K75.4  ICD-9-CM: 571.42  4/17/2019        Thrombocytopenia ([facility] ICD-10-CM: D69.6  ICD-9-CM: 287.5  4/17/2019        Cirrhosis ([facility] ICD-10-CM: K74.60  ICD-9-CM: 571.5  4/17/2019              Terell Hernandez returns to the [address] [address] of 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 The active problem list, all pertinent past medical history, medications, liver histology, radiologic findings and laboratory findings related to the liver disorder were reviewed with the patient.       The patient is a 66 y.o.  female who was found to have marked elevation in liver enzymes into the 500-800 range in 2017     The patient was found to have cirrhosis in 2019 when she developed lower extremity edema, thrombocytopenia and an ultrasound suggested cirrhosis. The patient has developed the following complications of cirrhosis: esophageal varices, GAVE    The patient notes fatigue. Continues to c/o of severe back and hip pain. Has established care with a pain management specialist, Dr. Waldemar Rodriguez. Dr. Waldemar Rodriguez has ordered an MRI. He plans to treat her with an implanted nerve stimulator. She is c/o severe left lower extremity pain today. The patient has not experienced pain in the right side over the liver, problems concentrating, swelling of the abdomen, swelling of the lower extremities, hematemesis, hematochezia. The patient has limitations in daily activities due to liver disease and other medical issues. Since the last office appointment:  Completed vaccination to COVID 19. Has established care with a pain management specialist, Dr. Waldemar Rodriguez. Dr. Waldemar Rodriguez has ordered an MRI. He plans to treat her with an implanted nerve stimulator. Complaining of left lower extremity pain which is quite severe. Doppler study ordered. 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 The diagnosis of cirrhosis is based upon elastography, imaging, laboratory studies, complications of cirrhosis. Complications of cirrhosis were discussed in detail. We discussed thrombocytopenia, portal hypertension, varices, GI bleeding, peripheral edema, ascites, hepatic encephalopathy, and hepatocellular carcinoma. We discussed the need for follow ups on a regular basis, at 3 month intervals to monitor for complications. We discussed the need for every 6 month liver imaging studies. The patient has normal liver function. The CTP is 6. Child class A. The MELD score is 8. Left lower extremity pain. Doppler studies ordered. Autoimmune Hepatitis   The diagnosis was based upon serology and liver biopsy.    A liver biopsy performed in 8/2017 demonstrated moderate to severe inflammation with interface hepatitis, with piecemeal necrosis and stage 2-3 fibrosis. Elastography performed in 4/2019 suggests Cirrhosis. The most recent laboratory studies indicate that the liver transaminases are normal, alkaline phosphatase is elevated, tests of hepatic synthetic and metabolic function are normal, and the platelet count is normal.      The patient is taking TAC 1 mg BID since 10/2019. Most recent tacrolimus level was low at 1.2, but the AST/ALT were WNL. Tacrolimus level repeated today. Primary Sclerosing Cholangitis. This diagnosis is based on MRI/MRCP performed in 06/2020. Intrahepatic biliary ducts have a mildly beaded appearance, suggestive of primary sclerosing cholangitis. The natural history of PSC was discussed in detail. I explained that there is cure for PSC. I explained that at least 75% of those people who suffer from [facility] also have ulcerative colitis. I explained that the single greatest risk factor associated with PSC is cholangiocarcinomas. Elevation in ALP  The elevation in ALP is most likely due to cirrhosis and PSC. Acute kidney injury  Serum creatinine is 1.13 on recent labs. Will continue TAC at 1 mg BID for now. Will continue diuretics at step one for now. ANTI-HCV positive HCV RNA negative  Treated in 2008 by Dr. Vania Ocampo in clinical trial.   The patient is HCV RNA undetectable and does not have HCV. Lower extremity edema  Edema has resolved with current dose of diuretics. Will continue the current dose of diuretics at step 1. Screening for Esophageal varices   The patient had an EGD at [facility] in 7/2019. This demonstrated small esophageal varices and GAVE  Dr. Vania Ocampo performed a repeat EGD in 10/2020. Multiple large gastric polyps in the antrum. 3 polys removed. EGD was performed by Dr. Vania Ocampo in 06/2021. Two medium esophageal varices were present. These were banded.    Dr. Vania Ocampo repeated the EGD in 10/2021. There were small esophageal varices, no banding was performed. Repeat the EGD in 10/2022. She is not on a beta blocker. Hepatic encephalopathy   Overt HE has not developed to date. There is no need for treatment with lactulose and/or Xifaxan at this time. There is no need to restrict dietary protein at this time. Thrombocytopenia   The most recent platelet counts is 427. Screening for Hepatocellular Carcinoma  HCC screening has recently been performed and does not suggest [address] The previously ordered ultrasound was not performed. Re-ordered today. Treatment of other medical problems in patients with chronic liver disease  There are no contraindications for the patient to take most medications that are necessary for treatment of other medical issues. Counseling for alcohol in patients with chronic liver disease  The patient was counseled regarding alcohol consumption and the effect of alcohol on chronic liver disease. Osteoporosis  The risk of osteoporosis is increased in patients with cirrhosis. DEXA bone density to assess for osteoporosis has not been performed. This should be ordered by the patients primary care physician. Vaccinations   Vaccination for viral hepatitis B is not needed. The patient has serologic evidence of prior exposure or vaccination with immunity. The need for vaccination against viral hepatitis A will be assessed with serologic and instituted as appropriate. Routine vaccinations against other bacterial and viral agents can be performed as indicated. Annual flu vaccination should be administered if indicated.       ALLERGIES  Allergies   Allergen Reactions    Adhesive Tape-Silicones Other (comments)    Naproxen Nausea Only    Penicillins Hives    Tramadol Nausea Only    Tuberculin Ppd Hives    Vicodin [Hydrocodone-Acetaminophen] Hives       MEDICATIONS  Current Outpatient Medications   Medication Sig    butalbital-acetaminophen-caffeine (FIORICET, ESGIC) -40 mg per tablet TAKE 1 TABLET BY MOUTH EVERY 4 HOURS AS NEEDED FOR HEADACHE PAIN    dicyclomine (BENTYL) 20 mg tablet Take 20 mg by mouth.  gabapentin (NEURONTIN) 300 mg capsule Take 300 mg by mouth three (3) times daily.  predniSONE (DELTASONE) 10 mg tablet Take 5 mg by mouth.  traZODone (DESYREL) 50 mg tablet Take 50 mg by mouth nightly.  bacitracin (BACITRACIN) 500 unit/gram oint Apply  to affected area three (3) times daily.  insulin lispro (HUMALOG) 100 unit/mL injection by SubCUTAneous route once.  methocarbamoL (ROBAXIN) 750 mg tablet Take 750 mg by mouth every eight (8) hours as needed.  naloxone (NARCAN) 4 mg/actuation nasal spray 4 mg.  ondansetron (ZOFRAN ODT) 4 mg disintegrating tablet Take 4 mg by mouth every eight (8) hours as needed.  sodium bicarbonate 650 mg tablet Take 1,300 mg by mouth four (4) times daily.  tacrolimus (PROGRAF) 1 mg capsule Take 1 Cap by mouth every twelve (12) hours.  insulin aspart protamine/insulin aspart (NOVOLOG MIX 70-30FLEXPEN U-100) 100 unit/mL (70-30) inpn by SubCUTAneous route. Sliding scale    albuterol (PROVENTIL HFA) 90 mcg/actuation inhaler Take 2 Puffs by inhalation.  amLODIPine (NORVASC) 10 mg tablet Take 10 mg by mouth.  budesonide-formoterol (SYMBICORT) 160-4.5 mcg/actuation HFAA Take 2 Puffs by inhalation.  ferrous fumarate 324 mg (106 mg iron) tab Take 324 mg by mouth.  furosemide (LASIX) 40 mg tablet Take 40 mg by mouth.  glipiZIDE SR (GLUCOTROL XL) 5 mg CR tablet Take 5 mg by mouth.  minoxidil (LONITEN) 2.5 mg tablet Take 2.5 mg by mouth.  omeprazole (PRILOSEC) 40 mg capsule Take 40 mg by mouth.  spironolactone (ALDACTONE) 100 mg tablet Take 1 Tab by mouth daily. No current facility-administered medications for this visit.        SYSTEM REVIEW NOT RELATED TO LIVER DISEASE OR REVIEWED ABOVE:  Constitution systems: Negative for fever, chills, weight gain, weight loss. Eyes: Negative for visual changes. ENT: Negative for sore throat, painful swallowing. Respiratory: Negative for cough, hemoptysis, SOB. Cardiology: Swelling of the legs. GI:  Negative for constipation or diarrhea. : Negative for urinary frequency, dysuria, hematuria, nocturia. Skin: Negative for rash. Hematology: Negative for easy bruising, blood clots. Musculo-skelatal: Negative for back pain, muscle pain, weakness. Neurologic: Negative for headaches, dizziness, vertigo, memory problems not related to HE. Psychology: Negative for anxiety, depression. FAMILY HISTORY:  The father  of MI. The mother  of MI. The following family members have liver disease: a brother    SOCIAL HISTORY:  The patient is . The patient has 2 children, and 8 grandchildren. The patient stopped using tobacco products in . The patient has never consumed significant amounts of alcohol. The patient used to work as as a  at [employer]. The patient retired in . PHYSICAL EXAMINATION:  Visit Vitals  BP (!) 157/75   Pulse 85   Temp 96.9 °F (36.1 °C) (Tympanic)   Wt 222 lb (100.7 kg)   SpO2 98%   BMI 39.33 kg/m²     General: No acute distress. Eyes: Sclera anicteric. ENT: No oral lesions. Thyroid normal.  Nodes: No adenopathy. Skin: No spider angiomata. No jaundice. No palmar erythema. Respiratory: Lungs clear to auscultation. Cardiovascular: Regular heart rate. No murmurs. No JVD. Abdomen: Soft non-tender. Liver size normal to percussion/palpation. Spleen not palpable. No obvious ascites. Extremities: No edema. No muscle wasting. No gross arthritic changes. Neurologic: Alert and oriented. Cranial nerves grossly intact. No asterixis.     LABORATORY STUDIES:  [facility] [place] of [address] [place] [address] & Units 2021 2021 3/29/2021   WBC 4.6 - 13.2 K/uL 6.4 6.9 5.6   ANC 1.8 - 8.0 K/UL 4.3 4.2 2.6   HGB 12.0 - 16.0 g/dL 13.4 12.5 12.8    - 420 K/uL 221 209 202   INR 0.8 - 1.2   1.2 1.1 1.3 (H)   AST 10 - 38 U/L 28 32 39 (H)   ALT 13 - 56 U/L 43 35 48   Alk Phos 45 - 117 U/L 159 (H) 136 (H) 207 (H)   Bili, Total 0.2 - 1.0 MG/DL 0.8 0.6 0.5   Bili, Direct 0.0 - 0.2 MG/DL 0.2 0.2 0.1   Albumin 3.4 - 5.0 g/dL 3.4 3.5 3.5   BUN 7.0 - 18 MG/DL 16 15 16   Creat 0.6 - 1.3 MG/DL 1.13 1.23 1.10   Creat (iSTAT) 0.6 - 1.3 MG/DL      Na 136 - 145 mmol/L 137 140 139   K 3.5 - 5.5 mmol/L 4.4 4.0 4.1   Cl 100 - 111 mmol/L 106 107 110   CO2 21 - 32 mmol/L 25 28 27   Glucose 74 - 99 mg/dL 249 (H) 100 (H) 162 (H)   Magnesium 1.6 - 2.6 mg/dL      Ammonia 11 - 32 UMOL/L        Cancer Screening Latest Ref Rng & Units 9/1/2021 3/29/2021 1/5/2021   AFP, Serum 0.0 - 8.0 ng/mL 4.8 4.8 5.6   AFP-L3% 0.0 - 9.9 % Comment Comment Comment     Liver Virology and Transplant Immune Suppression Latest Ref Rng & Units 3/29/2021   Tacrolimus Level 2.0 - 20.0 ng/mL 4.4     Liver Virology and Transplant Immune Suppression Latest Ref Rng & Units 8/27/2020   Tacrolimus Level 2.0 - 20.0 ng/mL 1.7 (L)       SEROLOGIES:  6/2017. Anti-HBsurface positive  9/2017. HBsAntigen negative, anti-HCV positive, HCV RNA undetectable, VICKI positive, ASMA positive, ANCA positive, AMA negative, RF positive, alpha-1-antitrypsin 150    LIVER HISTOLOGY:  8/2017. Active hepatitis with portal and lobular inflammation, interface hepatitis, PMN and stage 2-3 septal fibrosis. Biopsy slides not reviewed by MLS.  4/2019. Sheer wave elastography performed at [facility]. Range 10.09- 17.05 kPa, Mean 14.07 kPa, Median 14.9 kPa, Standard deviation 2.41 kPa. The results suggested a fibrosis level of F4.    02/2022. TRANSIENT HEPATIC ELASTOGRAPHY:   E Range: 8.50-10.16 kPa  E Mean: 9.24 kPa  E Median: 9.22 kPa  E Std: 0.52 kPa    ENDOSCOPIC PROCEDURES:  7/2019. EGD by Dr Shari Sadler. Small esophageal varices. GAVE. 10/2020. EGD by Dr. Allen Simon.   Dr. Allen Simon performed a repeat EGD in 10/2020. Multiple large gastric polyps in the antrum. 3 polys removed. Repeat EGD in 1 to 2 months. 02/2021. EGD by Dr. Estrellita Cortés. Normal esophagus. No portal hypertensive gastropathy. At least least 10 polyps in the antrum. Several of these were > 5 mm in diameter. 7 of the polyps were resected by snare cautery. All bases were noted to be clear and without bleeding or perforation at end of procedure. 3-4 polyps with wide bases were left behind. No gastric varices identified. Repeat in 3 months. 06/2021. EGD by Dr. Estrellita Cortés. Two Medium varices were present. There was no stigmata of recent bleeding. Banding of esophageal varices was performed. 10/2021. EGD by Dr. Estrellita Cortés. Small esophageal varices. No banding performed. RADIOLOGY:  1/2019. Ultrasound of liver. Echogenic consistent with chronic liver disease. No liver mass lesions. No dilated bile ducts. No ascites. 5/2019. Ultrasound of liver. Echogenic consistent with cirrhosis. No liver mass lesions. No dilated bile ducts. No ascites. 06/2020. MRI/MRCP w/wo contrast.  Liver has a mildly nodular contour. No suspicious liver lesions. Hepatic biliary ducts have a mildly beaded appearance, which is suggestive of mild primary sclerosing cholangitis. There is no focal area of intrahepatic biliary dilation to suggest a dominant stricture. 12/2020. Ultrasound of the liver. Lobular surface contour in course and periapical echotexture redemonstrated. No focal mass. 04/2021. Abdominal ultrasound ([facility]). Coarsened heterogeneous texture to the liver with nodular contour, which can be seen in setting of chronic liver disease/cirrhosis. 02/2022. Ultrasound of the liver. Increased heterogeneous hepatic echotexture in keeping with patient's history of hepatocellular disease. No solid hepatic mass.      OTHER TESTING:  Not available or performed    FOLLOW-UP:  All of the issues listed above in the Assessment and Plan were discussed with the patient. All questions were answered. The patient expressed a clear understanding of the above. [address] in 3 month to repeat labs and review ultrasound.      KEI Randolph  [facility] [place] of [address], [address] [place] [address] Josee Camara, [address] [place] [address]   [phone]

## 2022-03-11 LAB
AFP L3 MFR SERPL: NORMAL % (ref 0–9.9)
AFP SERPL-MCNC: 3.5 NG/ML (ref 0–9.2)

## 2022-03-13 LAB — TACROLIMUS BLD-MCNC: 3.9 NG/ML (ref 2–20)

## 2022-03-16 ENCOUNTER — HOSPITAL ENCOUNTER (OUTPATIENT)
Age: 72
Setting detail: OBSERVATION
Discharge: HOME | End: 2022-03-18
Attending: STUDENT IN AN ORGANIZED HEALTH CARE EDUCATION/TRAINING PROGRAM | Admitting: HOSPITALIST
Payer: MEDICARE

## 2022-03-16 ENCOUNTER — HOSPITAL ENCOUNTER (OUTPATIENT)
Dept: VASCULAR SURGERY | Age: 72
Discharge: HOME | End: 2022-03-16
Payer: MEDICARE

## 2022-03-16 DIAGNOSIS — K75.4 AUTOIMMUNE HEPATITIS (HCC): ICD-10-CM

## 2022-03-16 DIAGNOSIS — R06.02 SHORTNESS OF BREATH ON EXERTION: Primary | ICD-10-CM

## 2022-03-16 DIAGNOSIS — K31.819 GAVE (GASTRIC ANTRAL VASCULAR ECTASIA): ICD-10-CM

## 2022-03-16 DIAGNOSIS — I82.432 ACUTE DEEP VEIN THROMBOSIS (DVT) OF POPLITEAL VEIN OF LEFT LOWER EXTREMITY (HCC): ICD-10-CM

## 2022-03-16 DIAGNOSIS — K74.60 CIRRHOSIS OF LIVER WITHOUT ASCITES, UNSPECIFIED HEPATIC CIRRHOSIS TYPE (HCC): ICD-10-CM

## 2022-03-16 PROBLEM — E11.9 INSULIN DEPENDENT TYPE 2 DIABETES MELLITUS (HCC): Status: ACTIVE | Noted: 2022-03-16

## 2022-03-16 PROBLEM — I82.409 DVT (DEEP VENOUS THROMBOSIS) (HCC): Status: ACTIVE | Noted: 2022-03-16

## 2022-03-16 PROBLEM — Z79.4 INSULIN DEPENDENT TYPE 2 DIABETES MELLITUS (HCC): Status: ACTIVE | Noted: 2022-03-16

## 2022-03-16 LAB
ANION GAP SERPL CALC-SCNC: 7 MMOL/L (ref 3–18)
BASOPHILS # BLD: 0 K/UL (ref 0–0.1)
BASOPHILS NFR BLD: 1 % (ref 0–2)
BUN SERPL-MCNC: 16 MG/DL (ref 7–18)
BUN/CREAT SERPL: 16 (ref 12–20)
CALCIUM SERPL-MCNC: 9.2 MG/DL (ref 8.5–10.1)
CHLORIDE SERPL-SCNC: 108 MMOL/L (ref 100–111)
CO2 SERPL-SCNC: 25 MMOL/L (ref 21–32)
CREAT SERPL-MCNC: 0.98 MG/DL (ref 0.6–1.3)
DIFFERENTIAL METHOD BLD: ABNORMAL
EOSINOPHIL # BLD: 0.1 K/UL (ref 0–0.4)
EOSINOPHIL NFR BLD: 1 % (ref 0–5)
ERYTHROCYTE [DISTWIDTH] IN BLOOD BY AUTOMATED COUNT: 14.6 % (ref 11.6–14.5)
EST. AVERAGE GLUCOSE BLD GHB EST-MCNC: 163 MG/DL
GLUCOSE BLD STRIP.AUTO-MCNC: 114 MG/DL (ref 70–110)
GLUCOSE SERPL-MCNC: 118 MG/DL (ref 74–99)
HBA1C MFR BLD: 7.3 % (ref 4.2–5.6)
HCT VFR BLD AUTO: 39.6 % (ref 35–45)
HGB BLD-MCNC: 12.8 G/DL (ref 12–16)
IMM GRANULOCYTES # BLD AUTO: 0 K/UL (ref 0–0.04)
IMM GRANULOCYTES NFR BLD AUTO: 0 % (ref 0–0.5)
INR PPP: 1.2 (ref 0.8–1.2)
LYMPHOCYTES # BLD: 1.5 K/UL (ref 0.9–3.6)
LYMPHOCYTES NFR BLD: 23 % (ref 21–52)
MCH RBC QN AUTO: 26.9 PG (ref 24–34)
MCHC RBC AUTO-ENTMCNC: 32.3 G/DL (ref 31–37)
MCV RBC AUTO: 83.2 FL (ref 78–100)
MONOCYTES # BLD: 0.9 K/UL (ref 0.05–1.2)
MONOCYTES NFR BLD: 14 % (ref 3–10)
NEUTS SEG # BLD: 4 K/UL (ref 1.8–8)
NEUTS SEG NFR BLD: 61 % (ref 40–73)
NRBC # BLD: 0 K/UL (ref 0–0.01)
NRBC BLD-RTO: 0 PER 100 WBC
PLATELET # BLD AUTO: 198 K/UL (ref 135–420)
PMV BLD AUTO: 9.9 FL (ref 9.2–11.8)
POTASSIUM SERPL-SCNC: 4.3 MMOL/L (ref 3.5–5.5)
PROTHROMBIN TIME: 14.5 SEC (ref 11.5–15.2)
RBC # BLD AUTO: 4.76 M/UL (ref 4.2–5.3)
SODIUM SERPL-SCNC: 140 MMOL/L (ref 136–145)
WBC # BLD AUTO: 6.6 K/UL (ref 4.6–13.2)

## 2022-03-16 PROCEDURE — 99285 EMERGENCY DEPT VISIT HI MDM: CPT

## 2022-03-16 PROCEDURE — 74011250636 HC RX REV CODE- 250/636: Performed by: HOSPITALIST

## 2022-03-16 PROCEDURE — 74011250637 HC RX REV CODE- 250/637: Performed by: HOSPITALIST

## 2022-03-16 PROCEDURE — 80048 BASIC METABOLIC PNL TOTAL CA: CPT

## 2022-03-16 PROCEDURE — G0378 HOSPITAL OBSERVATION PER HR: HCPCS

## 2022-03-16 PROCEDURE — 85610 PROTHROMBIN TIME: CPT

## 2022-03-16 PROCEDURE — 99215 OFFICE O/P EST HI 40 MIN: CPT | Performed by: INTERNAL MEDICINE

## 2022-03-16 PROCEDURE — 93971 EXTREMITY STUDY: CPT

## 2022-03-16 PROCEDURE — 85025 COMPLETE CBC W/AUTO DIFF WBC: CPT

## 2022-03-16 PROCEDURE — 82962 GLUCOSE BLOOD TEST: CPT

## 2022-03-16 PROCEDURE — 83036 HEMOGLOBIN GLYCOSYLATED A1C: CPT

## 2022-03-16 RX ORDER — DEXTROSE MONOHYDRATE 100 MG/ML
0-250 INJECTION, SOLUTION INTRAVENOUS AS NEEDED
Status: DISCONTINUED | OUTPATIENT
Start: 2022-03-16 | End: 2022-03-18 | Stop reason: HOSPADM

## 2022-03-16 RX ORDER — GABAPENTIN 300 MG/1
300 CAPSULE ORAL 3 TIMES DAILY
Status: DISCONTINUED | OUTPATIENT
Start: 2022-03-16 | End: 2022-03-18 | Stop reason: HOSPADM

## 2022-03-16 RX ORDER — AMLODIPINE BESYLATE 5 MG/1
10 TABLET ORAL DAILY
Status: DISCONTINUED | OUTPATIENT
Start: 2022-03-17 | End: 2022-03-18 | Stop reason: HOSPADM

## 2022-03-16 RX ORDER — MAGNESIUM SULFATE 100 %
4 CRYSTALS MISCELLANEOUS AS NEEDED
Status: DISCONTINUED | OUTPATIENT
Start: 2022-03-16 | End: 2022-03-18 | Stop reason: HOSPADM

## 2022-03-16 RX ORDER — SPIRONOLACTONE 100 MG/1
100 TABLET, FILM COATED ORAL DAILY
Status: DISCONTINUED | OUTPATIENT
Start: 2022-03-17 | End: 2022-03-18 | Stop reason: HOSPADM

## 2022-03-16 RX ORDER — MORPHINE SULFATE 15 MG/1
15 TABLET ORAL
Status: DISCONTINUED | OUTPATIENT
Start: 2022-03-16 | End: 2022-03-18 | Stop reason: HOSPADM

## 2022-03-16 RX ORDER — TACROLIMUS 1 MG/1
1 CAPSULE ORAL EVERY 12 HOURS
Status: DISCONTINUED | OUTPATIENT
Start: 2022-03-16 | End: 2022-03-18 | Stop reason: HOSPADM

## 2022-03-16 RX ORDER — FERROUS GLUCONATE 324(38)MG
1 TABLET ORAL
Status: DISCONTINUED | OUTPATIENT
Start: 2022-03-17 | End: 2022-03-17 | Stop reason: RX

## 2022-03-16 RX ORDER — TRAZODONE HYDROCHLORIDE 50 MG/1
50 TABLET ORAL
Status: DISCONTINUED | OUTPATIENT
Start: 2022-03-16 | End: 2022-03-16

## 2022-03-16 RX ORDER — MINOXIDIL 2.5 MG/1
2.5 TABLET ORAL DAILY
Status: DISCONTINUED | OUTPATIENT
Start: 2022-03-17 | End: 2022-03-18 | Stop reason: HOSPADM

## 2022-03-16 RX ORDER — INSULIN LISPRO 100 [IU]/ML
INJECTION, SOLUTION INTRAVENOUS; SUBCUTANEOUS
Status: DISCONTINUED | OUTPATIENT
Start: 2022-03-16 | End: 2022-03-18 | Stop reason: HOSPADM

## 2022-03-16 RX ORDER — FUROSEMIDE 40 MG/1
40 TABLET ORAL DAILY
Status: DISCONTINUED | OUTPATIENT
Start: 2022-03-17 | End: 2022-03-18 | Stop reason: HOSPADM

## 2022-03-16 RX ORDER — PANTOPRAZOLE SODIUM 40 MG/1
40 TABLET, DELAYED RELEASE ORAL
Status: DISCONTINUED | OUTPATIENT
Start: 2022-03-17 | End: 2022-03-18 | Stop reason: HOSPADM

## 2022-03-16 RX ORDER — ALBUTEROL SULFATE 90 UG/1
2 AEROSOL, METERED RESPIRATORY (INHALATION)
Status: DISCONTINUED | OUTPATIENT
Start: 2022-03-16 | End: 2022-03-18 | Stop reason: HOSPADM

## 2022-03-16 RX ADMIN — GABAPENTIN 300 MG: 300 CAPSULE ORAL at 21:22

## 2022-03-16 RX ADMIN — TACROLIMUS 1 MG: 1 CAPSULE ORAL at 21:22

## 2022-03-16 RX ADMIN — GABAPENTIN 300 MG: 300 CAPSULE ORAL at 18:41

## 2022-03-16 NOTE — ED PROVIDER NOTES
EMERGENCY DEPARTMENT HISTORY AND PHYSICAL EXAM      Date: 3/16/2022  Patient Name: Bayron Lopez    History of Presenting Illness     Chief Complaint   Patient presents with    Blood Clot       History Provided By: Patient    HPI:  Bayron Lopez is a 66 y.o. female with a history of liver disease, GERD, diabetes, COPD, autoimmune hepatitis who presents with complaints of left leg pain, s/p vascular study with DVT present. She states that she has had pain for \"some time\" was referred by PA at Dr. Fabiano Croft office for study. Was positive, sent to ED for management. The patient denies any aggravating or alleviating factors - and has not taken any medications in an attempt to alleviate her symptoms. PMH, PSH, family history, social history, allergies reviewed with the patient with significant items noted above. ---  Pregnancy -     PCP: Agnes Mejia NP    Current Outpatient Medications   Medication Sig Dispense Refill    meloxicam (MOBIC) 7.5 mg tablet Take 7.5 mg by mouth daily.  morphine IR (MS IR) 15 mg tablet       gabapentin (NEURONTIN) 300 mg capsule Take 300 mg by mouth three (3) times daily.  predniSONE (DELTASONE) 10 mg tablet Take 5 mg by mouth.  tacrolimus (PROGRAF) 1 mg capsule Take 1 Cap by mouth every twelve (12) hours. 120 Cap 6    insulin aspart protamine/insulin aspart (NOVOLOG MIX 70-30FLEXPEN U-100) 100 unit/mL (70-30) inpn by SubCUTAneous route. Sliding scale      albuterol (PROVENTIL HFA) 90 mcg/actuation inhaler Take 2 Puffs by inhalation.  amLODIPine (NORVASC) 10 mg tablet Take 10 mg by mouth.  budesonide-formoterol (SYMBICORT) 160-4.5 mcg/actuation HFAA Take 2 Puffs by inhalation.  ferrous fumarate 324 mg (106 mg iron) tab Take 324 mg by mouth.  furosemide (LASIX) 40 mg tablet Take 40 mg by mouth.  glipiZIDE SR (GLUCOTROL XL) 5 mg CR tablet Take 5 mg by mouth.  minoxidil (LONITEN) 2.5 mg tablet Take 2.5 mg by mouth.  omeprazole (PRILOSEC) 40 mg capsule Take 40 mg by mouth.  spironolactone (ALDACTONE) 100 mg tablet Take 1 Tab by mouth daily. 180 Tab 3       Past History     Past Medical History:  Past Medical History:   Diagnosis Date    Arthritis     Autoimmune disease ([facility]     HEPATITIS     Chronic obstructive pulmonary disease ([facility]     Chronic pain     BACK PAIN    Diabetes (HCC)     Gastrointestinal disorder     GERD (gastroesophageal reflux disease)     Hypertension     Liver disease        Past Surgical History:  Past Surgical History:   Procedure Laterality Date    BIOPSY LIVER      HX BREAST BIOPSY      HX CATARACT REMOVAL      HX HYSTERECTOMY      HX ORTHOPAEDIC  03/2020    BACK X5    HX ORTHOPAEDIC Bilateral     bilateral knee scopes    HX ROTATOR CUFF REPAIR Bilateral     HX TUBAL LIGATION      IR IVC FILTER PLACEMENT PERCUTANEOUS  3/17/2022       Family History:  No family history on file. Social History:  Social History     Tobacco Use    Smoking status: Never Smoker    Smokeless tobacco: Never Used   Substance Use Topics    Alcohol use: Yes     Comment: CELEBRATORY    Drug use: Yes     Types: Marijuana       Allergies:   Allergies   Allergen Reactions    Adhesive Tape-Silicones Other (comments)    Naproxen Nausea Only    Penicillins Hives    Tramadol Nausea Only    Tuberculin Ppd Hives    Vicodin [Hydrocodone-Acetaminophen] Hives       Review of Systems   Review of Systems    In addition to that documented in the HPI above  All other review of systems negative    Constitutional: Denies fevers or chills  Eyes: Denies vision changes  ENMT: Denies sore throat  CV: Denies chest pain  Resp: Denies SOB  GI: Denies vomiting or diarrhea  : Denies painful urination  MSK: Denies recent trauma  Skin: Denies new rashes  Neuro: Denies new numbness or tingling or weakness  Endocrine: Denies polyuria  Heme: Denies bleeding disorders    Physical Exam     Vitals:    03/17/22 2002 03/17/22 2327 03/18/22 0346 03/18/22 1142   BP: 127/61 (!) 105/57 139/82 130/85   Pulse: 82 82 70 88   Resp: 17 17 16 16   Temp: 98.6 °F (37 °C) 97.9 °F (36.6 °C) 97.5 °F (36.4 °C) 98.7 °F (37.1 °C)   SpO2: 98% 96% 98% 95%   Weight:  100.7 kg (222 lb)     Height:         Physical Exam    Nursing notes and vital signs reviewed  General: Patient is awake and alert, resting comfortably in no acute distress  Head: Normocephalic, Atraumatic  Eyes: EOMI, no conjunctival pallor  Neck: Supple, Normal external exam  Cardiovascular: RRR, warm, well-perfused extremities  Chest: Normal work of breathing and chest excursion bilaterally  Respiratory: Patient is in no respiratory distress, lungs CTAB  Abdomen: Soft, non tender, non distended  Back: No evidence of trauma or deformity  Extremities: No evidence of trauma or deformity, no LE edema  Skin: Warm and dry, intact  Neuro: Alert and appropriate, CN intact, normal speech, strength and sensation full and symmetric bilaterally, normal gait, normal coordination  Psychiatric: Normal mood and affect    Medical Decision Making   I am the first provider for this patient. I reviewed the vital signs, available nursing notes, past medical history, past surgical history, family history and social history. Vital Signs-Reviewed the patient's vital signs. Visit Vitals  /85 (BP 1 Location: Left upper arm, BP Patient Position: Sitting)   Pulse 88   Temp 98.7 °F (37.1 °C)   Resp 16   Ht 5' 3\" (1.6 m)   Wt 100.7 kg (222 lb)   SpO2 95%   Breastfeeding No   BMI 39.33 kg/m²     Provider Notes (Medical Decision Making):   Maria Victoria Dela Cruz is a 66 y.o. female with DVT, acute, with history of GI bleeding, GAVE. Considered but do not suspect PE, no chest pain, no sob.         Procedures:  Critical Care  Performed by: Rosa M Loza MD  Authorized by: Rosa M Loza MD     Critical care provider statement:     Critical care time (minutes):  35    Critical care time was exclusive of: Separately billable procedures and treating other patients    Critical care was necessary to treat or prevent imminent or life-threatening deterioration of the following conditions:  Circulatory failure    Critical care was time spent personally by me on the following activities:  Discussions with consultants, discussions with primary provider, examination of patient, re-evaluation of patient's condition, ordering and review of radiographic studies, ordering and review of laboratory studies and ordering and performing treatments and interventions        Due to significant clinical impact of dvt and patient history, spent a considerable amount of time researching pathology, considering critical intervention. Unfortunately unable to anticoagulate. Several evaluations of patient. ED Course:   ED Course as of 03/22/22 2055   Wed Mar 16, 2022   1519 gastric antral vascular ectasia was oozing in October 2021 [DM]   56 Dr. Dedrick Malloy, [facility]. Filter only real option    Spoke to Dr. Aileen Councilman, can do in am.    [DM]   [phone] 4:03 PM  Will plan to admit for obs for DVT. The patient understands and agrees with the plan. Spoke with Dr. Vin Hammond the on call admitting clinician who agrees to admit patient. Appreciate the assistance in the care of this patient. [DM]      ED Course User Index  [DM] Karen Burnett MD            Vitals Review/addressed -     Diagnostic Study Results     Orders Placed This Encounter    IR IVC FILTER PLACEMENT PERCUTANEOUS     Standing Status:   Standing     Number of Occurrences:   1     Order Specific Question:   Transport     Answer:   BED [2]     Order Specific Question:   Reason for Exam     Answer:   dvt, GAVE     Order Specific Question:   Does patient have history of Renal Disease?      Answer:   No    PROTHROMBIN TIME + INR     Standing Status:   Standing     Number of Occurrences:   1    CBC WITH AUTOMATED DIFF     Standing Status:   Standing     Number of Occurrences:   1  METABOLIC PANEL, BASIC     Standing Status:   Standing     Number of Occurrences:   1    HEMOGLOBIN A1C     Standing Status:   Standing     Number of Occurrences:   1    CRITICAL CARE (ASAP ONLY)     This order was created via procedure documentation     Standing Status:   Standing     Number of Occurrences:   1    IP CONSULT TO PHYSICAL THERAPY     Standing Status:   Standing     Number of Occurrences:   1    GLUCOSE, POC     Standing Status:   Standing     Number of Occurrences:   1    GLUCOSE, POC     Standing Status:   Standing     Number of Occurrences:   1    GLUCOSE, POC     Standing Status:   Standing     Number of Occurrences:   1    GLUCOSE, POC     Standing Status:   Standing     Number of Occurrences:   1    GLUCOSE, POC     Standing Status:   Standing     Number of Occurrences:   1    GLUCOSE, POC     Standing Status:   Standing     Number of Occurrences:   1    GLUCOSE, POC     Standing Status:   Standing     Number of Occurrences:   1    GLUCOSE, POC     Standing Status:   Standing     Number of Occurrences:   1    DISCHARGE PATIENT     Standing Status:   Standing     Number of Occurrences:   1    DISCONTD: insulin lispro (HUMALOG) injection    DISCONTD: glucose chewable tablet 16 g    DISCONTD: glucagon (GLUCAGEN) injection 1 mg    DISCONTD: dextrose 10% infusion 0-250 mL    DISCONTD: albuterol (PROVENTIL HFA, VENTOLIN HFA, PROAIR HFA) inhaler 2 Puff     OP SIG:Take 2 Puffs by inhalation. Order Specific Question:   MODE OF DELIVERY     Answer:   Spacer     Order Specific Question:   Initiate RT Bronchodilator Protocol     Answer: Yes    DISCONTD: amLODIPine (NORVASC) tablet 10 mg     OP SIG:Take 10 mg by mouth.  DISCONTD: ferrous gluconate 324 mg (38 mg iron) tablet 1 Tablet    DISCONTD: furosemide (LASIX) tablet 40 mg     OP SIG:Take 40 mg by mouth.  DISCONTD: gabapentin (NEURONTIN) capsule 300 mg     OP SIG:Take 300 mg by mouth three (3) times daily.       DISCONTD: minoxidiL (LONITEN) tablet 2.5 mg     OP SIG:Take 2.5 mg by mouth.  DISCONTD: morphine IR (MS IR) tablet 15 mg    DISCONTD: pantoprazole (PROTONIX) tablet 40 mg     Order Specific Question:   PPI INDICATION     Answer:   Gastritis    DISCONTD: traZODone (DESYREL) tablet 50 mg     OP SIG:Take 50 mg by mouth nightly.  DISCONTD: tacrolimus (PROGRAF) capsule 1 mg     OP SIG:Take 1 Cap by mouth every twelve (12) hours.  DISCONTD: spironolactone (ALDACTONE) tablet 100 mg     OP SIG:Take 1 Tab by mouth daily.  heparinized saline 2 units/mL infusion 1,000 Units    lidocaine (PF) (XYLOCAINE) 10 mg/mL (1 %) injection 1-10 mL    iopamidoL (ISOVUE 300) 61 % contrast injection 1-50 mL    DISCONTD: ferrous sulfate tablet 325 mg    DISCONTD: diphenhydrAMINE (BENADRYL) injection 12.5 mg    diphenhydrAMINE (BENADRYL) injection 12.5 mg    DISCONTD: diphenhydrAMINE (BENADRYL) injection 25 mg    methylPREDNISolone (PF) (SOLU-MEDROL) injection 40 mg    INITIAL PHYSICIAN ORDER: OBSERVATION/OUTPATIENT IN A BED OBSERVATION; Medical; No; DVT planned procedure 3/17     Standing Status:   Standing     Number of Occurrences:   1     Order Specific Question:   Patient Class: Answer:   OBSERVATION [104]     Order Specific Question:   Type of Bed     Answer:   Medical [8]     Order Specific Question:   Cardiac Monitoring Required? Answer:   No     Order Specific Question:   Reason for Observation     Answer:   DVT planned procedure 3/17     Order Specific Question:   Admitting Diagnosis     Answer:   DVT (deep venous thrombosis) [facility]) [[id number]]     Order Specific Question:   Admitting Physician     Answer:   Reggie Silverio     Order Specific Question:   Attending Physician     Answer:   Reggie Silverio       Labs -   No results found for this or any previous visit (from the past 12 hour(s)).     Radiologic Studies -   IR IVC FILTER PLACEMENT PERCUTANEOUS   Final Result   Infrarenal IVC potentially retrievable filter placement as above. CT Results  (Last 48 hours)    None        CXR Results  (Last 48 hours)    None          Disposition     Disposition:  Admit    CLINICAL IMPRESSION:    1. Shortness of breath on exertion    2. Cirrhosis of liver without ascites, unspecified hepatic cirrhosis type ([facility] [address] [facility]    3. Autoimmune hepatitis ([facility] [facility]    4. GAVE (gastric antral vascular ectasia)    5. Acute deep vein thrombosis (DVT) of popliteal vein of left lower extremity ([address]        It should be noted that I will be the provider of record for this patient  Simon Leon MD    Follow-up Information     Follow up With Specialties Details Why Contact Info    Gerard Hoover NP Family Medicine On 3/28/2022 Follow up appointment scheduled for March 28, 2022 at 8:00 a.m. Please wear mask to office visit. [address] [place] [address]      Saúl Elias MD Hepatology On 4/20/2022 Follow up appointment scheduled for April 20, 2022 at 11:30 a.m. Please wear mask to office visit. [address] [place] [address]  [phone]            Discharge Medication List as of 3/18/2022 12:56 PM      CONTINUE these medications which have NOT CHANGED    Details   meloxicam (MOBIC) 7.5 mg tablet Take 7.5 mg by mouth daily. , Historical Med      morphine IR (MS IR) 15 mg tablet Historical Med      gabapentin (NEURONTIN) 300 mg capsule Take 300 mg by mouth three (3) times daily. , Historical Med      predniSONE (DELTASONE) 10 mg tablet Take 5 mg by mouth., Historical Med      tacrolimus (PROGRAF) 1 mg capsule Take 1 Cap by mouth every twelve (12) hours. , No Print, Disp-120 Cap, R-6      insulin aspart protamine/insulin aspart (NOVOLOG MIX 70-30FLEXPEN U-100) 100 unit/mL (70-30) inpn by SubCUTAneous route. Sliding scale, Historical Med      albuterol (PROVENTIL HFA) 90 mcg/actuation inhaler Take 2 Puffs by inhalation. , Historical Med      amLODIPine (NORVASC) 10 mg tablet Take 10 mg by mouth., Historical Med      budesonide-formoterol (SYMBICORT) 160-4.5 mcg/actuation HFAA Take 2 Puffs by inhalation. , Historical Med      ferrous fumarate 324 mg (106 mg iron) tab Take 324 mg by mouth., Historical Med      furosemide (LASIX) 40 mg tablet Take 40 mg by mouth., Historical Med      glipiZIDE SR (GLUCOTROL XL) 5 mg CR tablet Take 5 mg by mouth., Historical Med      minoxidil (LONITEN) 2.5 mg tablet Take 2.5 mg by mouth., Historical Med      omeprazole (PRILOSEC) 40 mg capsule Take 40 mg by mouth., Historical Med      spironolactone (ALDACTONE) 100 mg tablet Take 1 Tab by mouth daily. , Normal, Disp-180 Tab, R-3         STOP taking these medications       amitriptyline (ELAVIL) 10 mg tablet Comments:   Reason for Stopping:         butalbital-acetaminophen-caffeine (FIORICET, ESGIC) -40 mg per tablet Comments:   Reason for Stopping:         dicyclomine (BENTYL) 20 mg tablet Comments:   Reason for Stopping:         traZODone (DESYREL) 50 mg tablet Comments:   Reason for Stopping:         bacitracin (BACITRACIN) 500 unit/gram oint Comments:   Reason for Stopping:         insulin lispro (HUMALOG) 100 unit/mL injection Comments:   Reason for Stopping:         methocarbamoL (ROBAXIN) 750 mg tablet Comments:   Reason for Stopping:         naloxone (NARCAN) 4 mg/actuation nasal spray Comments:   Reason for Stopping:         ondansetron (ZOFRAN ODT) 4 mg disintegrating tablet Comments:   Reason for Stopping:               Please note that this dictation was completed with [employer], the [employer] voice recognition software. Quite often unanticipated grammatical, syntax, homophones, and other interpretive errors are inadvertently transcribed by the computer software. Please disregard these errors. Please excuse any errors that have escaped final proofreading.

## 2022-03-16 NOTE — CONSENT
[facility], [place], [address], [facility], [place]      ANNEMARIE Yap, [facility]     Nida Delacruz, [facility]   KEI Garvin, [facility]       Sandeep Zavala [address]    at [address], [facility] Wes Marcus  22.    [phone]    FAX: [address] [place] [address], [address]    [phone]    FAX: [phone]         HEPATOLOGY CONSULT NOTE  The patient is well known to and regularly cared for at [facility]. She is a 66 y.o.  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 This was confirmed by liver biopsy in 2017 and is being treated with tacrolimus 1 mg BID. She has responded to immune suppression and liver transaminases have been normal for several years. The patient has developed the following complications of cirrhosis: esophageal varices, GAVE  GAVE is severe and she has chronic oozing and GI blood loss. This was exacerbated by ASA and this was stopped. She was seen in the office last week and noted pain behind her left knee. She underwent a doppler exam to evaluate this and was found to have a popliteal vein DVT. She was hospitalized for treatment of DVT. Hepatology Plan:  Given severe GAVE with chronic oozing she should not be started on on IV heparin and cannot be treated with anticoagulation. Recommend placement of IVC filter to protect against DVT        ASSESSMENT AND PLAN:  Cirrhosis  Cirrhosis is secondary to Autoimmune hepatitis. The diagnosis of cirrhosis is based upon elastography, imaging, laboratory studies, complications of cirrhosis.   The patient has normal liver function. The CTP is 6. Child class A.   The MELD score is 8.     Left popliteal DVT  She developed pain in the back of the left knee. This has been there for several weeks. Doppler of this area shows DVT. She cannot be treated with anticoagulation because of sevree GAVE with chronic GI blood loss. Will proceed with IVC filter placement in the AM.       Autoimmune Hepatitis   The diagnosis was based upon serology and liver biopsy. A liver biopsy performed in 8/2017 demonstrated moderate to severe inflammation with interface hepatitis, with piecemeal necrosis and stage 2-3 fibrosis. Elastography performed in 4/2019 suggested Cirrhosis. AIH is being treated with TAC 1 mg BID since 10/2019. The most recent laboratory studies indicate that the liver transaminases are normal, alkaline phosphatase is elevated, tests of hepatic synthetic and metabolic function are normal, and the platelet count is normal.       Primary Sclerosing Cholangitis. This diagnosis is based on MRI/MRCP performed in 6/2020. Intrahepatic biliary ducts have a mildly beaded appearance, suggestive of primary sclerosing cholangitis.      GAVE  EGD in 7/2019 at [facility] demonstrated small esophageal varices and GAVE  EGD in 10/2021 demonstrated small esophageal varices, no banding performed GAVE that was actively oozing.       Hepatic encephalopathy   Overt HE has not developed to date. There is no need for treatment with lactulose and/or Xifaxan at this time. There is no need to restrict dietary protein at this time.       Thrombocytopenia   This is secondary to cirrhosis. There is no evidence of overt bleeding. No treatment is required. The platelet count is adequate for the patient to undergo procedures without the need for platelet transfusion or platelet growth factors.       SYSTEM REVIEW:  Constitution systems: Negative for fever, chills, weight gain, weight loss. Eyes: Negative for visual changes. ENT: Negative for sore throat, painful swallowing.    Respiratory: Negative for cough, hemoptysis, SOB. Cardiology: Negative for chest pain, palpitations. GI:  Negative for constipation or diarrhea. : Negative for urinary frequency, dysuria, hematuria, nocturia. Skin: Negative for rash. Hematology: Negative for easy bruising, blood clots. Musculo-skelatal: Left leg pain. Neurologic: Negative for headaches, dizziness, vertigo, memory problems not related to HE. Psychology: Negative for anxiety, depression. FAMILY HISTORY:  The father  of MI. The mother  of MI. The following family members have liver disease: a brother     SOCIAL HISTORY:  The patient is . The patient has 2 children, and 8 grandchildren. The patient stopped using tobacco products in . The patient has never consumed significant amounts of alcohol. The patient used to work as as a  at [employer]. The patient retired in . PHYSICAL EXAMINATION:  VS: per nursing note  General:  No acute distress. Eyes:  Sclera anicteric. ENT:  No oral lesions. Thyroid normal.  Nodes:  No adenopathy. Skin:  No spider angiomata. No jaundice. Respiratory:  Lungs clear to auscultation. Cardiovascular:  Regular heart rate. Abdomen:  Soft non-tender, No obvious ascites. Extremities:  Mild lower edema left. Pain in left thigh and behind left knee   Neurologic:  Alert and oriented. Cranial nerves grossly intact. No asterixis. LABORATORY:  Results for Alex Aguayo (MRN [id number]) as of 3/16/2022 17:45   Ref.  Range 3/10/2022 10:07 3/16/2022 16:30   WBC Latest Ref Range: 4.6 - 13.2 K/uL 5.5 6.6   HGB Latest Ref Range: 12.0 - 16.0 g/dL 12.6 12.8   PLATELET Latest Ref Range: 135 - 420 K/uL 226 198   INR Latest Ref Range: 0.8 - 1.2   1.2 1.2   Sodium Latest Ref Range: 136 - 145 mmol/L 138 140   Potassium Latest Ref Range: 3.5 - 5.5 mmol/L 4.0 4.3   Chloride Latest Ref Range: 100 - 111 mmol/L 108 108   CO2 Latest Ref Range: 21 - 32 mmol/L 25 25   Glucose Latest Ref Range: 74 - 99 mg/dL 196 (H) 118 (H)   BUN Latest Ref Range: 7.0 - 18 MG/DL 14 16   Creatinine Latest Ref Range: 0.6 - 1.3 MG/DL 1.04 0.98   Bilirubin, total Latest Ref Range: 0.2 - 1.0 MG/DL 0.4    Albumin Latest Ref Range: 3.4 - 5.0 g/dL 3.5    ALT Latest Ref Range: 13 - 56 U/L 40    AST Latest Ref Range: 10 - 38 U/L 27    Alk. phosphatase Latest Ref Range: 45 - 117 U/L 145 (H)        RADIOLOGY:  Doppler.   Left leg popliteal DVT      Ashley Arriola MD  [address]  [address] [facility], [address] [place] [address] Josee Camara, [address]  12 [facility]

## 2022-03-16 NOTE — Clinical Note
TRANSFER - OUT REPORT:    Verbal report given to Thalia(name) on Cooper Peterson  being transferred to [facility](unit) for routine progression of care       Report consisted of patients Situation, Background, Assessment and   Recommendations(SBAR). Information from the following report(s) SBAR and Procedure Summary was reviewed with the receiving nurse. Lines: @[facility](4,5,6,7,8,12)@     Opportunity for questions and clarification was provided.       Patient transported with:   Phizzle

## 2022-03-16 NOTE — H&P
[facility]  HISTORY AND PHYSICAL    Name:  Marshall Agudelo  MR#:   [id number]  :  1950  ACCOUNT #:  [de-identified]  ADMIT DATE:  2022    ADMITTING DIAGNOSES:  1. Left lower extremity popliteal deep vein thrombosis. 2.  Gastric antral vascular ectasia syndrome. 3.  Autoimmune hepatitis. 4.  Insulin-dependent diabetes. 5.  Chronic back pain. 6.  Primary sclerosing cholangitis. 7.  Thrombocytopenia. HISTORY OF PRESENT ILLNESS:  This is a 67-year-old female with liver disease. She is on tacrolimus twice daily. 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 She is off aspirin because of that. She came in for left leg pain, this has been going on about 2 months. Initially diagnosed as neuropathy and then subsequently set up to have an ultrasound by the Hepatology office and was positive for popliteal vein DVT. She was sent to the emergency room and she is being admitted to have an IVC filter placed. Hepatology has seen her. They recommend against anticoagulation because of the chronic GI oozing she has due to her stomach issue and cirrhosis. PAST MEDICAL HISTORY:  1.  Primary sclerosing cholangitis. 2.  Autoimmune hepatitis. 3.  Chronic back pain. 4.  Diabetes. 5.  Hypertension. 6.  Anemia. PREVIOUS SURGERY:  Liver biopsy, back procedures. ALLERGIES:  TO ADHESIVE TAPE, NAPROXEN, PENICILLIN, TRAMADOL, VICODIN. HOME MEDS:  She takes,  1. Percocet and morphine intermittently. 2.  Glucotrol. 3.  Minoxidil. 4.  Prilosec. 5.  Aldactone. 6.  Norvasc. 7.  Albuterol inhaler. 8.  70/30 Mix insulin. 9.  Neurontin. 10.  Elavil that she takes sometimes at night. SOCIAL HISTORY:  Nonsmoker, nondrinker. Lives at home with family. FAMILY HISTORY:  She has a family history of bleeding disorders and her daughter has had blood clots as well. REVIEW OF SYSTEMS:  GENERAL:  No fevers or chills.   RESPIRATORY:  No shortness of breath, cough or wheezing. CARDIOVASCULAR:  No chest pain, palpitations. She does have left leg swelling. GI:  No nausea, vomiting, or recent GI bleeding or blood in the stool. No abdominal pain. :  No dysuria or hematuria. HEMATOLOGIC:  No bleeding or bruisability currently. PHYSICAL EXAMINATION:  GENERAL:  She is awake and alert, in good spirits, 78-[facility]-old [facility] [employer] female in no acute distress, oriented x3. VITAL SIGNS:  Blood pressure 161/90, pulse 86, temp 99, respiratory rate 20, SaO2 100% on room air. HEENT:  Oropharynx is dry. No lesions. Sclerae anicteric. Conjunctivae pink. NECK:  Supple. No thyromegaly or lymphadenopathy. LUNGS:  Clear bilaterally. No rales, rhonchi or wheezes. CARDIAC:  Regular rate and rhythm. No murmur, rub or gallop. ABDOMEN:  Soft, nontender, no distention or ascites. EXTREMITIES:  Left lower extremity is tender with swelling of the left calf. No palpable cords. Distal pulses palpable. Right lower extremity, no pitting edema noted. LABORATORY DATA:  Her labs show glucose of 114. CBC which is normal.  INR is 1.2. Metabolic panel is normal.  INR as noted. Tacrolimus level from 03/10 was 3.9. Duplex of the lower extremity on the left side shows an acute nonocclusive thrombus in the left popliteal vein. ASSESSMENT:  1. Deep vein thrombosis, left leg.  2.  Gastric antral vascular ectasia syndrome with chronic gastrointestinal blood loss and anemia. 3.  Cirrhosis due to autoimmune hepatitis and primary sclerosing cholangitis. 4.  Insulin-dependent diabetes mellitus. 5.  Hypertension. PLAN:  Admit to the hospital for IVC filter placement. Hepatology consult is done. She should be n.p.o. after midnight for the procedure tomorrow. Repeat labs again in the morning. Continue her home medications. Accu-Cheks a.c., at bedtime with sliding scale insulin as needed.   Check echocardiogram.  Consult with PT tomorrow after her procedure. Expected length of stay 48 hours. I have discussed the plan of care with both her and her daughter. We will make sure she is on a PPI and her usual diuretics to include Aldactone and Lasix. There is no evidence for hepatic encephalopathy. She has her pain medication ordered as needed.   She is stable for admission to eKnny Virk MD      [id number]  D:  03/16/2022 18:36  T:  03/16/2022 19:42  JOB #:  [id number]

## 2022-03-16 NOTE — ED NOTES
Pt brought over to ED from [address] d/t blood clot to left leg

## 2022-03-17 ENCOUNTER — APPOINTMENT (OUTPATIENT)
Dept: INTERVENTIONAL RADIOLOGY/VASCULAR | Age: 72
End: 2022-03-17
Attending: HOSPITALIST
Payer: MEDICARE

## 2022-03-17 ENCOUNTER — APPOINTMENT (OUTPATIENT)
Dept: NON INVASIVE DIAGNOSTICS | Age: 72
End: 2022-03-17
Attending: HOSPITALIST
Payer: MEDICARE

## 2022-03-17 LAB
ECHO AO ROOT DIAM: 3.3 CM
ECHO AO ROOT INDEX: 1.68 CM/M2
ECHO AV AREA PEAK VELOCITY: 2 CM2
ECHO AV AREA VTI: 2.1 CM2
ECHO AV AREA/BSA PEAK VELOCITY: 1 CM2/M2
ECHO AV AREA/BSA VTI: 1.1 CM2/M2
ECHO AV MEAN GRADIENT: 4 MMHG
ECHO AV MEAN VELOCITY: 0.9 M/S
ECHO AV PEAK GRADIENT: 6 MMHG
ECHO AV PEAK VELOCITY: 1.3 M/S
ECHO AV VELOCITY RATIO: 0.62
ECHO AV VTI: 26.9 CM
ECHO EST RA PRESSURE: 3 MMHG
ECHO LA DIAMETER INDEX: 1.83 CM/M2
ECHO LA DIAMETER: 3.6 CM
ECHO LA TO AORTIC ROOT RATIO: 1.09
ECHO LA VOL 4C: 46 ML (ref 22–52)
ECHO LA VOLUME INDEX A4C: 23 ML/M2 (ref 16–34)
ECHO LV E' LATERAL VELOCITY: 8 CM/S
ECHO LV E' SEPTAL VELOCITY: 8 CM/S
ECHO LV EDV A2C: 53 ML
ECHO LV EDV A4C: 77 ML
ECHO LV EDV BP: 67 ML (ref 56–104)
ECHO LV EDV INDEX A4C: 39 ML/M2
ECHO LV EDV INDEX BP: 34 ML/M2
ECHO LV EDV NDEX A2C: 27 ML/M2
ECHO LV EJECTION FRACTION A2C: 53 %
ECHO LV EJECTION FRACTION A4C: 56 %
ECHO LV EJECTION FRACTION BIPLANE: 56 % (ref 55–100)
ECHO LV ESV A2C: 25 ML
ECHO LV ESV A4C: 34 ML
ECHO LV ESV BP: 29 ML (ref 19–49)
ECHO LV ESV INDEX A2C: 13 ML/M2
ECHO LV ESV INDEX A4C: 17 ML/M2
ECHO LV ESV INDEX BP: 15 ML/M2
ECHO LV GLOBAL LONGITUDINAL STRAIN (GLS): -14 %
ECHO LV INTERNAL DIMENSION DIASTOLE INDEX: 1.83 CM/M2
ECHO LV INTERNAL DIMENSION DIASTOLIC: 3.6 CM (ref 3.9–5.3)
ECHO LV IVSD: 1.5 CM (ref 0.6–0.9)
ECHO LV MASS 2D: 212 G (ref 67–162)
ECHO LV MASS INDEX 2D: 107.6 G/M2 (ref 43–95)
ECHO LV POSTERIOR WALL DIASTOLIC: 1.6 CM (ref 0.6–0.9)
ECHO LV RELATIVE WALL THICKNESS RATIO: 0.89
ECHO LVOT AREA: 3.5 CM2
ECHO LVOT AV VTI INDEX: 0.64
ECHO LVOT DIAM: 2.1 CM
ECHO LVOT MEAN GRADIENT: 1 MMHG
ECHO LVOT PEAK GRADIENT: 2 MMHG
ECHO LVOT PEAK VELOCITY: 0.8 M/S
ECHO LVOT STROKE VOLUME INDEX: 30 ML/M2
ECHO LVOT SV: 59.2 ML
ECHO LVOT VTI: 17.1 CM
ECHO MV A VELOCITY: 0.66 M/S
ECHO MV E DECELERATION TIME (DT): 249.8 MS
ECHO MV E VELOCITY: 0.57 M/S
ECHO MV E/A RATIO: 0.86
ECHO MV E/E' LATERAL: 7.13
ECHO MV E/E' RATIO (AVERAGED): 7.13
ECHO MV E/E' SEPTAL: 7.13
ECHO PULMONARY ARTERY END DIASTOLIC PRESSURE: 5 MMHG
ECHO PV REGURGITANT MAX VELOCITY: 1.2 M/S
ECHO RIGHT VENTRICULAR SYSTOLIC PRESSURE (RVSP): 25 MMHG
ECHO RV FREE WALL PEAK S': 18 CM/S
ECHO RV INTERNAL DIMENSION: 2.3 CM
ECHO RV TAPSE: 2.2 CM (ref 1.5–2)
ECHO TV REGURGITANT MAX VELOCITY: 2.32 M/S
ECHO TV REGURGITANT PEAK GRADIENT: 22 MMHG
GLUCOSE BLD STRIP.AUTO-MCNC: 153 MG/DL (ref 70–110)
GLUCOSE BLD STRIP.AUTO-MCNC: 167 MG/DL (ref 70–110)
GLUCOSE BLD STRIP.AUTO-MCNC: 170 MG/DL (ref 70–110)
GLUCOSE BLD STRIP.AUTO-MCNC: 270 MG/DL (ref 70–110)
GLUCOSE BLD STRIP.AUTO-MCNC: 95 MG/DL (ref 70–110)

## 2022-03-17 PROCEDURE — 74011250637 HC RX REV CODE- 250/637: Performed by: HOSPITALIST

## 2022-03-17 PROCEDURE — 74011250636 HC RX REV CODE- 250/636: Performed by: HOSPITALIST

## 2022-03-17 PROCEDURE — 97116 GAIT TRAINING THERAPY: CPT

## 2022-03-17 PROCEDURE — 74011250636 HC RX REV CODE- 250/636: Performed by: FAMILY MEDICINE

## 2022-03-17 PROCEDURE — 96374 THER/PROPH/DIAG INJ IV PUSH: CPT

## 2022-03-17 PROCEDURE — 74011636637 HC RX REV CODE- 636/637: Performed by: HOSPITALIST

## 2022-03-17 PROCEDURE — 93306 TTE W/DOPPLER COMPLETE: CPT

## 2022-03-17 PROCEDURE — 77030040831 HC BAG URINE DRNG MDII -A

## 2022-03-17 PROCEDURE — 74011250636 HC RX REV CODE- 250/636: Performed by: RADIOLOGY

## 2022-03-17 PROCEDURE — G0378 HOSPITAL OBSERVATION PER HR: HCPCS

## 2022-03-17 PROCEDURE — 74011000250 HC RX REV CODE- 250: Performed by: RADIOLOGY

## 2022-03-17 PROCEDURE — 82962 GLUCOSE BLOOD TEST: CPT

## 2022-03-17 PROCEDURE — 74011000636 HC RX REV CODE- 636: Performed by: RADIOLOGY

## 2022-03-17 PROCEDURE — 97161 PT EVAL LOW COMPLEX 20 MIN: CPT

## 2022-03-17 PROCEDURE — 77030013687 IR IVC FILTER PLACEMENT PERCUTANEOUS

## 2022-03-17 RX ORDER — DIPHENHYDRAMINE HYDROCHLORIDE 50 MG/ML
12.5 INJECTION, SOLUTION INTRAMUSCULAR; INTRAVENOUS
Status: DISCONTINUED | OUTPATIENT
Start: 2022-03-17 | End: 2022-03-17

## 2022-03-17 RX ORDER — DIPHENHYDRAMINE HYDROCHLORIDE 50 MG/ML
12.5 INJECTION, SOLUTION INTRAMUSCULAR; INTRAVENOUS ONCE
Status: COMPLETED | OUTPATIENT
Start: 2022-03-17 | End: 2022-03-17

## 2022-03-17 RX ORDER — DIPHENHYDRAMINE HYDROCHLORIDE 50 MG/ML
25 INJECTION, SOLUTION INTRAMUSCULAR; INTRAVENOUS
Status: DISCONTINUED | OUTPATIENT
Start: 2022-03-17 | End: 2022-03-18 | Stop reason: HOSPADM

## 2022-03-17 RX ORDER — HEPARIN SODIUM 200 [USP'U]/100ML
500 INJECTION, SOLUTION INTRAVENOUS
Status: COMPLETED | OUTPATIENT
Start: 2022-03-17 | End: 2022-03-17

## 2022-03-17 RX ORDER — LIDOCAINE HYDROCHLORIDE 10 MG/ML
1-10 INJECTION, SOLUTION EPIDURAL; INFILTRATION; INTRACAUDAL; PERINEURAL
Status: COMPLETED | OUTPATIENT
Start: 2022-03-17 | End: 2022-03-17

## 2022-03-17 RX ORDER — LANOLIN ALCOHOL/MO/W.PET/CERES
1 CREAM (GRAM) TOPICAL
Status: DISCONTINUED | OUTPATIENT
Start: 2022-03-17 | End: 2022-03-18 | Stop reason: HOSPADM

## 2022-03-17 RX ADMIN — TACROLIMUS 1 MG: 1 CAPSULE ORAL at 11:49

## 2022-03-17 RX ADMIN — FUROSEMIDE 40 MG: 40 TABLET ORAL at 11:49

## 2022-03-17 RX ADMIN — FERROUS SULFATE TAB 325 MG (65 MG ELEMENTAL FE) 325 MG: 325 (65 FE) TAB at 16:29

## 2022-03-17 RX ADMIN — DIPHENHYDRAMINE HYDROCHLORIDE 12.5 MG: 50 INJECTION, SOLUTION INTRAMUSCULAR; INTRAVENOUS at 18:50

## 2022-03-17 RX ADMIN — GABAPENTIN 300 MG: 300 CAPSULE ORAL at 11:49

## 2022-03-17 RX ADMIN — TACROLIMUS 1 MG: 1 CAPSULE ORAL at 22:04

## 2022-03-17 RX ADMIN — INSULIN LISPRO 2 UNITS: 100 INJECTION, SOLUTION INTRAVENOUS; SUBCUTANEOUS at 18:10

## 2022-03-17 RX ADMIN — INSULIN LISPRO 6 UNITS: 100 INJECTION, SOLUTION INTRAVENOUS; SUBCUTANEOUS at 22:10

## 2022-03-17 RX ADMIN — SPIRONOLACTONE 100 MG: 100 TABLET ORAL at 11:49

## 2022-03-17 RX ADMIN — PANTOPRAZOLE SODIUM 40 MG: 40 TABLET, DELAYED RELEASE ORAL at 11:49

## 2022-03-17 RX ADMIN — GABAPENTIN 300 MG: 300 CAPSULE ORAL at 22:04

## 2022-03-17 RX ADMIN — IOPAMIDOL 15 ML: 612 INJECTION, SOLUTION INTRAVENOUS at 09:54

## 2022-03-17 RX ADMIN — DIPHENHYDRAMINE HYDROCHLORIDE 12.5 MG: 50 INJECTION, SOLUTION INTRAMUSCULAR; INTRAVENOUS at 16:29

## 2022-03-17 RX ADMIN — GABAPENTIN 300 MG: 300 CAPSULE ORAL at 16:29

## 2022-03-17 RX ADMIN — AMLODIPINE BESYLATE 10 MG: 5 TABLET ORAL at 11:49

## 2022-03-17 RX ADMIN — MINOXIDIL 2.5 MG: 2.5 TABLET ORAL at 11:49

## 2022-03-17 RX ADMIN — HEPARIN SODIUM IN SODIUM CHLORIDE 1000 UNITS: 200 INJECTION INTRAVENOUS at 09:54

## 2022-03-17 RX ADMIN — LIDOCAINE HYDROCHLORIDE 5 ML: 10 INJECTION, SOLUTION EPIDURAL; INFILTRATION; INTRACAUDAL; PERINEURAL at 09:54

## 2022-03-17 RX ADMIN — MORPHINE SULFATE 15 MG: 15 TABLET ORAL at 11:49

## 2022-03-17 RX ADMIN — INSULIN LISPRO 2 UNITS: 100 INJECTION, SOLUTION INTRAVENOUS; SUBCUTANEOUS at 12:03

## 2022-03-17 NOTE — PROGRESS NOTES
0800:Received SBAR from off going shift RN in  made aware patient have been made NPO since midnight for scheduled procedure this morning. 10:05 writer notified by IR patient had a bandage to right IJ place (see notes) patient is to bed on bed rest for 1 hour upon returning to unit. 1025: Patient returned to floor assessment completed no signs of distress patient denies SOB. Dressing to right side of neck is clean, dry and intact. 1610: patient c/o itching at procedure site. Writer paged Dr. Arelis Martinez. See orders. 1812: Patient c/o of itching at procedure site writer paged hospitalist Celi Parrish see order modifications. Will update receiving RN  With SBAR.

## 2022-03-17 NOTE — PROGRESS NOTES
Problem: Falls - Risk of  Goal: *Absence of Falls  Description: Document Alverto Martell Fall Risk and appropriate interventions in the flowsheet.   Outcome: Progressing Towards Goal  Note: Fall Risk Interventions:  Mobility Interventions: Patient to call before getting OOB         Medication Interventions: Patient to call before getting OOB    Elimination Interventions: Call light in reach

## 2022-03-17 NOTE — PROGRESS NOTES
Problem: Mobility Impaired (Adult and Pediatric)  Goal: *Acute Goals and Plan of Care (Insert Text)  Outcome: Resolved/Met  Note:   physical Therapy EVALUATION & Discharge    Patient: Yumiko Franco (7 y.o. female)  Date: 3/17/2022  Primary Diagnosis: DVT (deep venous thrombosis) ([facility] [I82.409]  Precautions:   Fall    ASSESSMENT AND RECOMMENDATIONS:  Based on the objective data described below, the patient presents with good strength bilaterally, supervision level for all bed mobility, transfers and ambulation with quad cane. No LOB during session. SPO >90s and  with mobility. Pt appears at baseline level for mobility. Skilled physical therapy is not indicated at this time. Discharge Recommendations: None  Further Equipment Recommendations for Discharge: N/A      SUBJECTIVE:   Patient stated I feel much better.     OBJECTIVE DATA SUMMARY:     Past Medical History:   Diagnosis Date    Arthritis     Autoimmune disease ([facility]     HEPATITIS     Chronic obstructive pulmonary disease (HCC)     Chronic pain     BACK PAIN    Diabetes (HCC)     Gastrointestinal disorder     GERD (gastroesophageal reflux disease)     Hypertension     Liver disease      Past Surgical History:   Procedure Laterality Date    BIOPSY LIVER      HX BREAST BIOPSY      HX CATARACT REMOVAL      HX HYSTERECTOMY      HX ORTHOPAEDIC  03/2020    BACK X5    HX ORTHOPAEDIC Bilateral     bilateral knee scopes    HX ROTATOR CUFF REPAIR Bilateral     HX TUBAL LIGATION      IR IVC FILTER PLACEMENT PERCUTANEOUS  3/17/2022     Barriers to Learning/Limitations: None  Compensate with: visual, verbal, tactile, kinesthetic cues/model  Prior Level of Function/Home Situation: Independent ambulation without AD  Home Situation  Home Environment: Apartment  # Steps to Enter: 1  One/Two Story Residence: One story  Living Alone: Yes  Support Systems: Spouse/Significant Other  Patient Expects to be Discharged to[de-identified] Home  Current DME Used/Available at Home: None  Critical Behavior:  Psychosocial  Purposeful Interaction: Yes  Pt Identified Daily Priority: Clinical issues (comment)  Caritas Process: Nurture loving kindness;Enable denise/hope;Establish trust  Caring Interventions: Reassure  Reassure: Therapeutic listening; Informing; Acceptance  Strength:    Strength: Generally decreased, functional  Tone & Sensation:   Tone: Normal  Sensation: Impaired  Range Of Motion:  AROM: Generally decreased, functional  PROM: Generally decreased, functional  Functional Mobility:  Bed Mobility:   Supine to Sit: Supervision  Transfers:  Sit to Stand: Supervision  Stand to Sit: Supervision  Balance:   Sitting: Intact  Standing: Intact; With support  Ambulation/Gait Training:  Distance (ft): 150 Feet (ft)  Assistive Device: Gait belt;Cane, quad  Ambulation - Level of Assistance: Supervision   Gait Description (WDL): Exceptions to WDL  Gait Abnormalities: Decreased step clearance  Base of Support: Shift to right   Speed/Lucero: Slow  Step Length: Right shortened;Left shortened  Pain:  Pain Scale 1: Numeric (0 - 10)  Pain Intensity 1: 6  Activity Tolerance:   Good  Please refer to the flowsheet for vital signs taken during this treatment. After treatment:   [x]         Patient left in no apparent distress sitting up in chair  []         Patient left in no apparent distress in bed  [x]         Call bell left within reach  [x]         Nursing notified  []         Caregiver present  []         Bed alarm activated    COMMUNICATION/EDUCATION:   [x]         Fall prevention education was provided and the patient/caregiver indicated understanding. [x]         Patient/family have participated as able in goal setting and plan of care. [x]         Patient/family agree to work toward stated goals and plan of care. []         Patient understands intent and goals of therapy, but is neutral about his/her participation.   []         Patient is unable to participate in goal setting and plan of care.    Thank you for this referral.  Janeth Webber   Time Calculation: 23 mins   Eval Complexity: History: MEDIUM  Complexity : 1-2 comorbidities / personal factors will impact the outcome/ POC Exam:LOW Complexity : 1-2 Standardized tests and measures addressing body structure, function, activity limitation and / or participation in recreation  Presentation: LOW Complexity : Stable, uncomplicated  Clinical Decision Making:Low Complexity    Overall Complexity:LOW

## 2022-03-17 NOTE — PROGRESS NOTES
Reason for Admission:  Left lower extremity popliteal deep vein thrombosis                     RUR Score:  N/A                   Plan for utilizing home health:  Possible H2H        PCP: First and Last name:  Marty Ramirez MD     Name of Practice:    Are you a current patient: Yes/No:    Approximate date of last visit:    Can you participate in a virtual visit with your PCP:                     Current Advanced Directive/Advance Care Plan: Full Code      Healthcare Decision Maker:   Click here to complete [address] including selection of the Healthcare Decision Maker Relationship (ie \"Primary\")                             Transition of Care Plan:     [facility] with physician follow up vs Home with physician follow up                Chart reviewed. Per H&P \"This is a 39-year-old female with liver disease. She is on tacrolimus twice daily. 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 She is off aspirin because of that. She came in for left leg pain, this has been going on about 2 months. Initially diagnosed as neuropathy and then subsequently set up to have an ultrasound by the Hepatology office and was positive for popliteal vein DVT. She was sent to the emergency room and she is being admitted to have an IVC filter placed. Hepatology has seen her. They recommend against anticoagulation because of the chronic GI oozing she has due to her stomach issue and cirrhosis. \"    CM met with pt and her daughter at bedside to discuss transition of care. Pt lives alone and has access to a quad cane and a rollator. Pt does drive herself to appointments and to run errands. Pt's daughter and significant other will assist as needed. Pt has inquired about a new PCP. Pt is aware that if we are unable to find a new PCP to see her in a timely manner then pt is agreeable to returning to her previous PCP.   Anticipate pt will transition home with physician follow up vs [facility] with physician follow up with in the next 24-48 hours. CM to continue to follow and assist.    Care Management Interventions  Mode of Transport at Discharge:  Other (see comment) (Family)  Transition of Care Consult (CM Consult): Discharge Planning  Health Maintenance Reviewed: Yes  Physical Therapy Consult: Yes  Support Systems: Spouse/Significant Other,Child(kaden),Other Family Member(s)  The Plan for Transition of Care is Related to the Following Treatment Goals : [facility] with physician follow up vs Home with physician follow up  Discharge Location  Patient Expects to be Discharged to[de-identified] Home with family assistance

## 2022-03-17 NOTE — PROGRESS NOTES
2352  Instructed patient to be on NPO starting midnight. Patient on fall risk (with history of falls); fall risk armband attached to patient's wrist. Patient with bedside commode at bedside.

## 2022-03-17 NOTE — ROUTINE PROCESS
1930  Bedside and Verbal shift change report given to Kanu Flores, RN and Terell Samaniego RN (oncoming nurse) by Trent Cedillo RN (offgoing nurse). Report included the following information SBAR, Kardex, Intake/Output, MAR and Recent Results. 03/17/22 0750  Verbal shift change report given to Ana Garg RN (oncoming nurse) by Kanu Flores RN and Terell Samaniego RN (offgoing nurse). Report included the following information SBAR, Kardex, Intake/Output, MAR and Recent Results.

## 2022-03-17 NOTE — PROCEDURES
Interventional Radiology Brief Procedure Note    Interventional Radiologist: Efe Darnell MD    Pre-operative Diagnosis: DVT. Gastric ulcer. Post-operative Diagnosis: Same as pre-operative diagnosis    Procedure(s) Performed:  IVC filter placement    Anesthesia:  Local     Findings: Infrarenal IVC filter placed via Right IJV. Full report to follow.      Complications: None    Estimated Blood Loss:  minimal    Specimens: None    Condition: Good    Efe Darnell MD  [facility]  3/17/2022

## 2022-03-17 NOTE — DIABETES MGMT
Diabetes/ Glycemic Control Plan of Care  Recommendations:    Recommend initiating a low dose of Lantus - 5 units every 24 hours, and monitoring need for additional basal   Continue normal sensitivity corrective Humalog     Assessment:   Patient with known h/o T2DM, with A1c appropriate for age & comorbidities. Fasting BG elevated this morning - 170 mg/dL. Patient would benefit from basal insulin. Patient manages at home with Novolog 70/30 insulin, and Glucotrol. Patient on a regular diet with 4 CHO choices. Per nursing, % of tray eaten yesterday evening, not yet reported for today. Will continue to monitor for additional glycemic needs. DX:   1. Cirrhosis of liver without ascites, unspecified hepatic cirrhosis type ([facility]     2. Autoimmune hepatitis ([facility]     3. GAVE (gastric antral vascular ectasia)     4. Acute deep vein thrombosis (DVT) of popliteal vein of left lower extremity (HCC)        Fasting/ Morning blood glucose:   Lab Results   Component Value Date/Time    Glucose 118 (H) 03/16/2022 04:30 PM    Glucose (POC) 170 (H) 03/17/2022 07:41 AM     IV Fluids containing dextrose:  None  Steroids:   None    Blood glucose values: Within target range (70-180mg/dL): Yes  Current insulin orders:    Corrective Humalog ACHS - normal sensitivity scale  Total Daily Dose previous 24 hours =  None    Current A1c:   Lab Results   Component Value Date/Time    Hemoglobin A1c 7.3 (H) 03/16/2022 04:30 PM      Nutrition/Diet:   Active Orders   Diet    ADULT DIET Regular; 4 carb choices (60 gm/meal)      Meal Intake:  Patient Vitals for the past 168 hrs:   % Diet Eaten   03/16/22 1754 76 - 100%     Plan/Goals:   Blood glucose will be within target of 70 - 180 mg/dl within 72 hours  Reinforce dietary and medication compliance at home.        Education:  [] Refer to Diabetes Education Record                       [x] Education not indicated at this time     Aftab Roy RN, BSN, [facility]  Professional Development Specialist  Glycemic Control Team   Phone:  [phone]  Tues - Thurs 8:30 - 4:30

## 2022-03-17 NOTE — PROGRESS NOTES
Hospitalist Progress Note    Patient: Daphene Brunner MRN: [id number]  CSN: [id number]    YOB: 1950  Age: 66 y.o. Sex: female    DOA: 3/16/2022 LOS:  LOS: 0 days          Chief Complaint:    dvt      Assessment/Plan     1. Left lower extremity popliteal deep vein thrombosis. S/p filter placement  2. GAVE  3. Autoimmune hepatitis/cirrhosis  4. Insulin-dependent diabetes. BG are stable  5. Chronic back pain. 6.  Primary sclerosing cholangitis. 7.  Thrombocytopenia. Walk with PT    Repeat labs in am    If all stable can d/c in am          Disposition :  Patient Active Problem List   Diagnosis Code    Severe obesity ([facility] E66.01    Autoimmune hepatitis ([facility] K75.4    Thrombocytopenia (HCC) D69.6    Cirrhosis ([facility] K74.60    GAVE (gastric antral vascular ectasia) K31.819    Hypoglycemia E16.2    Primary sclerosing cholangitis K83.01    DVT (deep venous thrombosis) (HCC) I82.409    Insulin dependent type 2 diabetes mellitus (HCC) E11.9, Z79.4       Subjective:  Pain in left calf unchanged  No new issues  Had her IVC filter placed      Review of systems:    Constitutional: denies fevers  Respiratory: denies SOB, cough  Cardiovascular: denies chest pain  Gastrointestinal: denies nausea, vomiting, diarrhea      Vital signs/Intake and Output:  Visit Vitals  BP (!) 148/85 (BP 1 Location: Left upper arm, BP Patient Position: At rest)   Pulse 77   Temp 98 °F (36.7 °C)   Resp 18   Ht 5' 3\" (1.6 m)   Wt 95.3 kg (210 lb)   SpO2 100%   Breastfeeding No   BMI 37.20 kg/m²     Current Shift:  No intake/output data recorded.   Last three shifts:  03/15 1901 - 03/17 0700  In: 480 [P.O.:480]  Out: 1300 [Urine:1300]    Exam:    General: Well developed, alert, NAD, OX3  CVS:Regular rate and rhythm, no M/R/G, S1/S2 heard, no thrill  Lungs:Clear to auscultation bilaterally, no wheezes, rhonchi, or rales  Abdomen: Soft, Nontender, No distention  Extremities: tender edema LLE at calf  Neuro:grossly normal , follows commands  Psych:appropriate                Labs: Results:       Chemistry Recent Labs     03/16/22  1630   *      K 4.3      CO2 25   BUN 16   CREA 0.98   CA 9.2   AGAP 7   BUCR 16      CBC w/Diff Recent Labs     03/16/22  1630   WBC 6.6   RBC 4.76   HGB 12.8   HCT 39.6      GRANS 61   LYMPH 23   EOS 1      Cardiac Enzymes No results for input(s): CPK, CKND1, FARHANA in the last 72 hours. No lab exists for component: CKRMB, TROIP   Coagulation Recent Labs     03/16/22  1630   PTP 14.5   INR 1.2       Lipid Panel No results found for: CHOL, CHOLPOCT, CHOLX, CHLST, CHOLV, 392163, HDL, HDLP, LDL, LDLC, DLDLP, 209502, VLDLC, VLDL, TGLX, TRIGL, TRIGP, TGLPOCT, CHHD, CHHDX   BNP No results for input(s): BNPP in the last 72 hours. Liver Enzymes No results for input(s): TP, ALB, TBIL, AP in the last 72 hours.     No lab exists for component: SGOT, GPT, DBIL   Thyroid Studies No results found for: T4, T3U, TSH, TSHEXT     Procedures/imaging: see electronic medical records for all procedures/Xrays and details which were not copied into this note but were reviewed prior to creation of Leeroy Keating MD

## 2022-03-18 VITALS
HEIGHT: 63 IN | OXYGEN SATURATION: 95 % | DIASTOLIC BLOOD PRESSURE: 85 MMHG | TEMPERATURE: 98.7 F | SYSTOLIC BLOOD PRESSURE: 130 MMHG | HEART RATE: 88 BPM | RESPIRATION RATE: 16 BRPM | WEIGHT: 222 LBS | BODY MASS INDEX: 39.34 KG/M2

## 2022-03-18 PROBLEM — K31.819 GAVE (GASTRIC ANTRAL VASCULAR ECTASIA): Status: ACTIVE | Noted: 2019-06-10

## 2022-03-18 PROBLEM — K75.4 AUTOIMMUNE HEPATITIS (HCC): Status: ACTIVE | Noted: 2019-04-17

## 2022-03-18 LAB
ALBUMIN SERPL-MCNC: 3.1 G/DL (ref 3.4–5)
ALBUMIN/GLOB SERPL: 0.7 {RATIO} (ref 0.8–1.7)
ALP SERPL-CCNC: 121 U/L (ref 45–117)
ALT SERPL-CCNC: 39 U/L (ref 13–56)
ANION GAP SERPL CALC-SCNC: 5 MMOL/L (ref 3–18)
AST SERPL-CCNC: 27 U/L (ref 10–38)
BASOPHILS # BLD: 0 K/UL (ref 0–0.1)
BASOPHILS NFR BLD: 0 % (ref 0–2)
BILIRUB SERPL-MCNC: 0.5 MG/DL (ref 0.2–1)
BUN SERPL-MCNC: 14 MG/DL (ref 7–18)
BUN/CREAT SERPL: 15 (ref 12–20)
CALCIUM SERPL-MCNC: 8.8 MG/DL (ref 8.5–10.1)
CHLORIDE SERPL-SCNC: 106 MMOL/L (ref 100–111)
CO2 SERPL-SCNC: 27 MMOL/L (ref 21–32)
CREAT SERPL-MCNC: 0.93 MG/DL (ref 0.6–1.3)
DIFFERENTIAL METHOD BLD: ABNORMAL
EOSINOPHIL # BLD: 0.5 K/UL (ref 0–0.4)
EOSINOPHIL NFR BLD: 8 % (ref 0–5)
ERYTHROCYTE [DISTWIDTH] IN BLOOD BY AUTOMATED COUNT: 14.5 % (ref 11.6–14.5)
GLOBULIN SER CALC-MCNC: 4.7 G/DL (ref 2–4)
GLUCOSE BLD STRIP.AUTO-MCNC: 162 MG/DL (ref 70–110)
GLUCOSE BLD STRIP.AUTO-MCNC: 178 MG/DL (ref 70–110)
GLUCOSE SERPL-MCNC: 148 MG/DL (ref 74–99)
HCT VFR BLD AUTO: 40 % (ref 35–45)
HGB BLD-MCNC: 12.5 G/DL (ref 12–16)
IMM GRANULOCYTES # BLD AUTO: 0 K/UL (ref 0–0.04)
IMM GRANULOCYTES NFR BLD AUTO: 0 % (ref 0–0.5)
LYMPHOCYTES # BLD: 1.7 K/UL (ref 0.9–3.6)
LYMPHOCYTES NFR BLD: 29 % (ref 21–52)
MCH RBC QN AUTO: 26.4 PG (ref 24–34)
MCHC RBC AUTO-ENTMCNC: 31.3 G/DL (ref 31–37)
MCV RBC AUTO: 84.6 FL (ref 78–100)
MONOCYTES # BLD: 1 K/UL (ref 0.05–1.2)
MONOCYTES NFR BLD: 17 % (ref 3–10)
NEUTS SEG # BLD: 2.5 K/UL (ref 1.8–8)
NEUTS SEG NFR BLD: 45 % (ref 40–73)
NRBC # BLD: 0 K/UL (ref 0–0.01)
NRBC BLD-RTO: 0 PER 100 WBC
PLATELET # BLD AUTO: 180 K/UL (ref 135–420)
PMV BLD AUTO: 10 FL (ref 9.2–11.8)
POTASSIUM SERPL-SCNC: 3.9 MMOL/L (ref 3.5–5.5)
PROT SERPL-MCNC: 7.8 G/DL (ref 6.4–8.2)
RBC # BLD AUTO: 4.73 M/UL (ref 4.2–5.3)
SODIUM SERPL-SCNC: 138 MMOL/L (ref 136–145)
WBC # BLD AUTO: 5.7 K/UL (ref 4.6–13.2)

## 2022-03-18 PROCEDURE — 74011250636 HC RX REV CODE- 250/636: Performed by: HOSPITALIST

## 2022-03-18 PROCEDURE — 96375 TX/PRO/DX INJ NEW DRUG ADDON: CPT

## 2022-03-18 PROCEDURE — G0378 HOSPITAL OBSERVATION PER HR: HCPCS

## 2022-03-18 PROCEDURE — 82962 GLUCOSE BLOOD TEST: CPT

## 2022-03-18 PROCEDURE — 36415 COLL VENOUS BLD VENIPUNCTURE: CPT

## 2022-03-18 PROCEDURE — 74011250636 HC RX REV CODE- 250/636: Performed by: FAMILY MEDICINE

## 2022-03-18 PROCEDURE — 74011250637 HC RX REV CODE- 250/637: Performed by: HOSPITALIST

## 2022-03-18 PROCEDURE — 85025 COMPLETE CBC W/AUTO DIFF WBC: CPT

## 2022-03-18 PROCEDURE — 74011636637 HC RX REV CODE- 636/637: Performed by: HOSPITALIST

## 2022-03-18 PROCEDURE — 80053 COMPREHEN METABOLIC PANEL: CPT

## 2022-03-18 RX ADMIN — AMLODIPINE BESYLATE 10 MG: 5 TABLET ORAL at 08:46

## 2022-03-18 RX ADMIN — DIPHENHYDRAMINE HYDROCHLORIDE 25 MG: 50 INJECTION INTRAMUSCULAR; INTRAVENOUS at 06:30

## 2022-03-18 RX ADMIN — MINOXIDIL 2.5 MG: 2.5 TABLET ORAL at 08:47

## 2022-03-18 RX ADMIN — INSULIN LISPRO 2 UNITS: 100 INJECTION, SOLUTION INTRAVENOUS; SUBCUTANEOUS at 12:25

## 2022-03-18 RX ADMIN — TACROLIMUS 1 MG: 1 CAPSULE ORAL at 08:44

## 2022-03-18 RX ADMIN — FUROSEMIDE 40 MG: 40 TABLET ORAL at 08:46

## 2022-03-18 RX ADMIN — INSULIN LISPRO 2 UNITS: 100 INJECTION, SOLUTION INTRAVENOUS; SUBCUTANEOUS at 08:43

## 2022-03-18 RX ADMIN — METHYLPREDNISOLONE SODIUM SUCCINATE 40 MG: 40 INJECTION, POWDER, FOR SOLUTION INTRAMUSCULAR; INTRAVENOUS at 11:27

## 2022-03-18 RX ADMIN — FERROUS SULFATE TAB 325 MG (65 MG ELEMENTAL FE) 325 MG: 325 (65 FE) TAB at 08:47

## 2022-03-18 RX ADMIN — PANTOPRAZOLE SODIUM 40 MG: 40 TABLET, DELAYED RELEASE ORAL at 06:30

## 2022-03-18 RX ADMIN — GABAPENTIN 300 MG: 300 CAPSULE ORAL at 08:46

## 2022-03-18 RX ADMIN — SPIRONOLACTONE 100 MG: 100 TABLET ORAL at 08:47

## 2022-03-18 NOTE — ACP (ADVANCE CARE PLANNING)
Advance Care Planning   Advance Care Planning Inpatient Note  [facility] Department    Today's Date: 3/18/2022  Unit: THE [address] SURGICAL/ONCOLOGY    Received request from patient. Upon review of chart and communication with care team, patient's decision making abilities are not in question. Patient was/were present in the room during visit. Goals of ACP Conversation:  Discuss Advance Care planning documents    Health Care Decision Makers:      Primary Decision Maker: Tommy San - Daughter - [phone]    Secondary Decision Maker: Jeremiah Mari Daughter - [phone]      Summary:  Completed New Documents  Future Planning Documents (Patient Wishes) on file:  Healthcare Power of /Advance Directive appointment of Health care agent  Living Will/ Advance Directive  Anatomical Gift/Organ donation   Assessment:  Patient was just ready to do it.       Interventions:  Provided education on documents for clarity and greater understanding    Care Preferences Communicated:  No    Outcomes/Plan:  New Advance Directive completed  Returned original document(s) to patient, as well as copies for distribution to appointed agents  Copy of Advance Directive given to staff to scan into medical record    Chaplain Lisa on 3/18/2022 at 11:51 AM

## 2022-03-18 NOTE — PROGRESS NOTES
Problem: Falls - Risk of  Goal: *Absence of Falls  Description: Document Lightvinay Saleh Fall Risk and appropriate interventions in the flowsheet. Outcome: Progressing Towards Goal  Note: Fall Risk Interventions:  Mobility Interventions: Patient to call before getting OOB         Medication Interventions: Patient to call before getting OOB    Elimination Interventions: Call light in reach    History of Falls Interventions: Investigate reason for fall         Problem: Pressure Injury - Risk of  Goal: *Prevention of pressure injury  Description: Document Igor Scale and appropriate interventions in the flowsheet.   Outcome: Progressing Towards Goal  Note: Pressure Injury Interventions:  Sensory Interventions: Pressure redistribution bed/mattress (bed type)    Moisture Interventions: Absorbent underpads    Activity Interventions: Pressure redistribution bed/mattress(bed type)    Mobility Interventions: Pressure redistribution bed/mattress (bed type)    Nutrition Interventions: Document food/fluid/supplement intake    Friction and Shear Interventions: HOB 30 degrees or less

## 2022-03-18 NOTE — DISCHARGE SUMMARY
[address]     Name:  Susan Farr  MR#:   [id number]  :  1950  ACCOUNT #:  [de-identified]  ADMIT DATE:  2022  DISCHARGE DATE:  2022    DISCHARGE DIAGNOSES:  1. Deep venous thrombosis, left lower extremity. 2.  Gastric antral vascular ectasia syndrome. 3.  Autoimmune hepatitis and cirrhosis. 4.  Diabetes mellitus. 5.  Chronic back pain. 6.  Primary sclerosing cholangitis. 7.  Thrombocytopenia. HOSPITAL SUMMARY:  The patient is 66years old. She was discovered to have a left popliteal DVT in an outpatient study for continued leg pain over the last few weeks. She was sent to the emergency room. Thereafter, she was admitted. She is not a candidate for acute anticoagulation because she has GAVE syndrome with chronic oozing in the stomach and cirrhosis of the liver. She was seen by Hepatology. They recommended an IVC filter. She had that placed yesterday on  a.m. She tolerated this well. She had some itching from the tape on her upper chest wall, so we gave her Benadryl and steroids, that is under control. There are no diffuse hives. She looks great this morning. She is in no acute distress. Left lower calf is less tender. Metabolic panel is unremarkable. H and h are stable at 12.5 and 40.0. She is stable to discharge from the hospital today. An echo was done also, showed an EF of 55% 60%. She has had no hypoxia, shortness of breath, or chest pain. Vital signs are stable. Exam, otherwise, is unremarkable. Cardiac and lung exams are normal.  Saturation on room air is 95%. DISCHARGE DISPOSITION:  Discharge home. DISCHARGE INSTRUCTIONS:  Light activity over the next week. See Dr. Juliann Munoz on  and nurse practitioner, Elvira Sorenson, on . No anticoagulation. DISCHARGE MEDICATIONS:  Resume all of her home medications to include her  1. Symbicort. 2.  Ferrous fumarate. 3.  Glucotrol. 4.  NovoLog Mix.   5. Mobic.  6.  Prednisone. 7.  Albuterol. 8.  Norvasc. 9.  Lasix. 10.  Neurontin. 11.  Minoxidil. 12.  Morphine for pain. 13.  Prilosec. 14.  Spironolactone. 15.  Prograf. No additional recommendations at this time. Resume her diet as before. Discharge home with family today.       Tatiana Alberto MD      [id number]  D:  03/18/2022 14:39  T:  03/18/2022 15:26  JOB #:  [id number]

## 2022-03-18 NOTE — PROGRESS NOTES
Problem: Falls - Risk of  Goal: *Absence of Falls  Description: Document Nadya Adames Fall Risk and appropriate interventions in the flowsheet. Outcome: Progressing Towards Goal  Note: Fall Risk Interventions:  Mobility Interventions: Patient to call before getting OOB         Medication Interventions: Patient to call before getting OOB    Elimination Interventions: Call light in reach    History of Falls Interventions: Bed/chair exit alarm         Problem: Diabetes Self-Management  Goal: *Disease process and treatment process  Description: Define diabetes and identify own type of diabetes; list 3 options for treating diabetes. Outcome: Progressing Towards Goal     Problem: Diabetes Self-Management  Goal: *Monitoring blood glucose, interpreting and using results  Description: Identify recommended blood glucose targets  and personal targets. Outcome: Progressing Towards Goal     Problem: Pressure Injury - Risk of  Goal: *Prevention of pressure injury  Description: Document Igor Scale and appropriate interventions in the flowsheet.   Outcome: Progressing Towards Goal  Note: Pressure Injury Interventions:  Sensory Interventions: Float heels,Keep linens dry and wrinkle-free    Moisture Interventions: Apply protective barrier, creams and emollients    Activity Interventions: Pressure redistribution bed/mattress(bed type)    Mobility Interventions: Pressure redistribution bed/mattress (bed type)    Nutrition Interventions: Document food/fluid/supplement intake    Friction and Shear Interventions: HOB 30 degrees or less

## 2022-03-18 NOTE — ROUTINE PROCESS
1930  Verbal shift change report given to Geovanna Dalal, EUNICE and Tommy Marinelli RN (oncoming nurse) by Tam Perea RN (offgoing nurse). Report included the following information SBAR, Kardex, Intake/Output, MAR and Recent Results. 0730  Verbal shift change report given to EUNICE Stephens (oncoming nurse) by Tommy Marinelli RN and Geovanna Dalal RN (offgoing nurse). Report included the following information SBAR, Kardex, Intake/Output, MAR and Recent Results.

## 2022-03-18 NOTE — PROGRESS NOTES
Problem: Falls - Risk of  Goal: *Absence of Falls  Description: Document Raul Fall Risk and appropriate interventions in the flowsheet. 3/18/2022 1525 by Scott Chan RN  Outcome: Resolved/Met  Note: Fall Risk Interventions:  Mobility Interventions: Patient to call before getting OOB         Medication Interventions: Patient to call before getting OOB    Elimination Interventions: Call light in reach    History of Falls Interventions: Investigate reason for fall      3/18/2022 1027 by Scott Chan RN  Outcome: Progressing Towards Goal  Note: Fall Risk Interventions:  Mobility Interventions: Patient to call before getting OOB         Medication Interventions: Patient to call before getting OOB    Elimination Interventions: Call light in reach    History of Falls Interventions: Investigate reason for fall         Problem: Patient Education: Go to Patient Education Activity  Goal: Patient/Family Education  Outcome: Resolved/Met     Problem: Diabetes Self-Management  Goal: *Disease process and treatment process  Description: Define diabetes and identify own type of diabetes; list 3 options for treating diabetes. Outcome: Resolved/Met  Goal: *Incorporating nutritional management into lifestyle  Description: Describe effect of type, amount and timing of food on blood glucose; list 3 methods for planning meals. Outcome: Resolved/Met  Goal: *Incorporating physical activity into lifestyle  Description: State effect of exercise on blood glucose levels. Outcome: Resolved/Met  Goal: *Developing strategies to promote health/change behavior  Description: Define the ABC's of diabetes; identify appropriate screenings, schedule and personal plan for screenings. Outcome: Resolved/Met  Goal: *Using medications safely  Description: State effect of diabetes medications on diabetes; name diabetes medication taking, action and side effects.   Outcome: Resolved/Met  Goal: *Monitoring blood glucose, interpreting and using results  Description: Identify recommended blood glucose targets  and personal targets. Outcome: Resolved/Met  Goal: *Prevention, detection, treatment of acute complications  Description: List symptoms of hyper- and hypoglycemia; describe how to treat low blood sugar and actions for lowering  high blood glucose level. Outcome: Resolved/Met  Goal: *Prevention, detection and treatment of chronic complications  Description: Define the natural course of diabetes and describe the relationship of blood glucose levels to long term complications of diabetes.   Outcome: Resolved/Met  Goal: *Developing strategies to address psychosocial issues  Description: Describe feelings about living with diabetes; identify support needed and support network  Outcome: Resolved/Met  Goal: *Insulin pump training  Outcome: Resolved/Met  Goal: *Sick day guidelines  Outcome: Resolved/Met  Goal: *Patient Specific Goal (EDIT GOAL, INSERT TEXT)  Outcome: Resolved/Met     Problem: Patient Education: Go to Patient Education Activity  Goal: Patient/Family Education  Outcome: Resolved/Met     Problem: Patient Education: Go to Patient Education Activity  Goal: Patient/Family Education  Outcome: Resolved/Met Unknown

## 2022-03-19 PROBLEM — K74.60 CIRRHOSIS (HCC): Status: ACTIVE | Noted: 2019-04-17

## 2022-03-19 PROBLEM — E66.01 SEVERE OBESITY (HCC): Status: ACTIVE | Noted: 2019-04-17

## 2022-03-19 PROBLEM — D69.6 THROMBOCYTOPENIA (HCC): Status: ACTIVE | Noted: 2019-04-17

## 2022-03-19 PROBLEM — K83.01 PRIMARY SCLEROSING CHOLANGITIS: Status: ACTIVE | Noted: 2021-03-29

## 2022-03-20 PROBLEM — E16.2 HYPOGLYCEMIA: Status: ACTIVE | Noted: 2020-12-10

## 2022-03-24 PROBLEM — I82.409 DVT (DEEP VENOUS THROMBOSIS) (HCC): Status: ACTIVE | Noted: 2022-03-16

## 2022-03-24 PROBLEM — Z79.4 INSULIN DEPENDENT TYPE 2 DIABETES MELLITUS (HCC): Status: ACTIVE | Noted: 2022-03-16

## 2022-03-24 PROBLEM — E11.9 INSULIN DEPENDENT TYPE 2 DIABETES MELLITUS (HCC): Status: ACTIVE | Noted: 2022-03-16

## 2022-03-28 ENCOUNTER — TRANSCRIBE ORDER (OUTPATIENT)
Dept: SCHEDULING | Age: 72
End: 2022-03-28

## 2022-03-28 DIAGNOSIS — G89.4 CHRONIC PAIN SYNDROME: Primary | ICD-10-CM

## 2022-03-28 DIAGNOSIS — M96.1 FAILED BACK SURGICAL SYNDROME: ICD-10-CM

## 2022-04-20 ENCOUNTER — OFFICE VISIT (OUTPATIENT)
Dept: HEMATOLOGY | Age: 72
End: 2022-04-20
Payer: MEDICARE

## 2022-04-20 VITALS
DIASTOLIC BLOOD PRESSURE: 97 MMHG | WEIGHT: 223 LBS | SYSTOLIC BLOOD PRESSURE: 166 MMHG | BODY MASS INDEX: 39.5 KG/M2 | HEART RATE: 90 BPM | OXYGEN SATURATION: 98 % | TEMPERATURE: 97.5 F

## 2022-04-20 DIAGNOSIS — K74.60 CIRRHOSIS OF LIVER WITHOUT ASCITES, UNSPECIFIED HEPATIC CIRRHOSIS TYPE (HCC): Primary | ICD-10-CM

## 2022-04-20 PROCEDURE — 99214 OFFICE O/P EST MOD 30 MIN: CPT | Performed by: INTERNAL MEDICINE

## 2022-04-20 NOTE — Clinical Note
5/9/2022    Patient: Ann Watts   YOB: 1950   Date of Visit: 4/20/2022     Star Cunningham NP  [address] [address]  Via Fax: [phone]    Dear Star Cunningham NP,      Thank you for referring Ms. Ailin Parra to [address] [place] [address],[address] for evaluation. My notes for this consultation are attached. If you have questions, please do not hesitate to call me. I look forward to following your patient along with you.       Sincerely,    Billie Jordan MD

## 2022-04-20 NOTE — PROGRESS NOTES
[facility], [place], [address], [place], [place]      Au Flash, ANNEMARIE Etienne, [facility]     Nida Delacruz, [facility]   Viridiana Lindsey, SHERRY-JOSH Cordero, [facility]       Sandeep Deput[address]    at [address], [address] [place] [address], [facility] 22.    [phone]    FAX: [address] [place] [address]    at [address], [address], [address]    [phone]    FAX: [phone]       Patient Care Team:  Candida Rai NP as PCP - General (Family Medicine)  Valentin Tirado MD (Gastroenterology)  Xiao Hernandez MD (Neurosurgery)  Priscila Florian DO (Family Medicine)      Problem List  Date Reviewed: 3/10/2022          Codes Class Noted    DVT (deep venous thrombosis) [facility]) ICD-10-CM: I82.409  ICD-9-CM: 453.40  3/16/2022        Insulin dependent type 2 diabetes mellitus ([facility] [address] ICD-10-CM: E11.9, Z79.4  ICD-9-CM: 250.00, V58.67  3/16/2022        Primary sclerosing cholangitis ICD-10-CM: K83.01  ICD-9-CM: 576.1  3/29/2021        Hypoglycemia ICD-10-CM: E16.2  ICD-9-CM: 251.2  12/10/2020        GAVE (gastric antral vascular ectasia) ICD-10-CM: K31.819  ICD-9-CM: 537.82  6/10/2019        Severe obesity ([facility] [address] ICD-10-CM: E66.01  ICD-9-CM: 278.01  4/17/2019        Autoimmune hepatitis ([facility] [address] ICD-10-CM: K75.4  ICD-9-CM: 571.42  4/17/2019        Thrombocytopenia ([facility] [address] ICD-10-CM: D69.6  ICD-9-CM: 287.5  4/17/2019        Cirrhosis ([facility] [address] ICD-10-CM: K74.60  ICD-9-CM: 571.5  4/17/2019              Milderd Serum returns to the [place] [address] of [facility] for management 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   The active problem list, all pertinent past medical history, medications, liver histology, radiologic findings and laboratory findings related to the liver disorder were reviewed with the patient. The patient is a 67 y.o.  female who was found to have marked elevation in liver enzymes into the 500-800 range in 2017     The patient was found to have cirrhosis in 2019 when she developed lower extremity edema, thrombocytopenia and an ultrasound suggested cirrhosis. The patient has developed the following complications of cirrhosis: esophageal varices, GAVE    Since the last office appointment:  She was hospitalized with DVT of left lower extremity. She was not thought to be a candidate for anticoagulation because of GAVE and esophageal varices. An IVC filter was placed. The patient has the following symptoms which could be attributed to the liver disorder:    fatigue,   swelling of the left lower extremities has improved,     Continues to c/o of severe back and hip pain. Has established care with a pain management specialist, Dr. Sindhu Nixon. This was initially thought to be due back spinal compression issues but may be due to DVT. The patient is not experiencing the following symptoms which are commonly seen in this liver disorder:   pain in the right side over the liver,   swelling of the right lower extremities,   hematemesis,   hematochezia. The patient has limitations in functional activities which can be attributed to the liver disease and to other medical problems that are not related to the liver disease. 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 The diagnosis of cirrhosis is based upon elastography, imaging, laboratory studies, complications of cirrhosis. Complications of cirrhosis were discussed in detail. We discussed thrombocytopenia, portal hypertension, varices, GI bleeding, peripheral edema, ascites, hepatic encephalopathy, and hepatocellular carcinoma. We discussed the need for follow ups on a regular basis, at 3 month intervals to monitor for complications.  We discussed the need for every 6 month liver imaging studies. The patient has normal liver function. The CTP is 6. Child class A. The MELD score is 8. Left lower extremity pain. Doppler was positive for DVT. She now has IVC filter in place. The left lower extremity remains swollen    Autoimmune Hepatitis   The diagnosis was based upon serology and liver biopsy. A liver biopsy performed in 8/2017 demonstrated moderate to severe inflammation with interface hepatitis, with piecemeal necrosis and stage 2-3 fibrosis. Elastography performed in 4/2019 suggests Cirrhosis. The most recent laboratory studies indicate that the liver transaminases are normal, alkaline phosphatase is elevated, tests of hepatic synthetic and metabolic function are normal, and the platelet count is normal.      The patient is taking TAC 1 mg BID since 10/2019. Most recent tacrolimus level was low at 1.2, but the AST/ALT were WNL. Primary Sclerosing Cholangitis. This diagnosis is based on a persistent elevation in ALP, MRI/MRCP performed in 6/2020. Intrahepatic biliary ducts have a mildly beaded appearance, suggestive of primary sclerosing cholangitis. The natural history of PSC was discussed in detail. I explained that there is no cure for PSC. I explained that at least 75% of those people who suffer from [facility] also have ulcerative colitis. I explained that the single greatest risk factor associated with PSC is cholangiocarcinomas. Acute kidney injury  Serum creatinine is 1.13 on recent labs. Will continue TAC at 1 mg BID for now. Will continue diuretics at step one for now. ANTI-HCV positive HCV RNA negative  Treated in 2008 by Dr. Gavin Mir in clinical trial.   The patient is HCV RNA undetectable and no longer has HCV. Lower extremity edema  Edema has resolved with current dose of diuretics. Will continue the current dose of diuretics at step 1.     Screening for Esophageal varices   The patient had an EGD at [facility] in 7/2019. This demonstrated small esophageal varices and GAVE  Dr. Fuad Brown performed a repeat EGD in 10/2020. Multiple large gastric polyps in the antrum. 3 polys removed. EGD was performed by Dr. Fuad Brown in 06/2021. Two medium esophageal varices were present. These were banded. Dr. Fuad Brown repeated the EGD in 10/2021. There were small esophageal varices, no banding was performed. Repeat the EGD in 10/2022. She is not on a beta blocker. Hepatic encephalopathy   Overt HE has not developed to date. There is no need for treatment with lactulose and/or Xifaxan at this time. There is no need to restrict dietary protein at this time. Thrombocytopenia   The most recent platelet counts is 695. Screening for Hepatocellular Carcinoma  HCC screening has recently been performed and does not suggest [address] The previously ordered ultrasound was not performed. Re-ordered today. Treatment of other medical problems in patients with chronic liver disease  There are no contraindications for the patient to take most medications that are necessary for treatment of other medical issues. Counseling for alcohol in patients with chronic liver disease  The patient was counseled regarding alcohol consumption and the effect of alcohol on chronic liver disease. Osteoporosis  The risk of osteoporosis is increased in patients with cirrhosis. DEXA bone density to assess for osteoporosis has not been performed. This should be ordered by the patients primary care physician. Vaccinations   Vaccination for viral hepatitis B is not needed. The patient has serologic evidence of prior exposure or vaccination with immunity. The need for vaccination against viral hepatitis A will be assessed with serologic and instituted as appropriate. Routine vaccinations against other bacterial and viral agents can be performed as indicated.   Annual flu vaccination should be administered if indicated. ALLERGIES  Allergies   Allergen Reactions    Adhesive Tape-Silicones Other (comments)    Naproxen Nausea Only    Penicillins Hives    Tramadol Nausea Only    Tuberculin Ppd Hives    Vicodin [Hydrocodone-Acetaminophen] Hives       MEDICATIONS  Current Outpatient Medications   Medication Sig    meloxicam (MOBIC) 7.5 mg tablet Take 7.5 mg by mouth daily.  morphine IR (MS IR) 15 mg tablet     gabapentin (NEURONTIN) 300 mg capsule Take 300 mg by mouth three (3) times daily.  predniSONE (DELTASONE) 10 mg tablet Take 5 mg by mouth.  tacrolimus (PROGRAF) 1 mg capsule Take 1 Cap by mouth every twelve (12) hours.  insulin aspart protamine/insulin aspart (NOVOLOG MIX 70-30FLEXPEN U-100) 100 unit/mL (70-30) inpn by SubCUTAneous route. Sliding scale    albuterol (PROVENTIL HFA) 90 mcg/actuation inhaler Take 2 Puffs by inhalation.  amLODIPine (NORVASC) 10 mg tablet Take 10 mg by mouth.  budesonide-formoterol (SYMBICORT) 160-4.5 mcg/actuation HFAA Take 2 Puffs by inhalation.  ferrous fumarate 324 mg (106 mg iron) tab Take 324 mg by mouth.  furosemide (LASIX) 40 mg tablet Take 40 mg by mouth.  glipiZIDE SR (GLUCOTROL XL) 5 mg CR tablet Take 5 mg by mouth.  minoxidil (LONITEN) 2.5 mg tablet Take 2.5 mg by mouth.  omeprazole (PRILOSEC) 40 mg capsule Take 40 mg by mouth.  spironolactone (ALDACTONE) 100 mg tablet Take 1 Tab by mouth daily. No current facility-administered medications for this visit. SYSTEM REVIEW NOT RELATED TO LIVER DISEASE OR REVIEWED ABOVE:  Constitution systems: Negative for fever, chills, weight gain, weight loss. Eyes: Negative for visual changes. ENT: Negative for sore throat, painful swallowing. Respiratory: Negative for cough, hemoptysis, SOB. Cardiology: Swelling of the legs. GI:  Negative for constipation or diarrhea. : Negative for urinary frequency, dysuria, hematuria, nocturia. Skin: Negative for rash.   Hematology: Negative for easy bruising, blood clots. Musculo-skelatal: Negative for back pain, muscle pain, weakness. Neurologic: Negative for headaches, dizziness, vertigo, memory problems not related to HE. Psychology: Negative for anxiety, depression. FAMILY HISTORY:  The father  of MI. The mother  of MI. The following family members have liver disease: a brother    SOCIAL HISTORY:  The patient is . The patient has 2 children, and 8 grandchildren. The patient stopped using tobacco products in . The patient has never consumed significant amounts of alcohol. The patient used to work as as a  at [email or web address]. The patient retired in . PHYSICAL EXAMINATION:  Visit Vitals  BP (!) 166/97   Pulse 90   Temp 97.5 °F (36.4 °C) (Tympanic)   Wt 223 lb (101.2 kg)   SpO2 98%   BMI 39.50 kg/m²     General: No acute distress. Eyes: Sclera anicteric. ENT: No oral lesions. Thyroid normal.  Nodes: No adenopathy. Skin: No spider angiomata. No jaundice. No palmar erythema. Respiratory: Lungs clear to auscultation. Cardiovascular: Regular heart rate. No murmurs. No JVD. Abdomen: Soft non-tender. Liver size normal to percussion/palpation. Spleen not palpable. No obvious ascites. Extremities: No edema. No muscle wasting. No gross arthritic changes. Neurologic: Alert and oriented. Cranial nerves grossly intact. No asterixis.     LABORATORY STUDIES:  Liver [place] of [address] Units 3/18/2022   WBC 4.6 - 13.2 K/uL 5.7   ANC 1.8 - 8.0 K/UL 2.5   HGB 12.0 - 16.0 g/dL 12.5    - 420 K/uL 180   INR 0.8 - 1.2      AST 10 - 38 U/L 27   ALT 13 - 56 U/L 39   Alk Phos 45 - 117 U/L 121 (H)   Bili, Total 0.2 - 1.0 MG/DL 0.5   Bili, Direct 0.0 - 0.2 MG/DL    Albumin 3.4 - 5.0 g/dL 3.1 (L)   BUN 7.0 - 18 MG/DL 14   Creat 0.6 - 1.3 MG/DL 0.93   Creat (iSTAT) 0.6 - 1.3 MG/DL    Na 136 - 145 mmol/L 138   K 3.5 - 5.5 mmol/L 3.9   Cl 100 - 111 mmol/L 106 CO2 21 - 32 mmol/L 27   Glucose 74 - 99 mg/dL 148 (H)     Cancer Screening Latest Ref Rng & Units 3/10/2022 12/2/2021 9/1/2021   AFP, Serum 0.0 - 9.2 ng/mL 3.5 4.2 4.8     Liver Virology and Transplant Immune Suppression Latest Ref Rng & Units 3/10/2022   Tacrolimus Level 2.0 - 20.0 ng/mL 3.9     Liver Virology and Transplant Immune Suppression Latest Ref Rng & Units 9/1/2021   Tacrolimus Level 2.0 - 20.0 ng/mL 1.2 (L)     Liver Virology and Transplant Immune Suppression Latest Ref Rng & Units 3/29/2021   Tacrolimus Level 2.0 - 20.0 ng/mL 4.4       SEROLOGIES:  6/2017. Anti-HBsurface positive  9/2017. HBsAntigen negative, anti-HCV positive, HCV RNA undetectable, VICKI positive, ASMA positive, ANCA positive, AMA negative, RF positive, alpha-1-antitrypsin 150    LIVER HISTOLOGY:  8/2017. Active hepatitis with portal and lobular inflammation, interface hepatitis, PMN and stage 2-3 septal fibrosis. Biopsy slides not reviewed by MLS.  4/2019. Sheer wave elastography performed at [facility]. Range 10.09- 17.05 kPa, Mean 14.07 kPa, Median 14.9 kPa, Standard deviation 2.41 kPa. The results suggested a fibrosis level of F4.    2/2022. TRANSIENT HEPATIC ELASTOGRAPHY:  E Range: 8.50-10.16 kPa, E Mean: 9.24 kPa, E Median: 9.22 kPa, E Std: 0.52 kPa    ENDOSCOPIC PROCEDURES:  7/2019. EGD by Dr Sandor Delaney. Small esophageal varices. GAVE. 10/2020. EGD by Dr. Nicolette Cantu. Dr. Nicolette Cantu performed a repeat EGD in 10/2020. Multiple large gastric polyps in the antrum. 3 polys removed. Repeat EGD in 1 to 2 months.   2/2021. EGD by Dr. Nicolette Cantu. Normal esophagus. No portal hypertensive gastropathy. At least least 10 polyps in the antrum. Several of these were > 5 mm in diameter. 7 of the polyps were resected by snare cautery. All bases were noted to be clear and without bleeding or perforation at end of procedure. 3-4 polyps with wide bases were left behind. No gastric varices identified. Repeat in 3 months.    6/2021.   EGD by Dr. Marquis Cameron. Two Medium varices were present. There was no stigmata of recent bleeding. Banding of esophageal varices was performed. 10/2021. EGD by Dr. Marquis Cameron. Small esophageal varices. No banding performed. RADIOLOGY:  1/2019. Ultrasound of liver. Echogenic consistent with chronic liver disease. No liver mass lesions. No dilated bile ducts. No ascites. 5/2019. Ultrasound of liver. Echogenic consistent with cirrhosis. No liver mass lesions. No dilated bile ducts. No ascites. 6/2020. MRI/MRCP w/wo contrast.  Liver has a mildly nodular contour. No suspicious liver lesions. Hepatic biliary ducts have a mildly beaded appearance, which is suggestive of mild primary sclerosing cholangitis. There is no focal area of intrahepatic biliary dilation to suggest a dominant stricture. 12/2020. Ultrasound of the liver. Lobular surface contour in course and periapical echotexture redemonstrated. No focal mass. 4/2021. Abdominal ultrasound ([facility]). Coarsened heterogeneous texture to the liver with nodular contour, which can be seen in setting of chronic liver disease/cirrhosis. 2/2022. Ultrasound of the liver. Increased heterogeneous hepatic echotexture in keeping with patient's history of hepatocellular disease. No solid hepatic mass. OTHER TESTING:  Not available or performed    FOLLOW-UP:  All of the issues listed above in the Assessment and Plan were discussed with the patient. All questions were answered. The patient expressed a clear understanding of the above. [address] in 3 months for monitoring.         Rolf Singletary MD  [address]  [facility], [address] [facility], [address]  [facility]

## 2022-05-04 ENCOUNTER — TRANSCRIBE ORDER (OUTPATIENT)
Dept: SCHEDULING | Age: 72
End: 2022-05-04

## 2022-05-04 DIAGNOSIS — M79.605 LEFT LEG PAIN: Primary | ICD-10-CM

## 2022-05-05 ENCOUNTER — HOSPITAL ENCOUNTER (OUTPATIENT)
Dept: VASCULAR SURGERY | Age: 72
Discharge: HOME | End: 2022-05-05
Attending: NURSE PRACTITIONER
Payer: MEDICARE

## 2022-05-05 DIAGNOSIS — M79.605 LEFT LEG PAIN: ICD-10-CM

## 2022-05-05 PROCEDURE — 93971 EXTREMITY STUDY: CPT

## 2022-05-23 ENCOUNTER — HOSPITAL ENCOUNTER (OUTPATIENT)
Dept: MRI IMAGING | Age: 72
Discharge: HOME | End: 2022-05-23
Attending: PAIN MEDICINE
Payer: MEDICARE

## 2022-05-23 DIAGNOSIS — M96.1 FAILED BACK SURGICAL SYNDROME: ICD-10-CM

## 2022-05-23 DIAGNOSIS — G89.4 CHRONIC PAIN SYNDROME: ICD-10-CM

## 2022-05-23 PROCEDURE — 72146 MRI CHEST SPINE W/O DYE: CPT

## 2022-05-25 RX ORDER — TACROLIMUS 1 MG/1
1 CAPSULE ORAL EVERY 12 HOURS
Qty: 120 CAPSULE | Refills: 6 | Status: SHIPPED | OUTPATIENT
Start: 2022-05-25

## 2022-05-25 RX ORDER — SPIRONOLACTONE 100 MG/1
100 TABLET, FILM COATED ORAL DAILY
Qty: 180 TABLET | Refills: 3 | Status: SHIPPED | OUTPATIENT
Start: 2022-05-25 | End: 2022-07-21 | Stop reason: SDUPTHER

## 2022-07-21 ENCOUNTER — OFFICE VISIT (OUTPATIENT)
Dept: HEMATOLOGY | Age: 72
End: 2022-07-21
Payer: MEDICARE

## 2022-07-21 VITALS
DIASTOLIC BLOOD PRESSURE: 81 MMHG | WEIGHT: 206 LBS | SYSTOLIC BLOOD PRESSURE: 134 MMHG | OXYGEN SATURATION: 98 % | HEART RATE: 98 BPM | BODY MASS INDEX: 36.49 KG/M2 | TEMPERATURE: 96.9 F

## 2022-07-21 DIAGNOSIS — E66.01 SEVERE OBESITY (BMI 35.0-39.9) WITH COMORBIDITY (HCC): ICD-10-CM

## 2022-07-21 DIAGNOSIS — K74.60 CIRRHOSIS OF LIVER WITHOUT ASCITES, UNSPECIFIED HEPATIC CIRRHOSIS TYPE (HCC): Primary | ICD-10-CM

## 2022-07-21 PROBLEM — N18.30 CHRONIC RENAL DISEASE, STAGE III (HCC): Status: ACTIVE | Noted: 2022-07-21

## 2022-07-21 PROCEDURE — G8427 DOCREV CUR MEDS BY ELIG CLIN: HCPCS | Performed by: NURSE PRACTITIONER

## 2022-07-21 PROCEDURE — 1090F PRES/ABSN URINE INCON ASSESS: CPT | Performed by: NURSE PRACTITIONER

## 2022-07-21 PROCEDURE — G8400 PT W/DXA NO RESULTS DOC: HCPCS | Performed by: NURSE PRACTITIONER

## 2022-07-21 PROCEDURE — 1123F ACP DISCUSS/DSCN MKR DOCD: CPT | Performed by: NURSE PRACTITIONER

## 2022-07-21 PROCEDURE — G8417 CALC BMI ABV UP PARAM F/U: HCPCS | Performed by: NURSE PRACTITIONER

## 2022-07-21 PROCEDURE — G8536 NO DOC ELDER MAL SCRN: HCPCS | Performed by: NURSE PRACTITIONER

## 2022-07-21 PROCEDURE — 99214 OFFICE O/P EST MOD 30 MIN: CPT | Performed by: NURSE PRACTITIONER

## 2022-07-21 PROCEDURE — 3017F COLORECTAL CA SCREEN DOC REV: CPT | Performed by: NURSE PRACTITIONER

## 2022-07-21 PROCEDURE — 1101F PT FALLS ASSESS-DOCD LE1/YR: CPT | Performed by: NURSE PRACTITIONER

## 2022-07-21 PROCEDURE — G8432 DEP SCR NOT DOC, RNG: HCPCS | Performed by: NURSE PRACTITIONER

## 2022-07-21 RX ORDER — SPIRONOLACTONE 100 MG/1
100 TABLET, FILM COATED ORAL DAILY
Qty: 180 TABLET | Refills: 3 | Status: SHIPPED | OUTPATIENT
Start: 2022-07-21

## 2022-07-21 RX ORDER — EMPAGLIFLOZIN 25 MG/1
25 TABLET, FILM COATED ORAL DAILY
COMMUNITY
Start: 2022-07-01 | End: 2022-10-20

## 2022-07-21 RX ORDER — FUROSEMIDE 40 MG/1
40 TABLET ORAL DAILY
Qty: 90 TABLET | Refills: 3 | Status: SHIPPED | OUTPATIENT
Start: 2022-07-21

## 2022-07-21 RX ORDER — GABAPENTIN 300 MG/1
300 CAPSULE ORAL 3 TIMES DAILY
Qty: 180 CAPSULE | Refills: 3 | Status: SHIPPED | OUTPATIENT
Start: 2022-07-21

## 2022-07-21 RX ORDER — DULAGLUTIDE 0.75 MG/.5ML
INJECTION, SOLUTION SUBCUTANEOUS
COMMUNITY
Start: 2022-07-01

## 2022-07-21 RX ORDER — CHLORPHENIRAMIN/PSEUDOEPHED/DM 1-15-5MG/5
LIQUID (ML) ORAL
COMMUNITY
Start: 2022-05-10

## 2022-07-21 RX ORDER — OMEPRAZOLE 40 MG/1
40 CAPSULE, DELAYED RELEASE ORAL DAILY
Qty: 90 CAPSULE | Refills: 1 | Status: SHIPPED | OUTPATIENT
Start: 2022-07-21

## 2022-07-21 NOTE — PROGRESS NOTES
MD Elsa, [address], [facility], [place]      ANNEMARIE Rodrigues, [facility]     Nida Delacruz, [facility]   Steven Ames, P-JOSH Mazariegos, [facility]       Sandeep Zavala [address]    at [address], [address] [place] [address], [facility] 22.    [phone]    FAX: [address] [place] [address], [address]    [phone]    FAX: [phone]       Patient Care Team:  Dann Anderson NP as PCP - General (Family Medicine)  Tobias Amin MD (Gastroenterology)  Reggie Tinajero MD (Neurosurgery)  China Read DO (Family Medicine)  Torres Wooten MD (Endocrinology Physician)      Problem List  Date Reviewed: 7/21/2022            Codes Class Noted    Chronic renal disease, stage III ICD-10-CM: N18.30  ICD-9-CM: 585.3  7/21/2022        DVT (deep venous thrombosis) ([facility] ICD-10-CM: I82.409  ICD-9-CM: 453.40  3/16/2022        Insulin dependent type 2 diabetes mellitus ([facility] ICD-10-CM: E11.9, Z79.4  ICD-9-CM: 250.00, V58.67  3/16/2022        Primary sclerosing cholangitis ICD-10-CM: K83.01  ICD-9-CM: 576.1  3/29/2021        Hypoglycemia ICD-10-CM: E16.2  ICD-9-CM: 251.2  12/10/2020        GAVE (gastric antral vascular ectasia) ICD-10-CM: K31.819  ICD-9-CM: 537.82  6/10/2019        Severe obesity ([facility] ICD-10-CM: E66.01  ICD-9-CM: 278.01  4/17/2019        Autoimmune hepatitis ([facility].) ICD-10-CM: K75.4  ICD-9-CM: 571.42  4/17/2019        Thrombocytopenia ([facility] ICD-10-CM: D69.6  ICD-9-CM: 287.5  4/17/2019        Cirrhosis ([facility].) ICD-10-CM: K74.60  ICD-9-CM: 571.5  4/17/2019           Kylah Martinez returns to the [place] [address] of [facility] for management 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   The active problem list, all pertinent past medical history, medications, liver histology, radiologic findings and laboratory findings related to the liver disorder were reviewed with the patient. The patient is a 67 y.o.  female who was found to have marked elevation in liver enzymes into the 500-800 range in 2017     The patient was found to have cirrhosis in 2019 when she developed lower extremity edema, thrombocytopenia and an ultrasound suggested cirrhosis. The patient has developed the following complications of cirrhosis: esophageal varices, GAVE    She was hospitalized with DVT of left lower extremity. She was not thought to be a candidate for anticoagulation because of GAVE and esophageal varices. An IVC filter was placed. The patient has the following symptoms which could be attributed to the liver disorder:    fatigue,   swelling of the left lower extremities has improved,     Continues to c/o of severe back and hip pain. Has established care with a pain management specialist, Dr. Euel Boas. The patient is not experiencing the following symptoms which are commonly seen in this liver disorder:   pain in the right side over the liver,   swelling of the right lower extremities,   hematemesis,   hematochezia. The patient has limitations in functional activities which can be attributed to the liver disease and to other medical problems that are not related to the liver disease. Since the last office appointment:  Pool therapy twice a week. Hasn't had nerve stimulator placed yet. Has lost 17 pounds. 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 The diagnosis of cirrhosis is based upon elastography, imaging, laboratory studies, complications of cirrhosis. Complications of cirrhosis were discussed in detail. We discussed thrombocytopenia, portal hypertension, varices, GI bleeding, peripheral edema, ascites, hepatic encephalopathy, and hepatocellular carcinoma.  We discussed the need for follow ups on a regular basis, at 3 month intervals to monitor for complications. We discussed the need for every 6 month liver imaging studies. The patient has normal liver function. The CTP is 6. Child class A. The MELD score is 8. Left lower extremity pain. Doppler was positive for DVT. She now has IVC filter in place. Autoimmune Hepatitis   The diagnosis was based upon serology and liver biopsy. A liver biopsy performed in 8/2017 demonstrated moderate to severe inflammation with interface hepatitis, with piecemeal necrosis and stage 2-3 fibrosis. Elastography performed in 4/2019 suggests Cirrhosis. The most recent laboratory studies indicate that the liver transaminases are normal, alkaline phosphatase is slightly elevated, tests of hepatic synthetic and metabolic function are normal, and the platelet count is normal.      The patient is taking TAC 1 mg BID since 10/2019. Most recent tacrolimus level was low at 1.2, but the AST/ALT were WNL. Primary Sclerosing Cholangitis. This diagnosis is based on a persistent elevation in ALP, MRI/MRCP performed in 6/2020. Intrahepatic biliary ducts have a mildly beaded appearance, suggestive of primary sclerosing cholangitis. The natural history of PSC was discussed in detail. I explained that there is no cure for PSC. I explained that at least 75% of those people who suffer from [facility] also have ulcerative colitis. I explained that the single greatest risk factor associated with PSC is cholangiocarcinomas. Acute kidney injury  Serum creatinine is 0.93 on recent labs. Will continue TAC at 1 mg BID for now. Will continue diuretics at step one for now. ANTI-HCV positive HCV RNA negative  Treated in 2008 by Dr. Harrison Etienne in clinical trial.   The patient is HCV RNA undetectable and no longer has HCV. Lower extremity edema  Edema has resolved with current dose of diuretics.   Will continue the current dose of diuretics at step 1. Screening for Esophageal varices   The patient had an EGD at [facility] in 7/2019. This demonstrated small esophageal varices and GAVE  Dr. Panfilo Cooper performed a repeat EGD in 10/2020. Multiple large gastric polyps in the antrum. 3 polys removed. EGD was performed by Dr. Panfilo Cooper in 06/2021. Two medium esophageal varices were present. These were banded. Dr. Panfilo Cooper repeated the EGD in 10/2021. There were small esophageal varices, no banding was performed. Repeat the EGD in 10/2022. This was ordered. She is not on a beta blocker. Hepatic encephalopathy   Overt HE has not developed to date. There is no need for treatment with lactulose and/or Xifaxan at this time. There is no need to restrict dietary protein at this time. Thrombocytopenia   The most recent platelet counts is 111. Screening for Hepatocellular Carcinoma  HCC screening has recently been performed and does not suggest [address] AFP and ultrasound were ordered during this appointment. Treatment of other medical problems in patients with chronic liver disease  There are no contraindications for the patient to take most medications that are necessary for treatment of other medical issues. Counseling for alcohol in patients with chronic liver disease  The patient was counseled regarding alcohol consumption and the effect of alcohol on chronic liver disease. Osteoporosis  The risk of osteoporosis is increased in patients with cirrhosis. DEXA bone density to assess for osteoporosis has not been performed. This should be ordered by the patients primary care physician. Vaccinations   Vaccination for viral hepatitis B is not needed. The patient has serologic evidence of prior exposure or vaccination with immunity. The need for vaccination against viral hepatitis A will be assessed with serologic and instituted as appropriate.   Routine vaccinations against other bacterial and viral agents can be performed as indicated. Annual flu vaccination should be administered if indicated. ALLERGIES  Allergies   Allergen Reactions    Adhesive Tape-Silicones Other (comments)    Naproxen Nausea Only    Penicillins Hives    Tramadol Nausea Only    Tuberculin Ppd Hives    Vicodin [Hydrocodone-Acetaminophen] Hives       MEDICATIONS  Current Outpatient Medications   Medication Sig    spironolactone (Aldactone) 100 mg tablet Take 1 Tablet by mouth in the morning.    gabapentin (NEURONTIN) 300 mg capsule Take 1 Capsule by mouth three (3) times daily. Max Daily Amount: 900 mg.    furosemide (Lasix) 40 mg tablet Take 1 Tablet by mouth in the morning. omeprazole (PRILOSEC) 40 mg capsule Take 1 Capsule by mouth in the morning. Trulicity 4.33 EC/4.0 mL sub-q pen INJECT 0.75MG BENEATH THE SKIN ONCE A WEEK    Jardiance 25 mg tablet Take 25 mg by mouth daily. in the morning    ClearLax 17 gram/dose powder     tacrolimus (PROGRAF) 1 mg capsule Take 1 Capsule by mouth every twelve (12) hours. morphine IR (MS IR) 15 mg tablet     insulin aspart protamine/insulin aspart (NOVOLOG MIX 70-30FLEXPEN U-100) 100 unit/mL (70-30) inpn by SubCUTAneous route. Sliding scale    albuterol (PROVENTIL HFA) 90 mcg/actuation inhaler Take 2 Puffs by inhalation. amLODIPine (NORVASC) 10 mg tablet Take 10 mg by mouth.    budesonide-formoterol (SYMBICORT) 160-4.5 mcg/actuation HFAA Take 2 Puffs by inhalation. ferrous fumarate 324 mg (106 mg iron) tab Take 324 mg by mouth. glipiZIDE SR (GLUCOTROL XL) 5 mg CR tablet Take 5 mg by mouth.    minoxidil (LONITEN) 2.5 mg tablet Take 2.5 mg by mouth. No current facility-administered medications for this visit. SYSTEM REVIEW NOT RELATED TO LIVER DISEASE OR REVIEWED ABOVE:  Constitution systems: Negative for fever, chills, weight gain, weight loss. Eyes: Negative for visual changes. ENT: Negative for sore throat, painful swallowing.    Respiratory: Negative for cough, hemoptysis, SOB. Cardiology: Swelling of the legs. GI:  Negative for constipation or diarrhea. : Negative for urinary frequency, dysuria, hematuria, nocturia. Skin: Negative for rash. Hematology: Negative for easy bruising, blood clots. Musculo-skelatal: Negative for back pain, muscle pain, weakness. Neurologic: Negative for headaches, dizziness, vertigo, memory problems not related to HE. Psychology: Negative for anxiety, depression. FAMILY HISTORY:  The father  of MI. The mother  of MI. The following family members have liver disease: a brother    SOCIAL HISTORY:  The patient is . The patient has 2 children, and 8 grandchildren. The patient stopped using tobacco products in . The patient has never consumed significant amounts of alcohol. The patient used to work as as a  at [employer]. The patient retired in . PHYSICAL EXAMINATION:  Visit Vitals  /81   Pulse 98   Temp 96.9 °F (36.1 °C) (Tympanic)   Wt 206 lb (93.4 kg)   SpO2 98%   BMI 36.49 kg/m²     General: No acute distress. Eyes: Sclera anicteric. ENT: No oral lesions. Thyroid normal.  Nodes: No adenopathy. Skin: No spider angiomata. No jaundice. No palmar erythema. Respiratory: Lungs clear to auscultation. Cardiovascular: Regular heart rate. No murmurs. No JVD. Abdomen: Soft non-tender. Liver size normal to percussion/palpation. Spleen not palpable. No obvious ascites. Extremities: No edema. No muscle wasting. No gross arthritic changes. Neurologic: Alert and oriented. Cranial nerves grossly intact. No asterixis.     LABORATORY STUDIES:  [place] [address] Units 3/18/2022   WBC 4.6 - 13.2 K/uL 5.7   ANC 1.8 - 8.0 K/UL 2.5   HGB 12.0 - 16.0 g/dL 12.5    - 420 K/uL 180   INR 0.8 - 1.2      AST 10 - 38 U/L 27   ALT 13 - 56 U/L 39   Alk Phos 45 - 117 U/L 121 (H)   Bili, Total 0.2 - 1.0 MG/DL 0.5   Bili, Direct 0.0 - 0.2 MG/DL Albumin 3.4 - 5.0 g/dL 3.1 (L)   BUN 7.0 - 18 MG/DL 14   Creat 0.6 - 1.3 MG/DL 0.93   Creat (iSTAT) 0.6 - 1.3 MG/DL    Na 136 - 145 mmol/L 138   K 3.5 - 5.5 mmol/L 3.9   Cl 100 - 111 mmol/L 106   CO2 21 - 32 mmol/L 27   Glucose 74 - 99 mg/dL 148 (H)     Cancer Screening Latest Ref Rng & Units 3/10/2022 12/2/2021 9/1/2021   AFP, Serum 0.0 - 9.2 ng/mL 3.5 4.2 4.8     Liver Virology and Transplant Immune Suppression Latest Ref Rng & Units 3/10/2022   Tacrolimus Level 2.0 - 20.0 ng/mL 3.9     Liver Virology and Transplant Immune Suppression Latest Ref Rng & Units 9/1/2021   Tacrolimus Level 2.0 - 20.0 ng/mL 1.2 (L)     Liver Virology and Transplant Immune Suppression Latest Ref Rng & Units 3/29/2021   Tacrolimus Level 2.0 - 20.0 ng/mL 4.4       SEROLOGIES:  6/2017. Anti-HBsurface positive  9/2017. HBsAntigen negative, anti-HCV positive, HCV RNA undetectable, VICKI positive, ASMA positive, ANCA positive, AMA negative, RF positive, alpha-1-antitrypsin 150    LIVER HISTOLOGY:  8/2017. Active hepatitis with portal and lobular inflammation, interface hepatitis, PMN and stage 2-3 septal fibrosis. Biopsy slides not reviewed by MLS.  4/2019. Sheer wave elastography performed at [facility]. Range 10.09- 17.05 kPa, Mean 14.07 kPa, Median 14.9 kPa, Standard deviation 2.41 kPa. The results suggested a fibrosis level of F4.    2/2022. TRANSIENT HEPATIC ELASTOGRAPHY:  E Range: 8.50-10.16 kPa, E Mean: 9.24 kPa, E Median: 9.22 kPa, E Std: 0.52 kPa    ENDOSCOPIC PROCEDURES:  7/2019. EGD by Dr Ирина Delgadillo. Small esophageal varices. GAVE. 10/2020. EGD by Dr. Ortiz García. Dr. Ortiz García performed a repeat EGD in 10/2020. Multiple large gastric polyps in the antrum. 3 polys removed. Repeat EGD in 1 to 2 months.   2/2021. EGD by Dr. Ortiz García. Normal esophagus. No portal hypertensive gastropathy. At least least 10 polyps in the antrum. Several of these were > 5 mm in diameter. 7 of the polyps were resected by snare cautery.   All bases were noted to be clear and without bleeding or perforation at end of procedure. 3-4 polyps with wide bases were left behind. No gastric varices identified. Repeat in 3 months.    6/2021. EGD by Dr. Gage Olea. Two Medium varices were present. There was no stigmata of recent bleeding. Banding of esophageal varices was performed. 10/2021. EGD by Dr. Gage Olea. Small esophageal varices. No banding performed. RADIOLOGY:  1/2019. Ultrasound of liver. Echogenic consistent with chronic liver disease. No liver mass lesions. No dilated bile ducts. No ascites. 5/2019. Ultrasound of liver. Echogenic consistent with cirrhosis. No liver mass lesions. No dilated bile ducts. No ascites. 6/2020. MRI/MRCP w/wo contrast.  Liver has a mildly nodular contour. No suspicious liver lesions. Hepatic biliary ducts have a mildly beaded appearance, which is suggestive of mild primary sclerosing cholangitis. There is no focal area of intrahepatic biliary dilation to suggest a dominant stricture. 12/2020. Ultrasound of the liver. Lobular surface contour in course and periapical echotexture redemonstrated. No focal mass. 4/2021. Abdominal ultrasound ([facility]). Coarsened heterogeneous texture to the liver with nodular contour, which can be seen in setting of chronic liver disease/cirrhosis. 2/2022. Ultrasound of the liver. Increased heterogeneous hepatic echotexture in keeping with patient's history of hepatocellular disease. No solid hepatic mass. OTHER TESTING:  Not available or performed    FOLLOW-UP:  All of the issues listed above in the Assessment and Plan were discussed with the patient. All questions were answered. The patient expressed a clear understanding of the above. [address] in 6 months.       KEI Bowen  Liver [facility]  [address] [place] [address], [address], [facility] [phone]

## 2022-08-12 ENCOUNTER — TRANSCRIBE ORDER (OUTPATIENT)
Dept: SCHEDULING | Age: 72
End: 2022-08-12

## 2022-08-12 DIAGNOSIS — R93.5 ABNORMAL CT SCAN, PELVIS: ICD-10-CM

## 2022-08-12 DIAGNOSIS — R10.32 LEFT INGUINAL PAIN: Primary | ICD-10-CM

## 2022-08-23 ENCOUNTER — HOSPITAL ENCOUNTER (OUTPATIENT)
Dept: ULTRASOUND IMAGING | Age: 72
Discharge: HOME | End: 2022-08-23
Payer: MEDICARE

## 2022-08-23 DIAGNOSIS — K74.60 CIRRHOSIS OF LIVER WITHOUT ASCITES, UNSPECIFIED HEPATIC CIRRHOSIS TYPE (HCC): ICD-10-CM

## 2022-08-23 PROCEDURE — 76705 ECHO EXAM OF ABDOMEN: CPT

## 2022-09-02 NOTE — PROGRESS NOTES
Please let him know that his ultrasound is unremarkable. No hepatic masses. Consistent with cirrhosis. Thank you.

## 2022-09-15 ENCOUNTER — TELEPHONE (OUTPATIENT)
Dept: HEMATOLOGY | Age: 72
End: 2022-09-15

## 2022-09-15 NOTE — TELEPHONE ENCOUNTER
----- Message from Shayna Cuba NP sent at 9/2/2022  3:44 PM EDT -----  Please let him know that his ultrasound is unremarkable. No hepatic masses. Consistent with cirrhosis. Thank you.

## 2022-10-20 ENCOUNTER — OFFICE VISIT (OUTPATIENT)
Dept: HEMATOLOGY | Age: 72
End: 2022-10-20
Payer: MEDICARE

## 2022-10-20 ENCOUNTER — HOSPITAL ENCOUNTER (OUTPATIENT)
Dept: LAB | Age: 72
Discharge: HOME | End: 2022-10-20
Payer: MEDICARE

## 2022-10-20 VITALS
SYSTOLIC BLOOD PRESSURE: 130 MMHG | OXYGEN SATURATION: 98 % | DIASTOLIC BLOOD PRESSURE: 78 MMHG | WEIGHT: 193 LBS | HEART RATE: 97 BPM | RESPIRATION RATE: 18 BRPM | BODY MASS INDEX: 34.2 KG/M2 | HEIGHT: 63 IN | TEMPERATURE: 97.6 F

## 2022-10-20 DIAGNOSIS — K74.60 CIRRHOSIS OF LIVER WITHOUT ASCITES, UNSPECIFIED HEPATIC CIRRHOSIS TYPE (HCC): ICD-10-CM

## 2022-10-20 DIAGNOSIS — K74.60 CIRRHOSIS OF LIVER WITHOUT ASCITES, UNSPECIFIED HEPATIC CIRRHOSIS TYPE (HCC): Primary | ICD-10-CM

## 2022-10-20 LAB
ALBUMIN SERPL-MCNC: 3.4 G/DL (ref 3.4–5)
ALBUMIN/GLOB SERPL: 0.8 {RATIO} (ref 0.8–1.7)
ALP SERPL-CCNC: 129 U/L (ref 45–117)
ALT SERPL-CCNC: 34 U/L (ref 13–56)
ANION GAP SERPL CALC-SCNC: 5 MMOL/L (ref 3–18)
AST SERPL-CCNC: 30 U/L (ref 10–38)
BASOPHILS # BLD: 0 K/UL (ref 0–0.1)
BASOPHILS NFR BLD: 1 % (ref 0–2)
BILIRUB DIRECT SERPL-MCNC: 0.2 MG/DL (ref 0–0.2)
BILIRUB SERPL-MCNC: 0.5 MG/DL (ref 0.2–1)
BUN SERPL-MCNC: 17 MG/DL (ref 7–18)
BUN/CREAT SERPL: 13 (ref 12–20)
CALCIUM SERPL-MCNC: 8.9 MG/DL (ref 8.5–10.1)
CHLORIDE SERPL-SCNC: 108 MMOL/L (ref 100–111)
CO2 SERPL-SCNC: 25 MMOL/L (ref 21–32)
CREAT SERPL-MCNC: 1.26 MG/DL (ref 0.6–1.3)
DIFFERENTIAL METHOD BLD: ABNORMAL
EOSINOPHIL # BLD: 0.2 K/UL (ref 0–0.4)
EOSINOPHIL NFR BLD: 5 % (ref 0–5)
ERYTHROCYTE [DISTWIDTH] IN BLOOD BY AUTOMATED COUNT: 14.6 % (ref 11.6–14.5)
GLOBULIN SER CALC-MCNC: 4.4 G/DL (ref 2–4)
GLUCOSE SERPL-MCNC: 216 MG/DL (ref 74–99)
HCT VFR BLD AUTO: 36.1 % (ref 35–45)
HGB BLD-MCNC: 11.8 G/DL (ref 12–16)
IMM GRANULOCYTES # BLD AUTO: 0 K/UL (ref 0–0.04)
IMM GRANULOCYTES NFR BLD AUTO: 1 % (ref 0–0.5)
INR PPP: 1.2 (ref 0.8–1.2)
LYMPHOCYTES # BLD: 1.3 K/UL (ref 0.9–3.6)
LYMPHOCYTES NFR BLD: 27 % (ref 21–52)
MCH RBC QN AUTO: 27.1 PG (ref 24–34)
MCHC RBC AUTO-ENTMCNC: 32.7 G/DL (ref 31–37)
MCV RBC AUTO: 82.8 FL (ref 78–100)
MONOCYTES # BLD: 0.7 K/UL (ref 0.05–1.2)
MONOCYTES NFR BLD: 15 % (ref 3–10)
NEUTS SEG # BLD: 2.5 K/UL (ref 1.8–8)
NEUTS SEG NFR BLD: 52 % (ref 40–73)
NRBC # BLD: 0 K/UL (ref 0–0.01)
NRBC BLD-RTO: 0 PER 100 WBC
PLATELET # BLD AUTO: 204 K/UL (ref 135–420)
PMV BLD AUTO: 10.7 FL (ref 9.2–11.8)
POTASSIUM SERPL-SCNC: 4.2 MMOL/L (ref 3.5–5.5)
PROT SERPL-MCNC: 7.8 G/DL (ref 6.4–8.2)
PROTHROMBIN TIME: 15.4 SEC (ref 11.5–15.2)
RBC # BLD AUTO: 4.36 M/UL (ref 4.2–5.3)
SODIUM SERPL-SCNC: 138 MMOL/L (ref 136–145)
WBC # BLD AUTO: 4.8 K/UL (ref 4.6–13.2)

## 2022-10-20 PROCEDURE — 1090F PRES/ABSN URINE INCON ASSESS: CPT | Performed by: NURSE PRACTITIONER

## 2022-10-20 PROCEDURE — 82107 ALPHA-FETOPROTEIN L3: CPT

## 2022-10-20 PROCEDURE — 80076 HEPATIC FUNCTION PANEL: CPT

## 2022-10-20 PROCEDURE — G8536 NO DOC ELDER MAL SCRN: HCPCS | Performed by: NURSE PRACTITIONER

## 2022-10-20 PROCEDURE — G8432 DEP SCR NOT DOC, RNG: HCPCS | Performed by: NURSE PRACTITIONER

## 2022-10-20 PROCEDURE — 85610 PROTHROMBIN TIME: CPT

## 2022-10-20 PROCEDURE — 36415 COLL VENOUS BLD VENIPUNCTURE: CPT

## 2022-10-20 PROCEDURE — 3017F COLORECTAL CA SCREEN DOC REV: CPT | Performed by: NURSE PRACTITIONER

## 2022-10-20 PROCEDURE — 80048 BASIC METABOLIC PNL TOTAL CA: CPT

## 2022-10-20 PROCEDURE — 99214 OFFICE O/P EST MOD 30 MIN: CPT | Performed by: NURSE PRACTITIONER

## 2022-10-20 PROCEDURE — 85025 COMPLETE CBC W/AUTO DIFF WBC: CPT

## 2022-10-20 PROCEDURE — G8427 DOCREV CUR MEDS BY ELIG CLIN: HCPCS | Performed by: NURSE PRACTITIONER

## 2022-10-20 PROCEDURE — G8417 CALC BMI ABV UP PARAM F/U: HCPCS | Performed by: NURSE PRACTITIONER

## 2022-10-20 PROCEDURE — G8400 PT W/DXA NO RESULTS DOC: HCPCS | Performed by: NURSE PRACTITIONER

## 2022-10-20 PROCEDURE — 1123F ACP DISCUSS/DSCN MKR DOCD: CPT | Performed by: NURSE PRACTITIONER

## 2022-10-20 PROCEDURE — 1101F PT FALLS ASSESS-DOCD LE1/YR: CPT | Performed by: NURSE PRACTITIONER

## 2022-10-20 RX ORDER — INSULIN GLARGINE 100 [IU]/ML
INJECTION, SOLUTION SUBCUTANEOUS
COMMUNITY
Start: 2022-09-09

## 2022-10-20 NOTE — PROGRESS NOTES
[facility], MD, [address], [place], [place]      ANNEMARIE Vela, [facility]     Nida Delacruz, [facility]   Romana Kilts, FNP-C Gerlene Creamer, [facility]       Sandeep Zavala [address]    at [address]    [address] [facility], [address]  [address].    [phone]    FAX: [address]    [facility]    [address], [address] [place] [address], [address]    [phone]    FAX: [phone]       Patient Care Team:  Federico Connor NP as PCP - General (Family Medicine)  Thi Triana MD (Gastroenterology)  Fredy Taylor MD (Neurosurgery)  Nuha Marquez DO (Family Medicine)  Bird Parada MD (Endocrinology Physician)      Problem List  Date Reviewed: 7/21/2022            Codes Class Noted    Chronic renal disease, stage III ICD-10-CM: N18.30  ICD-9-CM: 585.3  7/21/2022        DVT (deep venous thrombosis) ([facility] ICD-10-CM: I82.409  ICD-9-CM: 453.40  3/16/2022        Insulin dependent type 2 diabetes mellitus ([facility] ICD-10-CM: E11.9, Z79.4  ICD-9-CM: 250.00, V58.67  3/16/2022        Primary sclerosing cholangitis ICD-10-CM: K83.01  ICD-9-CM: 576.1  3/29/2021        Hypoglycemia ICD-10-CM: E16.2  ICD-9-CM: 251.2  12/10/2020        GAVE (gastric antral vascular ectasia) ICD-10-CM: K31.819  ICD-9-CM: 537.82  6/10/2019        Severe obesity ([facility] ICD-10-CM: E66.01  ICD-9-CM: 278.01  4/17/2019        Autoimmune hepatitis ([facility] ICD-10-CM: K75.4  ICD-9-CM: 571.42  4/17/2019        Thrombocytopenia ([facility] ICD-10-CM: D69.6  ICD-9-CM: 287.5  4/17/2019        Cirrhosis ([facility] ICD-10-CM: K74.60  ICD-9-CM: 571.5  4/17/2019           Yumiko Franco returns to the [place] [address] of [facility] for management 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   The active problem list, all pertinent past medical history, medications, liver histology, radiologic findings and laboratory findings related to the liver disorder were reviewed with the patient. The patient is a 67 y.o.  female who was found to have marked elevation in liver enzymes into the 500-800 range in 2017     The patient was found to have cirrhosis in 2019 when she developed lower extremity edema, thrombocytopenia and an ultrasound suggested cirrhosis. The patient has developed the following complications of cirrhosis: esophageal varices, GAVE    She was hospitalized with DVT of left lower extremity. She was not thought to be a candidate for anticoagulation because of GAVE and esophageal varices. An IVC filter was placed. The patient has the following symptoms which could be attributed to the liver disorder:  fatigue, swelling of the left lower extremities has improved. Continues to c/o of severe back and hip pain. Has established care with a pain management specialist, Dr. Froy Centeno. The patient is not experiencing the following symptoms which are commonly seen in this liver disorder:   pain in the right side over the liver,   swelling of the right lower extremities,   hematemesis,   hematochezia. The patient has limitations in functional activities which can be attributed to the liver disease and to other medical problems that are not related to the liver disease. Since the last office appointment:  Has lost 13 more pounds. 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 The diagnosis of cirrhosis is based upon elastography, imaging, laboratory studies, complications of cirrhosis. Complications of cirrhosis were discussed in detail. We discussed thrombocytopenia, portal hypertension, varices, GI bleeding, peripheral edema, ascites, hepatic encephalopathy, and hepatocellular carcinoma.  We discussed the need for follow ups on a regular basis, at 3 month intervals to monitor for complications. We discussed the need for every 6 month liver imaging studies. The patient has normal liver function. The CTP is 6. Child class A. The MELD score is 8. Left lower extremity pain. Doppler was positive for DVT. She now has IVC filter in place. Autoimmune Hepatitis   The diagnosis was based upon serology and liver biopsy. A liver biopsy performed in 8/2017 demonstrated moderate to severe inflammation with interface hepatitis, with piecemeal necrosis and stage 2-3 fibrosis. Elastography performed in 4/2019 suggests Cirrhosis. The most recent laboratory studies indicate that the liver transaminases are normal, alkaline phosphatase is slightly elevated, tests of hepatic synthetic and metabolic function are normal, and the platelet count is normal.      The patient is taking TAC 1 mg BID since 10/2019. Most recent tacrolimus level was low at 1.2, but the AST/ALT were WNL. Primary Sclerosing Cholangitis. This diagnosis is based on a persistent elevation in ALP, MRI/MRCP performed in 6/2020. Intrahepatic biliary ducts have a mildly beaded appearance, suggestive of primary sclerosing cholangitis. The natural history of PSC was discussed in detail. I explained that there is no cure for PSC. I explained that at least 75% of those people who suffer from [facility] also have ulcerative colitis. I explained that the single greatest risk factor associated with PSC is cholangiocarcinomas. Acute kidney injury  Serum creatinine is 0.93 on recent labs. Will continue TAC at 1 mg BID for now. Will continue diuretics at step one for now. ANTI-HCV positive HCV RNA negative  Treated in 2008 by Dr. Dedrick Malloy in clinical trial.   The patient is HCV RNA undetectable and no longer has HCV. Lower extremity edema  Edema has resolved with current dose of diuretics. Will continue the current dose of diuretics at step 1.     Screening for Esophageal varices   The patient had an EGD at [facility] in 7/2019. This demonstrated small esophageal varices and GAVE  Dr. Elmer Escobar performed a repeat EGD in 10/2020. Multiple large gastric polyps in the antrum. 3 polys removed. EGD was performed by Dr. Elmer Escobar in 06/2021. Two medium esophageal varices were present. These were banded. Dr. Elmer Escobar repeated the EGD in 10/2021. There were small esophageal varices, no banding was performed. Repeat the EGD in 10/2022. This was ordered. She is not on a beta blocker. Hepatic encephalopathy   Overt HE has not developed to date. There is no need for treatment with lactulose and/or Xifaxan at this time. There is no need to restrict dietary protein at this time. Thrombocytopenia   The most recent platelet counts is 728. Screening for Hepatocellular Carcinoma  HCC screening has recently been performed and does not suggest [address] AFP and ultrasound were ordered during this appointment. Treatment of other medical problems in patients with chronic liver disease  There are no contraindications for the patient to take most medications that are necessary for treatment of other medical issues. Counseling for alcohol in patients with chronic liver disease  The patient was counseled regarding alcohol consumption and the effect of alcohol on chronic liver disease. Osteoporosis  The risk of osteoporosis is increased in patients with cirrhosis. DEXA bone density to assess for osteoporosis has not been performed. This should be ordered by the patients primary care physician. Vaccinations   Vaccination for viral hepatitis B is not needed. The patient has serologic evidence of prior exposure or vaccination with immunity. The need for vaccination against viral hepatitis A will be assessed with serologic and instituted as appropriate. Routine vaccinations against other bacterial and viral agents can be performed as indicated.   Annual flu vaccination should be administered if indicated. Risk of elective surgery in a patient with cirrhosis  The patient is planning to undergo surgery for left hip replacement and has requested that the risk for this be assessed. The patient has cirrhosis Child class A and MELD of under 10. The risk of complications including hepatic decompensation is about 20-30%. Operative mortality risk is under 5%. The ultimate decision regarding whether or not to proceed with surgery will depend upon conversations between the surgeon and patient/and/or family and careful assessment of the risk and benefit of the surgical procedure. ALLERGIES  Allergies   Allergen Reactions    Adhesive Tape-Silicones Other (comments)    Naproxen Nausea Only    Penicillins Hives    Tramadol Nausea Only    Tuberculin Ppd Hives    Vicodin [Hydrocodone-Acetaminophen] Hives       MEDICATIONS  Current Outpatient Medications   Medication Sig    Trulicity 0.83 UF/6.0 mL sub-q pen INJECT 0.75MG BENEATH THE SKIN ONCE A WEEK    Jardiance 25 mg tablet Take 25 mg by mouth daily. in the morning    ClearLax 17 gram/dose powder     spironolactone (Aldactone) 100 mg tablet Take 1 Tablet by mouth in the morning.    gabapentin (NEURONTIN) 300 mg capsule Take 1 Capsule by mouth three (3) times daily. Max Daily Amount: 900 mg.    furosemide (Lasix) 40 mg tablet Take 1 Tablet by mouth in the morning. omeprazole (PRILOSEC) 40 mg capsule Take 1 Capsule by mouth in the morning. tacrolimus (PROGRAF) 1 mg capsule Take 1 Capsule by mouth every twelve (12) hours. morphine IR (MS IR) 15 mg tablet     insulin aspart protamine/insulin aspart (NOVOLOG MIX 70-30FLEXPEN U-100) 100 unit/mL (70-30) inpn by SubCUTAneous route. Sliding scale    albuterol (PROVENTIL HFA) 90 mcg/actuation inhaler Take 2 Puffs by inhalation. amLODIPine (NORVASC) 10 mg tablet Take 10 mg by mouth.    budesonide-formoterol (SYMBICORT) 160-4.5 mcg/actuation HFAA Take 2 Puffs by inhalation. ferrous fumarate 324 mg (106 mg iron) tab Take 324 mg by mouth. glipiZIDE SR (GLUCOTROL XL) 5 mg CR tablet Take 5 mg by mouth.    minoxidil (LONITEN) 2.5 mg tablet Take 2.5 mg by mouth. No current facility-administered medications for this visit. SYSTEM REVIEW NOT RELATED TO LIVER DISEASE OR REVIEWED ABOVE:  Constitution systems: Negative for fever, chills, weight gain, weight loss. Eyes: Negative for visual changes. ENT: Negative for sore throat, painful swallowing. Respiratory: Negative for cough, hemoptysis, SOB. Cardiology: Swelling of the legs. GI:  Negative for constipation or diarrhea. : Negative for urinary frequency, dysuria, hematuria, nocturia. Skin: Negative for rash. Hematology: Negative for easy bruising, blood clots. Musculo-skelatal: Negative for back pain, muscle pain, weakness. Neurologic: Negative for headaches, dizziness, vertigo, memory problems not related to HE. Psychology: Negative for anxiety, depression. FAMILY HISTORY:  The father  of MI. The mother  of MI. The following family members have liver disease: a brother    SOCIAL HISTORY:  The patient is . The patient has 2 children, and 8 grandchildren. The patient stopped using tobacco products in . The patient has never consumed significant amounts of alcohol. The patient used to work as as a  at [employer]. The patient retired in . PHYSICAL EXAMINATION:  Visit Vitals  /78 (BP 1 Location: Right arm, BP Patient Position: Sitting, BP Cuff Size: Adult)   Pulse 97   Temp 97.6 °F (36.4 °C) (Tympanic)   Resp 18   Ht 5' 3\" (1.6 m)   Wt 193 lb (87.5 kg)   SpO2 98%   BMI 34.19 kg/m²     General: No acute distress. Eyes: Sclera anicteric. ENT: No oral lesions. Thyroid normal.  Nodes: No adenopathy. Skin: No spider angiomata. No jaundice. No palmar erythema. Respiratory: Lungs clear to auscultation. Cardiovascular: Regular heart rate.   No murmurs. No JVD. Abdomen: Soft non-tender. Liver size normal to percussion/palpation. Spleen not palpable. No obvious ascites. Extremities: No edema. No muscle wasting. No gross arthritic changes. Neurologic: Alert and oriented. Cranial nerves grossly intact. No asterixis. LABORATORY STUDIES:  [place] of [address] & Units 3/18/2022   WBC 4.6 - 13.2 K/uL 5.7   ANC 1.8 - 8.0 K/UL 2.5   HGB 12.0 - 16.0 g/dL 12.5    - 420 K/uL 180   INR 0.8 - 1.2      AST 10 - 38 U/L 27   ALT 13 - 56 U/L 39   Alk Phos 45 - 117 U/L 121 (H)   Bili, Total 0.2 - 1.0 MG/DL 0.5   Bili, Direct 0.0 - 0.2 MG/DL    Albumin 3.4 - 5.0 g/dL 3.1 (L)   BUN 7.0 - 18 MG/DL 14   Creat 0.6 - 1.3 MG/DL 0.93   Creat (iSTAT) 0.6 - 1.3 MG/DL    Na 136 - 145 mmol/L 138   K 3.5 - 5.5 mmol/L 3.9   Cl 100 - 111 mmol/L 106   CO2 21 - 32 mmol/L 27   Glucose 74 - 99 mg/dL 148 (H)     Cancer Screening Latest Ref Rng & Units 3/10/2022 12/2/2021 9/1/2021   AFP, Serum 0.0 - 9.2 ng/mL 3.5 4.2 4.8     Liver Virology and Transplant Immune Suppression Latest Ref Rng & Units 3/10/2022   Tacrolimus Level 2.0 - 20.0 ng/mL 3.9     Liver Virology and Transplant Immune Suppression Latest Ref Rng & Units 9/1/2021   Tacrolimus Level 2.0 - 20.0 ng/mL 1.2 (L)     Liver Virology and Transplant Immune Suppression Latest Ref Rng & Units 3/29/2021   Tacrolimus Level 2.0 - 20.0 ng/mL 4.4       SEROLOGIES:  6/2017. Anti-HBsurface positive  9/2017. HBsAntigen negative, anti-HCV positive, HCV RNA undetectable, VICKI positive, ASMA positive, ANCA positive, AMA negative, RF positive, alpha-1-antitrypsin 150    LIVER HISTOLOGY:  8/2017. Active hepatitis with portal and lobular inflammation, interface hepatitis, PMN and stage 2-3 septal fibrosis. Biopsy slides not reviewed by MLS.  4/2019. Sheer wave elastography performed at [facility]. Range 10.09- 17.05 kPa, Mean 14.07 kPa, Median 14.9 kPa, Standard deviation 2.41 kPa.   The results suggested a fibrosis level of F4.    2/2022. TRANSIENT HEPATIC ELASTOGRAPHY:  E Range: 8.50-10.16 kPa, E Mean: 9.24 kPa, E Median: 9.22 kPa, E Std: 0.52 kPa    ENDOSCOPIC PROCEDURES:  7/2019. EGD by Dr Soila Lundberg. Small esophageal varices. GAVE. 10/2020. EGD by Dr. Donald Irving. Dr. Donald Irving performed a repeat EGD in 10/2020. Multiple large gastric polyps in the antrum. 3 polys removed. Repeat EGD in 1 to 2 months.   2/2021. EGD by Dr. Donald Irving. Normal esophagus. No portal hypertensive gastropathy. At least least 10 polyps in the antrum. Several of these were > 5 mm in diameter. 7 of the polyps were resected by snare cautery. All bases were noted to be clear and without bleeding or perforation at end of procedure. 3-4 polyps with wide bases were left behind. No gastric varices identified. Repeat in 3 months.    6/2021. EGD by Dr. Donald Irving. Two Medium varices were present. There was no stigmata of recent bleeding. Banding of esophageal varices was performed. 10/2021. EGD by Dr. Donald Irving. Small esophageal varices. No banding performed. RADIOLOGY:  1/2019. Ultrasound of liver. Echogenic consistent with chronic liver disease. No liver mass lesions. No dilated bile ducts. No ascites. 5/2019. Ultrasound of liver. Echogenic consistent with cirrhosis. No liver mass lesions. No dilated bile ducts. No ascites. 6/2020. MRI/MRCP w/wo contrast.  Liver has a mildly nodular contour. No suspicious liver lesions. Hepatic biliary ducts have a mildly beaded appearance, which is suggestive of mild primary sclerosing cholangitis. There is no focal area of intrahepatic biliary dilation to suggest a dominant stricture. 12/2020. Ultrasound of the liver. Lobular surface contour in course and periapical echotexture redemonstrated. No focal mass. 4/2021. Abdominal ultrasound ([facility]). Coarsened heterogeneous texture to the liver with nodular contour, which can be seen in setting of chronic liver disease/cirrhosis. 2/2022. Ultrasound of the liver. Increased heterogeneous hepatic echotexture in keeping with patient's history of hepatocellular disease. No solid hepatic mass. OTHER TESTING:  Not available or performed    FOLLOW-UP:  All of the issues listed above in the Assessment and Plan were discussed with the patient. All questions were answered. The patient expressed a clear understanding of the above. [address] in 6 months.       Aspen Galindo, NITZAP-C  [place]iver [place] [address], [address] [place] [address]   [phone]

## 2022-10-24 LAB
AFP L3 MFR SERPL: NORMAL % (ref 0–9.9)
AFP SERPL-MCNC: 4.9 NG/ML (ref 0–9.2)

## 2023-01-22 RX ORDER — DEXTROSE MONOHYDRATE 100 MG/ML
0-250 INJECTION, SOLUTION INTRAVENOUS AS NEEDED
Status: CANCELLED | OUTPATIENT
Start: 2023-01-22

## 2023-01-22 RX ORDER — NALOXONE HYDROCHLORIDE 0.4 MG/ML
0.2 INJECTION, SOLUTION INTRAMUSCULAR; INTRAVENOUS; SUBCUTANEOUS AS NEEDED
Status: CANCELLED | OUTPATIENT
Start: 2023-01-22

## 2023-01-22 RX ORDER — SODIUM CHLORIDE, SODIUM LACTATE, POTASSIUM CHLORIDE, CALCIUM CHLORIDE 600; 310; 30; 20 MG/100ML; MG/100ML; MG/100ML; MG/100ML
75 INJECTION, SOLUTION INTRAVENOUS CONTINUOUS
Status: CANCELLED | OUTPATIENT
Start: 2023-01-22

## 2023-01-22 RX ORDER — IBUPROFEN 200 MG
4 TABLET ORAL AS NEEDED
Status: CANCELLED | OUTPATIENT
Start: 2023-01-22

## 2023-01-22 RX ORDER — FENTANYL CITRATE 50 UG/ML
25 INJECTION, SOLUTION INTRAMUSCULAR; INTRAVENOUS
Status: CANCELLED | OUTPATIENT
Start: 2023-01-22

## 2023-01-22 RX ORDER — SODIUM CHLORIDE 0.9 % (FLUSH) 0.9 %
5-40 SYRINGE (ML) INJECTION EVERY 8 HOURS
Status: CANCELLED | OUTPATIENT
Start: 2023-01-22

## 2023-01-22 RX ORDER — INSULIN LISPRO 100 [IU]/ML
INJECTION, SOLUTION INTRAVENOUS; SUBCUTANEOUS ONCE
Status: CANCELLED | OUTPATIENT
Start: 2023-01-22 | End: 2023-01-22

## 2023-01-22 RX ORDER — SODIUM CHLORIDE 0.9 % (FLUSH) 0.9 %
5-40 SYRINGE (ML) INJECTION AS NEEDED
Status: CANCELLED | OUTPATIENT
Start: 2023-01-22

## 2023-01-22 RX ORDER — FENTANYL CITRATE 50 UG/ML
25 INJECTION, SOLUTION INTRAMUSCULAR; INTRAVENOUS AS NEEDED
Status: CANCELLED | OUTPATIENT
Start: 2023-01-22

## 2023-01-23 ENCOUNTER — ANESTHESIA (OUTPATIENT)
Dept: ENDOSCOPY | Age: 73
End: 2023-01-23
Payer: MEDICARE

## 2023-01-23 ENCOUNTER — HOSPITAL ENCOUNTER (OUTPATIENT)
Age: 73
Setting detail: OUTPATIENT SURGERY
Discharge: HOME | End: 2023-01-23
Attending: INTERNAL MEDICINE | Admitting: INTERNAL MEDICINE
Payer: MEDICARE

## 2023-01-23 ENCOUNTER — ANESTHESIA EVENT (OUTPATIENT)
Dept: ENDOSCOPY | Age: 73
End: 2023-01-23
Payer: MEDICARE

## 2023-01-23 VITALS
DIASTOLIC BLOOD PRESSURE: 79 MMHG | WEIGHT: 181 LBS | HEART RATE: 85 BPM | OXYGEN SATURATION: 100 % | SYSTOLIC BLOOD PRESSURE: 135 MMHG | HEIGHT: 63 IN | TEMPERATURE: 98.3 F | RESPIRATION RATE: 16 BRPM | BODY MASS INDEX: 32.07 KG/M2

## 2023-01-23 DIAGNOSIS — K31.819 GAVE (GASTRIC ANTRAL VASCULAR ECTASIA): Primary | ICD-10-CM

## 2023-01-23 LAB — GLUCOSE BLD STRIP.AUTO-MCNC: 127 MG/DL (ref 70–110)

## 2023-01-23 PROCEDURE — 74011250636 HC RX REV CODE- 250/636: Performed by: NURSE ANESTHETIST, CERTIFIED REGISTERED

## 2023-01-23 PROCEDURE — 43235 EGD DIAGNOSTIC BRUSH WASH: CPT | Performed by: INTERNAL MEDICINE

## 2023-01-23 PROCEDURE — 74011000250 HC RX REV CODE- 250: Performed by: NURSE ANESTHETIST, CERTIFIED REGISTERED

## 2023-01-23 PROCEDURE — 76060000031 HC ANESTHESIA FIRST 0.5 HR: Performed by: INTERNAL MEDICINE

## 2023-01-23 PROCEDURE — 82962 GLUCOSE BLOOD TEST: CPT

## 2023-01-23 PROCEDURE — 76040000019: Performed by: INTERNAL MEDICINE

## 2023-01-23 PROCEDURE — 74011250636 HC RX REV CODE- 250/636: Performed by: INTERNAL MEDICINE

## 2023-01-23 PROCEDURE — 2709999900 HC NON-CHARGEABLE SUPPLY: Performed by: INTERNAL MEDICINE

## 2023-01-23 RX ORDER — LIDOCAINE HYDROCHLORIDE 20 MG/ML
INJECTION, SOLUTION EPIDURAL; INFILTRATION; INTRACAUDAL; PERINEURAL AS NEEDED
Status: DISCONTINUED | OUTPATIENT
Start: 2023-01-23 | End: 2023-01-23 | Stop reason: HOSPADM

## 2023-01-23 RX ORDER — PROPOFOL 10 MG/ML
INJECTION, EMULSION INTRAVENOUS AS NEEDED
Status: DISCONTINUED | OUTPATIENT
Start: 2023-01-23 | End: 2023-01-23 | Stop reason: HOSPADM

## 2023-01-23 RX ORDER — DEXMEDETOMIDINE HYDROCHLORIDE 100 UG/ML
INJECTION, SOLUTION INTRAVENOUS AS NEEDED
Status: DISCONTINUED | OUTPATIENT
Start: 2023-01-23 | End: 2023-01-23 | Stop reason: HOSPADM

## 2023-01-23 RX ORDER — SODIUM CHLORIDE 9 MG/ML
125 INJECTION, SOLUTION INTRAVENOUS CONTINUOUS
Status: DISCONTINUED | OUTPATIENT
Start: 2023-01-23 | End: 2023-01-23 | Stop reason: HOSPADM

## 2023-01-23 RX ADMIN — DEXMEDETOMIDINE HYDROCHLORIDE 6 MCG: 100 INJECTION, SOLUTION INTRAVENOUS at 09:17

## 2023-01-23 RX ADMIN — PROPOFOL 50 MG: 10 INJECTION, EMULSION INTRAVENOUS at 09:17

## 2023-01-23 RX ADMIN — SODIUM CHLORIDE 125 ML/HR: 9 INJECTION, SOLUTION INTRAVENOUS at 08:44

## 2023-01-23 RX ADMIN — LIDOCAINE HYDROCHLORIDE 60 MG: 20 INJECTION, SOLUTION EPIDURAL; INFILTRATION; INTRACAUDAL; PERINEURAL at 09:15

## 2023-01-23 RX ADMIN — PROPOFOL 50 MG: 10 INJECTION, EMULSION INTRAVENOUS at 09:19

## 2023-01-23 RX ADMIN — PROPOFOL 50 MG: 10 INJECTION, EMULSION INTRAVENOUS at 09:18

## 2023-01-23 RX ADMIN — PROPOFOL 20 MG: 10 INJECTION, EMULSION INTRAVENOUS at 09:22

## 2023-01-23 RX ADMIN — DEXMEDETOMIDINE HYDROCHLORIDE 2 MCG: 100 INJECTION, SOLUTION INTRAVENOUS at 09:15

## 2023-01-23 RX ADMIN — PROPOFOL 20 MG: 10 INJECTION, EMULSION INTRAVENOUS at 09:23

## 2023-01-23 NOTE — ANESTHESIA PREPROCEDURE EVALUATION
Relevant Problems   GASTROINTESTINAL   (+) Autoimmune hepatitis (HCC)   (+) Cirrhosis (HCC)      RENAL FAILURE   (+) Chronic renal disease, stage III      ENDOCRINE   (+) Insulin dependent type 2 diabetes mellitus (HCC)   (+) Severe obesity (HCC)       Anesthetic History   No history of anesthetic complications            Review of Systems / Medical History  Patient summary reviewed, nursing notes reviewed and pertinent labs reviewed    Pulmonary    COPD               Neuro/Psych   Within defined limits           Cardiovascular    Hypertension: well controlled                   GI/Hepatic/Renal     GERD  Hepatitis  Renal disease: CRI  Liver disease     Endo/Other    Diabetes: poorly controlled    Morbid obesity and arthritis     Other Findings            Physical Exam    Airway  Mallampati: II  TM Distance: 4 - 6 cm  Neck ROM: normal range of motion   Mouth opening: Normal     Cardiovascular    Rhythm: regular  Rate: normal         Dental    Dentition: Caps/crowns and Upper partial plate     Pulmonary  Breath sounds clear to auscultation               Abdominal  GI exam deferred       Other Findings            Anesthetic Plan    ASA: 3  Anesthesia type: MAC and general          Induction: Intravenous  Anesthetic plan and risks discussed with: Patient      MAC/Brief GA

## 2023-01-23 NOTE — DISCHARGE INSTRUCTIONS
[facility], MD, FACP, [address] [place], [place]      Klely Ramirez PA-C    April S Jayme, [facility]   Richa Baig, [facility]-   Allen Nichols, FNP-JOSH Graff FNP-C   Judge Alejandre, PCNP-BC      [address]   at [facility]   [address] [place] [address] Ave, [address]   [phone]   FAX: [address]   at [address], [address] [place] [address], [address]   [phone]   FAX: [phone]         ENDOSCOPY DISCHARGE INSTRUCTIONS      Doug Robertson  0/47/[birth year]64  Date: 1/23/2023    DISCOMFORT:  Use lozenges or warm salt water gargle for sore thoat  Apply warm compress to IV site if red. If redness or soreness persists call the office. You may experience gas and bloating. Walking and belching will help relieve this. You may experience chest discomfort or pressure especially if banding of esophageal varices was performed. DIET:  You may resume your normal diet. ACTIVITY:  Spend the remainder of the day resting. Avoid any strenuous activity. You may not operate a vehicle for 12 hours. You may not engage in an occupation involving machinery or appliances for rest of today. Avoid making any critical or financial decisions for at least 24 hour. Call the [address] [facility] if you have any of the following:  Increasing chest or abdominal pain, nausea, vomiting, vomiting blood, abdominal distension or swelling, fever or chills, bloody discharge from nose or mouth or shortness of breath. Follow-up Instructions:  Call Dr. Dang Stroud for any questions or problems at the phone number listed above. If a biopsy was performed, you will be contacted by the office staff or Dr Dang Stroud within 1 week.    If you have not heard from us by then you may call the office at the phone number listed above to inquire about the results. ENDOSCOPY FINDINGS:  Your endoscopy demonstrated GAVE. Large blood vessels in the stomach  Will repeat EGD to look for development of varices in 1 years. Keep follow-up appointment with Filippo Harris on 4/25/2023. DISCHARGE SUMMARY from the Nurse: The following personal items collected during your admission are returned to you:   Dental Appliance: Dental Appliances: None  Vision: Visual Aid: Glasses, At bedside, With patient  Hearing Aid:    Jewelry:    Clothing:    Other Valuables:    Valuables sent to safe:      DISCHARGE SUMMARY from Nurse    The following personal items collected during your admission are returned to you:   Dental Appliance: Dental Appliances: None  Vision: Visual Aid: Glasses, At bedside, With patient  Hearing Aid:    Jewelry:    Clothing:    Other Valuables:    Valuables sent to safe:              PATIENT INSTRUCTIONS:    After general anesthesia or intravenous sedation, for 24 hours or while taking prescription Narcotics:  Limit your activities  Do not drive and operate hazardous machinery  Do not make important personal or business decisions  Do  not drink alcoholic beverages  If you have not urinated within 8 hours after discharge, please contact your surgeon on call. Report the following to your surgeon:  Excessive pain, swelling, redness or odor of or around the surgical area  Temperature over 100.5  Nausea and vomiting lasting longer than 4 hours or if unable to take medications  Any signs of decreased circulation or nerve impairment to extremity: change in color, persistent  numbness, tingling, coldness or increase pain  Any questions      [unfilled]    What to do at Home:  Recommended activity: as above,     If you experience any of the following symptoms as above, please follow up with Dr. Massimo Patel. *  Please give a list of your current medications to your Primary Care Provider.     *  Please update this list whenever your medications are discontinued, doses are      changed, or new medications (including over-the-counter products) are added. *  Please carry medication information at all times in case of emergency situations. These are general instructions for a healthy lifestyle:    No smoking/ No tobacco products/ Avoid exposure to second hand smoke    Surgeon General's Warning:  Quitting smoking now greatly reduces serious risk to your health. Obesity, smoking, and sedentary lifestyle greatly increases your risk for illness    A healthy diet, regular physical exercise & weight monitoring are important for maintaining a healthy lifestyle    You may be retaining fluid if you have a history of heart failure or if you experience any of the following symptoms:  Weight gain of 3 pounds or more overnight or 5 pounds in a week, increased swelling in our hands or feet or shortness of breath while lying flat in bed. Please call your doctor as soon as you notice any of these symptoms; do not wait until your next office visit. Recognize signs and symptoms of STROKE:    F-face looks uneven    A-arms unable to move or move unevenly    S-speech slurred or non-existent    T-time-call 911 as soon as signs and symptoms begin-DO NOT go       Back to bed or wait to see if you get better-TIME IS BRAIN. The discharge information has been reviewed with the patient and spouse. The patient and spouse verbalized understanding. Warning Signs of HEART ATTACK     Call 911 if you have these symptoms:  Chest discomfort. Most heart attacks involve discomfort in the center of the chest that lasts more than a few minutes, or that goes away and comes back. It can feel like uncomfortable pressure, squeezing, fullness, or pain. Discomfort in other areas of the upper body. Symptoms can include pain or discomfort in one or both arms, the back, neck, jaw, or stomach. Shortness of breath with or without chest discomfort.   Other signs may include breaking out in a cold sweat, nausea, or lightheadedness. Don't wait more than five minutes to call 911 - MINUTES MATTER! Fast action can save your life. Calling 911 is almost always the fastest way to get lifesaving treatment. Emergency Medical Services staff can begin treatment when they arrive -- up to an hour sooner than if someone gets to the hospital by car. The discharge information has been reviewed with the patient and caregiver. The patient and caregiver verbalized understanding. Discharge medications reviewed with the patient and guardian and appropriate educational materials and side effects teaching were provided.     Patient armband removed and shredded

## 2023-01-23 NOTE — ANESTHESIA POSTPROCEDURE EVALUATION
Post-Anesthesia Evaluation and Assessment    Cardiovascular Function/Vital Signs  Visit Vitals  /79   Pulse 85   Temp 36.8 °C (98.3 °F)   Resp 16   Ht 5' 3\" (1.6 m)   Wt 82.1 kg (181 lb)   SpO2 100%   Breastfeeding No   BMI 32.06 kg/m²       Patient is status post Procedure(s):  ESOPHAGOGASTRODUODENOSCOPY (EGD) WITH MAC. Nausea/Vomiting: Controlled. Postoperative hydration reviewed and adequate. Pain:  Pain Scale 1: Numeric (0 - 10) (01/23/23 0949)  Pain Intensity 1: 0 (01/23/23 0949)   Managed. Neurological Status: At baseline. Mental Status and Level of Consciousness: Arousable. Pulmonary Status:   O2 Device: None (Room air) (01/23/23 0949)   Adequate oxygenation and airway patent. Complications related to anesthesia: None    Post-anesthesia assessment completed. No concerns. Patient has met all discharge requirements.     Signed By: Nan Segovia CRNA    January 23, 2023

## 2023-01-27 RX ORDER — OMEPRAZOLE 40 MG/1
40 CAPSULE, DELAYED RELEASE ORAL DAILY
Qty: 90 CAPSULE | Refills: 0 | Status: SHIPPED | OUTPATIENT
Start: 2023-01-27

## 2023-02-09 DIAGNOSIS — K74.60 CIRRHOSIS OF LIVER WITHOUT ASCITES, UNSPECIFIED HEPATIC CIRRHOSIS TYPE (HCC): ICD-10-CM

## 2023-02-10 RX ORDER — GABAPENTIN 300 MG/1
CAPSULE ORAL
Qty: 180 CAPSULE | Refills: 0 | Status: SHIPPED | OUTPATIENT
Start: 2023-02-10

## 2023-02-21 ENCOUNTER — TELEPHONE (OUTPATIENT)
Age: 73
End: 2023-02-21

## 2023-03-05 ENCOUNTER — CLINICAL DOCUMENTATION (OUTPATIENT)
Age: 73
End: 2023-03-05

## 2023-03-05 NOTE — PROGRESS NOTES
[address] [facility], MD, FACP, [place], [place]      LOVE Jean, [facility]   Howard Bishop, [facility]   Sima Venegas, JUAREZ Erwin FNP-EARLINE Mariano, PCNP-BC      [address]   at [address], [address]  [address].   [phone]   FAX: [address]   at [address], [address] [facility], [address]   [phone]   FAX: [phone]       Risk of elective surgery in a patient with cirrhosis  I have been informed that the patient is interested in undergoing hip surgery. The patient has cirrhosis Child class A and MELD of under 10. The risk of complications including hepatic decompensation is about 20-30%. Operative mortality risk is under 5%. The patient has portal hypertension and has had banding of esophageal varices in the past.  The last EGD in 10/2021 demonstrated small esophageal varices were present. I would recommend the patient undergo a repeat EGD to see if varices have enlarged and would be at risk for bleeding during or following the surgical procedure. We will contact the patient and schedule this. The ultimate decision regarding whether or not to proceed with surgery will depend upon conversations between the surgeon and patient/and/or family and careful assessment of the risk and benefit of the surgical procedure.       Bharat Salazar MD  [address] [address], [address] [place] [address], [address]  12 [facility]

## 2023-03-27 DIAGNOSIS — G62.9 NEUROPATHY: Primary | ICD-10-CM

## 2023-03-30 RX ORDER — GABAPENTIN 300 MG/1
CAPSULE ORAL
Qty: 270 CAPSULE | Refills: 3 | Status: SHIPPED | OUTPATIENT
Start: 2023-03-30 | End: 2023-06-28

## 2023-04-19 ENCOUNTER — TELEPHONE (OUTPATIENT)
Age: 73
End: 2023-04-19

## 2023-07-05 NOTE — H&P
Luda Mueller MD, [address], Cite Quan Marr MD Minta Chatters, PA-C Margaret Barefoot, [facility]-BC     April S Jayme, [facility]   Betty Hernández P-C    Mk Hudson, [facility]       Sandeep Beedo [address]    at [address], [address] Wes Marcus  22.    [phone]    FAX: [address] [place] [address], [address], [address]    [phone]    FAX: [phone]         PRE-PROCEDURE NOTE - EGD    H and P from last office visit reviewed. Allergies reviewed. Out-patient medicaton list reviewed. 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 Current Outpatient Medications on File Prior to Encounter   Medication Sig Dispense Refill    insulin lispro (HUMALOG) 100 unit/mL injection by SubCUTAneous route.  methocarbamoL (ROBAXIN) 750 mg tablet Take 750 mg by mouth every eight (8) hours as needed.  sodium bicarbonate 650 mg tablet Take 1,300 mg by mouth four (4) times daily.  tacrolimus (PROGRAF) 1 mg capsule Take 1 Cap by mouth every twelve (12) hours. 120 Cap 6    insulin aspart protamine/insulin aspart (NOVOLOG MIX 70-30FLEXPEN U-100) 100 unit/mL (70-30) inpn by SubCUTAneous route.       albuterol (PROVENTIL HFA) 90 mcg/actuation inhaler Take 2 Puffs by inhalation.  amLODIPine (NORVASC) 10 mg tablet Take 10 mg by mouth.  budesonide-formoterol (SYMBICORT) 160-4.5 mcg/actuation HFAA Take 2 Puffs by inhalation.  butalbital-acetaminophen-caffeine (FIORICET, ESGIC) -40 mg per tablet TAKE 1 TABLET BY MOUTH EVERY 4 HOURS AS NEEDED FOR HEADACHE PAIN  0    ferrous fumarate 324 mg (106 mg iron) tab Take 324 mg by mouth.  furosemide (LASIX) 40 mg tablet Take 40 mg by mouth.  glipiZIDE SR (GLUCOTROL XL) 5 mg CR tablet Take 5 mg by mouth.  minoxidil (LONITEN) 2.5 mg tablet Take 2.5 mg by mouth.  omeprazole (PRILOSEC) 40 mg capsule Take 40 mg by mouth.  spironolactone (ALDACTONE) 100 mg tablet Take 1 Tab by mouth daily. 180 Tab 3    naloxone (NARCAN) 4 mg/actuation nasal spray 4 mg.  ondansetron (ZOFRAN ODT) 4 mg disintegrating tablet Take 4 mg by mouth every eight (8) hours as needed.  aspirin delayed-release 81 mg tablet Take 81 mg by mouth. For EGD to assess for esophageal and gastric varices. Plan to perform banding if indicated based upon variceal size and appearance. The risks of the procedure were discussed with the patient. These included reaction to anesthesia, pain, perforation and bleeding. All questions were answered. The patient wishes to proceed with the procedure. The patient was counseled at length about the risks of suzie Covid-19 in the cyndie-operative and post-operative states including the recovery window of their procedure. The patient was made aware that suzie Covid-19 after a surgical procedure may worsen their prognosis for recovering from the virus and lend to a higher morbidity and or mortality risk. The patient was given the options of postponing their procedure. All of the risks, benefits, and alternatives were discussed. The patient does  wish to proceed with the procedure. PHYSICAL EXAMINATION:  VS: per nursing note  General: No acute distress. Eyes: Sclera anicteric. ENT: No oral lesions. Thyroid normal.  Nodes: No adenopathy. Skin: No spider angiomata. No jaundice. No palmar erythema. Respiratory: Lungs clear to auscultation. Cardiovascular: Regular heart rate. No murmurs. No JVD. Abdomen: Soft non-tender, liver size normal to percussion/palpation. Spleen not palpable. No obvious ascites. Extremities: No edema. No muscle wasting. No gross arthritic changes. Neurologic: Alert and oriented. Cranial nerves grossly intact. No asterixis. MOST RECENT LABORATORY STUDIES:  Lab Results   Component Value Date/Time    WBC 5.2 08/27/2020 11:16 AM    HGB 12.1 08/27/2020 11:16 AM    HCT 37.7 08/27/2020 11:16 AM    PLATELET 651 54/80/0284 11:16 AM    MCV 84.9 08/27/2020 11:16 AM     Lab Results   Component Value Date/Time    INR 1.2 08/27/2020 11:16 AM    INR 1.2 08/21/2019 12:24 PM    INR 1.2 06/10/2019 01:01 PM    Prothrombin time 15.2 08/27/2020 11:16 AM    Prothrombin time 15.2 08/21/2019 12:24 PM    Prothrombin time 15.4 (H) 06/10/2019 01:01 PM       ASSESSMENT AND PLAN:  EGD to assess for esophageal and/or gastric varices.   Sedation per anesthesiology      MD Teresa Manuel 13 [address]  [address] [place] [address] 58, [address]  [phone]  [address] [place] [address] Rifampin Pregnancy And Lactation Text: This medication is Pregnancy Category C and it isn't know if it is safe during pregnancy. It is also excreted in breast milk and should not be used if you are breast feeding.

## 2023-08-11 RX ORDER — DULAGLUTIDE 1.5 MG/.5ML
INJECTION, SOLUTION SUBCUTANEOUS
COMMUNITY
Start: 2023-07-05 | End: 2023-08-29

## 2023-08-11 RX ORDER — MINOXIDIL 2.5 MG/1
2.5 TABLET ORAL DAILY
Qty: 30 TABLET | Refills: 5 | Status: CANCELLED | OUTPATIENT
Start: 2023-08-11

## 2023-08-11 RX ORDER — OXYCODONE HYDROCHLORIDE 5 MG/1
TABLET ORAL
COMMUNITY
Start: 2023-07-06 | End: 2023-08-29

## 2023-08-18 ENCOUNTER — TELEPHONE (OUTPATIENT)
Age: 73
End: 2023-08-18

## 2023-08-18 NOTE — TELEPHONE ENCOUNTER
Patient called clinic requesting refills for tacrolimus (PROGRAF) 1 MG capsule, spironolactone (ALDACTONE) 100 MG tablet and minoxidil (LONITEN) 2.5 MG tablet.  Please advise      Preferred Pharmacy: [facility] [address] [place], [place] [address]

## 2023-08-21 RX ORDER — FUROSEMIDE 40 MG/1
TABLET ORAL
Qty: 90 TABLET | Refills: 0 | Status: SHIPPED | OUTPATIENT
Start: 2023-08-21

## 2023-08-21 RX ORDER — SPIRONOLACTONE 100 MG/1
TABLET, FILM COATED ORAL
Qty: 90 TABLET | Refills: 0 | Status: SHIPPED | OUTPATIENT
Start: 2023-08-21

## 2023-08-21 RX ORDER — TACROLIMUS 1 MG/1
CAPSULE ORAL
Qty: 120 CAPSULE | Refills: 0 | Status: SHIPPED | OUTPATIENT
Start: 2023-08-21

## 2023-08-29 ENCOUNTER — TRANSCRIBE ORDERS (OUTPATIENT)
Facility: HOSPITAL | Age: 73
End: 2023-08-29

## 2023-08-29 ENCOUNTER — OFFICE VISIT (OUTPATIENT)
Age: 73
End: 2023-08-29
Payer: MEDICARE

## 2023-08-29 ENCOUNTER — HOSPITAL ENCOUNTER (OUTPATIENT)
Facility: HOSPITAL | Age: 73
Setting detail: SPECIMEN
Discharge: HOME | End: 2023-09-01
Payer: MEDICARE

## 2023-08-29 VITALS
OXYGEN SATURATION: 99 % | TEMPERATURE: 98.1 F | DIASTOLIC BLOOD PRESSURE: 72 MMHG | WEIGHT: 158 LBS | HEIGHT: 63 IN | BODY MASS INDEX: 28 KG/M2 | SYSTOLIC BLOOD PRESSURE: 123 MMHG | HEART RATE: 100 BPM

## 2023-08-29 DIAGNOSIS — K74.60 CIRRHOSIS OF LIVER WITHOUT ASCITES, UNSPECIFIED HEPATIC CIRRHOSIS TYPE (HCC): Primary | ICD-10-CM

## 2023-08-29 DIAGNOSIS — Z96.642 HISTORY OF LEFT HIP REPLACEMENT: ICD-10-CM

## 2023-08-29 DIAGNOSIS — M51.26 LUMBAR DISC HERNIATION: Primary | ICD-10-CM

## 2023-08-29 DIAGNOSIS — K74.60 CIRRHOSIS OF LIVER WITHOUT ASCITES, UNSPECIFIED HEPATIC CIRRHOSIS TYPE (HCC): ICD-10-CM

## 2023-08-29 LAB
ALBUMIN SERPL-MCNC: 3.2 G/DL (ref 3.4–5)
ALBUMIN/GLOB SERPL: 0.7 (ref 0.8–1.7)
ALP SERPL-CCNC: 137 U/L (ref 45–117)
ALT SERPL-CCNC: 111 U/L (ref 13–56)
ANION GAP SERPL CALC-SCNC: 3 MMOL/L (ref 3–18)
AST SERPL-CCNC: 96 U/L (ref 10–38)
BASOPHILS # BLD: 0 K/UL (ref 0–0.1)
BASOPHILS NFR BLD: 0 % (ref 0–2)
BILIRUB DIRECT SERPL-MCNC: 0.3 MG/DL (ref 0–0.2)
BILIRUB SERPL-MCNC: 0.5 MG/DL (ref 0.2–1)
BUN SERPL-MCNC: 11 MG/DL (ref 7–18)
BUN/CREAT SERPL: 12 (ref 12–20)
CALCIUM SERPL-MCNC: 8.7 MG/DL (ref 8.5–10.1)
CHLORIDE SERPL-SCNC: 104 MMOL/L (ref 100–111)
CO2 SERPL-SCNC: 31 MMOL/L (ref 21–32)
CREAT SERPL-MCNC: 0.92 MG/DL (ref 0.6–1.3)
DIFFERENTIAL METHOD BLD: ABNORMAL
EOSINOPHIL # BLD: 0.4 K/UL (ref 0–0.4)
EOSINOPHIL NFR BLD: 7 % (ref 0–5)
ERYTHROCYTE [DISTWIDTH] IN BLOOD BY AUTOMATED COUNT: 16.7 % (ref 11.6–14.5)
GLOBULIN SER CALC-MCNC: 4.7 G/DL (ref 2–4)
GLUCOSE SERPL-MCNC: 114 MG/DL (ref 74–99)
HCT VFR BLD AUTO: 36.4 % (ref 35–45)
HGB BLD-MCNC: 11.3 G/DL (ref 12–16)
IMM GRANULOCYTES # BLD AUTO: 0 K/UL (ref 0–0.04)
IMM GRANULOCYTES NFR BLD AUTO: 0 % (ref 0–0.5)
INR PPP: 1.2 (ref 0.9–1.1)
LYMPHOCYTES # BLD: 1.5 K/UL (ref 0.9–3.6)
LYMPHOCYTES NFR BLD: 29 % (ref 21–52)
MCH RBC QN AUTO: 24.6 PG (ref 24–34)
MCHC RBC AUTO-ENTMCNC: 31 G/DL (ref 31–37)
MCV RBC AUTO: 79.3 FL (ref 78–100)
MONOCYTES # BLD: 0.7 K/UL (ref 0.05–1.2)
MONOCYTES NFR BLD: 13 % (ref 3–10)
NEUTS SEG # BLD: 2.5 K/UL (ref 1.8–8)
NEUTS SEG NFR BLD: 50 % (ref 40–73)
NRBC # BLD: 0 K/UL (ref 0–0.01)
NRBC BLD-RTO: 0 PER 100 WBC
PLATELET # BLD AUTO: 219 K/UL (ref 135–420)
PMV BLD AUTO: 10.1 FL (ref 9.2–11.8)
POTASSIUM SERPL-SCNC: 3 MMOL/L (ref 3.5–5.5)
PROT SERPL-MCNC: 7.9 G/DL (ref 6.4–8.2)
PROTHROMBIN TIME: 15.6 SEC (ref 11.9–14.7)
RBC # BLD AUTO: 4.59 M/UL (ref 4.2–5.3)
SODIUM SERPL-SCNC: 138 MMOL/L (ref 136–145)
WBC # BLD AUTO: 5 K/UL (ref 4.6–13.2)

## 2023-08-29 PROCEDURE — 99214 OFFICE O/P EST MOD 30 MIN: CPT | Performed by: NURSE PRACTITIONER

## 2023-08-29 PROCEDURE — 82107 ALPHA-FETOPROTEIN L3: CPT

## 2023-08-29 PROCEDURE — 36415 COLL VENOUS BLD VENIPUNCTURE: CPT

## 2023-08-29 PROCEDURE — 1123F ACP DISCUSS/DSCN MKR DOCD: CPT | Performed by: NURSE PRACTITIONER

## 2023-08-29 PROCEDURE — 80076 HEPATIC FUNCTION PANEL: CPT

## 2023-08-29 PROCEDURE — 85025 COMPLETE CBC W/AUTO DIFF WBC: CPT

## 2023-08-29 PROCEDURE — 91200 LIVER ELASTOGRAPHY: CPT | Performed by: NURSE PRACTITIONER

## 2023-08-29 PROCEDURE — 85610 PROTHROMBIN TIME: CPT

## 2023-08-29 PROCEDURE — 80048 BASIC METABOLIC PNL TOTAL CA: CPT

## 2023-08-29 NOTE — PROGRESS NOTES
removed. Repeat EGD in 1 to 2 months.   2/2021. EGD by Dr. Jannet Hilliard. Normal esophagus. No portal hypertensive gastropathy. At least least 10 polyps in the antrum. Several of these were > 5 mm in diameter. 7 of the polyps were resected by snare cautery. All bases were noted to be clear and without bleeding or perforation at end of procedure. 3-4 polyps with wide bases were left behind. No gastric varices identified. Repeat in 3 months.    6/2021. EGD by Dr. Jannet Hilliard. Two Medium varices were present. There was no stigmata of recent bleeding. Banding of esophageal varices was performed. 10/2021. EGD by Dr. Jannet Hilliard. Small esophageal varices. No banding performed. 01/2023. EGD by Dr. Jannet Hilliard. Normal esophagus. Repeat EGD in one year. RADIOLOGY:  1/2019. Ultrasound of liver. Echogenic consistent with chronic liver disease. No liver mass lesions. No dilated bile ducts. No ascites. 5/2019. Ultrasound of liver. Echogenic consistent with cirrhosis. No liver mass lesions. No dilated bile ducts. No ascites. 6/2020. MRI/MRCP w/wo contrast.  Liver has a mildly nodular contour. No suspicious liver lesions. Hepatic biliary ducts have a mildly beaded appearance, which is suggestive of mild primary sclerosing cholangitis. There is no focal area of intrahepatic biliary dilation to suggest a dominant stricture. 12/2020. Ultrasound of the liver. Lobular surface contour in course and periapical echotexture redemonstrated. No focal mass. 4/2021. Abdominal ultrasound ([facility]). Coarsened heterogeneous texture to the liver with nodular contour, which can be seen in setting of chronic liver disease/cirrhosis. 2/2022. Ultrasound of the liver. Increased heterogeneous hepatic echotexture in keeping with patient's history of hepatocellular disease. No solid hepatic mass.      OTHER TESTING:  Not available or performed    FOLLOW-UP:  All of the issues listed above in the Assessment and Plan

## 2023-08-30 LAB
AFP L3 MFR SERPL: NORMAL % (ref 0–9.9)
AFP SERPL-MCNC: 2.8 NG/ML (ref 0–9.2)

## 2023-09-07 ENCOUNTER — HOSPITAL ENCOUNTER (OUTPATIENT)
Facility: HOSPITAL | Age: 73
Discharge: HOME | End: 2023-09-07
Payer: MEDICARE

## 2023-09-07 DIAGNOSIS — M51.26 LUMBAR DISC HERNIATION: ICD-10-CM

## 2023-09-07 PROCEDURE — 72148 MRI LUMBAR SPINE W/O DYE: CPT

## 2023-09-13 RX ORDER — FUROSEMIDE 40 MG/1
TABLET ORAL
Qty: 90 TABLET | Refills: 0 | Status: SHIPPED | OUTPATIENT
Start: 2023-09-13

## 2023-09-15 ENCOUNTER — TELEPHONE (OUTPATIENT)
Age: 73
End: 2023-09-15

## 2023-09-15 DIAGNOSIS — K74.60 CIRRHOSIS OF LIVER WITHOUT ASCITES, UNSPECIFIED HEPATIC CIRRHOSIS TYPE (HCC): Primary | ICD-10-CM

## 2023-09-15 RX ORDER — TACROLIMUS 1 MG/1
1 CAPSULE ORAL EVERY 12 HOURS
Qty: 120 CAPSULE | Refills: 0 | Status: SHIPPED | OUTPATIENT
Start: 2023-09-15 | End: 2023-11-14

## 2023-09-15 RX ORDER — SPIRONOLACTONE 100 MG/1
100 TABLET, FILM COATED ORAL EVERY MORNING
Qty: 90 TABLET | Refills: 0 | Status: SHIPPED | OUTPATIENT
Start: 2023-09-15 | End: 2023-12-14

## 2023-09-15 NOTE — TELEPHONE ENCOUNTER
----- Message from Patricia Marquez RN sent at 9/13/2023  2:23 PM EDT -----  Regarding: FW: Medication Refill Request    ----- Message -----  From: Cassandra Wagner LPN  Sent: 9/19/8205   3:17 PM EDT  To: Patricia Marquez RN  Subject: FW: Medication Refill Request                      ----- Message -----  From: Rei Marte MA  Sent: 8/18/2023   2:31 PM EDT  To: MICHAEL Monteiro - CNP; #  Subject: Medication Refill Request                        Patient called clinic requesting refills for tacrolimus (PROGRAF) 1 MG capsule, spironolactone (ALDACTONE) 100 MG tablet and minoxidil (LONITEN) 2.5 MG tablet.  Please advise      Preferred Pharmacy: Eric Fink [address] [place] [address], [address] [place] [address]

## 2023-12-04 ENCOUNTER — HOSPITAL ENCOUNTER (EMERGENCY)
Facility: HOSPITAL | Age: 73
Discharge: HOME | End: 2023-12-05
Payer: MEDICARE

## 2023-12-04 ENCOUNTER — APPOINTMENT (OUTPATIENT)
Facility: HOSPITAL | Age: 73
End: 2023-12-04
Payer: MEDICARE

## 2023-12-04 VITALS
WEIGHT: 155 LBS | DIASTOLIC BLOOD PRESSURE: 72 MMHG | HEIGHT: 63 IN | RESPIRATION RATE: 18 BRPM | OXYGEN SATURATION: 100 % | TEMPERATURE: 98.5 F | HEART RATE: 89 BPM | SYSTOLIC BLOOD PRESSURE: 129 MMHG | BODY MASS INDEX: 27.46 KG/M2

## 2023-12-04 DIAGNOSIS — M25.552 CHRONIC LEFT HIP PAIN: Primary | ICD-10-CM

## 2023-12-04 DIAGNOSIS — R10.32 LEFT GROIN PAIN: ICD-10-CM

## 2023-12-04 DIAGNOSIS — G89.29 CHRONIC LEFT HIP PAIN: Primary | ICD-10-CM

## 2023-12-04 LAB — ECHO BSA: 1.77 M2

## 2023-12-04 PROCEDURE — 96372 THER/PROPH/DIAG INJ SC/IM: CPT

## 2023-12-04 PROCEDURE — 93971 EXTREMITY STUDY: CPT

## 2023-12-04 PROCEDURE — 73502 X-RAY EXAM HIP UNI 2-3 VIEWS: CPT

## 2023-12-04 PROCEDURE — 99284 EMERGENCY DEPT VISIT MOD MDM: CPT

## 2023-12-04 RX ORDER — HYDROMORPHONE HYDROCHLORIDE 1 MG/ML
1 INJECTION, SOLUTION INTRAMUSCULAR; INTRAVENOUS; SUBCUTANEOUS
Status: DISCONTINUED | OUTPATIENT
Start: 2023-12-04 | End: 2023-12-04

## 2023-12-04 RX ORDER — MORPHINE SULFATE 4 MG/ML
4 INJECTION, SOLUTION INTRAMUSCULAR; INTRAVENOUS
Status: COMPLETED | OUTPATIENT
Start: 2023-12-04 | End: 2023-12-05

## 2023-12-04 ASSESSMENT — ENCOUNTER SYMPTOMS
COLOR CHANGE: 0
EYES NEGATIVE: 1
GASTROINTESTINAL NEGATIVE: 1
RESPIRATORY NEGATIVE: 1
ALLERGIC/IMMUNOLOGIC NEGATIVE: 1

## 2023-12-04 ASSESSMENT — PAIN - FUNCTIONAL ASSESSMENT: PAIN_FUNCTIONAL_ASSESSMENT: 0-10

## 2023-12-04 ASSESSMENT — PAIN DESCRIPTION - LOCATION: LOCATION: GROIN;LEG

## 2023-12-04 ASSESSMENT — PAIN DESCRIPTION - ORIENTATION: ORIENTATION: LEFT

## 2023-12-04 ASSESSMENT — PAIN SCALES - GENERAL: PAINLEVEL_OUTOF10: 4

## 2023-12-05 PROCEDURE — 6360000002 HC RX W HCPCS: Performed by: EMERGENCY MEDICINE

## 2023-12-05 RX ADMIN — MORPHINE SULFATE 4 MG: 4 INJECTION, SOLUTION INTRAMUSCULAR; INTRAVENOUS at 00:09

## 2023-12-05 NOTE — ED TRIAGE NOTES
Pt ambulatory to triage with cane c/o pain and swelling in left leg/groin area x 1 week. Had left hip replacement April 24,2023. See pain management and gets injections.

## 2023-12-05 NOTE — ED PROVIDER NOTES
(155 lb)       Physical Exam  Constitutional:       General: She is not in acute distress. Appearance: Normal appearance. She is normal weight. She is not toxic-appearing. HENT:      Head: Normocephalic. Nose: Nose normal.      Mouth/Throat:      Mouth: Mucous membranes are moist.   Eyes:      Extraocular Movements: Extraocular movements intact. Cardiovascular:      Rate and Rhythm: Normal rate and regular rhythm. Pulses: Normal pulses. Heart sounds: Normal heart sounds. Pulmonary:      Effort: Pulmonary effort is normal.      Breath sounds: Normal breath sounds. Abdominal:      General: Abdomen is flat. Tenderness: There is no abdominal tenderness. Musculoskeletal:         General: Tenderness present. No deformity. Cervical back: Normal range of motion and neck supple. No rigidity. Comments: Tenderness to the left lateral hip as well as left groin. Strong femoral pulses. DP PT pulses palpable bilaterally. No redness or swelling. No palpable lesion. Skin:     General: Skin is warm. Neurological:      Mental Status: She is alert and oriented to person, place, and time. Psychiatric:         Mood and Affect: Mood normal.         Behavior: Behavior normal.         Thought Content: Thought content normal.         Judgment: Judgment normal.         DIAGNOSTIC RESULTS     RADIOLOGY:   Non-plain film images such as CT, Ultrasound and MRI are read by the radiologist. Plain radiographic images are visualized and preliminarily interpreted by the emergency physician with the below findings:        Interpretation per the Radiologist below, if available at the time of this note:    Vascular duplex lower extremity venous left   Final Result      XR HIP 2-3 VW W PELVIS LEFT   Final Result   No acute abnormality.             ED BEDSIDE ULTRASOUND:   Performed by ED Physician - none    LABS:  Labs Reviewed - No data to display    All other labs were within normal range or not

## 2024-02-07 DIAGNOSIS — K74.60 CIRRHOSIS OF LIVER WITHOUT ASCITES, UNSPECIFIED HEPATIC CIRRHOSIS TYPE (HCC): ICD-10-CM

## 2024-02-07 DIAGNOSIS — G62.9 NEUROPATHY: ICD-10-CM

## 2024-02-21 DIAGNOSIS — K74.60 CIRRHOSIS OF LIVER WITHOUT ASCITES, UNSPECIFIED HEPATIC CIRRHOSIS TYPE (HCC): ICD-10-CM

## 2024-02-22 RX ORDER — SPIRONOLACTONE 100 MG/1
100 TABLET, FILM COATED ORAL EVERY MORNING
Qty: 90 TABLET | Refills: 0 | Status: SHIPPED | OUTPATIENT
Start: 2024-02-22

## 2024-02-22 RX ORDER — TACROLIMUS 1 MG/1
CAPSULE ORAL
Qty: 120 CAPSULE | Refills: 0 | Status: SHIPPED | OUTPATIENT
Start: 2024-02-22

## 2024-04-08 RX ORDER — FUROSEMIDE 40 MG/1
40 TABLET ORAL EVERY MORNING
Qty: 90 TABLET | Refills: 1 | Status: SHIPPED | OUTPATIENT
Start: 2024-04-08 | End: 2024-10-05

## 2024-04-08 RX ORDER — TACROLIMUS 1 MG/1
1 CAPSULE ORAL EVERY 12 HOURS
Qty: 120 CAPSULE | Refills: 0 | Status: SHIPPED | OUTPATIENT
Start: 2024-04-08 | End: 2024-06-07

## 2024-04-08 RX ORDER — SPIRONOLACTONE 100 MG/1
100 TABLET, FILM COATED ORAL EVERY MORNING
Qty: 90 TABLET | Refills: 0 | Status: SHIPPED | OUTPATIENT
Start: 2024-04-08 | End: 2024-07-07

## 2024-04-08 RX ORDER — GABAPENTIN 300 MG/1
CAPSULE ORAL
Qty: 270 CAPSULE | Refills: 3 | Status: SHIPPED | OUTPATIENT
Start: 2024-04-08 | End: 2024-07-08

## 2024-06-24 DIAGNOSIS — K74.60 CIRRHOSIS OF LIVER WITHOUT ASCITES, UNSPECIFIED HEPATIC CIRRHOSIS TYPE (HCC): ICD-10-CM

## 2024-06-25 ENCOUNTER — PREP FOR PROCEDURE (OUTPATIENT)
Age: 74
End: 2024-06-25

## 2024-06-25 ENCOUNTER — OFFICE VISIT (OUTPATIENT)
Age: 74
End: 2024-06-25
Payer: MEDICARE

## 2024-06-25 ENCOUNTER — HOSPITAL ENCOUNTER (OUTPATIENT)
Facility: HOSPITAL | Age: 74
Setting detail: SPECIMEN
Discharge: HOME | End: 2024-06-28
Payer: MEDICARE

## 2024-06-25 VITALS
TEMPERATURE: 97.6 F | DIASTOLIC BLOOD PRESSURE: 73 MMHG | OXYGEN SATURATION: 98 % | HEART RATE: 94 BPM | HEIGHT: 63 IN | BODY MASS INDEX: 24.45 KG/M2 | WEIGHT: 138 LBS | SYSTOLIC BLOOD PRESSURE: 122 MMHG

## 2024-06-25 DIAGNOSIS — K74.60 CIRRHOSIS OF LIVER WITHOUT ASCITES, UNSPECIFIED HEPATIC CIRRHOSIS TYPE (HCC): ICD-10-CM

## 2024-06-25 LAB
ALBUMIN SERPL-MCNC: 3.3 G/DL (ref 3.4–5)
ALBUMIN/GLOB SERPL: 0.7 (ref 0.8–1.7)
ALP SERPL-CCNC: 119 U/L (ref 45–117)
ALT SERPL-CCNC: 86 U/L (ref 13–56)
ANION GAP SERPL CALC-SCNC: 4 MMOL/L (ref 3–18)
AST SERPL-CCNC: 84 U/L (ref 10–38)
BASOPHILS # BLD: 0 K/UL (ref 0–0.1)
BASOPHILS NFR BLD: 1 % (ref 0–2)
BILIRUB DIRECT SERPL-MCNC: 0.3 MG/DL (ref 0–0.2)
BILIRUB SERPL-MCNC: 0.7 MG/DL (ref 0.2–1)
BUN SERPL-MCNC: 12 MG/DL (ref 7–18)
BUN/CREAT SERPL: 14 (ref 12–20)
CALCIUM SERPL-MCNC: 8.7 MG/DL (ref 8.5–10.1)
CHLORIDE SERPL-SCNC: 108 MMOL/L (ref 100–111)
CO2 SERPL-SCNC: 28 MMOL/L (ref 21–32)
CREAT SERPL-MCNC: 0.88 MG/DL (ref 0.6–1.3)
DIFFERENTIAL METHOD BLD: ABNORMAL
EOSINOPHIL # BLD: 0.2 K/UL (ref 0–0.4)
EOSINOPHIL NFR BLD: 5 % (ref 0–5)
ERYTHROCYTE [DISTWIDTH] IN BLOOD BY AUTOMATED COUNT: 15.1 % (ref 11.6–14.5)
GLOBULIN SER CALC-MCNC: 4.9 G/DL (ref 2–4)
GLUCOSE SERPL-MCNC: 102 MG/DL (ref 74–99)
HCT VFR BLD AUTO: 34.6 % (ref 35–45)
HGB BLD-MCNC: 11.1 G/DL (ref 12–16)
IMM GRANULOCYTES # BLD AUTO: 0 K/UL (ref 0–0.04)
IMM GRANULOCYTES NFR BLD AUTO: 0 % (ref 0–0.5)
INR PPP: 1.3 (ref 0.9–1.1)
LYMPHOCYTES # BLD: 1.3 K/UL (ref 0.9–3.6)
LYMPHOCYTES NFR BLD: 31 % (ref 21–52)
MCH RBC QN AUTO: 27.1 PG (ref 24–34)
MCHC RBC AUTO-ENTMCNC: 32.1 G/DL (ref 31–37)
MCV RBC AUTO: 84.6 FL (ref 78–100)
MONOCYTES # BLD: 0.6 K/UL (ref 0.05–1.2)
MONOCYTES NFR BLD: 15 % (ref 3–10)
NEUTS SEG # BLD: 2 K/UL (ref 1.8–8)
NEUTS SEG NFR BLD: 48 % (ref 40–73)
NRBC # BLD: 0 K/UL (ref 0–0.01)
NRBC BLD-RTO: 0 PER 100 WBC
PLATELET # BLD AUTO: 180 K/UL (ref 135–420)
PMV BLD AUTO: 10.6 FL (ref 9.2–11.8)
POTASSIUM SERPL-SCNC: 3.6 MMOL/L (ref 3.5–5.5)
PROT SERPL-MCNC: 8.2 G/DL (ref 6.4–8.2)
PROTHROMBIN TIME: 16.1 SEC (ref 11.9–14.9)
RBC # BLD AUTO: 4.09 M/UL (ref 4.2–5.3)
SODIUM SERPL-SCNC: 140 MMOL/L (ref 136–145)
WBC # BLD AUTO: 4.1 K/UL (ref 4.6–13.2)

## 2024-06-25 PROCEDURE — 82107 ALPHA-FETOPROTEIN L3: CPT

## 2024-06-25 PROCEDURE — 80076 HEPATIC FUNCTION PANEL: CPT

## 2024-06-25 PROCEDURE — 85610 PROTHROMBIN TIME: CPT

## 2024-06-25 PROCEDURE — 85025 COMPLETE CBC W/AUTO DIFF WBC: CPT

## 2024-06-25 PROCEDURE — 36415 COLL VENOUS BLD VENIPUNCTURE: CPT

## 2024-06-25 PROCEDURE — 80048 BASIC METABOLIC PNL TOTAL CA: CPT

## 2024-06-25 PROCEDURE — 91200 LIVER ELASTOGRAPHY: CPT | Performed by: NURSE PRACTITIONER

## 2024-06-25 RX ORDER — SEMAGLUTIDE 0.68 MG/ML
INJECTION, SOLUTION SUBCUTANEOUS
COMMUNITY
Start: 2024-05-09

## 2024-06-25 RX ORDER — ONDANSETRON 4 MG/1
4 TABLET, ORALLY DISINTEGRATING ORAL EVERY 8 HOURS PRN
Qty: 90 TABLET | Refills: 1 | Status: SHIPPED | OUTPATIENT
Start: 2024-06-25

## 2024-06-25 RX ORDER — TACROLIMUS 1 MG/1
CAPSULE ORAL
Qty: 120 CAPSULE | Refills: 0 | Status: SHIPPED | OUTPATIENT
Start: 2024-06-25

## 2024-06-25 RX ORDER — SPIRONOLACTONE 100 MG/1
100 TABLET, FILM COATED ORAL EVERY MORNING
Qty: 90 TABLET | Refills: 0 | Status: SHIPPED | OUTPATIENT
Start: 2024-06-25 | End: 2024-09-23

## 2024-06-25 RX ORDER — SEMAGLUTIDE 1.34 MG/ML
0.5 INJECTION, SOLUTION SUBCUTANEOUS WEEKLY
COMMUNITY
Start: 2024-03-29

## 2024-06-25 RX ORDER — OMEPRAZOLE 40 MG/1
40 CAPSULE, DELAYED RELEASE ORAL DAILY
Qty: 90 CAPSULE | Refills: 2 | Status: SHIPPED | OUTPATIENT
Start: 2024-06-25

## 2024-06-25 RX ORDER — FUROSEMIDE 40 MG/1
40 TABLET ORAL EVERY MORNING
Qty: 90 TABLET | Refills: 1 | Status: SHIPPED | OUTPATIENT
Start: 2024-06-25 | End: 2024-12-22

## 2024-06-25 NOTE — PROGRESS NOTES
constipation or diarrhea.  : Negative for urinary frequency, dysuria, hematuria, nocturia.   Skin: Negative for rash.  Hematology: Negative for easy bruising, blood clots.    Musculo-skelatal: Negative for back pain, muscle pain, weakness.  Neurologic: Negative for headaches, dizziness, vertigo, memory problems not related to HE.  Psychology: Negative for anxiety, depression.     FAMILY HISTORY:  The father  of MI.    The mother  of MI.    The following family members have liver disease: a brother    SOCIAL HISTORY:  The patient is .    The patient has 2 children, and 8 grandchildren.    The patient stopped using tobacco products in .    The patient has never consumed significant amounts of alcohol.    The patient used to work as as a  at the [employer].  The patient retired in .      PHYSICAL EXAMINATION:  /73 (Site: Right Upper Arm, Position: Sitting, Cuff Size: Large Adult)   Pulse 94   Temp 97.6 °F (36.4 °C) (Skin)   Ht 1.6 m (5' 3\")   Wt 62.6 kg (138 lb)   SpO2 98%   BMI 24.45 kg/m²     General: No acute distress.   Eyes: Sclera anicteric.   ENT: No oral lesions.  Thyroid normal.  Nodes: No adenopathy.   Skin: No spider angiomata.  No jaundice.  No palmar erythema.  Respiratory: Lungs clear to auscultation.   Cardiovascular: Regular heart rate.  No murmurs.  No JVD.  Abdomen: Soft non-tender.  Liver size normal to percussion/palpation.  Spleen not palpable. No obvious ascites.  Extremities: No edema.  No muscle wasting.  No gross arthritic changes.  Neurologic: Alert and oriented.  Cranial nerves grossly intact.  No asterixis.    LABORATORY STUDIES:   Latest Ref Rn 3/18/2022 10/20/2022 2023 2024   KELLY - Routine Labs        WBC 4.6 - 13.2 K/uL 5.7  4.8  5.0  4.1 (L)    ANC 1.8 - 8.0 K/UL 2.5  2.5  2.5  2.0    HGB 12.0 - 16.0 g/dL 12.5  11.8 (L)  11.3 (L)  11.1 (L)     - 420 K/uL 180  204  219  180    INR 0.9 - 1.1    1.2  1.2 (H)  1.3 (H)    AST 10 -

## 2024-06-28 LAB
AFP L3 MFR SERPL: NORMAL % (ref 0–9.9)
AFP SERPL-MCNC: 4.1 NG/ML (ref 0–9.2)

## 2024-08-01 ENCOUNTER — APPOINTMENT (OUTPATIENT)
Facility: HOSPITAL | Age: 74
End: 2024-08-01
Payer: MEDICARE

## 2024-08-01 ENCOUNTER — HOSPITAL ENCOUNTER (EMERGENCY)
Facility: HOSPITAL | Age: 74
Discharge: HOME | End: 2024-08-01
Attending: STUDENT IN AN ORGANIZED HEALTH CARE EDUCATION/TRAINING PROGRAM
Payer: MEDICARE

## 2024-08-01 VITALS
BODY MASS INDEX: 24.8 KG/M2 | WEIGHT: 140 LBS | TEMPERATURE: 98.3 F | HEIGHT: 63 IN | OXYGEN SATURATION: 100 % | DIASTOLIC BLOOD PRESSURE: 67 MMHG | SYSTOLIC BLOOD PRESSURE: 128 MMHG | RESPIRATION RATE: 16 BRPM | HEART RATE: 86 BPM

## 2024-08-01 DIAGNOSIS — R42 LIGHTHEADEDNESS: Primary | ICD-10-CM

## 2024-08-01 LAB
ANION GAP SERPL CALC-SCNC: 3 MMOL/L (ref 3–18)
BASOPHILS # BLD: 0 K/UL (ref 0–0.1)
BASOPHILS NFR BLD: 1 % (ref 0–2)
BUN SERPL-MCNC: 10 MG/DL (ref 7–18)
BUN/CREAT SERPL: 12 (ref 12–20)
CALCIUM SERPL-MCNC: 8.8 MG/DL (ref 8.5–10.1)
CHLORIDE SERPL-SCNC: 108 MMOL/L (ref 100–111)
CO2 SERPL-SCNC: 25 MMOL/L (ref 21–32)
CREAT SERPL-MCNC: 0.84 MG/DL (ref 0.6–1.3)
DIFFERENTIAL METHOD BLD: ABNORMAL
EKG ATRIAL RATE: 91 BPM
EKG DIAGNOSIS: NORMAL
EKG P AXIS: 65 DEGREES
EKG P-R INTERVAL: 156 MS
EKG Q-T INTERVAL: 380 MS
EKG QRS DURATION: 80 MS
EKG QTC CALCULATION (BAZETT): 467 MS
EKG R AXIS: -1 DEGREES
EKG T AXIS: 55 DEGREES
EKG VENTRICULAR RATE: 91 BPM
EOSINOPHIL # BLD: 0.4 K/UL (ref 0–0.4)
EOSINOPHIL NFR BLD: 8 % (ref 0–5)
ERYTHROCYTE [DISTWIDTH] IN BLOOD BY AUTOMATED COUNT: 14.6 % (ref 11.6–14.5)
GLUCOSE SERPL-MCNC: 141 MG/DL (ref 74–99)
HCT VFR BLD AUTO: 31.2 % (ref 35–45)
HGB BLD-MCNC: 10.5 G/DL (ref 12–16)
IMM GRANULOCYTES # BLD AUTO: 0 K/UL (ref 0–0.04)
IMM GRANULOCYTES NFR BLD AUTO: 0 % (ref 0–0.5)
LYMPHOCYTES # BLD: 1.2 K/UL (ref 0.9–3.6)
LYMPHOCYTES NFR BLD: 25 % (ref 21–52)
MAGNESIUM SERPL-MCNC: 2 MG/DL (ref 1.6–2.6)
MCH RBC QN AUTO: 27.2 PG (ref 24–34)
MCHC RBC AUTO-ENTMCNC: 33.7 G/DL (ref 31–37)
MCV RBC AUTO: 80.8 FL (ref 78–100)
MONOCYTES # BLD: 0.9 K/UL (ref 0.05–1.2)
MONOCYTES NFR BLD: 19 % (ref 3–10)
NEUTS SEG # BLD: 2.2 K/UL (ref 1.8–8)
NEUTS SEG NFR BLD: 47 % (ref 40–73)
NRBC # BLD: 0 K/UL (ref 0–0.01)
NRBC BLD-RTO: 0 PER 100 WBC
PLATELET # BLD AUTO: 219 K/UL (ref 135–420)
PMV BLD AUTO: 11.5 FL (ref 9.2–11.8)
POTASSIUM SERPL-SCNC: 3.8 MMOL/L (ref 3.5–5.5)
RBC # BLD AUTO: 3.86 M/UL (ref 4.2–5.3)
SODIUM SERPL-SCNC: 136 MMOL/L (ref 136–145)
TROPONIN I SERPL HS-MCNC: 5 NG/L (ref 0–54)
WBC # BLD AUTO: 4.7 K/UL (ref 4.6–13.2)

## 2024-08-01 PROCEDURE — 84484 ASSAY OF TROPONIN QUANT: CPT

## 2024-08-01 PROCEDURE — 71045 X-RAY EXAM CHEST 1 VIEW: CPT

## 2024-08-01 PROCEDURE — 99285 EMERGENCY DEPT VISIT HI MDM: CPT

## 2024-08-01 PROCEDURE — 85025 COMPLETE CBC W/AUTO DIFF WBC: CPT

## 2024-08-01 PROCEDURE — 80048 BASIC METABOLIC PNL TOTAL CA: CPT

## 2024-08-01 PROCEDURE — 83735 ASSAY OF MAGNESIUM: CPT

## 2024-08-01 ASSESSMENT — PAIN - FUNCTIONAL ASSESSMENT: PAIN_FUNCTIONAL_ASSESSMENT: NONE - DENIES PAIN

## 2024-08-01 ASSESSMENT — ENCOUNTER SYMPTOMS
VOMITING: 0
CHEST TIGHTNESS: 0
SHORTNESS OF BREATH: 0
ABDOMINAL PAIN: 0
DIARRHEA: 0
NAUSEA: 0

## 2024-08-01 NOTE — ED PROVIDER NOTES
EMERGENCY DEPARTMENT HISTORY AND PHYSICAL EXAM      Date: 8/1/2024  Patient Name: Sabi Armenta    History of Presenting Illness     Chief Complaint   Patient presents with    Dizziness    Nausea       74 year old female with past medical history of cirrhosis, T2DM, presenting to the emergency department for evaluation of lightheadedness. Patient was making breakfast when started to feel lightheaded, nauseated. No syncope. Denies chest pain or shortness of breath, abdominal pain.                 PCP: Shanika Powell, APRN - NP    No current facility-administered medications for this encounter.     Current Outpatient Medications   Medication Sig Dispense Refill    tacrolimus (PROGRAF) 1 MG capsule TAKE 1 CAPSULE BY MOUTH IN THE MORNING AND 1 IN THE EVENING 120 capsule 0    OZEMPIC, 0.25 OR 0.5 MG/DOSE, 2 MG/3ML SOPN INJECT 0.5MG UNDER THE SKIN ONCE A WEEK      Semaglutide,0.25 or 0.5MG/DOS, (OZEMPIC, 0.25 OR 0.5 MG/DOSE,) 2 MG/1.5ML SOPN Inject 0.5 mg into the skin once a week      ondansetron (ZOFRAN-ODT) 4 MG disintegrating tablet Take 1 tablet by mouth every 8 hours as needed for Nausea 90 tablet 1    omeprazole (PRILOSEC) 40 MG delayed release capsule Take 1 capsule by mouth daily 90 capsule 2    spironolactone (ALDACTONE) 100 MG tablet Take 1 tablet by mouth every morning 90 tablet 0    furosemide (LASIX) 40 MG tablet Take 1 tablet by mouth every morning 90 tablet 1    gabapentin (NEURONTIN) 300 MG capsule TAKE 1 CAPSULE BY MOUTH THREE TIMES DAILY. MAX DAILY AMOUNT: 900MG 270 capsule 3    tacrolimus (PROGRAF) 1 MG capsule TAKE 1 CAPSULE BY MOUTH IN THE MORNING AND 1 IN THE EVENING 120 capsule 0    Ascorbic Acid 500 MG CAPS Take 500 mg by mouth 2 times daily      butalbital-acetaminophen-caffeine (FIORICET, ESGIC) -40 MG per tablet TAKE 1 TABLET BY MOUTH EVERY 4 HOURS AS NEEDED FOR HEADACHE FOR UP TO 10 DAYS. MAX OF 6 TABLETS IN 24 HOURS.      clotrimazole-betamethasone (LOTRISONE) 1-0.05 % cream Apply 1

## 2024-08-01 NOTE — ED TRIAGE NOTES
Patient to ED via EMS for reported near syncope at home. Patient reports that while she was making breakfast she began to feel dizzy, lightheaded, nauseated and broke out into a sweat. Patient did not fall or lose consciousness, Aox4 during triage

## 2024-08-07 LAB
EKG ATRIAL RATE: 91 BPM
EKG DIAGNOSIS: NORMAL
EKG P AXIS: 65 DEGREES
EKG P-R INTERVAL: 156 MS
EKG Q-T INTERVAL: 380 MS
EKG QRS DURATION: 80 MS
EKG QTC CALCULATION (BAZETT): 467 MS
EKG R AXIS: -1 DEGREES
EKG T AXIS: 55 DEGREES
EKG VENTRICULAR RATE: 91 BPM

## 2024-09-06 ENCOUNTER — APPOINTMENT (OUTPATIENT)
Facility: HOSPITAL | Age: 74
DRG: 433 | End: 2024-09-06
Payer: MEDICARE

## 2024-09-06 ENCOUNTER — HOSPITAL ENCOUNTER (INPATIENT)
Facility: HOSPITAL | Age: 74
LOS: 4 days | Discharge: HOME HEALTH | DRG: 433 | End: 2024-09-10
Attending: EMERGENCY MEDICINE | Admitting: INTERNAL MEDICINE
Payer: MEDICARE

## 2024-09-06 DIAGNOSIS — R74.01 TRANSAMINITIS: Primary | ICD-10-CM

## 2024-09-06 DIAGNOSIS — R10.84 GENERALIZED ABDOMINAL PAIN: ICD-10-CM

## 2024-09-06 DIAGNOSIS — K72.90 LIVER FAILURE WITHOUT HEPATIC COMA, UNSPECIFIED CHRONICITY (HCC): ICD-10-CM

## 2024-09-06 LAB
ALBUMIN SERPL-MCNC: 2.7 G/DL (ref 3.4–5)
ALBUMIN/GLOB SERPL: 0.4 (ref 0.8–1.7)
ALP SERPL-CCNC: 198 U/L (ref 45–117)
ALT SERPL-CCNC: 408 U/L (ref 13–56)
AMORPH CRY URNS QL MICRO: ABNORMAL
ANION GAP SERPL CALC-SCNC: 4 MMOL/L (ref 3–18)
APPEARANCE UR: CLEAR
APTT PPP: 28.4 SEC (ref 23–36.4)
AST SERPL-CCNC: 364 U/L (ref 10–38)
BACTERIA URNS QL MICRO: ABNORMAL /HPF
BASOPHILS # BLD: 0 K/UL (ref 0–0.1)
BASOPHILS NFR BLD: 1 % (ref 0–2)
BILIRUB SERPL-MCNC: 2.6 MG/DL (ref 0.2–1)
BILIRUB UR QL: ABNORMAL
BUN SERPL-MCNC: 11 MG/DL (ref 7–18)
BUN/CREAT SERPL: 10 (ref 12–20)
CALCIUM SERPL-MCNC: 8.8 MG/DL (ref 8.5–10.1)
CHLORIDE SERPL-SCNC: 107 MMOL/L (ref 100–111)
CO2 SERPL-SCNC: 22 MMOL/L (ref 21–32)
COLOR UR: ABNORMAL
CREAT SERPL-MCNC: 1.15 MG/DL (ref 0.6–1.3)
DIFFERENTIAL METHOD BLD: ABNORMAL
EOSINOPHIL # BLD: 0.1 K/UL (ref 0–0.4)
EOSINOPHIL NFR BLD: 2 % (ref 0–5)
EPITH CASTS URNS QL MICRO: ABNORMAL /LPF (ref 0–5)
ERYTHROCYTE [DISTWIDTH] IN BLOOD BY AUTOMATED COUNT: 16.1 % (ref 11.6–14.5)
ETHANOL SERPL-MCNC: <3 MG/DL (ref 0–3)
FLUAV RNA SPEC QL NAA+PROBE: NOT DETECTED
FLUBV RNA SPEC QL NAA+PROBE: NOT DETECTED
GLOBULIN SER CALC-MCNC: 6.5 G/DL (ref 2–4)
GLUCOSE BLD STRIP.AUTO-MCNC: 157 MG/DL (ref 70–110)
GLUCOSE SERPL-MCNC: 224 MG/DL (ref 74–99)
GLUCOSE UR STRIP.AUTO-MCNC: >1000 MG/DL
HCT VFR BLD AUTO: 27.6 % (ref 35–45)
HGB BLD-MCNC: 9.8 G/DL (ref 12–16)
HGB UR QL STRIP: NEGATIVE
HYALINE CASTS URNS QL MICRO: ABNORMAL /LPF (ref 0–2)
IMM GRANULOCYTES # BLD AUTO: 0 K/UL (ref 0–0.04)
IMM GRANULOCYTES NFR BLD AUTO: 1 % (ref 0–0.5)
INR PPP: 1.6 (ref 0.9–1.1)
KETONES UR QL STRIP.AUTO: ABNORMAL MG/DL
LEUKOCYTE ESTERASE UR QL STRIP.AUTO: ABNORMAL
LIPASE SERPL-CCNC: 26 U/L (ref 13–75)
LYMPHOCYTES # BLD: 1.2 K/UL (ref 0.9–3.6)
LYMPHOCYTES NFR BLD: 29 % (ref 21–52)
MCH RBC QN AUTO: 27.1 PG (ref 24–34)
MCHC RBC AUTO-ENTMCNC: 35.5 G/DL (ref 31–37)
MCV RBC AUTO: 76.5 FL (ref 78–100)
MONOCYTES # BLD: 0.8 K/UL (ref 0.05–1.2)
MONOCYTES NFR BLD: 19 % (ref 3–10)
NEUTS SEG # BLD: 2 K/UL (ref 1.8–8)
NEUTS SEG NFR BLD: 50 % (ref 40–73)
NITRITE UR QL STRIP.AUTO: NEGATIVE
NRBC # BLD: 0 K/UL (ref 0–0.01)
NRBC BLD-RTO: 0 PER 100 WBC
PH UR STRIP: 5.5 (ref 5–8)
PHOSPHATE SERPL-MCNC: 2.7 MG/DL (ref 2.5–4.9)
PLATELET # BLD AUTO: 199 K/UL (ref 135–420)
PMV BLD AUTO: 10.1 FL (ref 9.2–11.8)
POTASSIUM SERPL-SCNC: 4.6 MMOL/L (ref 3.5–5.5)
PROT SERPL-MCNC: 9.2 G/DL (ref 6.4–8.2)
PROT UR STRIP-MCNC: ABNORMAL MG/DL
PROTHROMBIN TIME: 19.4 SEC (ref 11.9–14.9)
RBC # BLD AUTO: 3.61 M/UL (ref 4.2–5.3)
RBC #/AREA URNS HPF: NEGATIVE /HPF (ref 0–5)
SARS-COV-2 RNA RESP QL NAA+PROBE: NOT DETECTED
SODIUM SERPL-SCNC: 133 MMOL/L (ref 136–145)
SOURCE: NORMAL
SP GR UR REFRACTOMETRY: 1.02 (ref 1–1.03)
UROBILINOGEN UR QL STRIP.AUTO: 1 EU/DL (ref 0.2–1)
WBC # BLD AUTO: 4.1 K/UL (ref 4.6–13.2)
WBC URNS QL MICRO: ABNORMAL /HPF (ref 0–5)
YEAST URNS QL MICRO: ABNORMAL

## 2024-09-06 PROCEDURE — 99285 EMERGENCY DEPT VISIT HI MDM: CPT

## 2024-09-06 PROCEDURE — 87154 CUL TYP ID BLD PTHGN 6+ TRGT: CPT

## 2024-09-06 PROCEDURE — 84145 PROCALCITONIN (PCT): CPT

## 2024-09-06 PROCEDURE — 85610 PROTHROMBIN TIME: CPT

## 2024-09-06 PROCEDURE — 82077 ASSAY SPEC XCP UR&BREATH IA: CPT

## 2024-09-06 PROCEDURE — 81001 URINALYSIS AUTO W/SCOPE: CPT

## 2024-09-06 PROCEDURE — 6360000004 HC RX CONTRAST MEDICATION: Performed by: EMERGENCY MEDICINE

## 2024-09-06 PROCEDURE — 80053 COMPREHEN METABOLIC PANEL: CPT

## 2024-09-06 PROCEDURE — 82962 GLUCOSE BLOOD TEST: CPT

## 2024-09-06 PROCEDURE — 87040 BLOOD CULTURE FOR BACTERIA: CPT

## 2024-09-06 PROCEDURE — 74177 CT ABD & PELVIS W/CONTRAST: CPT

## 2024-09-06 PROCEDURE — 87086 URINE CULTURE/COLONY COUNT: CPT

## 2024-09-06 PROCEDURE — 2500000003 HC RX 250 WO HCPCS: Performed by: EMERGENCY MEDICINE

## 2024-09-06 PROCEDURE — 6370000000 HC RX 637 (ALT 250 FOR IP): Performed by: EMERGENCY MEDICINE

## 2024-09-06 PROCEDURE — 84100 ASSAY OF PHOSPHORUS: CPT

## 2024-09-06 PROCEDURE — 6360000002 HC RX W HCPCS: Performed by: EMERGENCY MEDICINE

## 2024-09-06 PROCEDURE — 83690 ASSAY OF LIPASE: CPT

## 2024-09-06 PROCEDURE — 76705 ECHO EXAM OF ABDOMEN: CPT

## 2024-09-06 PROCEDURE — 87636 SARSCOV2 & INF A&B AMP PRB: CPT

## 2024-09-06 PROCEDURE — 74181 MRI ABDOMEN W/O CONTRAST: CPT

## 2024-09-06 PROCEDURE — 85730 THROMBOPLASTIN TIME PARTIAL: CPT

## 2024-09-06 PROCEDURE — 2580000003 HC RX 258: Performed by: EMERGENCY MEDICINE

## 2024-09-06 PROCEDURE — 96374 THER/PROPH/DIAG INJ IV PUSH: CPT

## 2024-09-06 PROCEDURE — 1100000000 HC RM PRIVATE

## 2024-09-06 PROCEDURE — 96375 TX/PRO/DX INJ NEW DRUG ADDON: CPT

## 2024-09-06 PROCEDURE — 85025 COMPLETE CBC W/AUTO DIFF WBC: CPT

## 2024-09-06 RX ORDER — METRONIDAZOLE 500 MG/100ML
500 INJECTION, SOLUTION INTRAVENOUS EVERY 8 HOURS
Status: DISCONTINUED | OUTPATIENT
Start: 2024-09-06 | End: 2024-09-10 | Stop reason: HOSPADM

## 2024-09-06 RX ORDER — ALBUMIN (HUMAN) 12.5 G/50ML
25 SOLUTION INTRAVENOUS EVERY 6 HOURS
Status: DISCONTINUED | OUTPATIENT
Start: 2024-09-06 | End: 2024-09-10 | Stop reason: HOSPADM

## 2024-09-06 RX ORDER — CIPROFLOXACIN 2 MG/ML
400 INJECTION, SOLUTION INTRAVENOUS EVERY 12 HOURS
Status: DISCONTINUED | OUTPATIENT
Start: 2024-09-06 | End: 2024-09-10 | Stop reason: HOSPADM

## 2024-09-06 RX ORDER — SODIUM CHLORIDE 9 MG/ML
INJECTION, SOLUTION INTRAVENOUS PRN
Status: DISCONTINUED | OUTPATIENT
Start: 2024-09-06 | End: 2024-09-10 | Stop reason: HOSPADM

## 2024-09-06 RX ORDER — POTASSIUM CHLORIDE 7.45 MG/ML
10 INJECTION INTRAVENOUS PRN
Status: DISCONTINUED | OUTPATIENT
Start: 2024-09-06 | End: 2024-09-10 | Stop reason: HOSPADM

## 2024-09-06 RX ORDER — POTASSIUM CHLORIDE 1500 MG/1
40 TABLET, EXTENDED RELEASE ORAL PRN
Status: DISCONTINUED | OUTPATIENT
Start: 2024-09-06 | End: 2024-09-10 | Stop reason: HOSPADM

## 2024-09-06 RX ORDER — DICYCLOMINE HYDROCHLORIDE 10 MG/1
20 CAPSULE ORAL
Status: COMPLETED | OUTPATIENT
Start: 2024-09-06 | End: 2024-09-06

## 2024-09-06 RX ORDER — SODIUM CHLORIDE 0.9 % (FLUSH) 0.9 %
5-40 SYRINGE (ML) INJECTION EVERY 12 HOURS SCHEDULED
Status: DISCONTINUED | OUTPATIENT
Start: 2024-09-06 | End: 2024-09-10 | Stop reason: HOSPADM

## 2024-09-06 RX ORDER — ONDANSETRON 4 MG/1
4 TABLET, ORALLY DISINTEGRATING ORAL EVERY 8 HOURS PRN
Status: DISCONTINUED | OUTPATIENT
Start: 2024-09-06 | End: 2024-09-10 | Stop reason: HOSPADM

## 2024-09-06 RX ORDER — ONDANSETRON 2 MG/ML
4 INJECTION INTRAMUSCULAR; INTRAVENOUS
Status: COMPLETED | OUTPATIENT
Start: 2024-09-06 | End: 2024-09-06

## 2024-09-06 RX ORDER — 0.9 % SODIUM CHLORIDE 0.9 %
1000 INTRAVENOUS SOLUTION INTRAVENOUS ONCE
Status: COMPLETED | OUTPATIENT
Start: 2024-09-06 | End: 2024-09-06

## 2024-09-06 RX ORDER — IOPAMIDOL 612 MG/ML
100 INJECTION, SOLUTION INTRAVASCULAR
Status: COMPLETED | OUTPATIENT
Start: 2024-09-06 | End: 2024-09-06

## 2024-09-06 RX ORDER — POLYETHYLENE GLYCOL 3350 17 G/17G
17 POWDER, FOR SOLUTION ORAL DAILY PRN
Status: DISCONTINUED | OUTPATIENT
Start: 2024-09-06 | End: 2024-09-10 | Stop reason: HOSPADM

## 2024-09-06 RX ORDER — HYDROMORPHONE HYDROCHLORIDE 1 MG/ML
0.5 INJECTION, SOLUTION INTRAMUSCULAR; INTRAVENOUS; SUBCUTANEOUS
Status: COMPLETED | OUTPATIENT
Start: 2024-09-06 | End: 2024-09-06

## 2024-09-06 RX ORDER — ENOXAPARIN SODIUM 100 MG/ML
40 INJECTION SUBCUTANEOUS DAILY
Status: DISCONTINUED | OUTPATIENT
Start: 2024-09-07 | End: 2024-09-10 | Stop reason: HOSPADM

## 2024-09-06 RX ORDER — MAGNESIUM SULFATE IN WATER 40 MG/ML
2000 INJECTION, SOLUTION INTRAVENOUS PRN
Status: DISCONTINUED | OUTPATIENT
Start: 2024-09-06 | End: 2024-09-10 | Stop reason: HOSPADM

## 2024-09-06 RX ORDER — SODIUM CHLORIDE 0.9 % (FLUSH) 0.9 %
5-40 SYRINGE (ML) INJECTION PRN
Status: DISCONTINUED | OUTPATIENT
Start: 2024-09-06 | End: 2024-09-10 | Stop reason: HOSPADM

## 2024-09-06 RX ORDER — ONDANSETRON 2 MG/ML
4 INJECTION INTRAMUSCULAR; INTRAVENOUS EVERY 6 HOURS PRN
Status: DISCONTINUED | OUTPATIENT
Start: 2024-09-06 | End: 2024-09-10 | Stop reason: HOSPADM

## 2024-09-06 RX ADMIN — HYDROMORPHONE HYDROCHLORIDE 0.5 MG: 1 INJECTION, SOLUTION INTRAMUSCULAR; INTRAVENOUS; SUBCUTANEOUS at 16:23

## 2024-09-06 RX ADMIN — SODIUM CHLORIDE 1000 ML: 9 INJECTION, SOLUTION INTRAVENOUS at 13:09

## 2024-09-06 RX ADMIN — DICYCLOMINE HYDROCHLORIDE 20 MG: 10 CAPSULE ORAL at 13:11

## 2024-09-06 RX ADMIN — IOPAMIDOL 100 ML: 612 INJECTION, SOLUTION INTRAVENOUS at 16:47

## 2024-09-06 RX ADMIN — ONDANSETRON 4 MG: 2 INJECTION INTRAMUSCULAR; INTRAVENOUS at 13:11

## 2024-09-06 ASSESSMENT — PAIN SCALES - GENERAL
PAINLEVEL_OUTOF10: 10
PAINLEVEL_OUTOF10: 10

## 2024-09-06 ASSESSMENT — LIFESTYLE VARIABLES
HOW MANY STANDARD DRINKS CONTAINING ALCOHOL DO YOU HAVE ON A TYPICAL DAY: PATIENT DOES NOT DRINK
HOW OFTEN DO YOU HAVE A DRINK CONTAINING ALCOHOL: NEVER

## 2024-09-07 ENCOUNTER — APPOINTMENT (OUTPATIENT)
Facility: HOSPITAL | Age: 74
DRG: 433 | End: 2024-09-07
Payer: MEDICARE

## 2024-09-07 PROBLEM — R10.9 ABDOMINAL PAIN: Status: ACTIVE | Noted: 2024-09-07

## 2024-09-07 LAB
ALBUMIN SERPL-MCNC: 2.8 G/DL (ref 3.4–5)
ALBUMIN/GLOB SERPL: 0.5 (ref 0.8–1.7)
ALP SERPL-CCNC: 161 U/L (ref 45–117)
ALT SERPL-CCNC: 355 U/L (ref 13–56)
AMMONIA PLAS-SCNC: 52 UMOL/L (ref 11–32)
ANION GAP SERPL CALC-SCNC: 4 MMOL/L (ref 3–18)
AST SERPL-CCNC: 306 U/L (ref 10–38)
BILIRUB SERPL-MCNC: 2.7 MG/DL (ref 0.2–1)
BUN SERPL-MCNC: 8 MG/DL (ref 7–18)
BUN/CREAT SERPL: 8 (ref 12–20)
CALCIUM SERPL-MCNC: 8.8 MG/DL (ref 8.5–10.1)
CHLORIDE SERPL-SCNC: 106 MMOL/L (ref 100–111)
CO2 SERPL-SCNC: 24 MMOL/L (ref 21–32)
CREAT SERPL-MCNC: 1 MG/DL (ref 0.6–1.3)
ERYTHROCYTE [DISTWIDTH] IN BLOOD BY AUTOMATED COUNT: 16.3 % (ref 11.6–14.5)
EST. AVERAGE GLUCOSE BLD GHB EST-MCNC: 128 MG/DL
FERRITIN SERPL-MCNC: 43 NG/ML (ref 8–388)
FOLATE SERPL-MCNC: >20 NG/ML (ref 3.1–17.5)
GLOBULIN SER CALC-MCNC: 5.8 G/DL (ref 2–4)
GLUCOSE BLD STRIP.AUTO-MCNC: 150 MG/DL (ref 70–110)
GLUCOSE BLD STRIP.AUTO-MCNC: 158 MG/DL (ref 70–110)
GLUCOSE BLD STRIP.AUTO-MCNC: 181 MG/DL (ref 70–110)
GLUCOSE BLD STRIP.AUTO-MCNC: 234 MG/DL (ref 70–110)
GLUCOSE BLD STRIP.AUTO-MCNC: 248 MG/DL (ref 70–110)
GLUCOSE SERPL-MCNC: 204 MG/DL (ref 74–99)
HBA1C MFR BLD: 6.1 % (ref 4.2–5.6)
HCT VFR BLD AUTO: 26.9 % (ref 35–45)
HGB BLD-MCNC: 9.4 G/DL (ref 12–16)
INR PPP: 1.7 (ref 0.9–1.1)
IRON SATN MFR SERPL: 28 % (ref 20–50)
IRON SERPL-MCNC: 76 UG/DL (ref 50–175)
LIPASE SERPL-CCNC: 25 U/L (ref 13–75)
MAGNESIUM SERPL-MCNC: 1.5 MG/DL (ref 1.6–2.6)
MCH RBC QN AUTO: 26.9 PG (ref 24–34)
MCHC RBC AUTO-ENTMCNC: 34.9 G/DL (ref 31–37)
MCV RBC AUTO: 77.1 FL (ref 78–100)
NRBC # BLD: 0 K/UL (ref 0–0.01)
NRBC BLD-RTO: 0 PER 100 WBC
PHOSPHATE SERPL-MCNC: 2.8 MG/DL (ref 2.5–4.9)
PLATELET # BLD AUTO: 173 K/UL (ref 135–420)
PMV BLD AUTO: 9.8 FL (ref 9.2–11.8)
POTASSIUM SERPL-SCNC: 3.8 MMOL/L (ref 3.5–5.5)
PROCALCITONIN SERPL-MCNC: 0.12 NG/ML
PROT SERPL-MCNC: 8.6 G/DL (ref 6.4–8.2)
PROTHROMBIN TIME: 19.7 SEC (ref 11.9–14.9)
RBC # BLD AUTO: 3.49 M/UL (ref 4.2–5.3)
SODIUM SERPL-SCNC: 134 MMOL/L (ref 136–145)
TIBC SERPL-MCNC: 270 UG/DL (ref 250–450)
TROPONIN I SERPL HS-MCNC: 6 NG/L (ref 0–54)
TROPONIN I SERPL HS-MCNC: 7 NG/L (ref 0–54)
TROPONIN I SERPL HS-MCNC: 8 NG/L (ref 0–54)
VIT B12 SERPL-MCNC: 1805 PG/ML (ref 211–911)
WBC # BLD AUTO: 3.8 K/UL (ref 4.6–13.2)

## 2024-09-07 PROCEDURE — 82728 ASSAY OF FERRITIN: CPT

## 2024-09-07 PROCEDURE — 83036 HEMOGLOBIN GLYCOSYLATED A1C: CPT

## 2024-09-07 PROCEDURE — 36415 COLL VENOUS BLD VENIPUNCTURE: CPT

## 2024-09-07 PROCEDURE — 1100000000 HC RM PRIVATE

## 2024-09-07 PROCEDURE — 71045 X-RAY EXAM CHEST 1 VIEW: CPT

## 2024-09-07 PROCEDURE — 2500000003 HC RX 250 WO HCPCS: Performed by: HOSPITALIST

## 2024-09-07 PROCEDURE — 83540 ASSAY OF IRON: CPT

## 2024-09-07 PROCEDURE — 82962 GLUCOSE BLOOD TEST: CPT

## 2024-09-07 PROCEDURE — 85027 COMPLETE CBC AUTOMATED: CPT

## 2024-09-07 PROCEDURE — 83690 ASSAY OF LIPASE: CPT

## 2024-09-07 PROCEDURE — 94640 AIRWAY INHALATION TREATMENT: CPT

## 2024-09-07 PROCEDURE — 6370000000 HC RX 637 (ALT 250 FOR IP): Performed by: INTERNAL MEDICINE

## 2024-09-07 PROCEDURE — 82607 VITAMIN B-12: CPT

## 2024-09-07 PROCEDURE — 84484 ASSAY OF TROPONIN QUANT: CPT

## 2024-09-07 PROCEDURE — 80053 COMPREHEN METABOLIC PANEL: CPT

## 2024-09-07 PROCEDURE — 87040 BLOOD CULTURE FOR BACTERIA: CPT

## 2024-09-07 PROCEDURE — 6360000002 HC RX W HCPCS: Performed by: INTERNAL MEDICINE

## 2024-09-07 PROCEDURE — P9047 ALBUMIN (HUMAN), 25%, 50ML: HCPCS | Performed by: INTERNAL MEDICINE

## 2024-09-07 PROCEDURE — 82140 ASSAY OF AMMONIA: CPT

## 2024-09-07 PROCEDURE — 83735 ASSAY OF MAGNESIUM: CPT

## 2024-09-07 PROCEDURE — 83550 IRON BINDING TEST: CPT

## 2024-09-07 PROCEDURE — 2580000003 HC RX 258: Performed by: STUDENT IN AN ORGANIZED HEALTH CARE EDUCATION/TRAINING PROGRAM

## 2024-09-07 PROCEDURE — 85610 PROTHROMBIN TIME: CPT

## 2024-09-07 PROCEDURE — 82746 ASSAY OF FOLIC ACID SERUM: CPT

## 2024-09-07 PROCEDURE — 84100 ASSAY OF PHOSPHORUS: CPT

## 2024-09-07 PROCEDURE — 2580000003 HC RX 258: Performed by: INTERNAL MEDICINE

## 2024-09-07 PROCEDURE — 6360000002 HC RX W HCPCS: Performed by: STUDENT IN AN ORGANIZED HEALTH CARE EDUCATION/TRAINING PROGRAM

## 2024-09-07 RX ORDER — INSULIN LISPRO 100 [IU]/ML
0-4 INJECTION, SOLUTION INTRAVENOUS; SUBCUTANEOUS NIGHTLY
Status: DISCONTINUED | OUTPATIENT
Start: 2024-09-07 | End: 2024-09-10 | Stop reason: HOSPADM

## 2024-09-07 RX ORDER — HYDROMORPHONE HYDROCHLORIDE 1 MG/ML
0.5 INJECTION, SOLUTION INTRAMUSCULAR; INTRAVENOUS; SUBCUTANEOUS EVERY 4 HOURS PRN
Status: DISCONTINUED | OUTPATIENT
Start: 2024-09-07 | End: 2024-09-10 | Stop reason: HOSPADM

## 2024-09-07 RX ORDER — ALBUTEROL SULFATE 90 UG/1
2 AEROSOL, METERED RESPIRATORY (INHALATION) EVERY 4 HOURS PRN
Status: DISCONTINUED | OUTPATIENT
Start: 2024-09-07 | End: 2024-09-07

## 2024-09-07 RX ORDER — INSULIN LISPRO 100 [IU]/ML
0-4 INJECTION, SOLUTION INTRAVENOUS; SUBCUTANEOUS
Status: DISCONTINUED | OUTPATIENT
Start: 2024-09-07 | End: 2024-09-10

## 2024-09-07 RX ORDER — BUDESONIDE 0.5 MG/2ML
0.5 INHALANT ORAL
Status: DISCONTINUED | OUTPATIENT
Start: 2024-09-07 | End: 2024-09-10 | Stop reason: HOSPADM

## 2024-09-07 RX ORDER — AMLODIPINE BESYLATE 5 MG/1
10 TABLET ORAL DAILY
Status: DISCONTINUED | OUTPATIENT
Start: 2024-09-07 | End: 2024-09-10 | Stop reason: HOSPADM

## 2024-09-07 RX ORDER — MAGNESIUM SULFATE HEPTAHYDRATE 40 MG/ML
2000 INJECTION, SOLUTION INTRAVENOUS ONCE
Status: COMPLETED | OUTPATIENT
Start: 2024-09-07 | End: 2024-09-07

## 2024-09-07 RX ORDER — INSULIN GLARGINE 100 [IU]/ML
0.15 INJECTION, SOLUTION SUBCUTANEOUS NIGHTLY
Status: DISCONTINUED | OUTPATIENT
Start: 2024-09-07 | End: 2024-09-10 | Stop reason: HOSPADM

## 2024-09-07 RX ORDER — FERROUS SULFATE 325(65) MG
325 TABLET ORAL DAILY
Status: DISCONTINUED | OUTPATIENT
Start: 2024-09-07 | End: 2024-09-10 | Stop reason: HOSPADM

## 2024-09-07 RX ORDER — IPRATROPIUM BROMIDE AND ALBUTEROL SULFATE 2.5; .5 MG/3ML; MG/3ML
1 SOLUTION RESPIRATORY (INHALATION) EVERY 4 HOURS PRN
Status: DISCONTINUED | OUTPATIENT
Start: 2024-09-07 | End: 2024-09-10 | Stop reason: HOSPADM

## 2024-09-07 RX ORDER — MINOXIDIL 2.5 MG/1
2.5 TABLET ORAL DAILY
Status: DISCONTINUED | OUTPATIENT
Start: 2024-09-07 | End: 2024-09-10 | Stop reason: HOSPADM

## 2024-09-07 RX ORDER — MULTIVIT WITH MINERALS/LUTEIN
125 TABLET ORAL DAILY
Status: DISCONTINUED | OUTPATIENT
Start: 2024-09-07 | End: 2024-09-10 | Stop reason: HOSPADM

## 2024-09-07 RX ORDER — SPIRONOLACTONE 100 MG/1
100 TABLET, FILM COATED ORAL EVERY MORNING
Status: DISCONTINUED | OUTPATIENT
Start: 2024-09-07 | End: 2024-09-10 | Stop reason: HOSPADM

## 2024-09-07 RX ORDER — TACROLIMUS 1 MG/1
1 CAPSULE ORAL 2 TIMES DAILY
Status: DISCONTINUED | OUTPATIENT
Start: 2024-09-07 | End: 2024-09-10 | Stop reason: HOSPADM

## 2024-09-07 RX ORDER — POLYETHYLENE GLYCOL 3350 17 G/17G
17 POWDER, FOR SOLUTION ORAL ONCE
Status: DISCONTINUED | OUTPATIENT
Start: 2024-09-07 | End: 2024-09-10 | Stop reason: HOSPADM

## 2024-09-07 RX ORDER — IPRATROPIUM BROMIDE AND ALBUTEROL SULFATE 2.5; .5 MG/3ML; MG/3ML
1 SOLUTION RESPIRATORY (INHALATION)
Status: DISCONTINUED | OUTPATIENT
Start: 2024-09-07 | End: 2024-09-07

## 2024-09-07 RX ORDER — GABAPENTIN 300 MG/1
300 CAPSULE ORAL 3 TIMES DAILY
Status: DISCONTINUED | OUTPATIENT
Start: 2024-09-07 | End: 2024-09-10 | Stop reason: HOSPADM

## 2024-09-07 RX ORDER — ALBUTEROL SULFATE 0.83 MG/ML
2.5 SOLUTION RESPIRATORY (INHALATION) EVERY 4 HOURS PRN
Status: DISCONTINUED | OUTPATIENT
Start: 2024-09-07 | End: 2024-09-10 | Stop reason: HOSPADM

## 2024-09-07 RX ORDER — DEXTROSE MONOHYDRATE 100 MG/ML
INJECTION, SOLUTION INTRAVENOUS CONTINUOUS PRN
Status: DISCONTINUED | OUTPATIENT
Start: 2024-09-07 | End: 2024-09-10 | Stop reason: HOSPADM

## 2024-09-07 RX ORDER — LIDOCAINE 4 G/G
1 PATCH TOPICAL DAILY
Status: DISCONTINUED | OUTPATIENT
Start: 2024-09-07 | End: 2024-09-10 | Stop reason: HOSPADM

## 2024-09-07 RX ORDER — FUROSEMIDE 40 MG
40 TABLET ORAL EVERY MORNING
Status: DISCONTINUED | OUTPATIENT
Start: 2024-09-07 | End: 2024-09-10 | Stop reason: HOSPADM

## 2024-09-07 RX ORDER — ARFORMOTEROL TARTRATE 15 UG/2ML
15 SOLUTION RESPIRATORY (INHALATION)
Status: DISCONTINUED | OUTPATIENT
Start: 2024-09-07 | End: 2024-09-10 | Stop reason: HOSPADM

## 2024-09-07 RX ORDER — INSULIN LISPRO 100 [IU]/ML
0.05 INJECTION, SOLUTION INTRAVENOUS; SUBCUTANEOUS
Status: DISCONTINUED | OUTPATIENT
Start: 2024-09-07 | End: 2024-09-10 | Stop reason: HOSPADM

## 2024-09-07 RX ORDER — ONDANSETRON 4 MG/1
4 TABLET, ORALLY DISINTEGRATING ORAL EVERY 8 HOURS PRN
Status: DISCONTINUED | OUTPATIENT
Start: 2024-09-07 | End: 2024-09-10 | Stop reason: HOSPADM

## 2024-09-07 RX ORDER — MULTIVIT WITH MINERALS/LUTEIN
500 TABLET ORAL 2 TIMES DAILY
Status: DISCONTINUED | OUTPATIENT
Start: 2024-09-07 | End: 2024-09-10 | Stop reason: HOSPADM

## 2024-09-07 RX ADMIN — FUROSEMIDE 40 MG: 40 TABLET ORAL at 08:37

## 2024-09-07 RX ADMIN — INSULIN GLARGINE 9 UNITS: 100 INJECTION, SOLUTION SUBCUTANEOUS at 01:05

## 2024-09-07 RX ADMIN — INSULIN LISPRO 3 UNITS: 100 INJECTION, SOLUTION INTRAVENOUS; SUBCUTANEOUS at 18:22

## 2024-09-07 RX ADMIN — SPIRONOLACTONE 100 MG: 100 TABLET ORAL at 08:36

## 2024-09-07 RX ADMIN — GABAPENTIN 300 MG: 300 CAPSULE ORAL at 21:53

## 2024-09-07 RX ADMIN — HYDROMORPHONE HYDROCHLORIDE 0.5 MG: 1 INJECTION, SOLUTION INTRAMUSCULAR; INTRAVENOUS; SUBCUTANEOUS at 18:30

## 2024-09-07 RX ADMIN — HYDROMORPHONE HYDROCHLORIDE 0.5 MG: 1 INJECTION, SOLUTION INTRAMUSCULAR; INTRAVENOUS; SUBCUTANEOUS at 11:55

## 2024-09-07 RX ADMIN — ARFORMOTEROL TARTRATE 15 MCG: 15 SOLUTION RESPIRATORY (INHALATION) at 07:02

## 2024-09-07 RX ADMIN — AMLODIPINE BESYLATE 10 MG: 5 TABLET ORAL at 08:36

## 2024-09-07 RX ADMIN — ALBUMIN (HUMAN) 25 G: 0.25 INJECTION, SOLUTION INTRAVENOUS at 12:08

## 2024-09-07 RX ADMIN — GABAPENTIN 300 MG: 300 CAPSULE ORAL at 08:36

## 2024-09-07 RX ADMIN — HYDROMORPHONE HYDROCHLORIDE 0.5 MG: 1 INJECTION, SOLUTION INTRAMUSCULAR; INTRAVENOUS; SUBCUTANEOUS at 00:52

## 2024-09-07 RX ADMIN — METRONIDAZOLE 500 MG: 500 INJECTION, SOLUTION INTRAVENOUS at 03:14

## 2024-09-07 RX ADMIN — ALBUMIN (HUMAN) 25 G: 0.25 INJECTION, SOLUTION INTRAVENOUS at 05:56

## 2024-09-07 RX ADMIN — INSULIN LISPRO 3 UNITS: 100 INJECTION, SOLUTION INTRAVENOUS; SUBCUTANEOUS at 08:17

## 2024-09-07 RX ADMIN — PHYTONADIONE 10 MG: 10 INJECTION, EMULSION INTRAMUSCULAR; INTRAVENOUS; SUBCUTANEOUS at 01:51

## 2024-09-07 RX ADMIN — METRONIDAZOLE 500 MG: 500 INJECTION, SOLUTION INTRAVENOUS at 19:28

## 2024-09-07 RX ADMIN — VANCOMYCIN HYDROCHLORIDE 1500 MG: 10 INJECTION, POWDER, LYOPHILIZED, FOR SOLUTION INTRAVENOUS at 21:30

## 2024-09-07 RX ADMIN — ENOXAPARIN SODIUM 40 MG: 100 INJECTION SUBCUTANEOUS at 08:40

## 2024-09-07 RX ADMIN — TACROLIMUS 1 MG: 1 CAPSULE ORAL at 01:05

## 2024-09-07 RX ADMIN — GABAPENTIN 300 MG: 300 CAPSULE ORAL at 01:05

## 2024-09-07 RX ADMIN — CIPROFLOXACIN 400 MG: 400 INJECTION, SOLUTION INTRAVENOUS at 02:13

## 2024-09-07 RX ADMIN — CIPROFLOXACIN 400 MG: 400 INJECTION, SOLUTION INTRAVENOUS at 14:09

## 2024-09-07 RX ADMIN — ALBUMIN (HUMAN) 25 G: 0.25 INJECTION, SOLUTION INTRAVENOUS at 18:22

## 2024-09-07 RX ADMIN — FERROUS SULFATE TAB 325 MG (65 MG ELEMENTAL FE) 325 MG: 325 (65 FE) TAB at 08:37

## 2024-09-07 RX ADMIN — SODIUM CHLORIDE, PRESERVATIVE FREE 10 ML: 5 INJECTION INTRAVENOUS at 08:40

## 2024-09-07 RX ADMIN — PANTOPRAZOLE SODIUM 40 MG: 40 INJECTION, POWDER, FOR SOLUTION INTRAVENOUS at 08:39

## 2024-09-07 RX ADMIN — BUDESONIDE 500 MCG: 0.5 INHALANT RESPIRATORY (INHALATION) at 19:57

## 2024-09-07 RX ADMIN — INSULIN LISPRO 3 UNITS: 100 INJECTION, SOLUTION INTRAVENOUS; SUBCUTANEOUS at 12:00

## 2024-09-07 RX ADMIN — SODIUM CHLORIDE: 9 INJECTION, SOLUTION INTRAVENOUS at 19:27

## 2024-09-07 RX ADMIN — ALBUMIN (HUMAN) 25 G: 0.25 INJECTION, SOLUTION INTRAVENOUS at 00:19

## 2024-09-07 RX ADMIN — ARFORMOTEROL TARTRATE 15 MCG: 15 SOLUTION RESPIRATORY (INHALATION) at 19:58

## 2024-09-07 RX ADMIN — SODIUM CHLORIDE, PRESERVATIVE FREE 10 ML: 5 INJECTION INTRAVENOUS at 21:55

## 2024-09-07 RX ADMIN — Medication 500 MG: at 21:52

## 2024-09-07 RX ADMIN — IPRATROPIUM BROMIDE AND ALBUTEROL SULFATE 1 DOSE: .5; 3 SOLUTION RESPIRATORY (INHALATION) at 07:03

## 2024-09-07 RX ADMIN — INSULIN GLARGINE 9 UNITS: 100 INJECTION, SOLUTION SUBCUTANEOUS at 21:57

## 2024-09-07 RX ADMIN — BUDESONIDE 500 MCG: 0.5 INHALANT RESPIRATORY (INHALATION) at 07:03

## 2024-09-07 RX ADMIN — MINOXIDIL 2.5 MG: 2.5 TABLET ORAL at 08:37

## 2024-09-07 RX ADMIN — PHYTONADIONE 10 MG: 10 INJECTION, EMULSION INTRAMUSCULAR; INTRAVENOUS; SUBCUTANEOUS at 23:55

## 2024-09-07 RX ADMIN — Medication 500 MG: at 01:05

## 2024-09-07 RX ADMIN — SODIUM CHLORIDE: 9 INJECTION, SOLUTION INTRAVENOUS at 11:59

## 2024-09-07 RX ADMIN — SODIUM CHLORIDE, PRESERVATIVE FREE 10 ML: 5 INJECTION INTRAVENOUS at 01:06

## 2024-09-07 RX ADMIN — GABAPENTIN 300 MG: 300 CAPSULE ORAL at 14:13

## 2024-09-07 RX ADMIN — TACROLIMUS 1 MG: 1 CAPSULE ORAL at 21:53

## 2024-09-07 RX ADMIN — METRONIDAZOLE 500 MG: 500 INJECTION, SOLUTION INTRAVENOUS at 12:04

## 2024-09-07 RX ADMIN — MAGNESIUM SULFATE HEPTAHYDRATE 2000 MG: 40 INJECTION, SOLUTION INTRAVENOUS at 14:39

## 2024-09-07 RX ADMIN — TACROLIMUS 1 MG: 1 CAPSULE ORAL at 08:35

## 2024-09-07 ASSESSMENT — PAIN SCALES - GENERAL
PAINLEVEL_OUTOF10: 0
PAINLEVEL_OUTOF10: 0
PAINLEVEL_OUTOF10: 9
PAINLEVEL_OUTOF10: 9
PAINLEVEL_OUTOF10: 3
PAINLEVEL_OUTOF10: 3

## 2024-09-07 ASSESSMENT — PAIN - FUNCTIONAL ASSESSMENT: PAIN_FUNCTIONAL_ASSESSMENT: ACTIVITIES ARE NOT PREVENTED

## 2024-09-07 ASSESSMENT — PAIN DESCRIPTION - LOCATION
LOCATION: ABDOMEN
LOCATION: ABDOMEN

## 2024-09-07 ASSESSMENT — PAIN DESCRIPTION - PAIN TYPE: TYPE: ACUTE PAIN

## 2024-09-07 ASSESSMENT — PAIN DESCRIPTION - ONSET: ONSET: ON-GOING

## 2024-09-07 ASSESSMENT — PAIN DESCRIPTION - DESCRIPTORS
DESCRIPTORS: ACHING;SHARP
DESCRIPTORS: CRAMPING
DESCRIPTORS: CRAMPING;ACHING

## 2024-09-07 ASSESSMENT — PAIN DESCRIPTION - FREQUENCY: FREQUENCY: CONTINUOUS

## 2024-09-07 ASSESSMENT — PAIN DESCRIPTION - ORIENTATION
ORIENTATION: LOWER;UPPER
ORIENTATION: LEFT

## 2024-09-08 PROBLEM — R78.81 GRAM-POSITIVE BACTEREMIA: Status: ACTIVE | Noted: 2024-09-08

## 2024-09-08 LAB
ACB COMPLEX DNA BLD POS QL NAA+NON-PROBE: NOT DETECTED
ACCESSION NUMBER, LLC1M: ABNORMAL
ALBUMIN SERPL-MCNC: 3.7 G/DL (ref 3.4–5)
ALBUMIN/GLOB SERPL: 0.8 (ref 0.8–1.7)
ALP SERPL-CCNC: 125 U/L (ref 45–117)
ALT SERPL-CCNC: 304 U/L (ref 13–56)
AMMONIA PLAS-SCNC: 62 UMOL/L (ref 11–32)
ANION GAP SERPL CALC-SCNC: 7 MMOL/L (ref 3–18)
AST SERPL-CCNC: 303 U/L (ref 10–38)
B FRAGILIS DNA BLD POS QL NAA+NON-PROBE: NOT DETECTED
BILIRUB SERPL-MCNC: 3.1 MG/DL (ref 0.2–1)
BIOFIRE TEST COMMENT: ABNORMAL
BUN SERPL-MCNC: 8 MG/DL (ref 7–18)
BUN/CREAT SERPL: 8 (ref 12–20)
C ALBICANS DNA BLD POS QL NAA+NON-PROBE: NOT DETECTED
C AURIS DNA BLD POS QL NAA+NON-PROBE: NOT DETECTED
C GATTII+NEOFOR DNA BLD POS QL NAA+N-PRB: NOT DETECTED
C GLABRATA DNA BLD POS QL NAA+NON-PROBE: NOT DETECTED
C KRUSEI DNA BLD POS QL NAA+NON-PROBE: NOT DETECTED
C PARAP DNA BLD POS QL NAA+NON-PROBE: NOT DETECTED
C TROPICLS DNA BLD POS QL NAA+NON-PROBE: NOT DETECTED
CALCIUM SERPL-MCNC: 9 MG/DL (ref 8.5–10.1)
CHLORIDE SERPL-SCNC: 107 MMOL/L (ref 100–111)
CO2 SERPL-SCNC: 22 MMOL/L (ref 21–32)
CREAT SERPL-MCNC: 1.03 MG/DL (ref 0.6–1.3)
E CLOAC COMP DNA BLD POS NAA+NON-PROBE: NOT DETECTED
E COLI DNA BLD POS QL NAA+NON-PROBE: NOT DETECTED
E FAECALIS DNA BLD POS QL NAA+NON-PROBE: NOT DETECTED
E FAECIUM DNA BLD POS QL NAA+NON-PROBE: NOT DETECTED
ENTEROBACTERALES DNA BLD POS NAA+N-PRB: NOT DETECTED
ERYTHROCYTE [DISTWIDTH] IN BLOOD BY AUTOMATED COUNT: 16.3 % (ref 11.6–14.5)
GLOBULIN SER CALC-MCNC: 4.8 G/DL (ref 2–4)
GLUCOSE BLD STRIP.AUTO-MCNC: 159 MG/DL (ref 70–110)
GLUCOSE BLD STRIP.AUTO-MCNC: 187 MG/DL (ref 70–110)
GLUCOSE BLD STRIP.AUTO-MCNC: 209 MG/DL (ref 70–110)
GLUCOSE BLD STRIP.AUTO-MCNC: 249 MG/DL (ref 70–110)
GLUCOSE SERPL-MCNC: 170 MG/DL (ref 74–99)
GP B STREP DNA BLD POS QL NAA+NON-PROBE: NOT DETECTED
HAEM INFLU DNA BLD POS QL NAA+NON-PROBE: NOT DETECTED
HCT VFR BLD AUTO: 26.2 % (ref 35–45)
HGB BLD-MCNC: 9 G/DL (ref 12–16)
INR PPP: 1.8 (ref 0.9–1.1)
K OXYTOCA DNA BLD POS QL NAA+NON-PROBE: NOT DETECTED
KLEBSIELLA SP DNA BLD POS QL NAA+NON-PRB: NOT DETECTED
KLEBSIELLA SP DNA BLD POS QL NAA+NON-PRB: NOT DETECTED
L MONOCYTOG DNA BLD POS QL NAA+NON-PROBE: NOT DETECTED
LIPASE SERPL-CCNC: 17 U/L (ref 13–75)
MAGNESIUM SERPL-MCNC: 1.6 MG/DL (ref 1.6–2.6)
MCH RBC QN AUTO: 26.7 PG (ref 24–34)
MCHC RBC AUTO-ENTMCNC: 34.4 G/DL (ref 31–37)
MCV RBC AUTO: 77.7 FL (ref 78–100)
MECA+MECC ISLT/SPM QL: NOT DETECTED
N MEN DNA BLD POS QL NAA+NON-PROBE: NOT DETECTED
NRBC # BLD: 0 K/UL (ref 0–0.01)
NRBC BLD-RTO: 0 PER 100 WBC
P AERUGINOSA DNA BLD POS NAA+NON-PROBE: NOT DETECTED
PHOSPHATE SERPL-MCNC: 2.6 MG/DL (ref 2.5–4.9)
PLATELET # BLD AUTO: 137 K/UL (ref 135–420)
PMV BLD AUTO: 10.1 FL (ref 9.2–11.8)
POTASSIUM SERPL-SCNC: 4.2 MMOL/L (ref 3.5–5.5)
PROT SERPL-MCNC: 8.5 G/DL (ref 6.4–8.2)
PROTEUS SP DNA BLD POS QL NAA+NON-PROBE: NOT DETECTED
PROTHROMBIN TIME: 21.4 SEC (ref 11.9–14.9)
RBC # BLD AUTO: 3.37 M/UL (ref 4.2–5.3)
RESISTANT GENE TARGETS: ABNORMAL
S AUREUS DNA BLD POS QL NAA+NON-PROBE: NOT DETECTED
S AUREUS+CONS DNA BLD POS NAA+NON-PROBE: DETECTED
S EPIDERMIDIS DNA BLD POS QL NAA+NON-PRB: DETECTED
S LUGDUNENSIS DNA BLD POS QL NAA+NON-PRB: NOT DETECTED
S MALTOPHILIA DNA BLD POS QL NAA+NON-PRB: NOT DETECTED
S MARCESCENS DNA BLD POS NAA+NON-PROBE: NOT DETECTED
S PNEUM DNA BLD POS QL NAA+NON-PROBE: NOT DETECTED
S PYO DNA BLD POS QL NAA+NON-PROBE: NOT DETECTED
SALMONELLA DNA BLD POS QL NAA+NON-PROBE: NOT DETECTED
SODIUM SERPL-SCNC: 136 MMOL/L (ref 136–145)
STREPTOCOCCUS DNA BLD POS NAA+NON-PROBE: NOT DETECTED
WBC # BLD AUTO: 2.9 K/UL (ref 4.6–13.2)

## 2024-09-08 PROCEDURE — 1100000000 HC RM PRIVATE

## 2024-09-08 PROCEDURE — P9047 ALBUMIN (HUMAN), 25%, 50ML: HCPCS | Performed by: INTERNAL MEDICINE

## 2024-09-08 PROCEDURE — 87040 BLOOD CULTURE FOR BACTERIA: CPT

## 2024-09-08 PROCEDURE — 6370000000 HC RX 637 (ALT 250 FOR IP): Performed by: INTERNAL MEDICINE

## 2024-09-08 PROCEDURE — 85610 PROTHROMBIN TIME: CPT

## 2024-09-08 PROCEDURE — 84100 ASSAY OF PHOSPHORUS: CPT

## 2024-09-08 PROCEDURE — 6370000000 HC RX 637 (ALT 250 FOR IP): Performed by: HOSPITALIST

## 2024-09-08 PROCEDURE — 85027 COMPLETE CBC AUTOMATED: CPT

## 2024-09-08 PROCEDURE — 2580000003 HC RX 258: Performed by: STUDENT IN AN ORGANIZED HEALTH CARE EDUCATION/TRAINING PROGRAM

## 2024-09-08 PROCEDURE — 2580000003 HC RX 258: Performed by: INTERNAL MEDICINE

## 2024-09-08 PROCEDURE — 6360000002 HC RX W HCPCS: Performed by: INTERNAL MEDICINE

## 2024-09-08 PROCEDURE — 6360000002 HC RX W HCPCS: Performed by: STUDENT IN AN ORGANIZED HEALTH CARE EDUCATION/TRAINING PROGRAM

## 2024-09-08 PROCEDURE — 82140 ASSAY OF AMMONIA: CPT

## 2024-09-08 PROCEDURE — 94640 AIRWAY INHALATION TREATMENT: CPT

## 2024-09-08 PROCEDURE — 83735 ASSAY OF MAGNESIUM: CPT

## 2024-09-08 PROCEDURE — 36415 COLL VENOUS BLD VENIPUNCTURE: CPT

## 2024-09-08 PROCEDURE — 80053 COMPREHEN METABOLIC PANEL: CPT

## 2024-09-08 PROCEDURE — 83690 ASSAY OF LIPASE: CPT

## 2024-09-08 PROCEDURE — 82962 GLUCOSE BLOOD TEST: CPT

## 2024-09-08 RX ORDER — BUTALBITAL, ACETAMINOPHEN AND CAFFEINE 50; 325; 40 MG/1; MG/1; MG/1
1 TABLET ORAL
Status: COMPLETED | OUTPATIENT
Start: 2024-09-08 | End: 2024-09-08

## 2024-09-08 RX ORDER — LACTULOSE 10 G/15ML
10 SOLUTION ORAL 2 TIMES DAILY
Status: DISCONTINUED | OUTPATIENT
Start: 2024-09-08 | End: 2024-09-08

## 2024-09-08 RX ORDER — LACTULOSE 10 G/15ML
20 SOLUTION ORAL DAILY PRN
Status: DISCONTINUED | OUTPATIENT
Start: 2024-09-08 | End: 2024-09-10 | Stop reason: HOSPADM

## 2024-09-08 RX ADMIN — SODIUM CHLORIDE, PRESERVATIVE FREE 10 ML: 5 INJECTION INTRAVENOUS at 10:22

## 2024-09-08 RX ADMIN — GABAPENTIN 300 MG: 300 CAPSULE ORAL at 09:30

## 2024-09-08 RX ADMIN — METRONIDAZOLE 500 MG: 500 INJECTION, SOLUTION INTRAVENOUS at 03:29

## 2024-09-08 RX ADMIN — ARFORMOTEROL TARTRATE 15 MCG: 15 SOLUTION RESPIRATORY (INHALATION) at 22:09

## 2024-09-08 RX ADMIN — BUDESONIDE 500 MCG: 0.5 INHALANT RESPIRATORY (INHALATION) at 06:53

## 2024-09-08 RX ADMIN — FUROSEMIDE 40 MG: 40 TABLET ORAL at 09:30

## 2024-09-08 RX ADMIN — Medication 125 MG: at 09:30

## 2024-09-08 RX ADMIN — SODIUM CHLORIDE, PRESERVATIVE FREE 10 ML: 5 INJECTION INTRAVENOUS at 20:14

## 2024-09-08 RX ADMIN — METRONIDAZOLE 500 MG: 500 INJECTION, SOLUTION INTRAVENOUS at 20:07

## 2024-09-08 RX ADMIN — BUTALBITAL, ACETAMINOPHEN AND CAFFEINE 1 TABLET: 325; 50; 40 TABLET ORAL at 21:21

## 2024-09-08 RX ADMIN — ONDANSETRON 4 MG: 2 INJECTION INTRAMUSCULAR; INTRAVENOUS at 17:45

## 2024-09-08 RX ADMIN — CIPROFLOXACIN 400 MG: 400 INJECTION, SOLUTION INTRAVENOUS at 02:07

## 2024-09-08 RX ADMIN — ALBUMIN (HUMAN) 25 G: 0.25 INJECTION, SOLUTION INTRAVENOUS at 12:00

## 2024-09-08 RX ADMIN — HYDROMORPHONE HYDROCHLORIDE 0.5 MG: 1 INJECTION, SOLUTION INTRAMUSCULAR; INTRAVENOUS; SUBCUTANEOUS at 06:53

## 2024-09-08 RX ADMIN — INSULIN GLARGINE 9 UNITS: 100 INJECTION, SOLUTION SUBCUTANEOUS at 21:21

## 2024-09-08 RX ADMIN — INSULIN LISPRO 3 UNITS: 100 INJECTION, SOLUTION INTRAVENOUS; SUBCUTANEOUS at 17:46

## 2024-09-08 RX ADMIN — ALBUMIN (HUMAN) 25 G: 0.25 INJECTION, SOLUTION INTRAVENOUS at 06:50

## 2024-09-08 RX ADMIN — GABAPENTIN 300 MG: 300 CAPSULE ORAL at 21:21

## 2024-09-08 RX ADMIN — PANTOPRAZOLE SODIUM 40 MG: 40 INJECTION, POWDER, FOR SOLUTION INTRAVENOUS at 09:30

## 2024-09-08 RX ADMIN — INSULIN LISPRO 3 UNITS: 100 INJECTION, SOLUTION INTRAVENOUS; SUBCUTANEOUS at 08:10

## 2024-09-08 RX ADMIN — GABAPENTIN 300 MG: 300 CAPSULE ORAL at 14:08

## 2024-09-08 RX ADMIN — LACTULOSE 10 G: 10 SOLUTION ORAL at 10:20

## 2024-09-08 RX ADMIN — TACROLIMUS 1 MG: 1 CAPSULE ORAL at 09:30

## 2024-09-08 RX ADMIN — INSULIN LISPRO 1 UNITS: 100 INJECTION, SOLUTION INTRAVENOUS; SUBCUTANEOUS at 17:45

## 2024-09-08 RX ADMIN — ARFORMOTEROL TARTRATE 15 MCG: 15 SOLUTION RESPIRATORY (INHALATION) at 06:52

## 2024-09-08 RX ADMIN — MINOXIDIL 2.5 MG: 2.5 TABLET ORAL at 09:30

## 2024-09-08 RX ADMIN — CIPROFLOXACIN 400 MG: 400 INJECTION, SOLUTION INTRAVENOUS at 14:07

## 2024-09-08 RX ADMIN — HYDROMORPHONE HYDROCHLORIDE 0.5 MG: 1 INJECTION, SOLUTION INTRAMUSCULAR; INTRAVENOUS; SUBCUTANEOUS at 19:13

## 2024-09-08 RX ADMIN — TACROLIMUS 1 MG: 1 CAPSULE ORAL at 21:21

## 2024-09-08 RX ADMIN — Medication 500 MG: at 21:20

## 2024-09-08 RX ADMIN — ALBUMIN (HUMAN) 25 G: 0.25 INJECTION, SOLUTION INTRAVENOUS at 01:06

## 2024-09-08 RX ADMIN — PHYTONADIONE 10 MG: 10 INJECTION, EMULSION INTRAMUSCULAR; INTRAVENOUS; SUBCUTANEOUS at 22:48

## 2024-09-08 RX ADMIN — INSULIN LISPRO 1 UNITS: 100 INJECTION, SOLUTION INTRAVENOUS; SUBCUTANEOUS at 12:03

## 2024-09-08 RX ADMIN — VANCOMYCIN HYDROCHLORIDE 1000 MG: 1 INJECTION, POWDER, LYOPHILIZED, FOR SOLUTION INTRAVENOUS at 21:16

## 2024-09-08 RX ADMIN — ONDANSETRON 4 MG: 2 INJECTION INTRAMUSCULAR; INTRAVENOUS at 06:59

## 2024-09-08 RX ADMIN — HYDROMORPHONE HYDROCHLORIDE 0.5 MG: 1 INJECTION, SOLUTION INTRAMUSCULAR; INTRAVENOUS; SUBCUTANEOUS at 11:45

## 2024-09-08 RX ADMIN — BUDESONIDE 500 MCG: 0.5 INHALANT RESPIRATORY (INHALATION) at 22:09

## 2024-09-08 RX ADMIN — ONDANSETRON 4 MG: 2 INJECTION INTRAMUSCULAR; INTRAVENOUS at 11:46

## 2024-09-08 RX ADMIN — AMLODIPINE BESYLATE 10 MG: 5 TABLET ORAL at 09:30

## 2024-09-08 RX ADMIN — INSULIN LISPRO 3 UNITS: 100 INJECTION, SOLUTION INTRAVENOUS; SUBCUTANEOUS at 12:04

## 2024-09-08 RX ADMIN — FERROUS SULFATE TAB 325 MG (65 MG ELEMENTAL FE) 325 MG: 325 (65 FE) TAB at 09:30

## 2024-09-08 RX ADMIN — SPIRONOLACTONE 100 MG: 100 TABLET ORAL at 09:30

## 2024-09-08 RX ADMIN — METRONIDAZOLE 500 MG: 500 INJECTION, SOLUTION INTRAVENOUS at 11:12

## 2024-09-08 RX ADMIN — Medication 500 MG: at 09:30

## 2024-09-08 RX ADMIN — ALBUMIN (HUMAN) 25 G: 0.25 INJECTION, SOLUTION INTRAVENOUS at 17:53

## 2024-09-08 RX ADMIN — ENOXAPARIN SODIUM 40 MG: 100 INJECTION SUBCUTANEOUS at 09:30

## 2024-09-08 RX ADMIN — SODIUM CHLORIDE: 9 INJECTION, SOLUTION INTRAVENOUS at 14:06

## 2024-09-08 ASSESSMENT — PAIN SCALES - GENERAL
PAINLEVEL_OUTOF10: 6
PAINLEVEL_OUTOF10: 5
PAINLEVEL_OUTOF10: 9
PAINLEVEL_OUTOF10: 8
PAINLEVEL_OUTOF10: 0
PAINLEVEL_OUTOF10: 2
PAINLEVEL_OUTOF10: 0

## 2024-09-08 ASSESSMENT — PAIN DESCRIPTION - DESCRIPTORS
DESCRIPTORS: ACHING
DESCRIPTORS: ACHING

## 2024-09-08 ASSESSMENT — PAIN DESCRIPTION - LOCATION
LOCATION: HEAD
LOCATION: ABDOMEN;LEG;HEAD
LOCATION: HEAD

## 2024-09-08 ASSESSMENT — PAIN SCALES - WONG BAKER
WONGBAKER_NUMERICALRESPONSE: NO HURT
WONGBAKER_NUMERICALRESPONSE: NO HURT

## 2024-09-09 ENCOUNTER — APPOINTMENT (OUTPATIENT)
Facility: HOSPITAL | Age: 74
DRG: 433 | End: 2024-09-09
Payer: MEDICARE

## 2024-09-09 LAB
ALBUMIN SERPL-MCNC: 4.2 G/DL (ref 3.4–5)
ALBUMIN/GLOB SERPL: 1 (ref 0.8–1.7)
ALP SERPL-CCNC: 96 U/L (ref 45–117)
ALT SERPL-CCNC: 267 U/L (ref 13–56)
AMMONIA PLAS-SCNC: 60 UMOL/L (ref 11–32)
ANION GAP SERPL CALC-SCNC: 8 MMOL/L (ref 3–18)
AST SERPL-CCNC: 257 U/L (ref 10–38)
BACTERIA SPEC CULT: ABNORMAL
BACTERIA SPEC CULT: NORMAL
BILIRUB SERPL-MCNC: 4 MG/DL (ref 0.2–1)
BUN SERPL-MCNC: 6 MG/DL (ref 7–18)
BUN/CREAT SERPL: 5 (ref 12–20)
CALCIUM SERPL-MCNC: 9.7 MG/DL (ref 8.5–10.1)
CC UR VC: NORMAL
CHLORIDE SERPL-SCNC: 104 MMOL/L (ref 100–111)
CO2 SERPL-SCNC: 22 MMOL/L (ref 21–32)
CREAT SERPL-MCNC: 1.14 MG/DL (ref 0.6–1.3)
ERYTHROCYTE [DISTWIDTH] IN BLOOD BY AUTOMATED COUNT: 16.2 % (ref 11.6–14.5)
GLOBULIN SER CALC-MCNC: 4.4 G/DL (ref 2–4)
GLUCOSE BLD STRIP.AUTO-MCNC: 182 MG/DL (ref 70–110)
GLUCOSE BLD STRIP.AUTO-MCNC: 253 MG/DL (ref 70–110)
GLUCOSE BLD STRIP.AUTO-MCNC: 288 MG/DL (ref 70–110)
GLUCOSE SERPL-MCNC: 203 MG/DL (ref 74–99)
GRAM STN SPEC: ABNORMAL
GRAM STN SPEC: ABNORMAL
HCT VFR BLD AUTO: 25.1 % (ref 35–45)
HGB BLD-MCNC: 8.6 G/DL (ref 12–16)
INR PPP: 2.1 (ref 0.9–1.1)
LIPASE SERPL-CCNC: 14 U/L (ref 13–75)
MAGNESIUM SERPL-MCNC: 1.5 MG/DL (ref 1.6–2.6)
MCH RBC QN AUTO: 26.5 PG (ref 24–34)
MCHC RBC AUTO-ENTMCNC: 34.3 G/DL (ref 31–37)
MCV RBC AUTO: 77.2 FL (ref 78–100)
NRBC # BLD: 0 K/UL (ref 0–0.01)
NRBC BLD-RTO: 0 PER 100 WBC
PLATELET # BLD AUTO: 136 K/UL (ref 135–420)
PMV BLD AUTO: 10.5 FL (ref 9.2–11.8)
POTASSIUM SERPL-SCNC: 4.2 MMOL/L (ref 3.5–5.5)
PROT SERPL-MCNC: 8.6 G/DL (ref 6.4–8.2)
PROTHROMBIN TIME: 23.8 SEC (ref 11.9–14.9)
RBC # BLD AUTO: 3.25 M/UL (ref 4.2–5.3)
SERVICE CMNT-IMP: ABNORMAL
SERVICE CMNT-IMP: NORMAL
SODIUM SERPL-SCNC: 134 MMOL/L (ref 136–145)
VANCOMYCIN SERPL-MCNC: 10.6 UG/ML (ref 5–40)
WBC # BLD AUTO: 3.5 K/UL (ref 4.6–13.2)

## 2024-09-09 PROCEDURE — 80202 ASSAY OF VANCOMYCIN: CPT

## 2024-09-09 PROCEDURE — P9047 ALBUMIN (HUMAN), 25%, 50ML: HCPCS | Performed by: INTERNAL MEDICINE

## 2024-09-09 PROCEDURE — 6360000002 HC RX W HCPCS: Performed by: HOSPITALIST

## 2024-09-09 PROCEDURE — 3430000000 HC RX DIAGNOSTIC RADIOPHARMACEUTICAL: Performed by: INTERNAL MEDICINE

## 2024-09-09 PROCEDURE — 1100000000 HC RM PRIVATE

## 2024-09-09 PROCEDURE — 83735 ASSAY OF MAGNESIUM: CPT

## 2024-09-09 PROCEDURE — 36415 COLL VENOUS BLD VENIPUNCTURE: CPT

## 2024-09-09 PROCEDURE — A9537 TC99M MEBROFENIN: HCPCS | Performed by: INTERNAL MEDICINE

## 2024-09-09 PROCEDURE — 85610 PROTHROMBIN TIME: CPT

## 2024-09-09 PROCEDURE — 80053 COMPREHEN METABOLIC PANEL: CPT

## 2024-09-09 PROCEDURE — 82140 ASSAY OF AMMONIA: CPT

## 2024-09-09 PROCEDURE — 83690 ASSAY OF LIPASE: CPT

## 2024-09-09 PROCEDURE — 82962 GLUCOSE BLOOD TEST: CPT

## 2024-09-09 PROCEDURE — 6360000002 HC RX W HCPCS: Performed by: INTERNAL MEDICINE

## 2024-09-09 PROCEDURE — 85027 COMPLETE CBC AUTOMATED: CPT

## 2024-09-09 PROCEDURE — 99223 1ST HOSP IP/OBS HIGH 75: CPT | Performed by: INTERNAL MEDICINE

## 2024-09-09 PROCEDURE — 6370000000 HC RX 637 (ALT 250 FOR IP): Performed by: INTERNAL MEDICINE

## 2024-09-09 PROCEDURE — 2580000003 HC RX 258: Performed by: INTERNAL MEDICINE

## 2024-09-09 PROCEDURE — 94640 AIRWAY INHALATION TREATMENT: CPT

## 2024-09-09 RX ORDER — KIT FOR THE PREPARATION OF TECHNETIUM TC 99M MEBROFENIN 45 MG/10ML
6.7 INJECTION, POWDER, LYOPHILIZED, FOR SOLUTION INTRAVENOUS
Status: COMPLETED | OUTPATIENT
Start: 2024-09-09 | End: 2024-09-09

## 2024-09-09 RX ORDER — MAGNESIUM SULFATE IN WATER 40 MG/ML
2000 INJECTION, SOLUTION INTRAVENOUS ONCE
Status: COMPLETED | OUTPATIENT
Start: 2024-09-09 | End: 2024-09-09

## 2024-09-09 RX ADMIN — TACROLIMUS 1 MG: 1 CAPSULE ORAL at 16:00

## 2024-09-09 RX ADMIN — GABAPENTIN 300 MG: 300 CAPSULE ORAL at 15:14

## 2024-09-09 RX ADMIN — BUDESONIDE 500 MCG: 0.5 INHALANT RESPIRATORY (INHALATION) at 08:58

## 2024-09-09 RX ADMIN — SODIUM CHLORIDE, PRESERVATIVE FREE 10 ML: 5 INJECTION INTRAVENOUS at 20:54

## 2024-09-09 RX ADMIN — TACROLIMUS 1 MG: 1 CAPSULE ORAL at 20:53

## 2024-09-09 RX ADMIN — GABAPENTIN 300 MG: 300 CAPSULE ORAL at 20:53

## 2024-09-09 RX ADMIN — SPIRONOLACTONE 100 MG: 100 TABLET ORAL at 15:14

## 2024-09-09 RX ADMIN — PANTOPRAZOLE SODIUM 40 MG: 40 INJECTION, POWDER, FOR SOLUTION INTRAVENOUS at 15:11

## 2024-09-09 RX ADMIN — FERROUS SULFATE TAB 325 MG (65 MG ELEMENTAL FE) 325 MG: 325 (65 FE) TAB at 15:14

## 2024-09-09 RX ADMIN — METRONIDAZOLE 500 MG: 500 INJECTION, SOLUTION INTRAVENOUS at 15:36

## 2024-09-09 RX ADMIN — ARFORMOTEROL TARTRATE 15 MCG: 15 SOLUTION RESPIRATORY (INHALATION) at 08:58

## 2024-09-09 RX ADMIN — INSULIN LISPRO 3 UNITS: 100 INJECTION, SOLUTION INTRAVENOUS; SUBCUTANEOUS at 17:00

## 2024-09-09 RX ADMIN — Medication 500 MG: at 15:39

## 2024-09-09 RX ADMIN — Medication 125 MG: at 15:13

## 2024-09-09 RX ADMIN — Medication 500 MG: at 20:52

## 2024-09-09 RX ADMIN — ENOXAPARIN SODIUM 40 MG: 100 INJECTION SUBCUTANEOUS at 15:17

## 2024-09-09 RX ADMIN — METRONIDAZOLE 500 MG: 500 INJECTION, SOLUTION INTRAVENOUS at 03:02

## 2024-09-09 RX ADMIN — AMLODIPINE BESYLATE 10 MG: 5 TABLET ORAL at 15:14

## 2024-09-09 RX ADMIN — FUROSEMIDE 40 MG: 40 TABLET ORAL at 15:13

## 2024-09-09 RX ADMIN — MAGNESIUM SULFATE HEPTAHYDRATE 2000 MG: 40 INJECTION, SOLUTION INTRAVENOUS at 15:35

## 2024-09-09 RX ADMIN — SODIUM CHLORIDE, PRESERVATIVE FREE 10 ML: 5 INJECTION INTRAVENOUS at 15:21

## 2024-09-09 RX ADMIN — ALBUMIN (HUMAN) 25 G: 0.25 INJECTION, SOLUTION INTRAVENOUS at 05:55

## 2024-09-09 RX ADMIN — ALBUMIN (HUMAN) 25 G: 0.25 INJECTION, SOLUTION INTRAVENOUS at 23:43

## 2024-09-09 RX ADMIN — INSULIN GLARGINE 9 UNITS: 100 INJECTION, SOLUTION SUBCUTANEOUS at 20:55

## 2024-09-09 RX ADMIN — ARFORMOTEROL TARTRATE 15 MCG: 15 SOLUTION RESPIRATORY (INHALATION) at 20:01

## 2024-09-09 RX ADMIN — MINOXIDIL 2.5 MG: 2.5 TABLET ORAL at 15:15

## 2024-09-09 RX ADMIN — HYDROMORPHONE HYDROCHLORIDE 0.5 MG: 1 INJECTION, SOLUTION INTRAMUSCULAR; INTRAVENOUS; SUBCUTANEOUS at 15:42

## 2024-09-09 RX ADMIN — CIPROFLOXACIN 400 MG: 400 INJECTION, SOLUTION INTRAVENOUS at 01:31

## 2024-09-09 RX ADMIN — METRONIDAZOLE 500 MG: 500 INJECTION, SOLUTION INTRAVENOUS at 18:40

## 2024-09-09 RX ADMIN — ONDANSETRON 4 MG: 2 INJECTION INTRAMUSCULAR; INTRAVENOUS at 15:59

## 2024-09-09 RX ADMIN — ALBUMIN (HUMAN) 25 G: 0.25 INJECTION, SOLUTION INTRAVENOUS at 00:31

## 2024-09-09 RX ADMIN — BUDESONIDE 500 MCG: 0.5 INHALANT RESPIRATORY (INHALATION) at 19:58

## 2024-09-09 RX ADMIN — ALBUMIN (HUMAN) 25 G: 0.25 INJECTION, SOLUTION INTRAVENOUS at 18:35

## 2024-09-09 RX ADMIN — CIPROFLOXACIN 400 MG: 400 INJECTION, SOLUTION INTRAVENOUS at 15:06

## 2024-09-09 RX ADMIN — ALBUMIN (HUMAN) 25 G: 0.25 INJECTION, SOLUTION INTRAVENOUS at 16:02

## 2024-09-09 RX ADMIN — KIT FOR THE PREPARATION OF TECHNETIUM TC 99M MEBROFENIN 6.7 MILLICURIE: 45 INJECTION, POWDER, LYOPHILIZED, FOR SOLUTION INTRAVENOUS at 11:42

## 2024-09-09 RX ADMIN — INSULIN LISPRO 2 UNITS: 100 INJECTION, SOLUTION INTRAVENOUS; SUBCUTANEOUS at 17:00

## 2024-09-09 RX ADMIN — HYDROMORPHONE HYDROCHLORIDE 0.5 MG: 1 INJECTION, SOLUTION INTRAMUSCULAR; INTRAVENOUS; SUBCUTANEOUS at 22:32

## 2024-09-09 ASSESSMENT — PAIN DESCRIPTION - PAIN TYPE: TYPE: CHRONIC PAIN

## 2024-09-09 ASSESSMENT — PAIN SCALES - WONG BAKER
WONGBAKER_NUMERICALRESPONSE: NO HURT
WONGBAKER_NUMERICALRESPONSE: NO HURT

## 2024-09-09 ASSESSMENT — PAIN - FUNCTIONAL ASSESSMENT: PAIN_FUNCTIONAL_ASSESSMENT: ACTIVITIES ARE NOT PREVENTED

## 2024-09-09 ASSESSMENT — PAIN SCALES - GENERAL
PAINLEVEL_OUTOF10: 7
PAINLEVEL_OUTOF10: 0
PAINLEVEL_OUTOF10: 9
PAINLEVEL_OUTOF10: 1
PAINLEVEL_OUTOF10: 10

## 2024-09-09 ASSESSMENT — PAIN DESCRIPTION - LOCATION
LOCATION: ABDOMEN
LOCATION: HEAD;ABDOMEN

## 2024-09-09 ASSESSMENT — PAIN DESCRIPTION - DESCRIPTORS: DESCRIPTORS: ACHING;SHARP

## 2024-09-10 VITALS
SYSTOLIC BLOOD PRESSURE: 114 MMHG | WEIGHT: 147.2 LBS | TEMPERATURE: 98.6 F | BODY MASS INDEX: 26.08 KG/M2 | DIASTOLIC BLOOD PRESSURE: 70 MMHG | RESPIRATION RATE: 18 BRPM | OXYGEN SATURATION: 96 % | HEIGHT: 63 IN | HEART RATE: 96 BPM

## 2024-09-10 PROBLEM — R11.2 NAUSEA AND VOMITING: Status: ACTIVE | Noted: 2024-09-10

## 2024-09-10 PROBLEM — K83.01 PSC (PRIMARY SCLEROSING CHOLANGITIS): Status: ACTIVE | Noted: 2021-03-29

## 2024-09-10 PROBLEM — R74.8 ELEVATED LIVER ENZYMES: Status: ACTIVE | Noted: 2024-09-10

## 2024-09-10 PROBLEM — Z95.828 S/P IVC FILTER: Status: ACTIVE | Noted: 2024-09-10

## 2024-09-10 PROBLEM — D50.0 IRON DEFICIENCY ANEMIA DUE TO CHRONIC BLOOD LOSS: Status: ACTIVE | Noted: 2024-09-10

## 2024-09-10 PROBLEM — R78.81 GRAM-POSITIVE BACTEREMIA: Status: RESOLVED | Noted: 2024-09-08 | Resolved: 2024-09-10

## 2024-09-10 PROBLEM — B34.9 VIRAL SYNDROME: Status: ACTIVE | Noted: 2024-09-10

## 2024-09-10 PROBLEM — Z79.60 LONG-TERM USE OF IMMUNOSUPPRESSANT MEDICATION: Status: ACTIVE | Noted: 2024-09-10

## 2024-09-10 PROBLEM — Z86.718 H/O DEEP VENOUS THROMBOSIS: Status: ACTIVE | Noted: 2024-09-10

## 2024-09-10 PROBLEM — Z86.19 HEPATITIS C VIRUS INFECTION CURED AFTER ANTIVIRAL DRUG THERAPY: Status: ACTIVE | Noted: 2024-09-10

## 2024-09-10 LAB
ALBUMIN SERPL-MCNC: 5 G/DL (ref 3.4–5)
ALBUMIN/GLOB SERPL: 1.3 (ref 0.8–1.7)
ALP SERPL-CCNC: 83 U/L (ref 45–117)
ALT SERPL-CCNC: 226 U/L (ref 13–56)
ANION GAP SERPL CALC-SCNC: 7 MMOL/L (ref 3–18)
AST SERPL-CCNC: 207 U/L (ref 10–38)
BILIRUB SERPL-MCNC: 4.5 MG/DL (ref 0.2–1)
BUN SERPL-MCNC: 9 MG/DL (ref 7–18)
BUN/CREAT SERPL: 7 (ref 12–20)
CALCIUM SERPL-MCNC: 10.2 MG/DL (ref 8.5–10.1)
CHLORIDE SERPL-SCNC: 101 MMOL/L (ref 100–111)
CO2 SERPL-SCNC: 23 MMOL/L (ref 21–32)
CREAT SERPL-MCNC: 1.26 MG/DL (ref 0.6–1.3)
GLOBULIN SER CALC-MCNC: 3.9 G/DL (ref 2–4)
GLUCOSE BLD STRIP.AUTO-MCNC: 225 MG/DL (ref 70–110)
GLUCOSE BLD STRIP.AUTO-MCNC: 262 MG/DL (ref 70–110)
GLUCOSE BLD STRIP.AUTO-MCNC: 300 MG/DL (ref 70–110)
GLUCOSE BLD STRIP.AUTO-MCNC: 305 MG/DL (ref 70–110)
GLUCOSE SERPL-MCNC: 253 MG/DL (ref 74–99)
POTASSIUM SERPL-SCNC: 4.6 MMOL/L (ref 3.5–5.5)
PROT SERPL-MCNC: 8.9 G/DL (ref 6.4–8.2)
SODIUM SERPL-SCNC: 131 MMOL/L (ref 136–145)

## 2024-09-10 PROCEDURE — 6360000002 HC RX W HCPCS: Performed by: INTERNAL MEDICINE

## 2024-09-10 PROCEDURE — 6370000000 HC RX 637 (ALT 250 FOR IP): Performed by: HOSPITALIST

## 2024-09-10 PROCEDURE — 6370000000 HC RX 637 (ALT 250 FOR IP): Performed by: INTERNAL MEDICINE

## 2024-09-10 PROCEDURE — 82962 GLUCOSE BLOOD TEST: CPT

## 2024-09-10 PROCEDURE — 97161 PT EVAL LOW COMPLEX 20 MIN: CPT

## 2024-09-10 PROCEDURE — 2580000003 HC RX 258: Performed by: INTERNAL MEDICINE

## 2024-09-10 PROCEDURE — 36415 COLL VENOUS BLD VENIPUNCTURE: CPT

## 2024-09-10 PROCEDURE — 94640 AIRWAY INHALATION TREATMENT: CPT

## 2024-09-10 PROCEDURE — P9047 ALBUMIN (HUMAN), 25%, 50ML: HCPCS | Performed by: INTERNAL MEDICINE

## 2024-09-10 PROCEDURE — 80053 COMPREHEN METABOLIC PANEL: CPT

## 2024-09-10 RX ORDER — INSULIN LISPRO 100 [IU]/ML
0-8 INJECTION, SOLUTION INTRAVENOUS; SUBCUTANEOUS
Status: DISCONTINUED | OUTPATIENT
Start: 2024-09-10 | End: 2024-09-10 | Stop reason: HOSPADM

## 2024-09-10 RX ORDER — BISACODYL 5 MG/1
TABLET, DELAYED RELEASE ORAL
COMMUNITY
Start: 2024-07-29 | End: 2024-09-27

## 2024-09-10 RX ORDER — BUTALBITAL, ACETAMINOPHEN AND CAFFEINE 50; 325; 40 MG/1; MG/1; MG/1
1 TABLET ORAL EVERY 6 HOURS PRN
Qty: 30 TABLET | Refills: 0 | Status: SHIPPED | OUTPATIENT
Start: 2024-09-10

## 2024-09-10 RX ORDER — BUTALBITAL, ACETAMINOPHEN AND CAFFEINE 50; 325; 40 MG/1; MG/1; MG/1
1 TABLET ORAL EVERY 4 HOURS PRN
Status: DISCONTINUED | OUTPATIENT
Start: 2024-09-10 | End: 2024-09-10 | Stop reason: HOSPADM

## 2024-09-10 RX ADMIN — GABAPENTIN 300 MG: 300 CAPSULE ORAL at 14:51

## 2024-09-10 RX ADMIN — PANTOPRAZOLE SODIUM 40 MG: 40 INJECTION, POWDER, FOR SOLUTION INTRAVENOUS at 08:33

## 2024-09-10 RX ADMIN — INSULIN LISPRO 6 UNITS: 100 INJECTION, SOLUTION INTRAVENOUS; SUBCUTANEOUS at 18:01

## 2024-09-10 RX ADMIN — SPIRONOLACTONE 100 MG: 100 TABLET ORAL at 08:36

## 2024-09-10 RX ADMIN — ENOXAPARIN SODIUM 40 MG: 100 INJECTION SUBCUTANEOUS at 08:40

## 2024-09-10 RX ADMIN — CIPROFLOXACIN 400 MG: 400 INJECTION, SOLUTION INTRAVENOUS at 14:53

## 2024-09-10 RX ADMIN — INSULIN LISPRO 3 UNITS: 100 INJECTION, SOLUTION INTRAVENOUS; SUBCUTANEOUS at 12:15

## 2024-09-10 RX ADMIN — FUROSEMIDE 40 MG: 40 TABLET ORAL at 08:36

## 2024-09-10 RX ADMIN — FERROUS SULFATE TAB 325 MG (65 MG ELEMENTAL FE) 325 MG: 325 (65 FE) TAB at 11:23

## 2024-09-10 RX ADMIN — METRONIDAZOLE 500 MG: 500 INJECTION, SOLUTION INTRAVENOUS at 02:43

## 2024-09-10 RX ADMIN — BUDESONIDE 500 MCG: 0.5 INHALANT RESPIRATORY (INHALATION) at 10:19

## 2024-09-10 RX ADMIN — CIPROFLOXACIN 400 MG: 400 INJECTION, SOLUTION INTRAVENOUS at 01:39

## 2024-09-10 RX ADMIN — BUTALBITAL, ACETAMINOPHEN, AND CAFFEINE 1 TABLET: 50; 325; 40 TABLET ORAL at 16:27

## 2024-09-10 RX ADMIN — HYDROMORPHONE HYDROCHLORIDE 0.5 MG: 1 INJECTION, SOLUTION INTRAMUSCULAR; INTRAVENOUS; SUBCUTANEOUS at 05:32

## 2024-09-10 RX ADMIN — TACROLIMUS 1 MG: 1 CAPSULE ORAL at 08:36

## 2024-09-10 RX ADMIN — SODIUM CHLORIDE: 9 INJECTION, SOLUTION INTRAVENOUS at 11:33

## 2024-09-10 RX ADMIN — INSULIN LISPRO 4 UNITS: 100 INJECTION, SOLUTION INTRAVENOUS; SUBCUTANEOUS at 12:15

## 2024-09-10 RX ADMIN — SODIUM CHLORIDE, PRESERVATIVE FREE 10 ML: 5 INJECTION INTRAVENOUS at 08:31

## 2024-09-10 RX ADMIN — METRONIDAZOLE 500 MG: 500 INJECTION, SOLUTION INTRAVENOUS at 11:35

## 2024-09-10 RX ADMIN — MINOXIDIL 2.5 MG: 2.5 TABLET ORAL at 08:37

## 2024-09-10 RX ADMIN — ALBUMIN (HUMAN) 25 G: 0.25 INJECTION, SOLUTION INTRAVENOUS at 05:27

## 2024-09-10 RX ADMIN — INSULIN LISPRO 3 UNITS: 100 INJECTION, SOLUTION INTRAVENOUS; SUBCUTANEOUS at 18:00

## 2024-09-10 RX ADMIN — AMLODIPINE BESYLATE 10 MG: 5 TABLET ORAL at 08:36

## 2024-09-10 RX ADMIN — ALBUMIN (HUMAN) 25 G: 0.25 INJECTION, SOLUTION INTRAVENOUS at 11:39

## 2024-09-10 RX ADMIN — ONDANSETRON 4 MG: 2 INJECTION INTRAMUSCULAR; INTRAVENOUS at 08:33

## 2024-09-10 RX ADMIN — Medication 125 MG: at 11:23

## 2024-09-10 RX ADMIN — GABAPENTIN 300 MG: 300 CAPSULE ORAL at 08:37

## 2024-09-10 RX ADMIN — INSULIN LISPRO 3 UNITS: 100 INJECTION, SOLUTION INTRAVENOUS; SUBCUTANEOUS at 08:41

## 2024-09-10 RX ADMIN — Medication 500 MG: at 11:24

## 2024-09-10 RX ADMIN — ONDANSETRON 4 MG: 2 INJECTION INTRAMUSCULAR; INTRAVENOUS at 15:06

## 2024-09-10 RX ADMIN — ARFORMOTEROL TARTRATE 15 MCG: 15 SOLUTION RESPIRATORY (INHALATION) at 10:19

## 2024-09-10 ASSESSMENT — PAIN SCALES - GENERAL
PAINLEVEL_OUTOF10: 5
PAINLEVEL_OUTOF10: 5
PAINLEVEL_OUTOF10: 10
PAINLEVEL_OUTOF10: 4
PAINLEVEL_OUTOF10: 9

## 2024-09-10 ASSESSMENT — PAIN DESCRIPTION - LOCATION
LOCATION: HEAD
LOCATION: ABDOMEN;HEAD

## 2024-09-10 ASSESSMENT — PAIN DESCRIPTION - ORIENTATION
ORIENTATION: RIGHT;LEFT
ORIENTATION: ANTERIOR

## 2024-09-10 ASSESSMENT — PAIN DESCRIPTION - DESCRIPTORS
DESCRIPTORS: THROBBING
DESCRIPTORS: ACHING;SHARP

## 2024-09-11 ENCOUNTER — TELEPHONE (OUTPATIENT)
Age: 74
End: 2024-09-11

## 2024-09-11 ENCOUNTER — OFFICE VISIT (OUTPATIENT)
Age: 74
End: 2024-09-11
Payer: MEDICARE

## 2024-09-11 VITALS
OXYGEN SATURATION: 99 % | HEART RATE: 75 BPM | TEMPERATURE: 97.8 F | DIASTOLIC BLOOD PRESSURE: 70 MMHG | WEIGHT: 133 LBS | BODY MASS INDEX: 22.71 KG/M2 | SYSTOLIC BLOOD PRESSURE: 127 MMHG | HEIGHT: 64 IN

## 2024-09-11 DIAGNOSIS — K75.4 AUTOIMMUNE HEPATITIS (HCC): Primary | ICD-10-CM

## 2024-09-11 PROCEDURE — 99214 OFFICE O/P EST MOD 30 MIN: CPT | Performed by: INTERNAL MEDICINE

## 2024-09-11 PROCEDURE — 1123F ACP DISCUSS/DSCN MKR DOCD: CPT | Performed by: INTERNAL MEDICINE

## 2024-09-11 NOTE — PROGRESS NOTES
insufficiency/\"dehydration\"   Kidney issues    Hydrocodone-Acetaminophen Hives and Rash    Ibuprofen Hives, Nausea And Vomiting, Other (See Comments) and Rash     Nausea    Morphine Other (See Comments)     Reports \"I was out of my head\"    Naproxen Nausea Only    Penicillins Hives, Itching and Rash     Other reaction(s): Hives/Skin Rash, rash/itching    Pregabalin Other (See Comments)     Kidneys began to shut down, renal insufficiency    Other reaction(s): renal insufficiency   Kidneys began to shut down     Kidneys began to shut down    Tizanidine Other (See Comments) and Palpitations     Cardiac dysrhythmia    Other reaction(s): cardiac dysrhythmia    Tramadol Nausea Only, Nausea And Vomiting, Hives, Itching and Rash     Other reaction(s): gi distress, Hives/Skin Rash    Tuberculin Ppd Hives    Wound Dressing Adhesive Hives, Itching, Other (See Comments) and Rash     Blistering of skin with bandaids and tape.    Amitriptyline Hcl Hives    Tuberculin Ppd Hives       MEDICATIONS  Current Outpatient Medications   Medication Instructions    albuterol sulfate HFA (PROVENTIL;VENTOLIN;PROAIR) 108 (90 Base) MCG/ACT inhaler 2 puffs, Inhalation    blood glucose test strips (ONETOUCH ULTRA) strip USE 1 STRIP TO CHECK GLUCOSE EVERY 4 HOURS    budesonide-formoterol (SYMBICORT) 160-4.5 MCG/ACT AERO 2 puffs, Inhalation    butalbital-acetaminophen-caffeine (FIORICET, ESGIC) -40 MG per tablet 1 tablet, Oral, EVERY 6 HOURS PRN    ciprofloxacin (CIPRO) 500 mg, Oral, 2 TIMES DAILY    ferrous sulfate (IRON 325) 325 mg, Oral, DAILY WITH BREAKFAST    furosemide (LASIX) 40 mg, Oral, EVERY MORNING    gabapentin (NEURONTIN) 300 MG capsule TAKE 1 CAPSULE BY MOUTH THREE TIMES DAILY. MAX DAILY AMOUNT: 900MG    insulin glargine (BASAGLAR KWIKPEN) 100 UNIT/ML injection pen INJECT 10 UNITS BENEATH THE SKIN DAILY, INCREASE BY 2 UNITS EVERY 3RD DAY UNTIL -160MG/DL. MAX DOSE 30 UNITS PER DAY.    lactulose (CHRONULAC) 30 g, Oral, 3

## 2024-09-13 PROBLEM — D50.9 IRON DEFICIENCY ANEMIA, UNSPECIFIED: Status: ACTIVE | Noted: 2024-09-13

## 2024-09-13 LAB
BACTERIA SPEC CULT: NORMAL
SERVICE CMNT-IMP: NORMAL

## 2024-09-14 LAB
BACTERIA SPEC CULT: NORMAL
BACTERIA SPEC CULT: NORMAL
SERVICE CMNT-IMP: NORMAL
SERVICE CMNT-IMP: NORMAL

## 2024-09-19 ENCOUNTER — CLINICAL DOCUMENTATION (OUTPATIENT)
Age: 74
End: 2024-09-19

## 2024-09-23 ENCOUNTER — HOSPITAL ENCOUNTER (OUTPATIENT)
Facility: HOSPITAL | Age: 74
Setting detail: INFUSION SERIES
End: 2024-09-23

## 2024-09-26 ENCOUNTER — HOSPITAL ENCOUNTER (INPATIENT)
Facility: HOSPITAL | Age: 74
LOS: 5 days | Discharge: HOME HEALTH | DRG: 377 | End: 2024-10-01
Attending: EMERGENCY MEDICINE | Admitting: INTERNAL MEDICINE
Payer: MEDICARE

## 2024-09-26 ENCOUNTER — APPOINTMENT (OUTPATIENT)
Facility: HOSPITAL | Age: 74
DRG: 377 | End: 2024-09-26
Payer: MEDICARE

## 2024-09-26 DIAGNOSIS — R11.2 NAUSEA AND VOMITING, UNSPECIFIED VOMITING TYPE: ICD-10-CM

## 2024-09-26 DIAGNOSIS — K92.2 UGI BLEED: ICD-10-CM

## 2024-09-26 DIAGNOSIS — Z96.642 HISTORY OF LEFT HIP REPLACEMENT: ICD-10-CM

## 2024-09-26 DIAGNOSIS — E11.65 HYPERGLYCEMIA DUE TO DIABETES MELLITUS (HCC): ICD-10-CM

## 2024-09-26 DIAGNOSIS — R10.84 GENERALIZED ABDOMINAL PAIN: ICD-10-CM

## 2024-09-26 DIAGNOSIS — D64.9 ANEMIA, UNSPECIFIED TYPE: ICD-10-CM

## 2024-09-26 DIAGNOSIS — K75.4 AUTOIMMUNE HEPATITIS (HCC): ICD-10-CM

## 2024-09-26 DIAGNOSIS — K92.2 UPPER GI BLEED: Primary | ICD-10-CM

## 2024-09-26 LAB
ALBUMIN SERPL-MCNC: 3.5 G/DL (ref 3.4–5)
ALBUMIN/GLOB SERPL: 0.7 (ref 0.8–1.7)
ALP SERPL-CCNC: 218 U/L (ref 45–117)
ALT SERPL-CCNC: 221 U/L (ref 13–56)
AMMONIA PLAS-SCNC: 93 UMOL/L (ref 11–32)
ANION GAP SERPL CALC-SCNC: 12 MMOL/L (ref 3–18)
APPEARANCE UR: CLEAR
AST SERPL-CCNC: 166 U/L (ref 10–38)
BASOPHILS # BLD: 0 K/UL (ref 0–0.1)
BASOPHILS NFR BLD: 0 % (ref 0–2)
BILIRUB SERPL-MCNC: 2.5 MG/DL (ref 0.2–1)
BILIRUB UR QL: NEGATIVE
BUN SERPL-MCNC: 25 MG/DL (ref 7–18)
BUN/CREAT SERPL: 15 (ref 12–20)
CALCIUM SERPL-MCNC: 9.1 MG/DL (ref 8.5–10.1)
CHLORIDE SERPL-SCNC: 100 MMOL/L (ref 100–111)
CO2 SERPL-SCNC: 17 MMOL/L (ref 21–32)
COLOR UR: YELLOW
CREAT SERPL-MCNC: 1.68 MG/DL (ref 0.6–1.3)
DIFFERENTIAL METHOD BLD: ABNORMAL
EOSINOPHIL # BLD: 0 K/UL (ref 0–0.4)
EOSINOPHIL NFR BLD: 0 % (ref 0–5)
ERYTHROCYTE [DISTWIDTH] IN BLOOD BY AUTOMATED COUNT: 18.5 % (ref 11.6–14.5)
GLOBULIN SER CALC-MCNC: 5.2 G/DL (ref 2–4)
GLUCOSE BLD STRIP.AUTO-MCNC: 481 MG/DL (ref 70–110)
GLUCOSE BLD STRIP.AUTO-MCNC: 515 MG/DL (ref 70–110)
GLUCOSE SERPL-MCNC: 565 MG/DL (ref 74–99)
GLUCOSE UR STRIP.AUTO-MCNC: 250 MG/DL
HCT VFR BLD AUTO: 16.9 % (ref 35–45)
HEMOCCULT STL QL: POSITIVE
HGB BLD-MCNC: 5.6 G/DL (ref 12–16)
HGB UR QL STRIP: NEGATIVE
HISTORY CHECK: NORMAL
IMM GRANULOCYTES # BLD AUTO: 0.1 K/UL (ref 0–0.04)
IMM GRANULOCYTES NFR BLD AUTO: 1 % (ref 0–0.5)
INR PPP: 1.6 (ref 0.9–1.1)
KETONES UR QL STRIP.AUTO: NEGATIVE MG/DL
LEUKOCYTE ESTERASE UR QL STRIP.AUTO: NEGATIVE
LIPASE SERPL-CCNC: 20 U/L (ref 13–75)
LYMPHOCYTES # BLD: 1.3 K/UL (ref 0.9–3.6)
LYMPHOCYTES NFR BLD: 17 % (ref 21–52)
MAGNESIUM SERPL-MCNC: 2 MG/DL (ref 1.6–2.6)
MCH RBC QN AUTO: 26 PG (ref 24–34)
MCHC RBC AUTO-ENTMCNC: 33.1 G/DL (ref 31–37)
MCV RBC AUTO: 78.6 FL (ref 78–100)
MONOCYTES # BLD: 1.2 K/UL (ref 0.05–1.2)
MONOCYTES NFR BLD: 15 % (ref 3–10)
NEUTS SEG # BLD: 5.3 K/UL (ref 1.8–8)
NEUTS SEG NFR BLD: 67 % (ref 40–73)
NITRITE UR QL STRIP.AUTO: NEGATIVE
NRBC # BLD: 0 K/UL (ref 0–0.01)
NRBC BLD-RTO: 0 PER 100 WBC
NT PRO BNP: 70 PG/ML (ref 0–1800)
PH UR STRIP: 6 (ref 5–8)
PLATELET # BLD AUTO: 323 K/UL (ref 135–420)
PMV BLD AUTO: 10.2 FL (ref 9.2–11.8)
POTASSIUM SERPL-SCNC: 5.1 MMOL/L (ref 3.5–5.5)
PROT SERPL-MCNC: 8.7 G/DL (ref 6.4–8.2)
PROT UR STRIP-MCNC: NEGATIVE MG/DL
PROTHROMBIN TIME: 18.9 SEC (ref 11.9–14.9)
RBC # BLD AUTO: 2.15 M/UL (ref 4.2–5.3)
SODIUM SERPL-SCNC: 129 MMOL/L (ref 136–145)
SP GR UR REFRACTOMETRY: >1.03 (ref 1–1.03)
TROPONIN I SERPL HS-MCNC: 5 NG/L (ref 0–54)
UROBILINOGEN UR QL STRIP.AUTO: 1 EU/DL (ref 0.2–1)
WBC # BLD AUTO: 7.9 K/UL (ref 4.6–13.2)

## 2024-09-26 PROCEDURE — 1100000000 HC RM PRIVATE

## 2024-09-26 PROCEDURE — 6360000004 HC RX CONTRAST MEDICATION: Performed by: EMERGENCY MEDICINE

## 2024-09-26 PROCEDURE — 82140 ASSAY OF AMMONIA: CPT

## 2024-09-26 PROCEDURE — 80053 COMPREHEN METABOLIC PANEL: CPT

## 2024-09-26 PROCEDURE — 99285 EMERGENCY DEPT VISIT HI MDM: CPT

## 2024-09-26 PROCEDURE — 6360000002 HC RX W HCPCS: Performed by: EMERGENCY MEDICINE

## 2024-09-26 PROCEDURE — 71275 CT ANGIOGRAPHY CHEST: CPT

## 2024-09-26 PROCEDURE — 82272 OCCULT BLD FECES 1-3 TESTS: CPT

## 2024-09-26 PROCEDURE — P9016 RBC LEUKOCYTES REDUCED: HCPCS

## 2024-09-26 PROCEDURE — 96374 THER/PROPH/DIAG INJ IV PUSH: CPT

## 2024-09-26 PROCEDURE — 83690 ASSAY OF LIPASE: CPT

## 2024-09-26 PROCEDURE — 86850 RBC ANTIBODY SCREEN: CPT

## 2024-09-26 PROCEDURE — 81003 URINALYSIS AUTO W/O SCOPE: CPT

## 2024-09-26 PROCEDURE — 2580000003 HC RX 258: Performed by: EMERGENCY MEDICINE

## 2024-09-26 PROCEDURE — 85610 PROTHROMBIN TIME: CPT

## 2024-09-26 PROCEDURE — 30233N1 TRANSFUSION OF NONAUTOLOGOUS RED BLOOD CELLS INTO PERIPHERAL VEIN, PERCUTANEOUS APPROACH: ICD-10-PCS | Performed by: INTERNAL MEDICINE

## 2024-09-26 PROCEDURE — 86920 COMPATIBILITY TEST SPIN: CPT

## 2024-09-26 PROCEDURE — 86921 COMPATIBILITY TEST INCUBATE: CPT

## 2024-09-26 PROCEDURE — 82962 GLUCOSE BLOOD TEST: CPT

## 2024-09-26 PROCEDURE — 85025 COMPLETE CBC W/AUTO DIFF WBC: CPT

## 2024-09-26 PROCEDURE — 82803 BLOOD GASES ANY COMBINATION: CPT

## 2024-09-26 PROCEDURE — 96361 HYDRATE IV INFUSION ADD-ON: CPT

## 2024-09-26 PROCEDURE — 86900 BLOOD TYPING SEROLOGIC ABO: CPT

## 2024-09-26 PROCEDURE — 83735 ASSAY OF MAGNESIUM: CPT

## 2024-09-26 PROCEDURE — 87086 URINE CULTURE/COLONY COUNT: CPT

## 2024-09-26 PROCEDURE — 84484 ASSAY OF TROPONIN QUANT: CPT

## 2024-09-26 PROCEDURE — 83880 ASSAY OF NATRIURETIC PEPTIDE: CPT

## 2024-09-26 PROCEDURE — 86922 COMPATIBILITY TEST ANTIGLOB: CPT

## 2024-09-26 PROCEDURE — 93005 ELECTROCARDIOGRAM TRACING: CPT | Performed by: EMERGENCY MEDICINE

## 2024-09-26 PROCEDURE — 6370000000 HC RX 637 (ALT 250 FOR IP): Performed by: EMERGENCY MEDICINE

## 2024-09-26 PROCEDURE — 74174 CTA ABD&PLVS W/CONTRAST: CPT

## 2024-09-26 PROCEDURE — 86901 BLOOD TYPING SEROLOGIC RH(D): CPT

## 2024-09-26 RX ORDER — 0.9 % SODIUM CHLORIDE 0.9 %
1000 INTRAVENOUS SOLUTION INTRAVENOUS ONCE
Status: COMPLETED | OUTPATIENT
Start: 2024-09-26 | End: 2024-09-26

## 2024-09-26 RX ORDER — IOPAMIDOL 755 MG/ML
100 INJECTION, SOLUTION INTRAVASCULAR
Status: COMPLETED | OUTPATIENT
Start: 2024-09-26 | End: 2024-09-26

## 2024-09-26 RX ORDER — METOCLOPRAMIDE HYDROCHLORIDE 5 MG/ML
10 INJECTION INTRAMUSCULAR; INTRAVENOUS
Status: COMPLETED | OUTPATIENT
Start: 2024-09-26 | End: 2024-09-26

## 2024-09-26 RX ORDER — SODIUM CHLORIDE 9 MG/ML
INJECTION, SOLUTION INTRAVENOUS PRN
Status: DISCONTINUED | OUTPATIENT
Start: 2024-09-26 | End: 2024-09-27

## 2024-09-26 RX ADMIN — INSULIN HUMAN 10 UNITS: 100 INJECTION, SOLUTION PARENTERAL at 23:45

## 2024-09-26 RX ADMIN — METOCLOPRAMIDE 10 MG: 5 INJECTION, SOLUTION INTRAMUSCULAR; INTRAVENOUS at 19:00

## 2024-09-26 RX ADMIN — PANTOPRAZOLE SODIUM 40 MG: 40 INJECTION, POWDER, FOR SOLUTION INTRAVENOUS at 22:34

## 2024-09-26 RX ADMIN — SODIUM CHLORIDE 1000 ML: 9 INJECTION, SOLUTION INTRAVENOUS at 18:59

## 2024-09-26 RX ADMIN — IOPAMIDOL 100 ML: 755 INJECTION, SOLUTION INTRAVENOUS at 20:32

## 2024-09-26 RX ADMIN — SODIUM CHLORIDE 1000 ML: 900 INJECTION, SOLUTION INTRAVENOUS at 21:20

## 2024-09-26 ASSESSMENT — PAIN - FUNCTIONAL ASSESSMENT: PAIN_FUNCTIONAL_ASSESSMENT: 0-10

## 2024-09-26 ASSESSMENT — PAIN DESCRIPTION - ORIENTATION: ORIENTATION: RIGHT;LEFT

## 2024-09-26 ASSESSMENT — PAIN SCALES - GENERAL: PAINLEVEL_OUTOF10: 8

## 2024-09-26 ASSESSMENT — PAIN DESCRIPTION - LOCATION: LOCATION: ABDOMEN

## 2024-09-26 ASSESSMENT — PAIN DESCRIPTION - PAIN TYPE: TYPE: ACUTE PAIN

## 2024-09-26 ASSESSMENT — PAIN DESCRIPTION - DESCRIPTORS: DESCRIPTORS: ACHING

## 2024-09-26 NOTE — ED TRIAGE NOTES
Pt arrives alert and oriented c/o weakness and vomiting \"since I left here a week ago\" pt reports she should be getting infusions for her liver that she hasn't been able to get.

## 2024-09-26 NOTE — CONSENT
Informed Consent for Blood Component Transfusion Note    I have discussed with the patient the rationale for blood component transfusion; its benefits in treating or preventing fatigue, organ damage, or death; and its risk which includes mild transfusion reactions, rare risk of blood borne infection, or more serious but rare reactions. I have discussed the alternatives to transfusion, including the risk and consequences of not receiving transfusion. The patient had an opportunity to ask questions and had agreed to proceed with transfusion of blood components.    Electronically signed by TAYLOR Nieves on 9/26/24 at 7:21 PM EDT

## 2024-09-27 PROBLEM — E66.01 SEVERE OBESITY: Chronic | Status: ACTIVE | Noted: 2019-04-17

## 2024-09-27 PROBLEM — K74.60 CIRRHOSIS OF LIVER WITHOUT ASCITES (HCC): Chronic | Status: ACTIVE | Noted: 2024-06-25

## 2024-09-27 PROBLEM — Z79.60 LONG-TERM USE OF IMMUNOSUPPRESSANT MEDICATION: Chronic | Status: ACTIVE | Noted: 2024-09-10

## 2024-09-27 PROBLEM — Z79.4 INSULIN DEPENDENT TYPE 2 DIABETES MELLITUS (HCC): Chronic | Status: ACTIVE | Noted: 2022-03-16

## 2024-09-27 PROBLEM — K31.819 GAVE (GASTRIC ANTRAL VASCULAR ECTASIA): Chronic | Status: ACTIVE | Noted: 2019-06-10

## 2024-09-27 PROBLEM — E11.9 INSULIN DEPENDENT TYPE 2 DIABETES MELLITUS (HCC): Chronic | Status: ACTIVE | Noted: 2022-03-16

## 2024-09-27 PROBLEM — K83.01 PSC (PRIMARY SCLEROSING CHOLANGITIS): Chronic | Status: ACTIVE | Noted: 2021-03-29

## 2024-09-27 PROBLEM — Z95.828 S/P IVC FILTER: Chronic | Status: ACTIVE | Noted: 2024-09-10

## 2024-09-27 PROBLEM — K75.4 AUTOIMMUNE HEPATITIS (HCC): Chronic | Status: ACTIVE | Noted: 2019-04-17

## 2024-09-27 PROBLEM — I82.409 DVT (DEEP VENOUS THROMBOSIS) (HCC): Chronic | Status: ACTIVE | Noted: 2022-03-16

## 2024-09-27 LAB
ALBUMIN SERPL-MCNC: 3.7 G/DL (ref 3.4–5)
ALBUMIN/GLOB SERPL: 0.9 (ref 0.8–1.7)
ALP SERPL-CCNC: 141 U/L (ref 45–117)
ALT SERPL-CCNC: 163 U/L (ref 13–56)
AMMONIA PLAS-SCNC: 64 UMOL/L (ref 11–32)
ANION GAP SERPL CALC-SCNC: 9 MMOL/L (ref 3–18)
AST SERPL-CCNC: 121 U/L (ref 10–38)
BASE DEFICIT BLDV-SCNC: 7.6 MMOL/L
BILIRUB DIRECT SERPL-MCNC: 2.4 MG/DL (ref 0–0.2)
BILIRUB INDIRECT SERPL-MCNC: 2 MG/DL
BILIRUB SERPL-MCNC: 2.9 MG/DL (ref 0.2–1)
BILIRUB SERPL-MCNC: 4.4 MG/DL (ref 0.2–1)
BUN SERPL-MCNC: 19 MG/DL (ref 7–18)
BUN/CREAT SERPL: 18 (ref 12–20)
CALCIUM SERPL-MCNC: 8.4 MG/DL (ref 8.5–10.1)
CHLORIDE SERPL-SCNC: 111 MMOL/L (ref 100–111)
CO2 SERPL-SCNC: 17 MMOL/L (ref 21–32)
CREAT SERPL-MCNC: 1.06 MG/DL (ref 0.6–1.3)
ERYTHROCYTE [DISTWIDTH] IN BLOOD BY AUTOMATED COUNT: 18 % (ref 11.6–14.5)
EST. AVERAGE GLUCOSE BLD GHB EST-MCNC: 146 MG/DL
FERRITIN SERPL-MCNC: 16 NG/ML (ref 8–388)
GLOBULIN SER CALC-MCNC: 4 G/DL (ref 2–4)
GLUCOSE BLD STRIP.AUTO-MCNC: 217 MG/DL (ref 70–110)
GLUCOSE BLD STRIP.AUTO-MCNC: 222 MG/DL (ref 70–110)
GLUCOSE BLD STRIP.AUTO-MCNC: 240 MG/DL (ref 70–110)
GLUCOSE BLD STRIP.AUTO-MCNC: 242 MG/DL (ref 70–110)
GLUCOSE BLD STRIP.AUTO-MCNC: 361 MG/DL (ref 70–110)
GLUCOSE SERPL-MCNC: 217 MG/DL (ref 74–99)
HBA1C MFR BLD: 6.7 % (ref 4.2–5.6)
HCO3 BLDV-SCNC: 18 MMOL/L (ref 23–28)
HCT VFR BLD AUTO: 15.9 % (ref 35–45)
HCT VFR BLD AUTO: 20.5 % (ref 35–45)
HCT VFR BLD AUTO: 22 % (ref 35–45)
HGB BLD-MCNC: 5.2 G/DL (ref 12–16)
HGB BLD-MCNC: 7 G/DL (ref 12–16)
HGB BLD-MCNC: 7.6 G/DL (ref 12–16)
HISTORY CHECK: NORMAL
INR PPP: 1.7 (ref 0.9–1.1)
IRON SATN MFR SERPL: 15 % (ref 20–50)
IRON SERPL-MCNC: 30 UG/DL (ref 50–175)
LDH SERPL L TO P-CCNC: 163 U/L (ref 81–234)
MAGNESIUM SERPL-MCNC: 1.8 MG/DL (ref 1.6–2.6)
MCH RBC QN AUTO: 26 PG (ref 24–34)
MCHC RBC AUTO-ENTMCNC: 32.7 G/DL (ref 31–37)
MCV RBC AUTO: 79.5 FL (ref 78–100)
NRBC # BLD: 0 K/UL (ref 0–0.01)
NRBC BLD-RTO: 0 PER 100 WBC
PCO2 BLDV: 35.8 MMHG (ref 41–51)
PH BLDV: 7.31 (ref 7.32–7.42)
PLATELET # BLD AUTO: 191 K/UL (ref 135–420)
PMV BLD AUTO: 10 FL (ref 9.2–11.8)
PO2 BLDV: 49 MMHG (ref 25–40)
POTASSIUM SERPL-SCNC: 3.7 MMOL/L (ref 3.5–5.5)
PROT SERPL-MCNC: 7.7 G/DL (ref 6.4–8.2)
PROTHROMBIN TIME: 20 SEC (ref 11.9–14.9)
RBC # BLD AUTO: 2 M/UL (ref 4.2–5.3)
RETICS/RBC NFR AUTO: 4 % (ref 0.5–2.5)
SAO2 % BLDV: 81 % (ref 65–88)
SERVICE CMNT-IMP: ABNORMAL
SODIUM SERPL-SCNC: 137 MMOL/L (ref 136–145)
SPECIMEN TYPE: ABNORMAL
TIBC SERPL-MCNC: 206 UG/DL (ref 250–450)
WBC # BLD AUTO: 5 K/UL (ref 4.6–13.2)

## 2024-09-27 PROCEDURE — 83540 ASSAY OF IRON: CPT

## 2024-09-27 PROCEDURE — 99152 MOD SED SAME PHYS/QHP 5/>YRS: CPT | Performed by: INTERNAL MEDICINE

## 2024-09-27 PROCEDURE — 86922 COMPATIBILITY TEST ANTIGLOB: CPT

## 2024-09-27 PROCEDURE — 82607 VITAMIN B-12: CPT

## 2024-09-27 PROCEDURE — 82247 BILIRUBIN TOTAL: CPT

## 2024-09-27 PROCEDURE — 86921 COMPATIBILITY TEST INCUBATE: CPT

## 2024-09-27 PROCEDURE — 2000000000 HC ICU R&B

## 2024-09-27 PROCEDURE — 36415 COLL VENOUS BLD VENIPUNCTURE: CPT

## 2024-09-27 PROCEDURE — 0W3P8ZZ CONTROL BLEEDING IN GASTROINTESTINAL TRACT, VIA NATURAL OR ARTIFICIAL OPENING ENDOSCOPIC: ICD-10-PCS | Performed by: INTERNAL MEDICINE

## 2024-09-27 PROCEDURE — 6370000000 HC RX 637 (ALT 250 FOR IP): Performed by: INTERNAL MEDICINE

## 2024-09-27 PROCEDURE — 82962 GLUCOSE BLOOD TEST: CPT

## 2024-09-27 PROCEDURE — 82140 ASSAY OF AMMONIA: CPT

## 2024-09-27 PROCEDURE — 6360000002 HC RX W HCPCS: Performed by: INTERNAL MEDICINE

## 2024-09-27 PROCEDURE — 99153 MOD SED SAME PHYS/QHP EA: CPT | Performed by: INTERNAL MEDICINE

## 2024-09-27 PROCEDURE — 80053 COMPREHEN METABOLIC PANEL: CPT

## 2024-09-27 PROCEDURE — 2580000003 HC RX 258: Performed by: INTERNAL MEDICINE

## 2024-09-27 PROCEDURE — 94640 AIRWAY INHALATION TREATMENT: CPT

## 2024-09-27 PROCEDURE — 82248 BILIRUBIN DIRECT: CPT

## 2024-09-27 PROCEDURE — 3600007512: Performed by: INTERNAL MEDICINE

## 2024-09-27 PROCEDURE — 36430 TRANSFUSION BLD/BLD COMPNT: CPT

## 2024-09-27 PROCEDURE — 85027 COMPLETE CBC AUTOMATED: CPT

## 2024-09-27 PROCEDURE — 82728 ASSAY OF FERRITIN: CPT

## 2024-09-27 PROCEDURE — P9016 RBC LEUKOCYTES REDUCED: HCPCS

## 2024-09-27 PROCEDURE — 83550 IRON BINDING TEST: CPT

## 2024-09-27 PROCEDURE — 83036 HEMOGLOBIN GLYCOSYLATED A1C: CPT

## 2024-09-27 PROCEDURE — 2709999900 HC NON-CHARGEABLE SUPPLY: Performed by: INTERNAL MEDICINE

## 2024-09-27 PROCEDURE — 3600007502: Performed by: INTERNAL MEDICINE

## 2024-09-27 PROCEDURE — P9047 ALBUMIN (HUMAN), 25%, 50ML: HCPCS | Performed by: INTERNAL MEDICINE

## 2024-09-27 PROCEDURE — 85018 HEMOGLOBIN: CPT

## 2024-09-27 PROCEDURE — 83615 LACTATE (LD) (LDH) ENZYME: CPT

## 2024-09-27 PROCEDURE — 83735 ASSAY OF MAGNESIUM: CPT

## 2024-09-27 PROCEDURE — 85045 AUTOMATED RETICULOCYTE COUNT: CPT

## 2024-09-27 PROCEDURE — 2720000010 HC SURG SUPPLY STERILE: Performed by: INTERNAL MEDICINE

## 2024-09-27 PROCEDURE — 80197 ASSAY OF TACROLIMUS: CPT

## 2024-09-27 PROCEDURE — 80074 ACUTE HEPATITIS PANEL: CPT

## 2024-09-27 PROCEDURE — 85014 HEMATOCRIT: CPT

## 2024-09-27 PROCEDURE — 85610 PROTHROMBIN TIME: CPT

## 2024-09-27 RX ORDER — SODIUM CHLORIDE 9 MG/ML
INJECTION, SOLUTION INTRAVENOUS PRN
Status: DISCONTINUED | OUTPATIENT
Start: 2024-09-27 | End: 2024-10-01 | Stop reason: HOSPADM

## 2024-09-27 RX ORDER — MIDAZOLAM HYDROCHLORIDE 1 MG/ML
INJECTION INTRAMUSCULAR; INTRAVENOUS PRN
Status: DISCONTINUED | OUTPATIENT
Start: 2024-09-27 | End: 2024-09-27 | Stop reason: ALTCHOICE

## 2024-09-27 RX ORDER — ALBUMIN (HUMAN) 12.5 G/50ML
25 SOLUTION INTRAVENOUS EVERY 6 HOURS
Status: DISCONTINUED | OUTPATIENT
Start: 2024-09-27 | End: 2024-09-27

## 2024-09-27 RX ORDER — TACROLIMUS 1 MG/1
1 CAPSULE ORAL 2 TIMES DAILY
Status: DISCONTINUED | OUTPATIENT
Start: 2024-09-27 | End: 2024-10-01 | Stop reason: HOSPADM

## 2024-09-27 RX ORDER — BUDESONIDE 0.5 MG/2ML
0.5 INHALANT ORAL
Status: DISCONTINUED | OUTPATIENT
Start: 2024-09-27 | End: 2024-10-01 | Stop reason: HOSPADM

## 2024-09-27 RX ORDER — CIPROFLOXACIN 2 MG/ML
400 INJECTION, SOLUTION INTRAVENOUS EVERY 12 HOURS
Status: DISCONTINUED | OUTPATIENT
Start: 2024-09-27 | End: 2024-10-01 | Stop reason: HOSPADM

## 2024-09-27 RX ORDER — POLYETHYLENE GLYCOL 3350 17 G/17G
17 POWDER, FOR SOLUTION ORAL DAILY PRN
Status: DISCONTINUED | OUTPATIENT
Start: 2024-09-27 | End: 2024-09-30

## 2024-09-27 RX ORDER — SIMETHICONE 40MG/0.6ML
40 SUSPENSION, DROPS(FINAL DOSAGE FORM)(ML) ORAL EVERY 6 HOURS PRN
Status: DISCONTINUED | OUTPATIENT
Start: 2024-09-27 | End: 2024-10-01 | Stop reason: HOSPADM

## 2024-09-27 RX ORDER — ARFORMOTEROL TARTRATE 15 UG/2ML
15 SOLUTION RESPIRATORY (INHALATION)
Status: DISCONTINUED | OUTPATIENT
Start: 2024-09-27 | End: 2024-10-01 | Stop reason: HOSPADM

## 2024-09-27 RX ORDER — ALBUMIN (HUMAN) 12.5 G/50ML
12.5 SOLUTION INTRAVENOUS EVERY 8 HOURS
Status: DISCONTINUED | OUTPATIENT
Start: 2024-09-27 | End: 2024-09-28

## 2024-09-27 RX ORDER — INSULIN LISPRO 100 [IU]/ML
0-4 INJECTION, SOLUTION INTRAVENOUS; SUBCUTANEOUS NIGHTLY
Status: DISCONTINUED | OUTPATIENT
Start: 2024-09-27 | End: 2024-09-28 | Stop reason: SDUPTHER

## 2024-09-27 RX ORDER — FENTANYL CITRATE 50 UG/ML
100 INJECTION, SOLUTION INTRAMUSCULAR; INTRAVENOUS
Status: DISCONTINUED | OUTPATIENT
Start: 2024-09-27 | End: 2024-09-28

## 2024-09-27 RX ORDER — INSULIN GLARGINE 100 [IU]/ML
0.25 INJECTION, SOLUTION SUBCUTANEOUS NIGHTLY
Status: DISCONTINUED | OUTPATIENT
Start: 2024-09-27 | End: 2024-09-29

## 2024-09-27 RX ORDER — MIDAZOLAM HYDROCHLORIDE 2 MG/2ML
5 INJECTION, SOLUTION INTRAMUSCULAR; INTRAVENOUS
Status: DISCONTINUED | OUTPATIENT
Start: 2024-09-27 | End: 2024-09-28

## 2024-09-27 RX ORDER — MAGNESIUM SULFATE IN WATER 40 MG/ML
2000 INJECTION, SOLUTION INTRAVENOUS ONCE
Status: COMPLETED | OUTPATIENT
Start: 2024-09-27 | End: 2024-09-27

## 2024-09-27 RX ORDER — GLUCAGON 1 MG/ML
1 KIT INJECTION PRN
Status: DISCONTINUED | OUTPATIENT
Start: 2024-09-27 | End: 2024-10-01 | Stop reason: HOSPADM

## 2024-09-27 RX ORDER — SODIUM CHLORIDE 9 MG/ML
INJECTION, SOLUTION INTRAVENOUS CONTINUOUS
Status: DISCONTINUED | OUTPATIENT
Start: 2024-09-27 | End: 2024-09-28

## 2024-09-27 RX ORDER — SODIUM CHLORIDE 0.9 % (FLUSH) 0.9 %
5-40 SYRINGE (ML) INJECTION PRN
Status: DISCONTINUED | OUTPATIENT
Start: 2024-09-27 | End: 2024-10-01 | Stop reason: HOSPADM

## 2024-09-27 RX ORDER — LACTULOSE 10 G/15ML
20 SOLUTION ORAL 2 TIMES DAILY
Status: DISCONTINUED | OUTPATIENT
Start: 2024-09-27 | End: 2024-09-27

## 2024-09-27 RX ORDER — DEXTROSE MONOHYDRATE 100 MG/ML
INJECTION, SOLUTION INTRAVENOUS CONTINUOUS PRN
Status: DISCONTINUED | OUTPATIENT
Start: 2024-09-27 | End: 2024-10-01 | Stop reason: HOSPADM

## 2024-09-27 RX ORDER — LACTULOSE 10 G/15ML
20 SOLUTION ORAL 3 TIMES DAILY
Status: DISCONTINUED | OUTPATIENT
Start: 2024-09-27 | End: 2024-09-29

## 2024-09-27 RX ORDER — DIPHENHYDRAMINE HYDROCHLORIDE 50 MG/ML
25 INJECTION INTRAMUSCULAR; INTRAVENOUS EVERY 6 HOURS PRN
Status: DISCONTINUED | OUTPATIENT
Start: 2024-09-27 | End: 2024-09-27

## 2024-09-27 RX ORDER — ALBUTEROL SULFATE 0.83 MG/ML
2.5 SOLUTION RESPIRATORY (INHALATION) EVERY 4 HOURS PRN
Status: DISCONTINUED | OUTPATIENT
Start: 2024-09-27 | End: 2024-09-27

## 2024-09-27 RX ORDER — DIPHENHYDRAMINE HYDROCHLORIDE 50 MG/ML
INJECTION INTRAMUSCULAR; INTRAVENOUS
Status: DISCONTINUED
Start: 2024-09-27 | End: 2024-09-28

## 2024-09-27 RX ORDER — EPINEPHRINE IN SOD CHLOR,ISO 1 MG/10 ML
1 SYRINGE (ML) INTRAVENOUS ONCE
Status: DISCONTINUED | OUTPATIENT
Start: 2024-09-27 | End: 2024-09-29

## 2024-09-27 RX ORDER — NALOXONE HYDROCHLORIDE 0.4 MG/ML
0.4 INJECTION, SOLUTION INTRAMUSCULAR; INTRAVENOUS; SUBCUTANEOUS PRN
Status: DISCONTINUED | OUTPATIENT
Start: 2024-09-27 | End: 2024-10-01 | Stop reason: HOSPADM

## 2024-09-27 RX ORDER — IPRATROPIUM BROMIDE AND ALBUTEROL SULFATE 2.5; .5 MG/3ML; MG/3ML
1 SOLUTION RESPIRATORY (INHALATION) EVERY 4 HOURS PRN
Status: DISCONTINUED | OUTPATIENT
Start: 2024-09-27 | End: 2024-10-01 | Stop reason: HOSPADM

## 2024-09-27 RX ORDER — INSULIN LISPRO 100 [IU]/ML
0-8 INJECTION, SOLUTION INTRAVENOUS; SUBCUTANEOUS
Status: DISCONTINUED | OUTPATIENT
Start: 2024-09-27 | End: 2024-09-28

## 2024-09-27 RX ORDER — FENTANYL CITRATE 50 UG/ML
INJECTION, SOLUTION INTRAMUSCULAR; INTRAVENOUS PRN
Status: DISCONTINUED | OUTPATIENT
Start: 2024-09-27 | End: 2024-09-27 | Stop reason: ALTCHOICE

## 2024-09-27 RX ORDER — GLYCOPYRROLATE 0.2 MG/ML
0.1 INJECTION INTRAMUSCULAR; INTRAVENOUS ONCE
Status: DISCONTINUED | OUTPATIENT
Start: 2024-09-27 | End: 2024-09-29

## 2024-09-27 RX ORDER — FLUMAZENIL 0.1 MG/ML
0.2 INJECTION INTRAVENOUS ONCE
Status: DISCONTINUED | OUTPATIENT
Start: 2024-09-27 | End: 2024-09-29

## 2024-09-27 RX ORDER — ALBUTEROL SULFATE 90 UG/1
2 INHALANT RESPIRATORY (INHALATION) EVERY 4 HOURS PRN
Status: DISCONTINUED | OUTPATIENT
Start: 2024-09-27 | End: 2024-09-27 | Stop reason: ALTCHOICE

## 2024-09-27 RX ORDER — ONDANSETRON 2 MG/ML
4 INJECTION INTRAMUSCULAR; INTRAVENOUS EVERY 6 HOURS PRN
Status: DISCONTINUED | OUTPATIENT
Start: 2024-09-27 | End: 2024-10-01 | Stop reason: HOSPADM

## 2024-09-27 RX ORDER — SODIUM CHLORIDE 0.9 % (FLUSH) 0.9 %
5-40 SYRINGE (ML) INJECTION EVERY 12 HOURS SCHEDULED
Status: DISCONTINUED | OUTPATIENT
Start: 2024-09-27 | End: 2024-10-01 | Stop reason: HOSPADM

## 2024-09-27 RX ORDER — GABAPENTIN 300 MG/1
300 CAPSULE ORAL 3 TIMES DAILY
Status: DISCONTINUED | OUTPATIENT
Start: 2024-09-27 | End: 2024-10-01 | Stop reason: HOSPADM

## 2024-09-27 RX ORDER — ONDANSETRON 4 MG/1
4 TABLET, ORALLY DISINTEGRATING ORAL EVERY 8 HOURS PRN
Status: DISCONTINUED | OUTPATIENT
Start: 2024-09-27 | End: 2024-10-01 | Stop reason: HOSPADM

## 2024-09-27 RX ORDER — SODIUM CHLORIDE 9 MG/ML
INJECTION, SOLUTION INTRAVENOUS CONTINUOUS
Status: CANCELLED | OUTPATIENT
Start: 2024-09-27

## 2024-09-27 RX ORDER — IPRATROPIUM BROMIDE AND ALBUTEROL SULFATE 2.5; .5 MG/3ML; MG/3ML
1 SOLUTION RESPIRATORY (INHALATION)
Status: DISCONTINUED | OUTPATIENT
Start: 2024-09-27 | End: 2024-09-27

## 2024-09-27 RX ADMIN — IPRATROPIUM BROMIDE AND ALBUTEROL SULFATE 1 DOSE: .5; 3 SOLUTION RESPIRATORY (INHALATION) at 07:22

## 2024-09-27 RX ADMIN — LACTULOSE 20 G: 10 SOLUTION ORAL at 20:46

## 2024-09-27 RX ADMIN — ALBUMIN (HUMAN) 25 G: 0.25 INJECTION, SOLUTION INTRAVENOUS at 01:49

## 2024-09-27 RX ADMIN — SODIUM CHLORIDE, PRESERVATIVE FREE 10 ML: 5 INJECTION INTRAVENOUS at 20:46

## 2024-09-27 RX ADMIN — ARFORMOTEROL TARTRATE 15 MCG: 15 SOLUTION RESPIRATORY (INHALATION) at 21:00

## 2024-09-27 RX ADMIN — TACROLIMUS 1 MG: 1 CAPSULE ORAL at 08:31

## 2024-09-27 RX ADMIN — INSULIN LISPRO 8 UNITS: 100 INJECTION, SOLUTION INTRAVENOUS; SUBCUTANEOUS at 12:45

## 2024-09-27 RX ADMIN — BUDESONIDE 500 MCG: 0.5 INHALANT RESPIRATORY (INHALATION) at 07:22

## 2024-09-27 RX ADMIN — BUDESONIDE 500 MCG: 0.5 INHALANT RESPIRATORY (INHALATION) at 21:00

## 2024-09-27 RX ADMIN — ALBUMIN (HUMAN) 12.5 G: 0.25 INJECTION, SOLUTION INTRAVENOUS at 23:29

## 2024-09-27 RX ADMIN — LACTULOSE 20 G: 10 SOLUTION ORAL at 14:16

## 2024-09-27 RX ADMIN — TACROLIMUS 1 MG: 1 CAPSULE ORAL at 20:46

## 2024-09-27 RX ADMIN — LACTULOSE 20 G: 10 SOLUTION ORAL at 01:48

## 2024-09-27 RX ADMIN — INSULIN GLARGINE 15 UNITS: 100 INJECTION, SOLUTION SUBCUTANEOUS at 01:48

## 2024-09-27 RX ADMIN — CIPROFLOXACIN 400 MG: 2 INJECTION, SOLUTION INTRAVENOUS at 11:03

## 2024-09-27 RX ADMIN — GABAPENTIN 300 MG: 300 CAPSULE ORAL at 14:16

## 2024-09-27 RX ADMIN — BENZOCAINE: 200 SPRAY DENTAL; ORAL; PERIODONTAL at 23:30

## 2024-09-27 RX ADMIN — SODIUM CHLORIDE: 9 INJECTION, SOLUTION INTRAVENOUS at 16:07

## 2024-09-27 RX ADMIN — MAGNESIUM SULFATE HEPTAHYDRATE 2000 MG: 40 INJECTION, SOLUTION INTRAVENOUS at 11:11

## 2024-09-27 RX ADMIN — LACTULOSE 20 G: 10 SOLUTION ORAL at 08:31

## 2024-09-27 RX ADMIN — INSULIN LISPRO 2 UNITS: 100 INJECTION, SOLUTION INTRAVENOUS; SUBCUTANEOUS at 08:32

## 2024-09-27 RX ADMIN — INSULIN LISPRO 2 UNITS: 100 INJECTION, SOLUTION INTRAVENOUS; SUBCUTANEOUS at 18:00

## 2024-09-27 RX ADMIN — CIPROFLOXACIN 400 MG: 2 INJECTION, SOLUTION INTRAVENOUS at 20:55

## 2024-09-27 RX ADMIN — INSULIN GLARGINE 15 UNITS: 100 INJECTION, SOLUTION SUBCUTANEOUS at 20:46

## 2024-09-27 RX ADMIN — PHYTONADIONE 5 MG: 10 INJECTION, EMULSION INTRAMUSCULAR; INTRAVENOUS; SUBCUTANEOUS at 10:52

## 2024-09-27 RX ADMIN — SODIUM CHLORIDE, PRESERVATIVE FREE 10 ML: 5 INJECTION INTRAVENOUS at 10:53

## 2024-09-27 RX ADMIN — SODIUM CHLORIDE, PRESERVATIVE FREE 40 MG: 5 INJECTION INTRAVENOUS at 20:46

## 2024-09-27 RX ADMIN — IPRATROPIUM BROMIDE AND ALBUTEROL SULFATE 1 DOSE: .5; 3 SOLUTION RESPIRATORY (INHALATION) at 21:00

## 2024-09-27 RX ADMIN — TACROLIMUS 1 MG: 1 CAPSULE ORAL at 01:48

## 2024-09-27 RX ADMIN — GABAPENTIN 300 MG: 300 CAPSULE ORAL at 08:31

## 2024-09-27 RX ADMIN — SODIUM CHLORIDE: 9 INJECTION, SOLUTION INTRAVENOUS at 20:54

## 2024-09-27 RX ADMIN — ALBUMIN (HUMAN) 25 G: 0.25 INJECTION, SOLUTION INTRAVENOUS at 08:31

## 2024-09-27 RX ADMIN — GABAPENTIN 300 MG: 300 CAPSULE ORAL at 20:46

## 2024-09-27 RX ADMIN — SODIUM CHLORIDE, PRESERVATIVE FREE 40 MG: 5 INJECTION INTRAVENOUS at 10:53

## 2024-09-27 RX ADMIN — ARFORMOTEROL TARTRATE 15 MCG: 15 SOLUTION RESPIRATORY (INHALATION) at 07:22

## 2024-09-27 ASSESSMENT — PAIN - FUNCTIONAL ASSESSMENT
PAIN_FUNCTIONAL_ASSESSMENT: NONE - DENIES PAIN
PAIN_FUNCTIONAL_ASSESSMENT: 0-10
PAIN_FUNCTIONAL_ASSESSMENT: NONE - DENIES PAIN
PAIN_FUNCTIONAL_ASSESSMENT: 0-10
PAIN_FUNCTIONAL_ASSESSMENT: NONE - DENIES PAIN
PAIN_FUNCTIONAL_ASSESSMENT: 0-10

## 2024-09-27 ASSESSMENT — PAIN SCALES - GENERAL
PAINLEVEL_OUTOF10: 0
PAINLEVEL_OUTOF10: 3
PAINLEVEL_OUTOF10: 0
PAINLEVEL_OUTOF10: 0
PAINLEVEL_OUTOF10: 4
PAINLEVEL_OUTOF10: 0
PAINLEVEL_OUTOF10: 0

## 2024-09-27 ASSESSMENT — PAIN SCALES - WONG BAKER: WONGBAKER_NUMERICALRESPONSE: NO HURT

## 2024-09-27 NOTE — PERIOP NOTE
TRANSFER - OUT REPORT:    Verbal report given to NE RN on Sabi BRAVO Armenta  ICU BED 8.   Report consisted of patient's Situation, Background, Assessment and   Recommendations(SBAR).     Information from the following report(s) Surgery Report, Med Rec Status, Cardiac Rhythm SR,ST , and Neuro Assessment was reviewed with the receiving nurse.           Lines:   Peripheral IV 09/26/24 Right;Dorsal Hand (Active)   Site Assessment Clean, dry & intact 09/27/24 1200   Line Status Normal saline locked 09/27/24 1200   Line Care Connections checked and tightened 09/27/24 1200   Phlebitis Assessment No symptoms 09/27/24 1200   Infiltration Assessment 0 09/27/24 1200   Alcohol Cap Used Yes 09/27/24 1200   Dressing Status Clean, dry & intact 09/27/24 1200   Dressing Type Transparent 09/27/24 1200       Extended Dwell Peripherial IV 09/27/24 Right Brachial (Active)   Criteria for Appropriate Use Limited/no vessel suitable for conventional peripheral access 09/27/24 1255   Site Assessment Clean, dry & intact 09/27/24 1255   Phlebitis Assessment No symptoms 09/27/24 1255   Infiltration Assessment 0 09/27/24 1255   Line Status Flushed;Capped 09/27/24 1255   Line Care Cap changed;Connections checked and tightened 09/27/24 1255   Alcohol Cap Used Yes 09/27/24 1255   Date of Last Dressing Change 09/27/24 09/27/24 1255   Dressing Type Transparent 09/27/24 1255   Dressing Status Clean, dry & intact 09/27/24 1255        Opportunity for questions and clarification was provided.      Patient transported with:  Monitor  ICU PT BED 8 .     Benadryl 50 mg ivp wasted.

## 2024-09-27 NOTE — PROCEDURES
Diaphragmatic opening or notch is located at 42 cm.   STOMACH: No evidence of blood, fluid or solid food retention. There is mild diffuse portal hypertensive gastropathy. No gastric varices. There is moderate to severe GAVE, gastric antral vascular ectasia circumferential in the antrum. The proximal part on the lesser curvature side there is oozing of fresh blood. At the end of the procedure I decided to cauterize all the GAVE lesions using the APC. The fundus on antegrade and retroflex views is normal. The cardia, body, lesser curvature, greater curvature, and pylorus are normal.   DUODENUM: The bulb, second, third portions and major papilla are normal.  PROXIMAL JEJUNUM:  Not examined.  Therapies:  Nil    Specimen: none           Complications:   None    EBL:  Negligible.  IMPLANTS: * No implants in log *  IMPRESSION: Small to medium esophageal varices without any stigmata. The Z-Line is intact. Small 2 cm Hiatal hernia. Diaphragmatic opening or notch is located at 42 cm.  No evidence of blood, fluid or solid food retention. There is mild diffuse portal hypertensive gastropathy. No gastric varices. There is moderate to severe GAVE, gastric antral vascular ectasia circumferential in the antrum. The proximal part on the lesser curvature side there is oozing of fresh blood probably related to light contact or friability. At the end of the procedure I decided to cauterize all the GAVE lesions using the APC.           RECOMMENDATION:  May resume full liquid diet and increase very gradually over few days. Avoid NSAID's for life. Make a FU appointment with Dr Raymond. Start taking Omeprazole 20 mg or equivalent twice daily. Would need repeat EGD in 3 months. Regular monitoring of the Hgb, Iron profile every 2 months    Assistant: None    --Jonna Murphy MD on 9/27/2024 at 4:18 PM

## 2024-09-27 NOTE — H&P
History & Physical    Patient: Sabi Armenta MRN: [id number]  [facility]: [id number]    YOB: 1950  Age: 74 y.o.  Sex: female      DOA: 9/26/2024    Chief Complaint:   Chief Complaint   Patient presents with    Emesis    Fatigue     Pt arrives alert and oriented c/o weakness and vomiting \"since I left here a week ago\" pt reports she should be getting infusions for her liver that she hasn't been able to get.        Active Hospital Problems    Diagnosis Date Noted    UGI bleed [K92.2] 09/26/2024    Iron deficiency anemia, unspecified [D50.9] 09/13/2024    Long-term use of immunosuppressant medication [Z79.60] 09/10/2024    Cirrhosis of liver without ascites (HCC) [K74.60] 06/25/2024    PSC (primary sclerosing cholangitis) [K83.01] 03/29/2021    GAVE (gastric antral vascular ectasia) [K31.819] 06/10/2019    Autoimmune hepatitis (HCC) [K75.4] 04/17/2019    Thrombocytopenia (HCC) [D69.6] 04/17/2019          HPI:     Sabi Armenta is a 74 y.o.  female who has history of autoimmune cirrhosis, hep C now treated, GAVE, thrombocytopenia, DVT with filter medical marijuana use presents to the emergency room with abdominal pain nausea and several episodes 4-5 of vomiting.  She is unsure if there was blood in her vomit she has been feeling weak tired and excessively thirsty.  She denies any NSAID use or alcohol use she was recently hospitalized with nausea vomiting and abdominal pain that was thought to be mostly from Ozempic titration she had lost about 90 pounds in the last 11 months she has not been on Ozempic for a month now  In the emergency room she was found to have a hemoglobin of 5 a CTA of the abdomen did not show any acute bleeding she was heme positive.    Past Medical History:   Diagnosis Date    Autoimmune hepatitis (HCC)     DM (diabetes mellitus) (HCC)     GAVE (gastric antral vascular ectasia)        Past Surgical History:   Procedure Laterality Date    IR IVC FILTER PLACEMENT W IMAGING  3/17/2022

## 2024-09-27 NOTE — PERIOP NOTE
Pt is awake, alert and oriented. Able to sign her EGD consent. Provided her daughter 's number to be called after procedure.   Jewelry waiver signed and witnessed by Primary nurse, NE, RN. All removed jewelry  in denture cup container.

## 2024-09-27 NOTE — PERIOP NOTE
Notify daughter, BRENT, regarding EGD procedure. All questions were answered. Daughter Krystal  to call her back post EGD procedure. Pt provided cell number, [phone]

## 2024-09-27 NOTE — CONSULTS
[facility]  Pulmonary, Critical Care, and Sleep Medicine    Name: Sabi Armenta MRN: [id number]   : 1950 Hospital: [facility]    Date: 2024  Room: [address] [place] [address] Note                                              Consult requesting physician: Dr. Lagunas  Reason for Consult: GI bleeding    IMPRESSION:     UGI bleed K92.2  Anemia D64.9  Autoimmune hepatitis (HCC) K75.4  GAVE (gastric antral vascular ectasia) K31.819  PSC (primary sclerosing cholangitis) K83.01  Cirrhosis of liver without ascites (HCC) K74.60  COPD J44.9  3 mm RLL Pulmonary nodule R91.8  Elevated LFT  Elevated ammonia  Long-term use of immunosuppressant medication  Active Hospital Problems    Diagnosis Date Noted    UGI bleed [K92.2] 2024    Iron deficiency anemia, unspecified [D50.9] 2024    Long-term use of immunosuppressant medication [Z79.60] 09/10/2024    Cirrhosis of liver without ascites (HCC) [K74.60] 2024    PSC (primary sclerosing cholangitis) [K83.01] 2021    GAVE (gastric antral vascular ectasia) [K31.819] 06/10/2019    Autoimmune hepatitis (HCC) [K75.4] 2019    Thrombocytopenia (HCC) [D69.6] 2019     Code status: Full Code      RECOMMENDATIONS:     Respiratory: Compensated respiratory status. No acute issues. Protecting airway.   Reports stable COPD on Symbicort and as needed albuterol.  Former smoker, quit .  Prior history of pulmonary nodules per patient.  CT abdomen and pelvis 2024 showed 2 mm lateral RLL nodule  CT abdomen and pelvis 2024 showed 3 mm lateral RLL nodule  CT abdomen and pelvis 2022 [[place]], Care Everywhere: 6 mm subpleural nodular opacity within the lateral RLL stable and 2 mm subcortical nodule lateral left lower lobe stable compared to 2019 CT exam  Overall, nodule in the right lower lobe has improved in the size suggesting likely inflammatory and would not require follow-up  Keep SPO2 >=92%.

## 2024-09-27 NOTE — PERIOP NOTE
Doctor FEDERICO called and updated pt's daughter, Lauren.   All belongings accounted by Primary nurse.   Kalyn care done - turn and reposition after Doctor JESSIE Richards performed rectal exam- Positive for fecal soft form and dark color.

## 2024-09-27 NOTE — ED NOTES
Report given to oncoming John vargas RN on ICU. Patient information discussed and reviewed per facility protocol. Outstanding orders reviewed (if applicable). No applicable questions at this time.

## 2024-09-27 NOTE — ED NOTES
pH, venous performed; results not noted to have transferred to EMR. Results as follows:    pH  7.309 pH  Low  pCO2  35.8 mmHg  Low  pO2  49.0 mmHg  High  cHCO3- 18.0 mmol/L Low  BE(ecf) -8.3 mmol/L Low  cSO2  81.0 %  BE(b)  -7.6 mmol/L Low

## 2024-09-27 NOTE — PRE SEDATION
Sedation Pre-Procedure Note    Patient Name: Sabi Armenta   YOB: 1950  Room/Bed: [facility]  Medical Record Number: [id number]  Date: 9/27/2024   Time: 4:34 PM       Indication:  melena severe acute anemia    Consent: I have discussed with the patient and/or the patient representative the indication, alternatives, and the possible risks and/or complications of the planned procedure and the anesthesia methods. The patient and/or patient representative appear to understand and agree to proceed.    Vital Signs:   Vitals:    09/27/24 1625   BP:    Pulse: 94   Resp: 16   Temp:    SpO2: 99%       Past Medical History:   has a past medical history of Autoimmune hepatitis (HCC), DM (diabetes mellitus) (HCC), and GAVE (gastric antral vascular ectasia).    Past Surgical History:   has a past surgical history that includes IR GUIDED IVC FILTER PLACEMENT (3/17/2022).    Medications:   Scheduled Meds:    insulin glargine  0.25 Units/kg SubCUTAneous Nightly    insulin lispro  0-8 Units SubCUTAneous TID WC    insulin lispro  0-4 Units SubCUTAneous Nightly    phytonadione (ADULT) (VITAMIN K) 5 mg in sodium chloride 0.9 % 100 mL IVPB  5 mg IntraVENous Daily    pantoprazole (PROTONIX) 40 mg in sodium chloride (PF) 0.9 % 10 mL injection  40 mg IntraVENous Q12H    sodium chloride flush  5-40 mL IntraVENous 2 times per day    arformoterol tartrate  15 mcg Nebulization BID RT    budesonide  0.5 mg Nebulization BID RT    gabapentin  300 mg Oral TID    tacrolimus  1 mg Oral BID    [Held by provider] ferric gluconate  125 mg IntraVENous Daily    lactulose  20 g Oral TID    ciprofloxacin  400 mg IntraVENous Q12H    flumazenil  0.2 mg IntraVENous Once    benzocaine   Mouth/Throat 4x Daily    EPINEPHrine  1 mg IntraVENous Once    glycopyrrolate  0.1 mg IntraVENous Once    albumin human 25%  12.5 g IntraVENous Q8H    diphenhydrAMINE         Continuous Infusions:    dextrose      sodium chloride      sodium chloride      fentaNYL

## 2024-09-28 LAB
ALBUMIN SERPL-MCNC: 3.6 G/DL (ref 3.4–5)
ALBUMIN/GLOB SERPL: 0.8 (ref 0.8–1.7)
ALP SERPL-CCNC: 107 U/L (ref 45–117)
ALT SERPL-CCNC: 159 U/L (ref 13–56)
AMMONIA PLAS-SCNC: 34 UMOL/L (ref 11–32)
ANION GAP SERPL CALC-SCNC: 7 MMOL/L (ref 3–18)
AST SERPL-CCNC: 122 U/L (ref 10–38)
BACTERIA SPEC CULT: NORMAL
BILIRUB SERPL-MCNC: 4.7 MG/DL (ref 0.2–1)
BUN SERPL-MCNC: 10 MG/DL (ref 7–18)
BUN/CREAT SERPL: 10 (ref 12–20)
CALCIUM SERPL-MCNC: 8.9 MG/DL (ref 8.5–10.1)
CHLORIDE SERPL-SCNC: 108 MMOL/L (ref 100–111)
CO2 SERPL-SCNC: 20 MMOL/L (ref 21–32)
CREAT SERPL-MCNC: 0.98 MG/DL (ref 0.6–1.3)
ERYTHROCYTE [DISTWIDTH] IN BLOOD BY AUTOMATED COUNT: 16.9 % (ref 11.6–14.5)
GLOBULIN SER CALC-MCNC: 4.5 G/DL (ref 2–4)
GLUCOSE BLD STRIP.AUTO-MCNC: 196 MG/DL (ref 70–110)
GLUCOSE BLD STRIP.AUTO-MCNC: 324 MG/DL (ref 70–110)
GLUCOSE BLD STRIP.AUTO-MCNC: 338 MG/DL (ref 70–110)
GLUCOSE BLD STRIP.AUTO-MCNC: 436 MG/DL (ref 70–110)
GLUCOSE BLD STRIP.AUTO-MCNC: 458 MG/DL (ref 70–110)
GLUCOSE SERPL-MCNC: 242 MG/DL (ref 74–99)
GLUCOSE SERPL-MCNC: 427 MG/DL (ref 74–99)
HCT VFR BLD AUTO: 22.6 % (ref 35–45)
HCT VFR BLD AUTO: 22.8 % (ref 35–45)
HGB BLD-MCNC: 7.7 G/DL (ref 12–16)
HGB BLD-MCNC: 7.9 G/DL (ref 12–16)
INR PPP: 1.6 (ref 0.9–1.1)
MAGNESIUM SERPL-MCNC: 1.8 MG/DL (ref 1.6–2.6)
MCH RBC QN AUTO: 27.5 PG (ref 24–34)
MCHC RBC AUTO-ENTMCNC: 34.6 G/DL (ref 31–37)
MCV RBC AUTO: 79.4 FL (ref 78–100)
NRBC # BLD: 0 K/UL (ref 0–0.01)
NRBC BLD-RTO: 0 PER 100 WBC
PLATELET # BLD AUTO: 253 K/UL (ref 135–420)
PMV BLD AUTO: 9.3 FL (ref 9.2–11.8)
POTASSIUM SERPL-SCNC: 4.5 MMOL/L (ref 3.5–5.5)
PROT SERPL-MCNC: 8.1 G/DL (ref 6.4–8.2)
PROTHROMBIN TIME: 19.1 SEC (ref 11.9–14.9)
RBC # BLD AUTO: 2.87 M/UL (ref 4.2–5.3)
SERVICE CMNT-IMP: NORMAL
SODIUM SERPL-SCNC: 135 MMOL/L (ref 136–145)
WBC # BLD AUTO: 6.4 K/UL (ref 4.6–13.2)

## 2024-09-28 PROCEDURE — 2700000000 HC OXYGEN THERAPY PER DAY

## 2024-09-28 PROCEDURE — 85610 PROTHROMBIN TIME: CPT

## 2024-09-28 PROCEDURE — 85018 HEMOGLOBIN: CPT

## 2024-09-28 PROCEDURE — 6370000000 HC RX 637 (ALT 250 FOR IP): Performed by: INTERNAL MEDICINE

## 2024-09-28 PROCEDURE — 2580000003 HC RX 258: Performed by: INTERNAL MEDICINE

## 2024-09-28 PROCEDURE — 6360000002 HC RX W HCPCS: Performed by: INTERNAL MEDICINE

## 2024-09-28 PROCEDURE — 94640 AIRWAY INHALATION TREATMENT: CPT

## 2024-09-28 PROCEDURE — P9047 ALBUMIN (HUMAN), 25%, 50ML: HCPCS | Performed by: INTERNAL MEDICINE

## 2024-09-28 PROCEDURE — 82140 ASSAY OF AMMONIA: CPT

## 2024-09-28 PROCEDURE — 1100000000 HC RM PRIVATE

## 2024-09-28 PROCEDURE — 82947 ASSAY GLUCOSE BLOOD QUANT: CPT

## 2024-09-28 PROCEDURE — 85027 COMPLETE CBC AUTOMATED: CPT

## 2024-09-28 PROCEDURE — 83735 ASSAY OF MAGNESIUM: CPT

## 2024-09-28 PROCEDURE — 80053 COMPREHEN METABOLIC PANEL: CPT

## 2024-09-28 PROCEDURE — 82962 GLUCOSE BLOOD TEST: CPT

## 2024-09-28 PROCEDURE — 85014 HEMATOCRIT: CPT

## 2024-09-28 RX ORDER — INSULIN LISPRO 100 [IU]/ML
12 INJECTION, SOLUTION INTRAVENOUS; SUBCUTANEOUS ONCE
Status: COMPLETED | OUTPATIENT
Start: 2024-09-28 | End: 2024-09-28

## 2024-09-28 RX ORDER — BUTALBITAL, ACETAMINOPHEN AND CAFFEINE 50; 325; 40 MG/1; MG/1; MG/1
2 TABLET ORAL ONCE
Status: COMPLETED | OUTPATIENT
Start: 2024-09-28 | End: 2024-09-28

## 2024-09-28 RX ORDER — INSULIN LISPRO 100 [IU]/ML
0-4 INJECTION, SOLUTION INTRAVENOUS; SUBCUTANEOUS NIGHTLY
Status: DISCONTINUED | OUTPATIENT
Start: 2024-09-28 | End: 2024-10-01 | Stop reason: HOSPADM

## 2024-09-28 RX ORDER — INSULIN LISPRO 100 [IU]/ML
0-16 INJECTION, SOLUTION INTRAVENOUS; SUBCUTANEOUS
Status: DISCONTINUED | OUTPATIENT
Start: 2024-09-28 | End: 2024-10-01 | Stop reason: HOSPADM

## 2024-09-28 RX ADMIN — INSULIN LISPRO 4 UNITS: 100 INJECTION, SOLUTION INTRAVENOUS; SUBCUTANEOUS at 20:42

## 2024-09-28 RX ADMIN — INSULIN GLARGINE 15 UNITS: 100 INJECTION, SOLUTION SUBCUTANEOUS at 20:41

## 2024-09-28 RX ADMIN — INSULIN LISPRO 12 UNITS: 100 INJECTION, SOLUTION INTRAVENOUS; SUBCUTANEOUS at 13:31

## 2024-09-28 RX ADMIN — SODIUM CHLORIDE: 9 INJECTION, SOLUTION INTRAVENOUS at 08:01

## 2024-09-28 RX ADMIN — TACROLIMUS 1 MG: 1 CAPSULE ORAL at 20:44

## 2024-09-28 RX ADMIN — SODIUM CHLORIDE: 9 INJECTION, SOLUTION INTRAVENOUS at 20:58

## 2024-09-28 RX ADMIN — PHYTONADIONE 5 MG: 10 INJECTION, EMULSION INTRAMUSCULAR; INTRAVENOUS; SUBCUTANEOUS at 09:21

## 2024-09-28 RX ADMIN — BUDESONIDE 500 MCG: 0.5 INHALANT RESPIRATORY (INHALATION) at 08:15

## 2024-09-28 RX ADMIN — ARFORMOTEROL TARTRATE 15 MCG: 15 SOLUTION RESPIRATORY (INHALATION) at 21:05

## 2024-09-28 RX ADMIN — ARFORMOTEROL TARTRATE 15 MCG: 15 SOLUTION RESPIRATORY (INHALATION) at 08:15

## 2024-09-28 RX ADMIN — TACROLIMUS 1 MG: 1 CAPSULE ORAL at 08:38

## 2024-09-28 RX ADMIN — SODIUM CHLORIDE, PRESERVATIVE FREE 10 ML: 5 INJECTION INTRAVENOUS at 20:45

## 2024-09-28 RX ADMIN — INSULIN LISPRO 8 UNITS: 100 INJECTION, SOLUTION INTRAVENOUS; SUBCUTANEOUS at 11:53

## 2024-09-28 RX ADMIN — INSULIN LISPRO 2 UNITS: 100 INJECTION, SOLUTION INTRAVENOUS; SUBCUTANEOUS at 08:00

## 2024-09-28 RX ADMIN — GABAPENTIN 300 MG: 300 CAPSULE ORAL at 08:38

## 2024-09-28 RX ADMIN — ONDANSETRON 4 MG: 2 INJECTION INTRAMUSCULAR; INTRAVENOUS at 04:58

## 2024-09-28 RX ADMIN — SODIUM CHLORIDE, PRESERVATIVE FREE 10 ML: 5 INJECTION INTRAVENOUS at 08:01

## 2024-09-28 RX ADMIN — CIPROFLOXACIN 400 MG: 2 INJECTION, SOLUTION INTRAVENOUS at 10:25

## 2024-09-28 RX ADMIN — BUDESONIDE 500 MCG: 0.5 INHALANT RESPIRATORY (INHALATION) at 21:05

## 2024-09-28 RX ADMIN — GABAPENTIN 300 MG: 300 CAPSULE ORAL at 13:31

## 2024-09-28 RX ADMIN — GABAPENTIN 300 MG: 300 CAPSULE ORAL at 20:44

## 2024-09-28 RX ADMIN — SODIUM CHLORIDE, PRESERVATIVE FREE 40 MG: 5 INJECTION INTRAVENOUS at 20:44

## 2024-09-28 RX ADMIN — CIPROFLOXACIN 400 MG: 2 INJECTION, SOLUTION INTRAVENOUS at 21:00

## 2024-09-28 RX ADMIN — LACTULOSE 20 G: 10 SOLUTION ORAL at 08:01

## 2024-09-28 RX ADMIN — LACTULOSE 20 G: 10 SOLUTION ORAL at 20:44

## 2024-09-28 RX ADMIN — BUTALBITAL, ACETAMINOPHEN AND CAFFEINE 2 TABLET: 325; 50; 40 TABLET ORAL at 18:22

## 2024-09-28 RX ADMIN — SODIUM CHLORIDE, PRESERVATIVE FREE 40 MG: 5 INJECTION INTRAVENOUS at 10:22

## 2024-09-28 RX ADMIN — ALBUMIN (HUMAN) 12.5 G: 0.25 INJECTION, SOLUTION INTRAVENOUS at 08:04

## 2024-09-28 ASSESSMENT — PAIN DESCRIPTION - LOCATION: LOCATION: HEAD;LEG

## 2024-09-28 ASSESSMENT — PAIN SCALES - GENERAL
PAINLEVEL_OUTOF10: 0
PAINLEVEL_OUTOF10: 10
PAINLEVEL_OUTOF10: 0
PAINLEVEL_OUTOF10: 10

## 2024-09-28 ASSESSMENT — PAIN DESCRIPTION - ORIENTATION: ORIENTATION: LEFT

## 2024-09-28 ASSESSMENT — PAIN SCALES - WONG BAKER: WONGBAKER_NUMERICALRESPONSE: NO HURT

## 2024-09-28 ASSESSMENT — PAIN DESCRIPTION - DESCRIPTORS: DESCRIPTORS: POUNDING;STABBING

## 2024-09-28 NOTE — CONSULTS
[address]  [address]                              CONSULTATION      PATIENT NAME: ALISA BOSWELL              : 1950  MED REC NO: [id number]                       ROOM: 108  ACCOUNT NO: [id number]                       ADMIT DATE: 2024  PROVIDER: Jonna Richards MD    DATE OF SERVICE:  2024    ATTENDING PHYSICIAN:  iBll Rushing DO    HISTORY OF PRESENT ILLNESS:  This is a 74-year-old female, who was brought yesterday to the emergency room because of nausea and vomiting.  She was found to have a hemoglobin of 5.2.  She also was found to have heme-positive stools.  The patient is already known to have cirrhosis from hepatitis C that was treated in , but subsequently in , she was found to have autoimmune hepatitis and sclerosing cholangitis.  She has been on tacrolimus and followed regularly by Dr. Raymond.  Last visit was in fact in early this month when she was admitted on  because of right-side-up upper abdominal pain with investigation revealed that she has no explanation for that pain.  She was treated with antibiotics.  MRCP done on  revealed that there is a common bile duct slightly dilated to 8 mm in the super pancreatic area and 7 mm intrapancreatic.  That has not changed since .  The common bowel duct taper smoothly to normal caliber at the ampulla.  No pancreatitis or ampullary mass.  The main pancreatic drain via the Santorini duct suggestive of pancreas divisum.  Tiny pancreatic cystic foci consistent with sided-branch intraductal papillary mucinous neoplasm, distended gallbladder, but without any stones, cirrhotic liver with nodular surface, hypertrophy of the left lateral segment, portal hypertension, right portal vein to inferior vena cava shunt, and inferior mesenteric vein to inferior vena cava shunt, recanalization of the umbilical vein and

## 2024-09-29 LAB
ALBUMIN SERPL-MCNC: 3.5 G/DL (ref 3.4–5)
ALBUMIN/GLOB SERPL: 0.9 (ref 0.8–1.7)
ALP SERPL-CCNC: 96 U/L (ref 45–117)
ALT SERPL-CCNC: 141 U/L (ref 13–56)
AMMONIA PLAS-SCNC: 66 UMOL/L (ref 11–32)
ANION GAP SERPL CALC-SCNC: 8 MMOL/L (ref 3–18)
AST SERPL-CCNC: 118 U/L (ref 10–38)
BILIRUB SERPL-MCNC: 3.6 MG/DL (ref 0.2–1)
BUN SERPL-MCNC: 11 MG/DL (ref 7–18)
BUN/CREAT SERPL: 10 (ref 12–20)
CALCIUM SERPL-MCNC: 8.8 MG/DL (ref 8.5–10.1)
CHLORIDE SERPL-SCNC: 105 MMOL/L (ref 100–111)
CO2 SERPL-SCNC: 20 MMOL/L (ref 21–32)
CREAT SERPL-MCNC: 1.09 MG/DL (ref 0.6–1.3)
ERYTHROCYTE [DISTWIDTH] IN BLOOD BY AUTOMATED COUNT: 17.3 % (ref 11.6–14.5)
GLOBULIN SER CALC-MCNC: 4 G/DL (ref 2–4)
GLUCOSE BLD STRIP.AUTO-MCNC: 182 MG/DL (ref 70–110)
GLUCOSE BLD STRIP.AUTO-MCNC: 183 MG/DL (ref 70–110)
GLUCOSE BLD STRIP.AUTO-MCNC: 239 MG/DL (ref 70–110)
GLUCOSE BLD STRIP.AUTO-MCNC: 362 MG/DL (ref 70–110)
GLUCOSE BLD STRIP.AUTO-MCNC: 404 MG/DL (ref 70–110)
GLUCOSE BLD STRIP.AUTO-MCNC: 405 MG/DL (ref 70–110)
GLUCOSE BLD STRIP.AUTO-MCNC: 413 MG/DL (ref 70–110)
GLUCOSE BLD STRIP.AUTO-MCNC: 414 MG/DL (ref 70–110)
GLUCOSE SERPL-MCNC: 203 MG/DL (ref 74–99)
HCT VFR BLD AUTO: 22.2 % (ref 35–45)
HGB BLD-MCNC: 7.5 G/DL (ref 12–16)
INR PPP: 1.6 (ref 0.9–1.1)
MAGNESIUM SERPL-MCNC: 1.9 MG/DL (ref 1.6–2.6)
MCH RBC QN AUTO: 26.8 PG (ref 24–34)
MCHC RBC AUTO-ENTMCNC: 33.8 G/DL (ref 31–37)
MCV RBC AUTO: 79.3 FL (ref 78–100)
NRBC # BLD: 0.02 K/UL (ref 0–0.01)
NRBC BLD-RTO: 0.3 PER 100 WBC
PLATELET # BLD AUTO: 262 K/UL (ref 135–420)
PMV BLD AUTO: 10.2 FL (ref 9.2–11.8)
POTASSIUM SERPL-SCNC: 4.2 MMOL/L (ref 3.5–5.5)
PROT SERPL-MCNC: 7.5 G/DL (ref 6.4–8.2)
PROTHROMBIN TIME: 19.6 SEC (ref 11.9–14.9)
RBC # BLD AUTO: 2.8 M/UL (ref 4.2–5.3)
SODIUM SERPL-SCNC: 133 MMOL/L (ref 136–145)
WBC # BLD AUTO: 6.1 K/UL (ref 4.6–13.2)

## 2024-09-29 PROCEDURE — 2580000003 HC RX 258: Performed by: INTERNAL MEDICINE

## 2024-09-29 PROCEDURE — 6370000000 HC RX 637 (ALT 250 FOR IP): Performed by: INTERNAL MEDICINE

## 2024-09-29 PROCEDURE — 82140 ASSAY OF AMMONIA: CPT

## 2024-09-29 PROCEDURE — 80053 COMPREHEN METABOLIC PANEL: CPT

## 2024-09-29 PROCEDURE — 85027 COMPLETE CBC AUTOMATED: CPT

## 2024-09-29 PROCEDURE — 6360000002 HC RX W HCPCS: Performed by: INTERNAL MEDICINE

## 2024-09-29 PROCEDURE — 82962 GLUCOSE BLOOD TEST: CPT

## 2024-09-29 PROCEDURE — 36415 COLL VENOUS BLD VENIPUNCTURE: CPT

## 2024-09-29 PROCEDURE — 97116 GAIT TRAINING THERAPY: CPT

## 2024-09-29 PROCEDURE — 1100000000 HC RM PRIVATE

## 2024-09-29 PROCEDURE — 97161 PT EVAL LOW COMPLEX 20 MIN: CPT

## 2024-09-29 PROCEDURE — 83735 ASSAY OF MAGNESIUM: CPT

## 2024-09-29 PROCEDURE — 94640 AIRWAY INHALATION TREATMENT: CPT

## 2024-09-29 PROCEDURE — 85610 PROTHROMBIN TIME: CPT

## 2024-09-29 RX ORDER — SODIUM CHLORIDE 9 MG/ML
INJECTION, SOLUTION INTRAVENOUS PRN
Status: DISCONTINUED | OUTPATIENT
Start: 2024-09-29 | End: 2024-10-01 | Stop reason: HOSPADM

## 2024-09-29 RX ORDER — HYDROMORPHONE HYDROCHLORIDE 1 MG/ML
0.5 INJECTION, SOLUTION INTRAMUSCULAR; INTRAVENOUS; SUBCUTANEOUS ONCE
Status: COMPLETED | OUTPATIENT
Start: 2024-09-29 | End: 2024-09-30

## 2024-09-29 RX ORDER — SODIUM CHLORIDE 0.9 % (FLUSH) 0.9 %
5-40 SYRINGE (ML) INJECTION PRN
Status: DISCONTINUED | OUTPATIENT
Start: 2024-09-29 | End: 2024-09-30

## 2024-09-29 RX ORDER — SODIUM CHLORIDE 0.9 % (FLUSH) 0.9 %
5-40 SYRINGE (ML) INJECTION EVERY 12 HOURS SCHEDULED
Status: DISCONTINUED | OUTPATIENT
Start: 2024-09-29 | End: 2024-09-30

## 2024-09-29 RX ORDER — LACTULOSE 10 G/15ML
30 SOLUTION ORAL 3 TIMES DAILY
Status: DISCONTINUED | OUTPATIENT
Start: 2024-09-30 | End: 2024-09-30

## 2024-09-29 RX ORDER — SODIUM CHLORIDE 9 MG/ML
INJECTION, SOLUTION INTRAVENOUS CONTINUOUS
Status: DISCONTINUED | OUTPATIENT
Start: 2024-09-29 | End: 2024-09-29

## 2024-09-29 RX ORDER — INSULIN GLARGINE 100 [IU]/ML
25 INJECTION, SOLUTION SUBCUTANEOUS NIGHTLY
Status: DISCONTINUED | OUTPATIENT
Start: 2024-09-29 | End: 2024-10-01 | Stop reason: HOSPADM

## 2024-09-29 RX ORDER — LANSOPRAZOLE
20 KIT
Status: DISCONTINUED | OUTPATIENT
Start: 2024-09-29 | End: 2024-10-01 | Stop reason: HOSPADM

## 2024-09-29 RX ORDER — INSULIN LISPRO 100 [IU]/ML
4-6 INJECTION, SOLUTION INTRAVENOUS; SUBCUTANEOUS
Status: DISCONTINUED | OUTPATIENT
Start: 2024-09-29 | End: 2024-10-01 | Stop reason: HOSPADM

## 2024-09-29 RX ORDER — INSULIN GLARGINE 100 [IU]/ML
20 INJECTION, SOLUTION SUBCUTANEOUS NIGHTLY
Status: DISCONTINUED | OUTPATIENT
Start: 2024-09-29 | End: 2024-09-29

## 2024-09-29 RX ADMIN — LACTULOSE 20 G: 10 SOLUTION ORAL at 14:43

## 2024-09-29 RX ADMIN — INSULIN LISPRO 4 UNITS: 100 INJECTION, SOLUTION INTRAVENOUS; SUBCUTANEOUS at 08:15

## 2024-09-29 RX ADMIN — SODIUM CHLORIDE, PRESERVATIVE FREE 10 ML: 5 INJECTION INTRAVENOUS at 08:19

## 2024-09-29 RX ADMIN — CIPROFLOXACIN 400 MG: 2 INJECTION, SOLUTION INTRAVENOUS at 08:23

## 2024-09-29 RX ADMIN — ONDANSETRON 4 MG: 2 INJECTION INTRAMUSCULAR; INTRAVENOUS at 07:37

## 2024-09-29 RX ADMIN — SODIUM CHLORIDE, PRESERVATIVE FREE 10 ML: 5 INJECTION INTRAVENOUS at 21:38

## 2024-09-29 RX ADMIN — GABAPENTIN 300 MG: 300 CAPSULE ORAL at 08:15

## 2024-09-29 RX ADMIN — GABAPENTIN 300 MG: 300 CAPSULE ORAL at 21:38

## 2024-09-29 RX ADMIN — TACROLIMUS 1 MG: 1 CAPSULE ORAL at 21:37

## 2024-09-29 RX ADMIN — INSULIN LISPRO 16 UNITS: 100 INJECTION, SOLUTION INTRAVENOUS; SUBCUTANEOUS at 11:15

## 2024-09-29 RX ADMIN — INSULIN LISPRO 6 UNITS: 100 INJECTION, SOLUTION INTRAVENOUS; SUBCUTANEOUS at 17:43

## 2024-09-29 RX ADMIN — PHYTONADIONE 5 MG: 10 INJECTION, EMULSION INTRAMUSCULAR; INTRAVENOUS; SUBCUTANEOUS at 10:02

## 2024-09-29 RX ADMIN — SODIUM CHLORIDE: 9 INJECTION, SOLUTION INTRAVENOUS at 21:44

## 2024-09-29 RX ADMIN — CIPROFLOXACIN 400 MG: 2 INJECTION, SOLUTION INTRAVENOUS at 21:45

## 2024-09-29 RX ADMIN — INSULIN LISPRO 6 UNITS: 100 INJECTION, SOLUTION INTRAVENOUS; SUBCUTANEOUS at 12:36

## 2024-09-29 RX ADMIN — ARFORMOTEROL TARTRATE 15 MCG: 15 SOLUTION RESPIRATORY (INHALATION) at 21:00

## 2024-09-29 RX ADMIN — LACTULOSE 20 G: 10 SOLUTION ORAL at 08:15

## 2024-09-29 RX ADMIN — GABAPENTIN 300 MG: 300 CAPSULE ORAL at 14:43

## 2024-09-29 RX ADMIN — LACTULOSE 20 G: 10 SOLUTION ORAL at 21:37

## 2024-09-29 RX ADMIN — BUDESONIDE 500 MCG: 0.5 INHALANT RESPIRATORY (INHALATION) at 21:00

## 2024-09-29 RX ADMIN — ARFORMOTEROL TARTRATE 15 MCG: 15 SOLUTION RESPIRATORY (INHALATION) at 08:48

## 2024-09-29 RX ADMIN — INSULIN GLARGINE 25 UNITS: 100 INJECTION, SOLUTION SUBCUTANEOUS at 21:38

## 2024-09-29 RX ADMIN — LIDOCAINE HYDROCHLORIDE 40 ML: 20 SOLUTION ORAL at 21:37

## 2024-09-29 RX ADMIN — BUDESONIDE 500 MCG: 0.5 INHALANT RESPIRATORY (INHALATION) at 08:48

## 2024-09-29 RX ADMIN — TACROLIMUS 1 MG: 1 CAPSULE ORAL at 08:15

## 2024-09-29 RX ADMIN — LANSOPRAZOLE 20 MG: KIT at 17:43

## 2024-09-29 ASSESSMENT — PAIN DESCRIPTION - FREQUENCY: FREQUENCY: INTERMITTENT

## 2024-09-29 ASSESSMENT — PAIN - FUNCTIONAL ASSESSMENT: PAIN_FUNCTIONAL_ASSESSMENT: ACTIVITIES ARE NOT PREVENTED

## 2024-09-29 ASSESSMENT — PAIN SCALES - GENERAL
PAINLEVEL_OUTOF10: 0
PAINLEVEL_OUTOF10: 0
PAINLEVEL_OUTOF10: 10

## 2024-09-29 ASSESSMENT — PAIN DESCRIPTION - ONSET: ONSET: GRADUAL

## 2024-09-29 ASSESSMENT — PAIN DESCRIPTION - PAIN TYPE: TYPE: SURGICAL PAIN;ACUTE PAIN

## 2024-09-29 ASSESSMENT — PAIN DESCRIPTION - LOCATION: LOCATION: STERNUM

## 2024-09-29 ASSESSMENT — PAIN DESCRIPTION - DESCRIPTORS: DESCRIPTORS: SORE;SHOOTING;SHARP

## 2024-09-29 ASSESSMENT — PAIN DESCRIPTION - ORIENTATION: ORIENTATION: LOWER

## 2024-09-29 NOTE — CARE COORDINATION
09/29/24 1135   Service Assessment   Patient Orientation Alert and Oriented   Cognition Alert   History Provided By Patient   Primary Caregiver Self   Support Systems Spouse/Significant Other   PCP Verified by CM Yes   Last Visit to PCP Within last 3 months   Prior Functional Level Independent in ADLs/IADLs   Current Functional Level Assistance with the following:;Housework;Mobility   Can patient return to prior living arrangement Yes   Ability to make needs known: Good   Family able to assist with home care needs: Yes   Would you like for me to discuss the discharge plan with any other family members/significant others, and if so, who? Yes  (daughters Diandra and Danuta)   Social/Functional History   Lives With Significant other   Type of Home Apartment   Home Layout One level   Home Access Level entry   Bathroom Shower/Tub Tub/Shower unit   Bathroom Toilet Handicap height   Bathroom Equipment Grab bars in shower   Home Equipment Cane;Walker - Rolling   Receives Help From Family   ADL Assistance Independent   Homemaking Assistance Independent   Homemaking Responsibilities No   Ambulation Assistance Independent   Transfer Assistance Independent   Active  Yes   Mode of Transportation Car   Occupation Retired   Discharge Planning   Living Arrangements Spouse/Significant Other   Current Services Prior To Admission None   Potential Assistance Needed N/A   DME Ordered? No   Potential Assistance Purchasing Medications No   Patient expects to be discharged to: Apartment   One/Two Story Residence One story   History of falls? 0   Services At/After Discharge   Transition of Care Consult (CM Consult) Discharge Planning   Services At/After Discharge None   Mode of Transport at Discharge Self   Confirm Follow Up Transport Family   Condition of Participation: Discharge Planning   The Plan for Transition of Care is related to the following treatment goals: Home when medically stable     Care manager met with patient

## 2024-09-30 ENCOUNTER — HOSPITAL ENCOUNTER (OUTPATIENT)
Facility: HOSPITAL | Age: 74
Setting detail: INFUSION SERIES
End: 2024-09-30

## 2024-09-30 ENCOUNTER — HOSPITAL ENCOUNTER (OUTPATIENT)
Facility: HOSPITAL | Age: 74
Discharge: HOME | DRG: 377 | End: 2024-10-03
Payer: MEDICARE

## 2024-09-30 LAB
-: ABNORMAL
ABO + RH BLD: NORMAL
ALBUMIN SERPL-MCNC: 3.2 G/DL (ref 3.4–5)
ALBUMIN/GLOB SERPL: 0.8 (ref 0.8–1.7)
ALP SERPL-CCNC: 90 U/L (ref 45–117)
ALT SERPL-CCNC: 129 U/L (ref 13–56)
ANION GAP SERPL CALC-SCNC: 8 MMOL/L (ref 3–18)
AST SERPL-CCNC: 114 U/L (ref 10–38)
BILIRUB SERPL-MCNC: 2.7 MG/DL (ref 0.2–1)
BLD PROD TYP BPU: NORMAL
BLOOD BANK BLOOD PRODUCT EXPIRATION DATE: NORMAL
BLOOD BANK DISPENSE STATUS: NORMAL
BLOOD BANK ISBT PRODUCT BLOOD TYPE: 5100
BLOOD BANK PRODUCT CODE: NORMAL
BLOOD BANK UNIT TYPE AND RH: NORMAL
BLOOD GROUP ANTIBODIES SERPL: NORMAL
BPU ID: NORMAL
BUN SERPL-MCNC: 11 MG/DL (ref 7–18)
BUN/CREAT SERPL: 9 (ref 12–20)
CALCIUM SERPL-MCNC: 8.6 MG/DL (ref 8.5–10.1)
CALLED TO: NORMAL
CALLED TO:,BCALL3: NORMAL
CALLED TO:: NORMAL
CHLORIDE SERPL-SCNC: 106 MMOL/L (ref 100–111)
CO2 SERPL-SCNC: 20 MMOL/L (ref 21–32)
CREAT SERPL-MCNC: 1.16 MG/DL (ref 0.6–1.3)
CROSSMATCH RESULT: NORMAL
EKG ATRIAL RATE: 96 BPM
EKG DIAGNOSIS: NORMAL
EKG P AXIS: 87 DEGREES
EKG P-R INTERVAL: 144 MS
EKG Q-T INTERVAL: 372 MS
EKG QRS DURATION: 80 MS
EKG QTC CALCULATION (BAZETT): 469 MS
EKG R AXIS: 21 DEGREES
EKG T AXIS: 78 DEGREES
EKG VENTRICULAR RATE: 96 BPM
GLOBULIN SER CALC-MCNC: 4.2 G/DL (ref 2–4)
GLUCOSE BLD STRIP.AUTO-MCNC: 201 MG/DL (ref 70–110)
GLUCOSE BLD STRIP.AUTO-MCNC: 202 MG/DL (ref 70–110)
GLUCOSE BLD STRIP.AUTO-MCNC: 225 MG/DL (ref 70–110)
GLUCOSE BLD STRIP.AUTO-MCNC: 253 MG/DL (ref 70–110)
GLUCOSE SERPL-MCNC: 200 MG/DL (ref 74–99)
HAV IGM SER QL: NEGATIVE
HBV CORE IGM SER QL: NEGATIVE
HBV SURFACE AG SER QL: <0.1 INDEX
HBV SURFACE AG SER QL: NEGATIVE
HCV AB SER IA-ACNC: >11 INDEX
HCV AB SERPL QL IA: POSITIVE
HEPATITIS C COMMENT: ABNORMAL
INR PPP: 1.7 (ref 0.9–1.1)
INTERPRETATION: NORMAL
POTASSIUM SERPL-SCNC: 4 MMOL/L (ref 3.5–5.5)
PROT SERPL-MCNC: 7.4 G/DL (ref 6.4–8.2)
PROTHROMBIN TIME: 19.9 SEC (ref 11.9–14.9)
SODIUM SERPL-SCNC: 134 MMOL/L (ref 136–145)
SPECIMEN EXP DATE BLD: NORMAL
TACROLIMUS BLD-MCNC: 4.6 NG/ML (ref 2–20)
UNIT DIVISION: 0
UNIT ISSUE DATE/TIME: NORMAL
VIT B12 SERPL-MCNC: 1893 PG/ML (ref 232–1245)

## 2024-09-30 PROCEDURE — 2580000003 HC RX 258: Performed by: INTERNAL MEDICINE

## 2024-09-30 PROCEDURE — 1100000000 HC RM PRIVATE

## 2024-09-30 PROCEDURE — 76705 ECHO EXAM OF ABDOMEN: CPT

## 2024-09-30 PROCEDURE — 6360000002 HC RX W HCPCS: Performed by: INTERNAL MEDICINE

## 2024-09-30 PROCEDURE — 6370000000 HC RX 637 (ALT 250 FOR IP): Performed by: INTERNAL MEDICINE

## 2024-09-30 PROCEDURE — 80053 COMPREHEN METABOLIC PANEL: CPT

## 2024-09-30 PROCEDURE — 82962 GLUCOSE BLOOD TEST: CPT

## 2024-09-30 PROCEDURE — 97165 OT EVAL LOW COMPLEX 30 MIN: CPT

## 2024-09-30 PROCEDURE — 85610 PROTHROMBIN TIME: CPT

## 2024-09-30 PROCEDURE — 36415 COLL VENOUS BLD VENIPUNCTURE: CPT

## 2024-09-30 PROCEDURE — 97535 SELF CARE MNGMENT TRAINING: CPT

## 2024-09-30 PROCEDURE — 94640 AIRWAY INHALATION TREATMENT: CPT

## 2024-09-30 RX ORDER — LACTULOSE 10 G/15ML
30 SOLUTION ORAL EVERY 6 HOURS SCHEDULED
Status: DISCONTINUED | OUTPATIENT
Start: 2024-09-30 | End: 2024-10-01

## 2024-09-30 RX ORDER — OXYCODONE HYDROCHLORIDE 5 MG/1
10 TABLET ORAL EVERY 6 HOURS PRN
Status: DISCONTINUED | OUTPATIENT
Start: 2024-09-30 | End: 2024-10-01 | Stop reason: HOSPADM

## 2024-09-30 RX ORDER — OXYCODONE HYDROCHLORIDE 5 MG/1
5 TABLET ORAL EVERY 6 HOURS PRN
Status: DISCONTINUED | OUTPATIENT
Start: 2024-09-30 | End: 2024-10-01 | Stop reason: HOSPADM

## 2024-09-30 RX ADMIN — OXYCODONE 10 MG: 5 TABLET ORAL at 22:13

## 2024-09-30 RX ADMIN — INSULIN GLARGINE 25 UNITS: 100 INJECTION, SOLUTION SUBCUTANEOUS at 22:39

## 2024-09-30 RX ADMIN — INSULIN LISPRO 4 UNITS: 100 INJECTION, SOLUTION INTRAVENOUS; SUBCUTANEOUS at 09:15

## 2024-09-30 RX ADMIN — GABAPENTIN 300 MG: 300 CAPSULE ORAL at 22:14

## 2024-09-30 RX ADMIN — SODIUM CHLORIDE, PRESERVATIVE FREE 10 ML: 5 INJECTION INTRAVENOUS at 15:26

## 2024-09-30 RX ADMIN — LANSOPRAZOLE 20 MG: KIT at 17:51

## 2024-09-30 RX ADMIN — TACROLIMUS 1 MG: 1 CAPSULE ORAL at 09:14

## 2024-09-30 RX ADMIN — INSULIN LISPRO 6 UNITS: 100 INJECTION, SOLUTION INTRAVENOUS; SUBCUTANEOUS at 18:00

## 2024-09-30 RX ADMIN — CIPROFLOXACIN 400 MG: 2 INJECTION, SOLUTION INTRAVENOUS at 09:01

## 2024-09-30 RX ADMIN — GABAPENTIN 300 MG: 300 CAPSULE ORAL at 09:14

## 2024-09-30 RX ADMIN — ARFORMOTEROL TARTRATE 15 MCG: 15 SOLUTION RESPIRATORY (INHALATION) at 08:31

## 2024-09-30 RX ADMIN — SODIUM CHLORIDE 125 MG: 9 INJECTION, SOLUTION INTRAVENOUS at 10:13

## 2024-09-30 RX ADMIN — GABAPENTIN 300 MG: 300 CAPSULE ORAL at 15:25

## 2024-09-30 RX ADMIN — LACTULOSE 30 G: 10 SOLUTION ORAL at 09:14

## 2024-09-30 RX ADMIN — SODIUM CHLORIDE: 9 INJECTION, SOLUTION INTRAVENOUS at 09:01

## 2024-09-30 RX ADMIN — INSULIN LISPRO 4 UNITS: 100 INJECTION, SOLUTION INTRAVENOUS; SUBCUTANEOUS at 12:26

## 2024-09-30 RX ADMIN — BUDESONIDE 500 MCG: 0.5 INHALANT RESPIRATORY (INHALATION) at 08:31

## 2024-09-30 RX ADMIN — CIPROFLOXACIN 400 MG: 2 INJECTION, SOLUTION INTRAVENOUS at 22:27

## 2024-09-30 RX ADMIN — SODIUM CHLORIDE, PRESERVATIVE FREE 10 ML: 5 INJECTION INTRAVENOUS at 15:25

## 2024-09-30 RX ADMIN — OXYCODONE 5 MG: 5 TABLET ORAL at 18:04

## 2024-09-30 RX ADMIN — HYDROMORPHONE HYDROCHLORIDE 0.5 MG: 1 INJECTION, SOLUTION INTRAMUSCULAR; INTRAVENOUS; SUBCUTANEOUS at 05:49

## 2024-09-30 RX ADMIN — INSULIN LISPRO 4 UNITS: 100 INJECTION, SOLUTION INTRAVENOUS; SUBCUTANEOUS at 18:01

## 2024-09-30 RX ADMIN — LACTULOSE 30 G: 10 SOLUTION ORAL at 17:53

## 2024-09-30 RX ADMIN — BUDESONIDE 500 MCG: 0.5 INHALANT RESPIRATORY (INHALATION) at 21:38

## 2024-09-30 RX ADMIN — LANSOPRAZOLE 20 MG: KIT at 05:50

## 2024-09-30 RX ADMIN — ONDANSETRON 4 MG: 2 INJECTION INTRAMUSCULAR; INTRAVENOUS at 20:17

## 2024-09-30 RX ADMIN — WATER: 100 IRRIGANT IRRIGATION at 15:33

## 2024-09-30 RX ADMIN — ARFORMOTEROL TARTRATE 15 MCG: 15 SOLUTION RESPIRATORY (INHALATION) at 21:38

## 2024-09-30 RX ADMIN — TACROLIMUS 1 MG: 1 CAPSULE ORAL at 22:14

## 2024-09-30 RX ADMIN — LACTULOSE 30 G: 10 SOLUTION ORAL at 23:57

## 2024-09-30 RX ADMIN — SODIUM CHLORIDE, PRESERVATIVE FREE 10 ML: 5 INJECTION INTRAVENOUS at 15:23

## 2024-09-30 ASSESSMENT — PAIN DESCRIPTION - ONSET
ONSET: ON-GOING
ONSET: ON-GOING
ONSET: GRADUAL
ONSET: ON-GOING
ONSET: GRADUAL

## 2024-09-30 ASSESSMENT — PAIN SCALES - GENERAL
PAINLEVEL_OUTOF10: 7
PAINLEVEL_OUTOF10: 7
PAINLEVEL_OUTOF10: 9
PAINLEVEL_OUTOF10: 8
PAINLEVEL_OUTOF10: 0
PAINLEVEL_OUTOF10: 9
PAINLEVEL_OUTOF10: 8
PAINLEVEL_OUTOF10: 6

## 2024-09-30 ASSESSMENT — PAIN DESCRIPTION - PAIN TYPE
TYPE: ACUTE PAIN

## 2024-09-30 ASSESSMENT — PAIN DESCRIPTION - DESCRIPTORS
DESCRIPTORS: BURNING;SHOOTING
DESCRIPTORS: ACHING
DESCRIPTORS: BURNING;SHOOTING
DESCRIPTORS: ACHING
DESCRIPTORS: SHARP

## 2024-09-30 ASSESSMENT — PAIN DESCRIPTION - LOCATION
LOCATION: STERNUM;ABDOMEN
LOCATION: STERNUM
LOCATION: STERNUM
LOCATION: ABDOMEN
LOCATION: STERNUM
LOCATION: ABDOMEN

## 2024-09-30 ASSESSMENT — PAIN DESCRIPTION - ORIENTATION
ORIENTATION: UPPER
ORIENTATION: ANTERIOR
ORIENTATION: RIGHT
ORIENTATION: MID;LOWER

## 2024-09-30 ASSESSMENT — PAIN DESCRIPTION - FREQUENCY
FREQUENCY: INTERMITTENT

## 2024-09-30 ASSESSMENT — PAIN - FUNCTIONAL ASSESSMENT
PAIN_FUNCTIONAL_ASSESSMENT: ACTIVITIES ARE NOT PREVENTED

## 2024-10-01 VITALS
SYSTOLIC BLOOD PRESSURE: 114 MMHG | WEIGHT: 134.7 LBS | OXYGEN SATURATION: 100 % | DIASTOLIC BLOOD PRESSURE: 62 MMHG | RESPIRATION RATE: 17 BRPM | HEART RATE: 89 BPM | BODY MASS INDEX: 23 KG/M2 | TEMPERATURE: 98.6 F | HEIGHT: 64 IN

## 2024-10-01 PROBLEM — Z86.19 HEPATITIS C VIRUS INFECTION CURED AFTER ANTIVIRAL DRUG THERAPY: Chronic | Status: ACTIVE | Noted: 2024-09-10

## 2024-10-01 PROBLEM — D50.0 IRON DEFICIENCY ANEMIA DUE TO CHRONIC BLOOD LOSS: Chronic | Status: ACTIVE | Noted: 2024-09-10

## 2024-10-01 LAB
GLUCOSE BLD STRIP.AUTO-MCNC: 234 MG/DL (ref 70–110)
GLUCOSE BLD STRIP.AUTO-MCNC: 362 MG/DL (ref 70–110)
GLUCOSE BLD STRIP.AUTO-MCNC: 393 MG/DL (ref 70–110)

## 2024-10-01 PROCEDURE — 2580000003 HC RX 258: Performed by: INTERNAL MEDICINE

## 2024-10-01 PROCEDURE — 6360000002 HC RX W HCPCS: Performed by: INTERNAL MEDICINE

## 2024-10-01 PROCEDURE — 6370000000 HC RX 637 (ALT 250 FOR IP): Performed by: INTERNAL MEDICINE

## 2024-10-01 PROCEDURE — 94640 AIRWAY INHALATION TREATMENT: CPT

## 2024-10-01 PROCEDURE — 82962 GLUCOSE BLOOD TEST: CPT

## 2024-10-01 RX ORDER — SPIRONOLACTONE 25 MG/1
50 TABLET ORAL EVERY MORNING
Status: DISCONTINUED | OUTPATIENT
Start: 2024-10-02 | End: 2024-10-01 | Stop reason: HOSPADM

## 2024-10-01 RX ORDER — OXYCODONE HYDROCHLORIDE 5 MG/1
5 TABLET ORAL EVERY 6 HOURS PRN
Qty: 12 TABLET | Refills: 0 | Status: SHIPPED | OUTPATIENT
Start: 2024-10-01 | End: 2024-10-04

## 2024-10-01 RX ORDER — LANSOPRAZOLE 30 MG/1
30 CAPSULE, DELAYED RELEASE ORAL 2 TIMES DAILY
Qty: 90 CAPSULE | Refills: 1 | Status: SHIPPED | OUTPATIENT
Start: 2024-10-01

## 2024-10-01 RX ORDER — CIPROFLOXACIN 500 MG/1
500 TABLET, FILM COATED ORAL 2 TIMES DAILY
Qty: 10 TABLET | Refills: 0 | Status: SHIPPED | OUTPATIENT
Start: 2024-10-02 | End: 2024-10-06

## 2024-10-01 RX ORDER — FERROUS SULFATE 325(65) MG
325 TABLET ORAL
Qty: 30 TABLET | Refills: 0 | Status: SHIPPED | OUTPATIENT
Start: 2024-10-02

## 2024-10-01 RX ORDER — FUROSEMIDE 40 MG
40 TABLET ORAL EVERY MORNING
Status: DISCONTINUED | OUTPATIENT
Start: 2024-10-01 | End: 2024-10-01 | Stop reason: HOSPADM

## 2024-10-01 RX ORDER — FERROUS SULFATE 325(65) MG
325 TABLET ORAL
Status: DISCONTINUED | OUTPATIENT
Start: 2024-10-02 | End: 2024-10-01 | Stop reason: HOSPADM

## 2024-10-01 RX ORDER — CIPROFLOXACIN 2 MG/ML
400 INJECTION, SOLUTION INTRAVENOUS EVERY 12 HOURS
Qty: 1200 ML | Refills: 0 | Status: SHIPPED | OUTPATIENT
Start: 2024-10-02 | End: 2024-10-01 | Stop reason: HOSPADM

## 2024-10-01 RX ORDER — LACTULOSE 10 G/15ML
30 SOLUTION ORAL 3 TIMES DAILY
Status: DISCONTINUED | OUTPATIENT
Start: 2024-10-01 | End: 2024-10-01 | Stop reason: HOSPADM

## 2024-10-01 RX ORDER — BUTALBITAL, ACETAMINOPHEN AND CAFFEINE 50; 325; 40 MG/1; MG/1; MG/1
1 TABLET ORAL EVERY 6 HOURS PRN
Status: DISCONTINUED | OUTPATIENT
Start: 2024-10-01 | End: 2024-10-01 | Stop reason: HOSPADM

## 2024-10-01 RX ORDER — LACTULOSE 10 G/15ML
30 SOLUTION ORAL 3 TIMES DAILY
Qty: 946 ML | Refills: 1 | Status: SHIPPED | OUTPATIENT
Start: 2024-10-01

## 2024-10-01 RX ADMIN — INSULIN LISPRO 4 UNITS: 100 INJECTION, SOLUTION INTRAVENOUS; SUBCUTANEOUS at 17:59

## 2024-10-01 RX ADMIN — SODIUM CHLORIDE, PRESERVATIVE FREE 10 ML: 5 INJECTION INTRAVENOUS at 10:40

## 2024-10-01 RX ADMIN — GABAPENTIN 300 MG: 300 CAPSULE ORAL at 10:30

## 2024-10-01 RX ADMIN — GABAPENTIN 300 MG: 300 CAPSULE ORAL at 17:58

## 2024-10-01 RX ADMIN — TACROLIMUS 1 MG: 1 CAPSULE ORAL at 10:30

## 2024-10-01 RX ADMIN — LACTULOSE 30 G: 10 SOLUTION ORAL at 05:36

## 2024-10-01 RX ADMIN — INSULIN LISPRO 6 UNITS: 100 INJECTION, SOLUTION INTRAVENOUS; SUBCUTANEOUS at 11:54

## 2024-10-01 RX ADMIN — ARFORMOTEROL TARTRATE 15 MCG: 15 SOLUTION RESPIRATORY (INHALATION) at 09:17

## 2024-10-01 RX ADMIN — INSULIN LISPRO 16 UNITS: 100 INJECTION, SOLUTION INTRAVENOUS; SUBCUTANEOUS at 11:27

## 2024-10-01 RX ADMIN — INSULIN LISPRO 6 UNITS: 100 INJECTION, SOLUTION INTRAVENOUS; SUBCUTANEOUS at 17:59

## 2024-10-01 RX ADMIN — LANSOPRAZOLE 20 MG: KIT at 10:30

## 2024-10-01 RX ADMIN — FUROSEMIDE 40 MG: 40 TABLET ORAL at 10:47

## 2024-10-01 RX ADMIN — BUDESONIDE 500 MCG: 0.5 INHALANT RESPIRATORY (INHALATION) at 09:17

## 2024-10-01 ASSESSMENT — PAIN SCALES - GENERAL: PAINLEVEL_OUTOF10: 0

## 2024-10-01 ASSESSMENT — PAIN SCALES - WONG BAKER: WONGBAKER_NUMERICALRESPONSE: NO HURT

## 2024-10-01 NOTE — PLAN OF CARE
Problem: Discharge Planning  Goal: Discharge to home or other facility with appropriate resources  9/27/2024 2132 by Soledad Barney RN  Outcome: Progressing  Flowsheets (Taken 9/27/2024 2000)  Discharge to home or other facility with appropriate resources:   Identify barriers to discharge with patient and caregiver   Identify discharge learning needs (meds, wound care, etc)   Refer to discharge planning if patient needs post-hospital services based on physician order or complex needs related to functional status, cognitive ability or social support system  9/27/2024 1817 by Earl Gonsalves RN  Outcome: Progressing  Flowsheets (Taken 9/27/2024 0730)  Discharge to home or other facility with appropriate resources:   Identify barriers to discharge with patient and caregiver   Arrange for interpreters to assist at discharge as needed     Problem: Pain  Goal: Verbalizes/displays adequate comfort level or baseline comfort level  9/27/2024 2132 by Soledad Barney RN  Outcome: Progressing  Flowsheets (Taken 9/27/2024 2132)  Verbalizes/displays adequate comfort level or baseline comfort level:   Encourage patient to monitor pain and request assistance   Assess pain using appropriate pain scale   Implement non-pharmacological measures as appropriate and evaluate response   Consider cultural and social influences on pain and pain management  9/27/2024 1817 by Earl Gonsalves RN  Outcome: Progressing     Problem: Safety - Adult  Goal: Free from fall injury  9/27/2024 2132 by Soledad Barney RN  Outcome: Progressing  Flowsheets (Taken 9/27/2024 2132)  Free From Fall Injury: Instruct family/caregiver on patient safety  9/27/2024 1817 by Earl Gonsalves RN  Outcome: Progressing     Problem: ABCDS Injury Assessment  Goal: Absence of physical injury  9/27/2024 2132 by Soledad Barney RN  Outcome: Progressing  Flowsheets (Taken 9/27/2024 2132)  Absence of Physical Injury: Implement safety measures based on patient assessment  9/27/2024 1817 by Major 
  Problem: Discharge Planning  Goal: Discharge to home or other facility with appropriate resources  9/29/2024 2215 by Suellen Ayala RN  Outcome: Progressing  9/29/2024 1520 by Paty Gifford RN  Outcome: Progressing  Flowsheets (Taken 9/29/2024 0825)  Discharge to home or other facility with appropriate resources:   Identify barriers to discharge with patient and caregiver   Arrange for needed discharge resources and transportation as appropriate     Problem: Pain  Goal: Verbalizes/displays adequate comfort level or baseline comfort level  9/29/2024 2215 by Suellen Ayala RN  Outcome: Progressing  9/29/2024 1520 by Paty Gifford RN  Outcome: Progressing     Problem: Safety - Adult  Goal: Free from fall injury  9/29/2024 2215 by Suellen Ayala RN  Outcome: Progressing  9/29/2024 1520 by Paty Gifford RN  Outcome: Progressing     Problem: ABCDS Injury Assessment  Goal: Absence of physical injury  9/29/2024 2215 by Suellen Ayala RN  Outcome: Progressing  9/29/2024 1520 by Paty Gifford RN  Outcome: Progressing     Problem: Chronic Conditions and Co-morbidities  Goal: Patient's chronic conditions and co-morbidity symptoms are monitored and maintained or improved  9/29/2024 2215 by Suellen Ayala RN  Outcome: Progressing  9/29/2024 1520 by Paty Gifford RN  Outcome: Progressing  Flowsheets (Taken 9/29/2024 0825)  Care Plan - Patient's Chronic Conditions and Co-Morbidity Symptoms are Monitored and Maintained or Improved: Monitor and assess patient's chronic conditions and comorbid symptoms for stability, deterioration, or improvement     Problem: Skin/Tissue Integrity  Goal: Absence of new skin breakdown  Description: 1.  Monitor for areas of redness and/or skin breakdown  2.  Assess vascular access sites hourly  3.  Every 4-6 hours minimum:  Change oxygen saturation probe site  4.  Every 4-6 hours:  If on nasal continuous positive airway pressure, respiratory therapy assess nares and 
  Problem: Discharge Planning  Goal: Discharge to home or other facility with appropriate resources  Outcome: Progressing     Problem: Pain  Goal: Verbalizes/displays adequate comfort level or baseline comfort level  10/1/2024 1136 by Angela Warren RN  Outcome: Progressing  10/1/2024 0134 by Tayla Bowen RN  Outcome: Progressing     Problem: Safety - Adult  Goal: Free from fall injury  10/1/2024 1136 by Angela Warren RN  Outcome: Progressing  10/1/2024 0134 by Tayla Bowen RN  Outcome: Progressing  Flowsheets (Taken 9/30/2024 2357)  Free From Fall Injury: Instruct family/caregiver on patient safety     Problem: ABCDS Injury Assessment  Goal: Absence of physical injury  10/1/2024 0134 by Tayla Bowen RN  Outcome: Progressing  Flowsheets (Taken 9/30/2024 2357)  Absence of Physical Injury: Implement safety measures based on patient assessment     Problem: Chronic Conditions and Co-morbidities  Goal: Patient's chronic conditions and co-morbidity symptoms are monitored and maintained or improved  10/1/2024 0134 by Tayla Bowen RN  Outcome: Progressing  Flowsheets (Taken 9/30/2024 2335)  Care Plan - Patient's Chronic Conditions and Co-Morbidity Symptoms are Monitored and Maintained or Improved: Monitor and assess patient's chronic conditions and comorbid symptoms for stability, deterioration, or improvement     Problem: Skin/Tissue Integrity  Goal: Absence of new skin breakdown  Description: 1.  Monitor for areas of redness and/or skin breakdown  2.  Assess vascular access sites hourly  3.  Every 4-6 hours minimum:  Change oxygen saturation probe site  4.  Every 4-6 hours:  If on nasal continuous positive airway pressure, respiratory therapy assess nares and determine need for appliance change or resting period.  10/1/2024 0134 by Tayla Bowen, RN  Outcome: Progressing     
  Problem: Discharge Planning  Goal: Discharge to home or other facility with appropriate resources  Outcome: Progressing     Problem: Pain  Goal: Verbalizes/displays adequate comfort level or baseline comfort level  Outcome: Progressing     Problem: Safety - Adult  Goal: Free from fall injury  Outcome: Progressing     Problem: ABCDS Injury Assessment  Goal: Absence of physical injury  Outcome: Progressing     
  Problem: Discharge Planning  Goal: Discharge to home or other facility with appropriate resources  Outcome: Progressing     Problem: Pain  Goal: Verbalizes/displays adequate comfort level or baseline comfort level  Outcome: Progressing     Problem: Safety - Adult  Goal: Free from fall injury  Outcome: Progressing     Problem: ABCDS Injury Assessment  Goal: Absence of physical injury  Outcome: Progressing     Problem: Chronic Conditions and Co-morbidities  Goal: Patient's chronic conditions and co-morbidity symptoms are monitored and maintained or improved  Outcome: Progressing     
  Problem: Discharge Planning  Goal: Discharge to home or other facility with appropriate resources  Outcome: Progressing  Flowsheets (Taken 9/27/2024 0730)  Discharge to home or other facility with appropriate resources:   Identify barriers to discharge with patient and caregiver   Arrange for interpreters to assist at discharge as needed     Problem: Pain  Goal: Verbalizes/displays adequate comfort level or baseline comfort level  Outcome: Progressing     Problem: Safety - Adult  Goal: Free from fall injury  Outcome: Progressing     Problem: ABCDS Injury Assessment  Goal: Absence of physical injury  Outcome: Progressing     Problem: Chronic Conditions and Co-morbidities  Goal: Patient's chronic conditions and co-morbidity symptoms are monitored and maintained or improved  Outcome: Progressing  Flowsheets (Taken 9/27/2024 0730)  Care Plan - Patient's Chronic Conditions and Co-Morbidity Symptoms are Monitored and Maintained or Improved:   Monitor and assess patient's chronic conditions and comorbid symptoms for stability, deterioration, or improvement   Collaborate with multidisciplinary team to address chronic and comorbid conditions and prevent exacerbation or deterioration   Update acute care plan with appropriate goals if chronic or comorbid symptoms are exacerbated and prevent overall improvement and discharge     
  Problem: Discharge Planning  Goal: Discharge to home or other facility with appropriate resources  Outcome: Progressing  Flowsheets (Taken 9/29/2024 0825)  Discharge to home or other facility with appropriate resources:   Identify barriers to discharge with patient and caregiver   Arrange for needed discharge resources and transportation as appropriate     Problem: Pain  Goal: Verbalizes/displays adequate comfort level or baseline comfort level  Outcome: Progressing     Problem: Safety - Adult  Goal: Free from fall injury  Outcome: Progressing     Problem: ABCDS Injury Assessment  Goal: Absence of physical injury  Outcome: Progressing     Problem: Chronic Conditions and Co-morbidities  Goal: Patient's chronic conditions and co-morbidity symptoms are monitored and maintained or improved  Outcome: Progressing  Flowsheets (Taken 9/29/2024 0825)  Care Plan - Patient's Chronic Conditions and Co-Morbidity Symptoms are Monitored and Maintained or Improved: Monitor and assess patient's chronic conditions and comorbid symptoms for stability, deterioration, or improvement     Problem: Skin/Tissue Integrity  Goal: Absence of new skin breakdown  Description: 1.  Monitor for areas of redness and/or skin breakdown  2.  Assess vascular access sites hourly  3.  Every 4-6 hours minimum:  Change oxygen saturation probe site  4.  Every 4-6 hours:  If on nasal continuous positive airway pressure, respiratory therapy assess nares and determine need for appliance change or resting period.  Outcome: Progressing     
  Problem: Pain  Goal: Verbalizes/displays adequate comfort level or baseline comfort level  Outcome: Progressing     Problem: Safety - Adult  Goal: Free from fall injury  Outcome: Progressing  Flowsheets (Taken 9/30/2024 2357)  Free From Fall Injury: Instruct family/caregiver on patient safety     Problem: ABCDS Injury Assessment  Goal: Absence of physical injury  Outcome: Progressing  Flowsheets (Taken 9/30/2024 8603)  Absence of Physical Injury: Implement safety measures based on patient assessment     Problem: Chronic Conditions and Co-morbidities  Goal: Patient's chronic conditions and co-morbidity symptoms are monitored and maintained or improved  Outcome: Progressing  Flowsheets (Taken 9/30/2024 2644)  Care Plan - Patient's Chronic Conditions and Co-Morbidity Symptoms are Monitored and Maintained or Improved: Monitor and assess patient's chronic conditions and comorbid symptoms for stability, deterioration, or improvement     Problem: Skin/Tissue Integrity  Goal: Absence of new skin breakdown  Description: 1.  Monitor for areas of redness and/or skin breakdown  2.  Assess vascular access sites hourly  3.  Every 4-6 hours minimum:  Change oxygen saturation probe site  4.  Every 4-6 hours:  If on nasal continuous positive airway pressure, respiratory therapy assess nares and determine need for appliance change or resting period.  Outcome: Progressing     
Conditions and Co-morbidities  Goal: Patient's chronic conditions and co-morbidity symptoms are monitored and maintained or improved  9/28/2024 0753 by Sveta Santana, RN  Outcome: Progressing  9/27/2024 2132 by Soledad Barney, RN  Outcome: Progressing  Flowsheets (Taken 9/27/2024 2000)  Care Plan - Patient's Chronic Conditions and Co-Morbidity Symptoms are Monitored and Maintained or Improved:   Monitor and assess patient's chronic conditions and comorbid symptoms for stability, deterioration, or improvement   Collaborate with multidisciplinary team to address chronic and comorbid conditions and prevent exacerbation or deterioration   Update acute care plan with appropriate goals if chronic or comorbid symptoms are exacerbated and prevent overall improvement and discharge  9/27/2024 1817 by Earl Gonsalves, RN  Outcome: Progressing  Flowsheets (Taken 9/27/2024 0730)  Care Plan - Patient's Chronic Conditions and Co-Morbidity Symptoms are Monitored and Maintained or Improved:   Monitor and assess patient's chronic conditions and comorbid symptoms for stability, deterioration, or improvement   Collaborate with multidisciplinary team to address chronic and comorbid conditions and prevent exacerbation or deterioration   Update acute care plan with appropriate goals if chronic or comorbid symptoms are exacerbated and prevent overall improvement and discharge

## 2024-10-01 NOTE — DISCHARGE INSTRUCTIONS
Discharge Instructions to Patient    Please follow up with primary care, nephrology and gastroenterology within 2 weeks of discharge.  Please call early to schedule these within that period of time.    Please continue to have 3 Bms per day and avoid titrating your dosages except for lactulose.       It was a pleasure to take care of you in the hospital and we wish you the best.

## 2024-10-01 NOTE — DISCHARGE SUMMARY
Discharge Summary    Patient: Sabi Armenta MRN: [id number]  CSN: [id number]    YOB: 1950  Age: 74 y.o.  Sex: female    DOA: 9/26/2024 LOS:  LOS: 5 days   Discharge Date: 10/1/2024, 4:47 PM      Primary Care Provider:  Shanika Powell APRN - NP    Admission Diagnoses: Generalized abdominal pain [R10.84]  Autoimmune hepatitis (HCC) [K75.4]  UGI bleed [K92.2]  Upper GI bleed [K92.2]  Anemia, unspecified type [D64.9]  Hyperglycemia due to diabetes mellitus (HCC) [E11.65]  Nausea and vomiting, unspecified vomiting type [R11.2]    Discharge Diagnoses:    Active Hospital Problems    Diagnosis Date Noted    UGI bleed [K92.2] 09/26/2024     Priority: High    Iron deficiency anemia, unspecified [D50.9] 09/13/2024    Long-term use of immunosuppressant medication [Z79.60] 09/10/2024    S/P IVC filter [Z95.828] 09/10/2024    Iron deficiency anemia due to chronic blood loss [D50.0] 09/10/2024    Elevated liver enzymes [R74.8] 09/10/2024    Hepatitis C virus infection cured after antiviral drug therapy [Z86.19] 09/10/2024    Cirrhosis of liver without ascites (HCC) [K74.60] 06/25/2024    DVT (deep venous thrombosis) (HCC) [I82.409] 03/16/2022    Insulin dependent type 2 diabetes mellitus (HCC) [E11.9, Z79.4] 03/16/2022    PSC (primary sclerosing cholangitis) [K83.01] 03/29/2021    GAVE (gastric antral vascular ectasia) [K31.819] 06/10/2019    Autoimmune hepatitis (HCC) [K75.4] 04/17/2019    Thrombocytopenia (HCC) [D69.6] 04/17/2019    Cirrhosis (HCC) [K74.60] 04/17/2019     Discharge Condition: stable       Discharge Medications:        Medication List        START taking these medications      ciprofloxacin 500 MG tablet  Commonly known as: CIPRO  Take 1 tablet by mouth 2 times daily for 4 days  Start taking on: October 2, 2024     ferrous sulfate 325 (65 Fe) MG tablet  Commonly known as: IRON 325  Take 1 tablet by mouth daily (with breakfast)  Start taking on: October 2, 2024     lactulose 10 GM/15ML

## 2024-10-01 NOTE — PROGRESS NOTES
Physician Progress Note      PATIENT:               ALISA BOSWELL  [facility] #:                  [id number]  :                       1950  ADMIT DATE:       2024 6:07 PM  DISCH DATE:  RESPONDING  PROVIDER #:        Bill Rushing DO          QUERY TEXT:    Patient admitted with UGIB. Noted to have Severe Malnutrition in RD note. If   possible, please document in progress notes and discharge summary if you are   evaluating and /or treating any of the following:    The medical record reflects the following:  Risk Factors: UGIB, Anemia, GAVE, cirrhosis, immunosuppresive meds, N/V    Clinical Indicators:  RD Note -  Malnutrition Assessment:  Malnutrition Status:  Severe malnutrition (24 1406)  Context:  Chronic Illness (acute on chronic)  Findings of the 6 clinical characteristics of malnutrition:  Energy Intake:  75% or less estimated energy requirements for 1 month or   longer  Weight Loss:  10% over 6 months unintentional   10% wt loss x   6 mos  Body Fat Loss:   (moderate)  Muscle Mass Loss:   (moderate)    Nutrition Assessment:  73yo F in ICU with GI Bleed; h/o autoimmune cirrhosis, hep C now treated,   GAVE, thrombocytopenia, DVT w/filter medical marijuana use, presents to ER abd   pain, n/v,  several episodes 4-5 vomiting, weakness, excessive thirst; denies   NSAID or alcohol use; recent hospitalization w/n/v and abdominal pain though   r/t Ozempic titration; reported lost   90# in last 11 month; has not been on Ozempic x 1 month now per MD.  GI   consulted.  awake/alert on RA. NPO.    15# (10%) wt loss x   6 mos per recent wt hx   68kg (3/26/24) however distant wt loss =   88# wt loss since 101kg ().  wt cont to trend down since 64kg in .    Treatment:  Nutrition Recommendations -  Avoid NPO >5-7 days inadequate nutrition  Concern with sig wt loss and wt cont to trend down ? Etiology if not related   to medical status and cirrhosis  Monitor need to start nutrition support  At HIGH 
0730  Bedside and Verbal shift change report given to Odette Solano RN (oncoming nurse) by Suellen Ayala RN (offgoing nurse). Report included the following information Nurse Handoff Report, Adult Overview, Surgery Report, Intake/Output, MAR, and Recent Results.     
0730  Bedside and Verbal shift change report given to Paty Gifford RN (oncoming nurse) by Suellen Ayala RN (offgoing nurse). Report included the following information Nurse Handoff Report, Adult Overview, Surgery Report, Intake/Output, MAR, and Recent Results.     
1010 US send      
1051- Receiving nurse providing patient care, extension left will return call.  1136- Report called to Paty RN   [id number]- Before transfer to the floor patient blood sugar checked and in 400's, patient had 2 cranberry juices at bedside that had appeared to be finished. MD akers notfied and sliding scaled utilized and blood drawn and sent to the lab. Recheck at 1300.  1331- 12 units given, sliding scale adjusted to high dose, recheck BS at 1400.   1607- Patient blood sugar appropriate, MD ok to continue with patient transfer.   
1112 Pt fingerstick 414 on left hand, recheck 405 on right hand, Pt states she feels \"fine\" Administering 16 units per sliding scale will recheck in 1 hour, MD Rushing notified via [employer]. Encouraged patient to drink more water and held her tray until recheck.     1213 Fingerstick recheck 413 and 404, MD Rushing aware orders to follow.     1330 Verbal orders given from MD Rushing to discontinue Telemetry monitoring    1950 Bedside and Verbal shift change report given to Suellen WALKER (oncoming nurse) by Paty WALKER (offgoing nurse). Report included the following information Nurse Handoff Report, Adult Overview, Intake/Output, MAR, and Recent Results.                
1136 Pt chart accessed to assume care.    1947 Bedside and Verbal shift change report given to Suellen RN (oncoming nurse) by Paty RN (offgoing nurse). Report included the following information Nurse Handoff Report, Adult Overview, Intake/Output, MAR, and Recent Results.                
2000  Patient in bed awake, A/Ox4, speech clear, hearing intact, non-ambulatory due to generalized weakness (patient has gotten up with PT with walker), continent of urine and stool. New purewick in place. Last bowel movement was 9/28 in the ICU. On room air, lung sounds clear & diminished, respirations unlabored. IV dressings clean, dry, and intact. Radial and pedal pulses present bilaterally, capillary refill <3 seconds to fingers and toes. Abdomen soft, bowel sounds active. Moderate hand  noted to bilateral upper extremities. Side rails x3 up, bed locked and in lowest position, bed alarm on, call bell and bedside table within reach, needs attended, denies any pain, SOB, or concerns at present.     
2100  Patient in bed awake, A/Ox4, speech clear, hearing intact, non-ambulatory due to generalized weakness (patient has gotten up with PT with walker), continent of urine and stool. New purewick in place. Last bowel movement was 9/28 in the ICU. On room air, lung sounds clear & diminished, respirations unlabored. IV dressings clean, dry, and intact. Radial and pedal pulses present bilaterally, capillary refill <3 seconds to fingers and toes. Abdomen soft, bowel sounds active. Moderate hand  noted to bilateral upper extremities. Side rails x3 up, bed locked and in lowest position, bed alarm on, call bell and bedside table within reach, needs attended, denies SOB, or concerns at present. Patient rates sternal pain 10/10 with sharp, shooting, mid to lower sternal pain. Patient denies SOB, and radiating pain. Messaged MD in regards to different pain medication.    2110  MD placed order for oral GI lidocaine cocktail to assist with pain and dilaudid. Attempted to administer and IV in hand is infiltrated and causing patient pain. Extended dwell is unable to flush and CIPRO is currently paused. Attempted to flush and unable. Contacted medic to assist with placement of new IV.   
23:30 Shift assessment completed. See nsg flow sheet for details.    03:05 Reassessed with 0 changes noted. Resting quietly in bed between cares    07:25 Bedside and Verbal shift change report given to Lulu WALKER (oncoming nurse) by HARRY Bowen RN (offgoing nurse). Report included the following information Nurse Handoff Report.     
Bedside and Verbal shift change report given to AARON Bourne (oncoming nurse) by AARON Pino (offgoing nurse). Report included the following information Nurse Handoff Report, Adult Overview, Intake/Output, and MAR.     
Comprehensive High Risk Nutrition Assessment Initial    Type and Reason for Visit:  Initial    Nutrition Recommendations/Plan:   Avoid NPO >5-7 days inadequate nutrition  Concern with sig wt loss and wt cont to trend down ? Etiology if not related to medical status and cirrhosis  Monitor need to start nutrition support   At HIGH RISK FOR REFEEDING SYNDROME  STRONGLY REC CONT TO CHECK PHOS AND MG  Closely monitor K, Phos, Mg, and BG levels and replete as needed  Encourage marijuana use as may increase GI upset  When able consider adding MVI w/min daily and thiamine x 7-10 days  Consider checking MMA level  Monitor prograf level adjust as needed   Cont to monitor POC, wt trends, renal fx, lytes, UOP, bowel fx, skin integrity, and adjust recs as needed     Malnutrition Assessment:  Malnutrition Status:  Severe malnutrition (09/27/24 1406)    Context:  Chronic Illness (acute on chronic)     Findings of the 6 clinical characteristics of malnutrition:  Energy Intake:  75% or less estimated energy requirements for 1 month or longer  Weight Loss:  10% over 6 months unintentional ~10% wt loss x ~6 mos   Body Fat Loss:   (moderate)     Muscle Mass Loss:   (moderate)      Nutrition Assessment:    73yo F in ICU with GI Bleed; h/o autoimmune cirrhosis, hep C now treated, GAVE, thrombocytopenia, DVT w/filter medical marijuana use, presents to ER abd pain, n/v,  several episodes 4-5 vomiting, weakness, excessive thirst; denies NSAID or alcohol use; recent hospitalization w/n/v and abdominal pain though r/t Ozempic titration; reported lost ~90# in last 11 month; has not been on Ozempic x 1 month now per MD.  GI consulted.  awake/alert on RA. NPO.  ~15# (10%) wt loss x ~6 mos per recent wt hx ~68kg (3/26/24) however distant wt loss = ~88# wt loss since 101kg (2022).  wt cont to trend down since 64kg in June.    Nutrition Related Findings:    , , A1c 6.1, Ca 8.4, vit B12 1805, ammonia 64, AST//163; ferrlicit, Mg 
Physical Therapy      1358: Pt refusing PT at this time asking to rest, will follow up as schedule permits.    1501: Nursing students in the room emptying urine canister.  Pt is sound asleep, will follow up as schedule permits.    
and pelvis 9/6/2024 showed 3 mm lateral RLL nodule  CT abdomen and pelvis 8/11/2022 [[facility]], Care Everywhere: 6 mm subpleural nodular opacity within the lateral RLL stable and 2 mm subcortical nodule lateral left lower lobe stable compared to 2/23/2019 CT exam  Overall, nodule in the right lower lobe has improved in the size suggesting likely inflammatory and would not require follow-up  Keep SPO2 >=92%. HOB 30 degree elevation all the time. Aggressive pulmonary toileting. Aspiration precautions, pulmonary hygiene care.  Bronchodilators: Brovana, budesonide, as needed DuoNeb    CVS: BP stable-monitor and aim SBP > 100 mmHg  EKG 9/26/2024 reviewed, normal  Diuresis: Start as per hospitalist/GI for cirrhosis of liver    ID: On Prograf with immunocompromise status  No leukocytosis or fever.  No clinical evidence for sepsis  Monitor off of antibiotics    Hematology/Oncology: Monitor CBC  H&H every 8 hours stable  Received 3 units of PRBC  Normal platelet  INR mildly elevated likely related to cirrhosis of liver    Renal: Improved S. Cr suggesting role of dehydration from vomiting and poor p.o. intake  Monitor renal functions, lytes, UO  Replace lytes per protocol and recheck after replacement as needed    GI/: Presenting with GI bleed  Chronic nausea, vomiting, abdominal pain continued beyond stopping Ozempic-defer management to GI  No evidence for acute abdomen or obstruction  Diet clear liquid and advance further per GI  LFT chronically elevated and monitor serial per clinical course  Continue lactulose and monitor periodic ammonia level  GI followed  EGD 9/27/2024: Small to medium esophageal varices without any stigmata. The Z-Line is intact. Small 2 cm Hiatal hernia. Diaphragmatic opening or notch is located at 42 cm.  No evidence of blood, fluid or solid food retention. There is mild diffuse portal hypertensive gastropathy. No gastric varices. There is moderate to severe GAVE, gastric antral vascular ectasia 
signs taken during this treatment.    After treatment:   Safety Devices  Type of Devices: Left in chair, Call light within reach, Nurse notified   COMMUNICATION/EDUCATION:   Patient Education  Education Given To: Patient  Education Provided: Role of Therapy;Transfer Training;Plan of Care;ADL Adaptive Strategies;Fall Prevention Strategies;Equipment;Mobility Training  Education Method: Verbal  Barriers to Learning: None  Education Outcome: Verbalized understanding;Continued education needed    Thank you for this referral.  ALESSIO Russell, OTR/L  Minutes: 23    Eval Complexity: Decision Making: Low Complexity  
treatment.    After treatment:   [x]         Patient left in no apparent distress sitting up in chair  []         Patient left in no apparent distress in bed  [x]         Call bell left within reach  [x]         Nursing notified  []         Caregiver present  []         Bed alarm activated  []         SCDs applied    COMMUNICATION/EDUCATION:   Patient Education  Education Given To: Patient  Education Provided: Role of Therapy;Plan of Care;Transfer Training;Fall Prevention Strategies  Education Method: Demonstration;Verbal;Teach Back  Education Outcome: Verbalized understanding;Demonstrated understanding;Continued education needed    Thank you for this referral.  Rozina Angeles, PT  Minutes: 24      Eval Complexity: Decision Making: Low Complexity

## 2024-10-06 PROBLEM — E16.2 HYPOGLYCEMIA: Status: RESOLVED | Noted: 2020-12-10 | Resolved: 2024-10-06

## 2024-10-06 PROBLEM — K92.2 UGI BLEED: Status: RESOLVED | Noted: 2024-09-26 | Resolved: 2024-10-06

## 2024-10-06 PROBLEM — B34.9 VIRAL SYNDROME: Status: RESOLVED | Noted: 2024-09-10 | Resolved: 2024-10-06

## 2024-10-06 PROBLEM — R74.8 ELEVATED LIVER ENZYMES: Status: RESOLVED | Noted: 2024-09-10 | Resolved: 2024-10-06

## 2024-10-06 PROBLEM — K74.60 CIRRHOSIS OF LIVER WITHOUT ASCITES (HCC): Chronic | Status: RESOLVED | Noted: 2024-06-25 | Resolved: 2024-10-06

## 2024-10-06 PROBLEM — R11.2 NAUSEA AND VOMITING: Status: RESOLVED | Noted: 2024-09-10 | Resolved: 2024-10-06

## 2024-10-06 PROBLEM — E11.9 TYPE 2 DIABETES MELLITUS (HCC): Status: ACTIVE | Noted: 2022-03-16

## 2024-10-06 PROBLEM — D50.0 IRON DEFICIENCY ANEMIA DUE TO CHRONIC BLOOD LOSS: Chronic | Status: RESOLVED | Noted: 2024-09-10 | Resolved: 2024-10-06

## 2024-11-14 RX ORDER — RIZATRIPTAN BENZOATE 10 MG/1
10 TABLET, ORALLY DISINTEGRATING ORAL
COMMUNITY
Start: 2024-11-07 | End: 2025-11-07

## 2024-11-14 RX ORDER — FERROUS FUMARATE 324(106)MG
1 TABLET ORAL DAILY
COMMUNITY
Start: 2024-11-07

## 2024-11-14 RX ORDER — INSULIN LISPRO 100 [IU]/ML
6 INJECTION, SOLUTION INTRAVENOUS; SUBCUTANEOUS
COMMUNITY
Start: 2024-10-16 | End: 2025-10-16

## 2024-11-20 ENCOUNTER — OFFICE VISIT (OUTPATIENT)
Age: 74
End: 2024-11-20
Payer: MEDICARE

## 2024-11-20 VITALS
BODY MASS INDEX: 22.3 KG/M2 | HEART RATE: 75 BPM | DIASTOLIC BLOOD PRESSURE: 76 MMHG | WEIGHT: 130 LBS | TEMPERATURE: 97 F | OXYGEN SATURATION: 99 % | SYSTOLIC BLOOD PRESSURE: 126 MMHG

## 2024-11-20 DIAGNOSIS — K74.60 CIRRHOSIS OF LIVER WITHOUT ASCITES, UNSPECIFIED HEPATIC CIRRHOSIS TYPE (HCC): ICD-10-CM

## 2024-11-20 PROCEDURE — 99214 OFFICE O/P EST MOD 30 MIN: CPT | Performed by: INTERNAL MEDICINE

## 2024-11-20 PROCEDURE — 1123F ACP DISCUSS/DSCN MKR DOCD: CPT | Performed by: INTERNAL MEDICINE

## 2024-11-20 PROCEDURE — 1125F AMNT PAIN NOTED PAIN PRSNT: CPT | Performed by: INTERNAL MEDICINE

## 2024-11-20 PROCEDURE — 1159F MED LIST DOCD IN RCRD: CPT | Performed by: INTERNAL MEDICINE

## 2024-11-20 RX ORDER — TACROLIMUS 1 MG/1
1 CAPSULE ORAL 2 TIMES DAILY
Qty: 180 CAPSULE | Refills: 3 | Status: SHIPPED | OUTPATIENT
Start: 2024-11-20

## 2024-11-20 RX ORDER — FUROSEMIDE 40 MG/1
40 TABLET ORAL EVERY MORNING
Qty: 90 TABLET | Refills: 3 | Status: SHIPPED | OUTPATIENT
Start: 2024-11-20

## 2024-11-20 RX ORDER — SPIRONOLACTONE 100 MG/1
100 TABLET, FILM COATED ORAL EVERY MORNING
Qty: 90 TABLET | Refills: 3 | Status: SHIPPED | OUTPATIENT
Start: 2024-11-20

## 2024-11-20 NOTE — PROGRESS NOTES
dilated bile ducts.  No ascites.  6/2020. MRI/MRCP w/wo contrast.  Liver has a mildly nodular contour. No suspicious liver lesions.  Hepatic biliary ducts have a mildly beaded appearance, which is suggestive of mild primary sclerosing cholangitis. There is no focal area of intrahepatic biliary dilation to suggest a dominant stricture.   12/2020.  Ultrasound of the liver.  Lobular surface contour in course and periapical echotexture redemonstrated. No focal mass.   4/2021.  Abdominal ultrasound ([facility]).  Coarsened heterogeneous texture to the liver with nodular contour, which can be seen in setting of chronic liver disease/cirrhosis.   2/2022.  Ultrasound of the liver.  Increased heterogeneous hepatic echotexture in keeping with patient's history of hepatocellular disease.  No solid hepatic mass.     OTHER TESTING:  Not available or performed    FOLLOW-UP:  All of the issues listed above in the Assessment and Plan were discussed with the patient.  All questions were answered.  The patient expressed a clear understanding of the above.    Follow-up [facility] in 3 months.      JUAREZ Moffett  [facility]  [address], [address]   [place] [address] [phone]

## 2024-12-08 DIAGNOSIS — G62.9 NEUROPATHY: ICD-10-CM

## 2024-12-10 RX ORDER — GABAPENTIN 300 MG/1
CAPSULE ORAL
Qty: 270 CAPSULE | Refills: 0 | Status: SHIPPED | OUTPATIENT
Start: 2024-12-10 | End: 2026-03-02

## 2024-12-12 ENCOUNTER — TELEPHONE (OUTPATIENT)
Age: 74
End: 2024-12-12

## 2024-12-20 DIAGNOSIS — K74.60 CIRRHOSIS OF LIVER WITHOUT ASCITES, UNSPECIFIED HEPATIC CIRRHOSIS TYPE (HCC): ICD-10-CM

## 2024-12-20 RX ORDER — TACROLIMUS 1 MG/1
1 CAPSULE ORAL 2 TIMES DAILY
Qty: 180 CAPSULE | Refills: 3 | Status: SHIPPED | OUTPATIENT
Start: 2024-12-20

## 2024-12-20 RX ORDER — SPIRONOLACTONE 100 MG/1
100 TABLET, FILM COATED ORAL EVERY MORNING
Qty: 90 TABLET | Refills: 3 | Status: SHIPPED | OUTPATIENT
Start: 2024-12-20

## 2024-12-20 RX ORDER — FUROSEMIDE 40 MG/1
40 TABLET ORAL EVERY MORNING
Qty: 90 TABLET | Refills: 3 | Status: SHIPPED | OUTPATIENT
Start: 2024-12-20

## 2025-01-21 ENCOUNTER — HOSPITAL ENCOUNTER (EMERGENCY)
Facility: HOSPITAL | Age: 75
Discharge: HOME | End: 2025-01-21
Attending: EMERGENCY MEDICINE
Payer: MEDICARE

## 2025-01-21 VITALS
WEIGHT: 130 LBS | HEIGHT: 63 IN | HEART RATE: 72 BPM | SYSTOLIC BLOOD PRESSURE: 137 MMHG | RESPIRATION RATE: 17 BRPM | BODY MASS INDEX: 23.04 KG/M2 | TEMPERATURE: 98.2 F | DIASTOLIC BLOOD PRESSURE: 79 MMHG | OXYGEN SATURATION: 100 %

## 2025-01-21 DIAGNOSIS — Z96.642 HISTORY OF LEFT HIP REPLACEMENT: ICD-10-CM

## 2025-01-21 DIAGNOSIS — E08.65 DIABETES MELLITUS DUE TO UNDERLYING CONDITION WITH HYPERGLYCEMIA, WITH LONG-TERM CURRENT USE OF INSULIN (HCC): ICD-10-CM

## 2025-01-21 DIAGNOSIS — M79.604 RIGHT LEG PAIN: Primary | ICD-10-CM

## 2025-01-21 DIAGNOSIS — Z79.4 DIABETES MELLITUS DUE TO UNDERLYING CONDITION WITH HYPERGLYCEMIA, WITH LONG-TERM CURRENT USE OF INSULIN (HCC): ICD-10-CM

## 2025-01-21 LAB — GLUCOSE BLD STRIP.AUTO-MCNC: 253 MG/DL (ref 70–110)

## 2025-01-21 PROCEDURE — 82962 GLUCOSE BLOOD TEST: CPT

## 2025-01-21 PROCEDURE — 6370000000 HC RX 637 (ALT 250 FOR IP): Performed by: EMERGENCY MEDICINE

## 2025-01-21 PROCEDURE — 99284 EMERGENCY DEPT VISIT MOD MDM: CPT

## 2025-01-21 PROCEDURE — 6360000002 HC RX W HCPCS: Performed by: EMERGENCY MEDICINE

## 2025-01-21 PROCEDURE — 96372 THER/PROPH/DIAG INJ SC/IM: CPT

## 2025-01-21 RX ORDER — HYDROMORPHONE HYDROCHLORIDE 2 MG/1
2 TABLET ORAL EVERY 6 HOURS PRN
Qty: 20 TABLET | Refills: 0 | Status: SHIPPED | OUTPATIENT
Start: 2025-01-21 | End: 2025-01-28

## 2025-01-21 RX ORDER — HYDROMORPHONE HYDROCHLORIDE 1 MG/ML
1 INJECTION, SOLUTION INTRAMUSCULAR; INTRAVENOUS; SUBCUTANEOUS ONCE
Status: COMPLETED | OUTPATIENT
Start: 2025-01-21 | End: 2025-01-21

## 2025-01-21 RX ORDER — HYDROMORPHONE HYDROCHLORIDE 1 MG/ML
1 INJECTION, SOLUTION INTRAMUSCULAR; INTRAVENOUS; SUBCUTANEOUS ONCE
Status: DISCONTINUED | OUTPATIENT
Start: 2025-01-21 | End: 2025-01-21

## 2025-01-21 RX ORDER — METHOCARBAMOL 500 MG/1
1000 TABLET, FILM COATED ORAL
Status: COMPLETED | OUTPATIENT
Start: 2025-01-21 | End: 2025-01-21

## 2025-01-21 RX ORDER — METHOCARBAMOL 750 MG/1
750 TABLET, FILM COATED ORAL 3 TIMES DAILY
Qty: 30 TABLET | Refills: 0 | Status: SHIPPED | OUTPATIENT
Start: 2025-01-21 | End: 2025-01-31

## 2025-01-21 RX ORDER — ONDANSETRON 4 MG/1
4 TABLET, ORALLY DISINTEGRATING ORAL
Status: COMPLETED | OUTPATIENT
Start: 2025-01-21 | End: 2025-01-21

## 2025-01-21 RX ADMIN — METHOCARBAMOL 1000 MG: 500 TABLET ORAL at 22:02

## 2025-01-21 RX ADMIN — ONDANSETRON 4 MG: 4 TABLET, ORALLY DISINTEGRATING ORAL at 22:02

## 2025-01-21 RX ADMIN — HYDROMORPHONE HYDROCHLORIDE 1 MG: 1 INJECTION, SOLUTION INTRAMUSCULAR; INTRAVENOUS; SUBCUTANEOUS at 22:05

## 2025-01-21 ASSESSMENT — PAIN DESCRIPTION - ORIENTATION: ORIENTATION: LEFT

## 2025-01-21 ASSESSMENT — PAIN DESCRIPTION - LOCATION: LOCATION: HIP;LEG;BACK

## 2025-01-21 ASSESSMENT — PAIN SCALES - GENERAL: PAINLEVEL_OUTOF10: 10

## 2025-01-21 ASSESSMENT — PAIN - FUNCTIONAL ASSESSMENT: PAIN_FUNCTIONAL_ASSESSMENT: 0-10

## 2025-01-21 ASSESSMENT — PAIN DESCRIPTION - DESCRIPTORS: DESCRIPTORS: DISCOMFORT

## 2025-01-22 NOTE — ED TRIAGE NOTES
Pt reports having high blood sugars, N/D and severe pain from the left side of her tailbone down her leg. Hx of left sided hip replacement 2 yrs ago.

## 2025-01-25 NOTE — ED PROVIDER NOTES
EMERGENCY DEPARTMENT HISTORY AND PHYSICAL EXAM    Date: 1/21/2025  Patient Name: Sabi Armetna    History of Presenting Illness     Chief Complaint   Patient presents with    Hyperglycemia    Leg Pain         History Provided By: Patient    Additional History (Context):   7:44 PM LESLY Armenta is a 74 y.o. female with PMHX of \"autoimmune hepatitis \", GAVE gastric antral vascular ectasia, primary sclerosing cholangitis,, cirrhosis, type 2 insulin-dependent diabetes, CKD 3, presence of IVC filter who presents to the emergency department C/O back pain elevated blood sugar.  Ultimately her pain is in her lower back and her hip, often she has complaints of long-term chronic neck pain but this is often controlled using gabapentin.  She denies any new falls traumas or injuries.  She has no saddle anesthesia or incontinence of bladder or bowel no weight loss night sweats or nighttime fevers.  There was some concerns her sugars may be elevated however today with they are in reasonably good limits.  She has been compliant with her medication.      Social History  She has a medical marijuana card that she uses for pain.  Quit smoking back in the 70s.  Does not drink alcohol.    Family History    Diabetes Mother   Hypertension Mother   Hypertension Father   Drug abuse Brother   Early death Brother   Alcohol abuse Mother's Sister   Cancer Mother's Sister   Arthritis Daughter   Asthma Daughter   COPD Daughter   Depression Daughter   Hypertension Daughter     PCP: Shanika Powell APRN - NP    No current facility-administered medications for this encounter.     Current Outpatient Medications   Medication Sig Dispense Refill    HYDROmorphone (DILAUDID) 2 MG tablet Take 1 tablet by mouth every 6 hours as needed for Pain for up to 7 days. Max Daily Amount: 8 mg 20 tablet 0    methocarbamol (ROBAXIN-750) 750 MG tablet Take 1 tablet by mouth 3 times daily for 10 days 30 tablet 0    furosemide (LASIX) 40 MG tablet Take 1  insufficiency/\"dehydration\"   Kidney issues    Hydrocodone-Acetaminophen Hives and Rash    Ibuprofen Hives, Nausea And Vomiting, Other (See Comments) and Rash     Nausea    Morphine Other (See Comments)     Reports \"I was out of my head\"    Naproxen Nausea Only    Penicillins Hives, Itching and Rash     Other reaction(s): Hives/Skin Rash, rash/itching    Pregabalin Other (See Comments)     Kidneys began to shut down, renal insufficiency    Other reaction(s): renal insufficiency   Kidneys began to shut down     Kidneys began to shut down    Tizanidine Other (See Comments) and Palpitations     Cardiac dysrhythmia    Other reaction(s): cardiac dysrhythmia    Tramadol Nausea Only, Nausea And Vomiting, Hives, Itching and Rash     Other reaction(s): gi distress, Hives/Skin Rash    Tuberculin Ppd Hives    Wound Dressing Adhesive Hives, Itching, Other (See Comments) and Rash     Blistering of skin with bandaids and tape.    Amitriptyline Hcl Hives    Tuberculin Ppd Hives         Physical Exam     Vitals:    01/21/25 1935   BP: 137/79   Pulse: 72   Resp: 17   Temp: 98.2 °F (36.8 °C)   TempSrc: Oral   SpO2: 100%   Weight: 59 kg (130 lb)   Height: 1.6 m (5' 3\")     Physical Exam  Vitals and nursing note reviewed.   Constitutional:       General: She is awake.      Appearance: Normal appearance. She is not ill-appearing or toxic-appearing.   HENT:      Head: Normocephalic and atraumatic.      Ears:      Comments: No blood or fluid from ears or nose     Nose:      Comments: No blood or fluid from ears or nose     Mouth/Throat:      Mouth: Mucous membranes are moist.   Eyes:      Extraocular Movements: Extraocular movements intact.   Cardiovascular:      Rate and Rhythm: Normal rate and regular rhythm.      Pulses: Normal pulses.      Heart sounds: Normal heart sounds.   Pulmonary:      Effort: Pulmonary effort is normal.      Breath sounds: Normal breath sounds.   Abdominal:      General: Bowel sounds are normal.

## 2025-07-27 ENCOUNTER — HOSPITAL ENCOUNTER (INPATIENT)
Facility: HOSPITAL | Age: 75
LOS: 1 days | Discharge: HOME | DRG: 552 | End: 2025-07-29
Attending: STUDENT IN AN ORGANIZED HEALTH CARE EDUCATION/TRAINING PROGRAM | Admitting: INTERNAL MEDICINE
Payer: MEDICARE

## 2025-07-27 DIAGNOSIS — M46.42 DISCITIS OF CERVICAL REGION: ICD-10-CM

## 2025-07-27 DIAGNOSIS — M54.12 CERVICAL RADICULOPATHY: Primary | ICD-10-CM

## 2025-07-27 PROCEDURE — 99285 EMERGENCY DEPT VISIT HI MDM: CPT

## 2025-07-27 PROCEDURE — 93005 ELECTROCARDIOGRAM TRACING: CPT | Performed by: STUDENT IN AN ORGANIZED HEALTH CARE EDUCATION/TRAINING PROGRAM

## 2025-07-28 ENCOUNTER — APPOINTMENT (OUTPATIENT)
Facility: HOSPITAL | Age: 75
DRG: 552 | End: 2025-07-28
Payer: MEDICARE

## 2025-07-28 PROBLEM — Z22.7 LTBI (LATENT TUBERCULOSIS INFECTION): Status: ACTIVE | Noted: 2025-07-28

## 2025-07-28 PROBLEM — M54.2 NECK PAIN: Status: ACTIVE | Noted: 2025-07-28

## 2025-07-28 LAB
ALBUMIN SERPL-MCNC: 3 G/DL (ref 3.4–5)
ALBUMIN/GLOB SERPL: 0.5 (ref 0.8–1.7)
ALP SERPL-CCNC: 132 U/L (ref 45–117)
ALT SERPL-CCNC: 38 U/L (ref 10–35)
AMMONIA PLAS-SCNC: 57 UMOL/L (ref 11–60)
ANION GAP SERPL CALC-SCNC: 10 MMOL/L (ref 3–18)
APPEARANCE UR: CLEAR
AST SERPL-CCNC: 44 U/L (ref 10–38)
BASOPHILS # BLD: 0.03 K/UL (ref 0–0.1)
BASOPHILS NFR BLD: 0.5 % (ref 0–2)
BILIRUB SERPL-MCNC: 0.9 MG/DL (ref 0.2–1)
BILIRUB UR QL: NEGATIVE
BUN SERPL-MCNC: 29 MG/DL (ref 6–23)
BUN/CREAT SERPL: 21 (ref 12–20)
CALCIUM SERPL-MCNC: 9.2 MG/DL (ref 8.5–10.1)
CHLORIDE SERPL-SCNC: 107 MMOL/L (ref 98–107)
CO2 SERPL-SCNC: 18 MMOL/L (ref 21–32)
COLOR UR: YELLOW
CREAT SERPL-MCNC: 1.35 MG/DL (ref 0.6–1.3)
CRP SERPL-MCNC: 1 MG/DL (ref 0–5)
DIFFERENTIAL METHOD BLD: ABNORMAL
EKG ATRIAL RATE: 78 BPM
EKG DIAGNOSIS: NORMAL
EKG P AXIS: 78 DEGREES
EKG P-R INTERVAL: 152 MS
EKG Q-T INTERVAL: 376 MS
EKG QRS DURATION: 78 MS
EKG QTC CALCULATION (BAZETT): 428 MS
EKG R AXIS: 3 DEGREES
EKG T AXIS: 63 DEGREES
EKG VENTRICULAR RATE: 78 BPM
EOSINOPHIL # BLD: 0.3 K/UL (ref 0–0.4)
EOSINOPHIL NFR BLD: 5.1 % (ref 0–5)
ERYTHROCYTE [DISTWIDTH] IN BLOOD BY AUTOMATED COUNT: 18.1 % (ref 11.6–14.5)
ERYTHROCYTE [SEDIMENTATION RATE] IN BLOOD: 68 MM/HR (ref 0–30)
FOLATE SERPL-MCNC: 15.1 NG/ML (ref 4.6–34.8)
GLOBULIN SER CALC-MCNC: 5.6 G/DL (ref 2–4)
GLUCOSE BLD STRIP.AUTO-MCNC: 168 MG/DL (ref 70–110)
GLUCOSE BLD STRIP.AUTO-MCNC: 197 MG/DL (ref 70–110)
GLUCOSE BLD STRIP.AUTO-MCNC: 199 MG/DL (ref 70–110)
GLUCOSE BLD STRIP.AUTO-MCNC: 206 MG/DL (ref 70–110)
GLUCOSE SERPL-MCNC: 149 MG/DL (ref 74–108)
GLUCOSE UR STRIP.AUTO-MCNC: 250 MG/DL
HCT VFR BLD AUTO: 32.9 % (ref 35–45)
HGB BLD-MCNC: 10.7 G/DL (ref 12–16)
HGB UR QL STRIP: NEGATIVE
IMM GRANULOCYTES # BLD AUTO: 0.01 K/UL (ref 0–0.04)
IMM GRANULOCYTES NFR BLD AUTO: 0.2 % (ref 0–0.5)
INR PPP: 1.3 (ref 0.9–1.2)
KETONES UR QL STRIP.AUTO: NEGATIVE MG/DL
LEUKOCYTE ESTERASE UR QL STRIP.AUTO: NEGATIVE
LYMPHOCYTES # BLD: 1.89 K/UL (ref 0.9–3.3)
LYMPHOCYTES NFR BLD: 32 % (ref 21–52)
MAGNESIUM SERPL-MCNC: 1.8 MG/DL (ref 1.6–2.6)
MCH RBC QN AUTO: 24.9 PG (ref 24–34)
MCHC RBC AUTO-ENTMCNC: 32.5 G/DL (ref 31–37)
MCV RBC AUTO: 76.7 FL (ref 78–100)
MONOCYTES # BLD: 0.98 K/UL (ref 0.05–1.2)
MONOCYTES NFR BLD: 16.6 % (ref 3–10)
NEUTS SEG # BLD: 2.69 K/UL (ref 1.8–8)
NEUTS SEG NFR BLD: 45.6 % (ref 40–73)
NITRITE UR QL STRIP.AUTO: NEGATIVE
NRBC # BLD: 0 K/UL (ref 0–0.01)
NRBC BLD-RTO: 0 PER 100 WBC
PH UR STRIP: 6 (ref 5–8)
PHOSPHATE SERPL-MCNC: 3 MG/DL (ref 2.5–4.9)
PLATELET # BLD AUTO: 147 K/UL (ref 135–420)
PMV BLD AUTO: 10.3 FL (ref 9.2–11.8)
POTASSIUM SERPL-SCNC: 5.1 MMOL/L (ref 3.5–5.5)
PROT SERPL-MCNC: 8.6 G/DL (ref 6.4–8.2)
PROT UR STRIP-MCNC: NEGATIVE MG/DL
PROTHROMBIN TIME: 16.9 SEC (ref 12–15.1)
RBC # BLD AUTO: 4.29 M/UL (ref 4.2–5.3)
SODIUM SERPL-SCNC: 135 MMOL/L (ref 136–145)
SP GR UR REFRACTOMETRY: 1.01 (ref 1–1.03)
TSH, 3RD GENERATION: 1.69 UIU/ML (ref 0.27–4.2)
UROBILINOGEN UR QL STRIP.AUTO: 1 EU/DL (ref 0.2–1)
VIT B12 SERPL-MCNC: 1026 PG/ML (ref 211–911)
WBC # BLD AUTO: 5.9 K/UL (ref 4.6–13.2)

## 2025-07-28 PROCEDURE — 73030 X-RAY EXAM OF SHOULDER: CPT

## 2025-07-28 PROCEDURE — 6360000002 HC RX W HCPCS: Performed by: INTERNAL MEDICINE

## 2025-07-28 PROCEDURE — 82140 ASSAY OF AMMONIA: CPT

## 2025-07-28 PROCEDURE — 6370000000 HC RX 637 (ALT 250 FOR IP): Performed by: INTERNAL MEDICINE

## 2025-07-28 PROCEDURE — 85610 PROTHROMBIN TIME: CPT

## 2025-07-28 PROCEDURE — 1100000000 HC RM PRIVATE

## 2025-07-28 PROCEDURE — 6370000000 HC RX 637 (ALT 250 FOR IP): Performed by: STUDENT IN AN ORGANIZED HEALTH CARE EDUCATION/TRAINING PROGRAM

## 2025-07-28 PROCEDURE — 71045 X-RAY EXAM CHEST 1 VIEW: CPT

## 2025-07-28 PROCEDURE — 82746 ASSAY OF FOLIC ACID SERUM: CPT

## 2025-07-28 PROCEDURE — 86140 C-REACTIVE PROTEIN: CPT

## 2025-07-28 PROCEDURE — 81003 URINALYSIS AUTO W/O SCOPE: CPT

## 2025-07-28 PROCEDURE — 94640 AIRWAY INHALATION TREATMENT: CPT

## 2025-07-28 PROCEDURE — 6360000002 HC RX W HCPCS: Performed by: STUDENT IN AN ORGANIZED HEALTH CARE EDUCATION/TRAINING PROGRAM

## 2025-07-28 PROCEDURE — 85025 COMPLETE CBC W/AUTO DIFF WBC: CPT

## 2025-07-28 PROCEDURE — 82962 GLUCOSE BLOOD TEST: CPT

## 2025-07-28 PROCEDURE — 87186 SC STD MICRODIL/AGAR DIL: CPT

## 2025-07-28 PROCEDURE — 2580000003 HC RX 258: Performed by: HOSPITALIST

## 2025-07-28 PROCEDURE — 87040 BLOOD CULTURE FOR BACTERIA: CPT

## 2025-07-28 PROCEDURE — 87077 CULTURE AEROBIC IDENTIFY: CPT

## 2025-07-28 PROCEDURE — 80197 ASSAY OF TACROLIMUS: CPT

## 2025-07-28 PROCEDURE — 72125 CT NECK SPINE W/O DYE: CPT

## 2025-07-28 PROCEDURE — 96375 TX/PRO/DX INJ NEW DRUG ADDON: CPT

## 2025-07-28 PROCEDURE — 85652 RBC SED RATE AUTOMATED: CPT

## 2025-07-28 PROCEDURE — 84443 ASSAY THYROID STIM HORMONE: CPT

## 2025-07-28 PROCEDURE — 96374 THER/PROPH/DIAG INJ IV PUSH: CPT

## 2025-07-28 PROCEDURE — 84425 ASSAY OF VITAMIN B-1: CPT

## 2025-07-28 PROCEDURE — 83735 ASSAY OF MAGNESIUM: CPT

## 2025-07-28 PROCEDURE — 6370000000 HC RX 637 (ALT 250 FOR IP): Performed by: HOSPITALIST

## 2025-07-28 PROCEDURE — 2580000003 HC RX 258: Performed by: STUDENT IN AN ORGANIZED HEALTH CARE EDUCATION/TRAINING PROGRAM

## 2025-07-28 PROCEDURE — 87154 CUL TYP ID BLD PTHGN 6+ TRGT: CPT

## 2025-07-28 PROCEDURE — 80053 COMPREHEN METABOLIC PANEL: CPT

## 2025-07-28 PROCEDURE — 93010 ELECTROCARDIOGRAM REPORT: CPT | Performed by: INTERNAL MEDICINE

## 2025-07-28 PROCEDURE — 84100 ASSAY OF PHOSPHORUS: CPT

## 2025-07-28 PROCEDURE — 82607 VITAMIN B-12: CPT

## 2025-07-28 RX ORDER — ONDANSETRON 4 MG/1
4 TABLET, ORALLY DISINTEGRATING ORAL EVERY 8 HOURS PRN
Status: DISCONTINUED | OUTPATIENT
Start: 2025-07-28 | End: 2025-07-29 | Stop reason: HOSPADM

## 2025-07-28 RX ORDER — METHOCARBAMOL 500 MG/1
500 TABLET, FILM COATED ORAL 3 TIMES DAILY
Status: DISCONTINUED | OUTPATIENT
Start: 2025-07-28 | End: 2025-07-29 | Stop reason: HOSPADM

## 2025-07-28 RX ORDER — KETOROLAC TROMETHAMINE 15 MG/ML
15 INJECTION, SOLUTION INTRAMUSCULAR; INTRAVENOUS EVERY 6 HOURS PRN
Status: DISCONTINUED | OUTPATIENT
Start: 2025-07-28 | End: 2025-07-29 | Stop reason: HOSPADM

## 2025-07-28 RX ORDER — INSULIN LISPRO 100 [IU]/ML
0-8 INJECTION, SOLUTION INTRAVENOUS; SUBCUTANEOUS
Status: DISCONTINUED | OUTPATIENT
Start: 2025-07-28 | End: 2025-07-29 | Stop reason: HOSPADM

## 2025-07-28 RX ORDER — SODIUM CHLORIDE, SODIUM LACTATE, POTASSIUM CHLORIDE, CALCIUM CHLORIDE 600; 310; 30; 20 MG/100ML; MG/100ML; MG/100ML; MG/100ML
INJECTION, SOLUTION INTRAVENOUS CONTINUOUS
Status: DISCONTINUED | OUTPATIENT
Start: 2025-07-28 | End: 2025-07-29 | Stop reason: HOSPADM

## 2025-07-28 RX ORDER — POLYETHYLENE GLYCOL 3350 17 G/17G
17 POWDER, FOR SOLUTION ORAL 2 TIMES DAILY PRN
Status: DISCONTINUED | OUTPATIENT
Start: 2025-07-28 | End: 2025-07-29 | Stop reason: HOSPADM

## 2025-07-28 RX ORDER — SPIRONOLACTONE 25 MG/1
100 TABLET ORAL EVERY MORNING
Status: DISCONTINUED | OUTPATIENT
Start: 2025-07-28 | End: 2025-07-29 | Stop reason: HOSPADM

## 2025-07-28 RX ORDER — ONDANSETRON 2 MG/ML
4 INJECTION INTRAMUSCULAR; INTRAVENOUS
Status: COMPLETED | OUTPATIENT
Start: 2025-07-28 | End: 2025-07-28

## 2025-07-28 RX ORDER — GLUCAGON 1 MG/ML
1 KIT INJECTION PRN
Status: DISCONTINUED | OUTPATIENT
Start: 2025-07-28 | End: 2025-07-29 | Stop reason: HOSPADM

## 2025-07-28 RX ORDER — PANTOPRAZOLE SODIUM 40 MG/1
40 TABLET, DELAYED RELEASE ORAL
Status: DISCONTINUED | OUTPATIENT
Start: 2025-07-28 | End: 2025-07-29 | Stop reason: HOSPADM

## 2025-07-28 RX ORDER — INSULIN GLARGINE 100 [IU]/ML
10 INJECTION, SOLUTION SUBCUTANEOUS EVERY MORNING
Status: DISCONTINUED | OUTPATIENT
Start: 2025-07-28 | End: 2025-07-29 | Stop reason: HOSPADM

## 2025-07-28 RX ORDER — FUROSEMIDE 40 MG/1
40 TABLET ORAL EVERY MORNING
Status: DISCONTINUED | OUTPATIENT
Start: 2025-07-28 | End: 2025-07-29 | Stop reason: HOSPADM

## 2025-07-28 RX ORDER — ALBUTEROL SULFATE 0.83 MG/ML
2.5 SOLUTION RESPIRATORY (INHALATION) EVERY 4 HOURS PRN
Status: DISCONTINUED | OUTPATIENT
Start: 2025-07-28 | End: 2025-07-29 | Stop reason: HOSPADM

## 2025-07-28 RX ORDER — TACROLIMUS 1 MG/1
1 CAPSULE ORAL 2 TIMES DAILY
Status: DISCONTINUED | OUTPATIENT
Start: 2025-07-28 | End: 2025-07-28

## 2025-07-28 RX ORDER — LIDOCAINE 4 G/G
1 PATCH TOPICAL
Status: COMPLETED | OUTPATIENT
Start: 2025-07-28 | End: 2025-07-28

## 2025-07-28 RX ORDER — LACTULOSE 10 G/15ML
30 SOLUTION ORAL 3 TIMES DAILY
Status: DISCONTINUED | OUTPATIENT
Start: 2025-07-28 | End: 2025-07-29 | Stop reason: HOSPADM

## 2025-07-28 RX ORDER — SUMATRIPTAN SUCCINATE 25 MG/1
50 TABLET ORAL 2 TIMES DAILY PRN
Status: DISCONTINUED | OUTPATIENT
Start: 2025-07-28 | End: 2025-07-29 | Stop reason: HOSPADM

## 2025-07-28 RX ORDER — FERROUS SULFATE 325(65) MG
325 TABLET ORAL
Status: DISCONTINUED | OUTPATIENT
Start: 2025-07-28 | End: 2025-07-28

## 2025-07-28 RX ORDER — GABAPENTIN 300 MG/1
300 CAPSULE ORAL 2 TIMES DAILY
Status: DISCONTINUED | OUTPATIENT
Start: 2025-07-28 | End: 2025-07-29 | Stop reason: HOSPADM

## 2025-07-28 RX ORDER — HYDROMORPHONE HYDROCHLORIDE 1 MG/ML
0.5 INJECTION, SOLUTION INTRAMUSCULAR; INTRAVENOUS; SUBCUTANEOUS
Status: COMPLETED | OUTPATIENT
Start: 2025-07-28 | End: 2025-07-28

## 2025-07-28 RX ORDER — DEXTROSE MONOHYDRATE 100 MG/ML
INJECTION, SOLUTION INTRAVENOUS CONTINUOUS PRN
Status: DISCONTINUED | OUTPATIENT
Start: 2025-07-28 | End: 2025-07-29 | Stop reason: HOSPADM

## 2025-07-28 RX ORDER — SODIUM CHLORIDE, SODIUM LACTATE, POTASSIUM CHLORIDE, AND CALCIUM CHLORIDE .6; .31; .03; .02 G/100ML; G/100ML; G/100ML; G/100ML
500 INJECTION, SOLUTION INTRAVENOUS ONCE
Status: COMPLETED | OUTPATIENT
Start: 2025-07-28 | End: 2025-07-28

## 2025-07-28 RX ORDER — METHOCARBAMOL 500 MG/1
500 TABLET, FILM COATED ORAL
Status: COMPLETED | OUTPATIENT
Start: 2025-07-28 | End: 2025-07-28

## 2025-07-28 RX ORDER — ACETAMINOPHEN 500 MG
1000 TABLET ORAL
Status: COMPLETED | OUTPATIENT
Start: 2025-07-28 | End: 2025-07-28

## 2025-07-28 RX ORDER — INSULIN LISPRO 100 [IU]/ML
6 INJECTION, SOLUTION INTRAVENOUS; SUBCUTANEOUS
Status: DISCONTINUED | OUTPATIENT
Start: 2025-07-28 | End: 2025-07-29 | Stop reason: HOSPADM

## 2025-07-28 RX ORDER — TACROLIMUS 1 MG/1
1 CAPSULE ORAL 2 TIMES DAILY
Status: DISCONTINUED | OUTPATIENT
Start: 2025-07-28 | End: 2025-07-29 | Stop reason: HOSPADM

## 2025-07-28 RX ORDER — ALBUTEROL SULFATE 90 UG/1
2 INHALANT RESPIRATORY (INHALATION) EVERY 4 HOURS PRN
Status: DISCONTINUED | OUTPATIENT
Start: 2025-07-28 | End: 2025-07-28 | Stop reason: CLARIF

## 2025-07-28 RX ADMIN — METHOCARBAMOL 500 MG: 500 TABLET ORAL at 02:21

## 2025-07-28 RX ADMIN — ACETAMINOPHEN 1000 MG: 500 TABLET ORAL at 01:13

## 2025-07-28 RX ADMIN — ONDANSETRON 4 MG: 2 INJECTION, SOLUTION INTRAMUSCULAR; INTRAVENOUS at 02:18

## 2025-07-28 RX ADMIN — METHOCARBAMOL TABLETS 500 MG: 500 TABLET, COATED ORAL at 21:19

## 2025-07-28 RX ADMIN — INSULIN LISPRO 2 UNITS: 100 INJECTION, SOLUTION INTRAVENOUS; SUBCUTANEOUS at 18:03

## 2025-07-28 RX ADMIN — INSULIN LISPRO 2 UNITS: 100 INJECTION, SOLUTION INTRAVENOUS; SUBCUTANEOUS at 21:30

## 2025-07-28 RX ADMIN — KETOROLAC TROMETHAMINE 15 MG: 15 INJECTION, SOLUTION INTRAMUSCULAR; INTRAVENOUS at 12:09

## 2025-07-28 RX ADMIN — FUROSEMIDE 40 MG: 40 TABLET ORAL at 09:09

## 2025-07-28 RX ADMIN — PANTOPRAZOLE SODIUM 40 MG: 40 TABLET, DELAYED RELEASE ORAL at 09:02

## 2025-07-28 RX ADMIN — ARFORMOTEROL TARTRATE: 15 SOLUTION RESPIRATORY (INHALATION) at 08:05

## 2025-07-28 RX ADMIN — LACTULOSE 30 G: 10 SOLUTION ORAL at 09:26

## 2025-07-28 RX ADMIN — INSULIN LISPRO 2 UNITS: 100 INJECTION, SOLUTION INTRAVENOUS; SUBCUTANEOUS at 12:08

## 2025-07-28 RX ADMIN — ARFORMOTEROL TARTRATE: 15 SOLUTION RESPIRATORY (INHALATION) at 20:12

## 2025-07-28 RX ADMIN — INSULIN LISPRO 6 UNITS: 100 INJECTION, SOLUTION INTRAVENOUS; SUBCUTANEOUS at 18:03

## 2025-07-28 RX ADMIN — HYDROMORPHONE HYDROCHLORIDE 0.5 MG: 1 INJECTION, SOLUTION INTRAMUSCULAR; INTRAVENOUS; SUBCUTANEOUS at 02:19

## 2025-07-28 RX ADMIN — TACROLIMUS 1 MG: 1 CAPSULE ORAL at 18:05

## 2025-07-28 RX ADMIN — GABAPENTIN 300 MG: 300 CAPSULE ORAL at 17:28

## 2025-07-28 RX ADMIN — INSULIN GLARGINE 10 UNITS: 100 INJECTION, SOLUTION SUBCUTANEOUS at 09:12

## 2025-07-28 RX ADMIN — SODIUM CHLORIDE, SODIUM LACTATE, POTASSIUM CHLORIDE, AND CALCIUM CHLORIDE 500 ML: .6; .31; .03; .02 INJECTION, SOLUTION INTRAVENOUS at 04:42

## 2025-07-28 RX ADMIN — SPIRONOLACTONE 100 MG: 25 TABLET ORAL at 09:03

## 2025-07-28 RX ADMIN — GABAPENTIN 300 MG: 300 CAPSULE ORAL at 09:01

## 2025-07-28 RX ADMIN — SODIUM CHLORIDE, SODIUM LACTATE, POTASSIUM CHLORIDE, AND CALCIUM CHLORIDE: .6; .31; .03; .02 INJECTION, SOLUTION INTRAVENOUS at 18:12

## 2025-07-28 RX ADMIN — LACTULOSE 30 G: 10 SOLUTION ORAL at 14:09

## 2025-07-28 RX ADMIN — INSULIN LISPRO 6 UNITS: 100 INJECTION, SOLUTION INTRAVENOUS; SUBCUTANEOUS at 12:08

## 2025-07-28 RX ADMIN — METHOCARBAMOL TABLETS 500 MG: 500 TABLET, COATED ORAL at 18:06

## 2025-07-28 ASSESSMENT — PAIN SCALES - GENERAL
PAINLEVEL_OUTOF10: 10

## 2025-07-28 ASSESSMENT — PAIN DESCRIPTION - ORIENTATION
ORIENTATION: LEFT
ORIENTATION: LEFT

## 2025-07-28 ASSESSMENT — PAIN DESCRIPTION - DESCRIPTORS
DESCRIPTORS: ACHING;POUNDING
DESCRIPTORS: ACHING;THROBBING;TINGLING

## 2025-07-28 ASSESSMENT — PAIN DESCRIPTION - LOCATION
LOCATION: GENERALIZED
LOCATION: HIP;KNEE;FOOT

## 2025-07-28 NOTE — ED PROVIDER NOTES
[facility] EMERGENCY DEPT  EMERGENCY DEPARTMENT ENCOUNTER    Patient Name: Sabi Armenta  MRN: [id number]  YOB: 1950  Provider: Dorcas Moore DO   PCP: Shanika Powell APRN - NP   Time/Date of evaluation: 4:15 AM EDT on 7/28/25    History of Presenting Illness     Chief Complaint   Patient presents with   • Neck Pain   • Arm Pain   • Leg Pain   • Back Pain       History Provided by: Patient   History is limited by: Nothing    HISTORY (Narative):   Sabi Armenta is a 75 y.o. female with a PMH significant for insulin-dependent diabetes, hypertension, autoimmune hepatitis on tacrolimus, liver cirrhosis, varices, CKD, GAVE, DVT status post IVC filter  who presents to the emergency department via by POV complaining of pain from the left side of her neck extending down her arm, torso and into her leg, associated with malaise, fatigue and generalized weakness. Patient states that she has chronic LLE numbness and weakness after a hip replacement several years prior. In the last week she has felt a change in her pain - now seeming to originate in the left side of her neck down her arm, into her back and continuing into her left leg. It is difficult for her to determine if the LLE radiculopathy is worse however notes worsening paresthesias in her upper and lower extremities. The pain in her neck/shoulder, LUE gets worse when laying down or bending her neck. It is most severe at nighttime. The paresthesias affect her entire hand and denies dermatomal pattern. She has not had any documented fevers but reports chills, malaise. Denies sore throat, cough, congestion, abdominal pain, vomiting, diarrhea, dysuria, frequency, hematuria, skin rash or joint swelling. No headache, visual disturbance, facial droop, tinnitus, hearing loss.  She has a hx of left rotator cuff surgery remotely but denies chronic pain in her left arm. Denies falling or other injuries prior to onset 1 week ago.     Nursing Notes were all

## 2025-07-28 NOTE — PROGRESS NOTES
4 Eyes Skin Assessment     NAME:  Sabi Armenta  YOB: 1950  MEDICAL RECORD NUMBER:  [id number]    The patient is being assessed for  Admission    I agree that at least one RN has performed a thorough Head to Toe Skin Assessment on the patient. ALL assessment sites listed below have been assessed.      Areas assessed by both nurses:    Head, Face, Ears, Shoulders, Back, Chest, Arms, Elbows, Hands, Sacrum. Buttock, Coccyx, Ischium, Legs. Feet and Heels, and Under Medical Devices       Patient has scattered healed surgical scars.   No opened wounds.     Does the Patient have a Wound? No noted wound(s)       Shaggy Prevention initiated by RN: Yes  Wound Care Orders initiated by RN: No    For hospital-acquired stage 1 & 2 and ALL Stage 3,4, Unstageable, DTI, NWPT, and Complex wounds: place order “IP Wound Care/Ostomy Nurse Eval and Treat” by RN under : No    New Ostomies, if present place, Ostomy referral order under : No     Nurse 1 eSignature: Electronically signed by Sofia Stubbs RN on 7/28/25 at 6:56 AM EDT    **SHARE this note so that the co-signing nurse can place an eSignature**    Nurse 2 eSignature: Electronically signed by Zabrina Copeland RN on 7/28/25 at 8:31 AM EDT

## 2025-07-28 NOTE — CARE COORDINATION
Met w/the patient at bedside who reports she lives alone in a 1 level ground floor apartment w/1 [address]. He Jeffery Kennedy can stay with her if needed. She has a 4 wheeled walker, a shower chair and tub/shower combo w/grab bars \"all around\". She reports she needs a new BSC as hers is broken. She needs assistance w/ADL's and iADL's. Her family assist her and does the driving.    Sisters Danuta Darling  and Lauren Green  are close and supportive.    Hx of HHC/SNF but none current at this time.        07/28/25 1601   Service Assessment   Patient Orientation Alert and Oriented   Cognition Alert   History Provided By Patient   Primary Caregiver Self   Accompanied By/Relationship Jeffery Kennedy can stay w/her if needed.   Support Systems Spouse/Significant Other;Family Members   PCP Verified by CM Yes  (Shanika Powell MD)   Prior Functional Level Assistance with the following:   Current Functional Level Assistance with the following:   Can patient return to prior living arrangement Yes   Ability to make needs known: Good   Family able to assist with home care needs: Yes   Social/Functional History   Lives With Alone   Type of Home Apartment   Home Layout One level   Home Access Stairs to enter without rails   Entrance Stairs - Number of Steps 1   Bathroom Shower/Tub Tub/Shower unit;Shower chair without back   Bathroom Toilet Standard   Bathroom Equipment Grab bars in shower   Home Equipment Walker - 4-Wheeled   Receives Help From Family   Prior Level of Assist for ADLs Needs assistance   Toileting Needs assistance   Prior Level of Assist for Homemaking Needs assistance   Homemaking Responsibilities Yes   Ambulation Assistance Independent   Prior Level of Assist for Transfers Independent   Active  No   Patient's  Info Family does the driving   Mode of Transportation Car   Occupation Retired   Discharge Planning   Type of Residence Apartment   Living Arrangements Alone   DME Ordered?

## 2025-07-28 NOTE — ED TRIAGE NOTES
Pt wheeled to triage c/o numbness and pain on entire L side of body from neck down to foot onset 1 week ago.    A&Ox4    Active Ambulatory Problems     Diagnosis Date Noted    Autoimmune hepatitis (HCC) 04/17/2019    GAVE (gastric antral vascular ectasia) 06/10/2019    Thrombocytopenia 04/17/2019    Cirrhosis (HCC) 04/17/2019    PSC (primary sclerosing cholangitis) (HCC) 03/29/2021    Severe obesity (HCC) 04/17/2019    DVT (deep venous thrombosis) (HCC) 03/16/2022    Type 2 diabetes mellitus (HCC) 03/16/2022    Chronic renal disease, stage III (HCC) 07/21/2022    History of left hip replacement 08/29/2023    Abdominal pain 09/07/2024    Hepatitis C virus infection cured after antiviral drug therapy 09/10/2024    S/P IVC filter 09/10/2024    H/O deep venous thrombosis 09/10/2024    Long-term use of immunosuppressant medication 09/10/2024    Iron deficiency anemia, unspecified 09/13/2024     Resolved Ambulatory Problems     Diagnosis Date Noted    Hypoglycemia 12/10/2020    Cirrhosis of liver without ascites (HCC) 06/25/2024    Gram-positive bacteremia 09/08/2024    Elevated liver enzymes 09/10/2024    Nausea and vomiting 09/10/2024    Iron deficiency anemia due to chronic blood loss 09/10/2024    Viral syndrome 09/10/2024    UGI bleed 09/26/2024     Past Medical History:   Diagnosis Date    DM (diabetes mellitus) (HCC)

## 2025-07-28 NOTE — ED NOTES
Report called to AARON Black. No questions/concerns at this time. Will take patient to the floor shortly.

## 2025-07-28 NOTE — CARE COORDINATION
07/28/25 0944   IMM Letter   IMM Letter given to Patient/Family/Significant other/Guardian/POA/by: Opal Treviño   IMM Letter date given: 07/28/25   IMM Letter time given: 0857     Patient has received, reviewed and signed the 1st Important Message for Medicare letter (IMM). Copy left at patient's bedside and the original placed in the chart.

## 2025-07-28 NOTE — PROGRESS NOTES
June 5, 2020       Stefan Caal MD  [address] [facility] [address]  VIA Facsimile: [phone]      Patient: Barrett Marrufo   YOB: 1987   Date of Visit: 6/5/2020       Dear Dr. Caal:    Thank you for referring Barrett Marrufo to me for evaluation. Below are my notes for this visit with her.    If you have questions, please do not hesitate to call me. I look forward to following your patient along with you.      Sincerely,        Khanh Hdez MD        CC: No Recipients  Khanh Hdez MD  6/5/2020 10:20 AM  Sign when Signing Visit  This visit is being performed via phone to discuss Follow-up (phone visit)    Clinician Location:  [place] [address] is in [place] and her identity has been established.   She understands that we are limiting office visits due to the coronavirus pandemic and was informed that consent to treat includes permission to submit charges to her insurance. It was also shared that without being seen and evaluated in person, there is a risk that the information and/or assessment may be incomplete or inaccurate.  11-20 minutes were spent in this encounter.     Cardiology Office Visit 6/5/2020    Chief Complaint/Reason for Visit:   Chief Complaint   Patient presents with   • Follow-up     phone visit       HISTORY OF PRESENT ILLNESS:     Barrett Marrufo is a 33 year old female who presents for outpatient follow up and cardiovascular evaluation.She has history of end-stage renal disease; on dialysis, previous history of pericardial effusion status post pericardial window, history of MR and TR, history of goiter, and diabetes mellitus. She works as a CNA at [employer].    The patient is doing well today. She follows with Dr. Anna. She denies any chest pain, palpitations, or shortness of breath. She notes that she is awaiting a refill of hydralazine, but her pharmacy is closed currently. She states that she is  Occupational Therapy Evaluation/Treatment Attempt    Chart reviewed. Attempted Occupational Therapy Evaluation/Treatment, however, patient CT \"concerning for discitis\" of the cervical spine, patient not yet seen by ortho, Patient may benefit from ortho consult prior to OT evaluation. Will hold eval and follow up accordingly.   Thank you for this referral.  Zo Craven OTD, OTR/L     experiencing some lower extremity edema which could be due to amlodipine and CAD. She admits to occasional drinking.     The patients blood pressure taken at home today was 146/93 mmHg. Her blood pressure is a little elevated but she has not taken hydralazine.     ECG reviewed and discussed with patient. Left atrium enlargement and early LVH    VASC Pelvis dplx from 3/23/2020 reviewed and discussed with patient. Showed Grayscale and spectral analysis demonstrate widely patent common iliac and external iliac arteries without significant stenosis.    Lexiscan cardiolite from 3/23/2020 reviewed and discussed with patient. LV systolic function was normal. Risk for ischemia is low.      TTE from 3/23/2020 reviewed and discussed with patient. Showed ejection fraction 60-65%. Trivial regurgitation of the aortic valve and pulmonic valve. Mild regurgitation of the mitral and tricuspid valve.     REVIEW OF SYSTEMS:  Review of Systems   Constitution: Negative for diaphoresis, fever, malaise/fatigue and weight gain.   HENT: Negative for congestion and nosebleeds.    Eyes: Negative for blurred vision.   Cardiovascular: Positive for leg swelling. Negative for chest pain, claudication, dyspnea on exertion, irregular heartbeat, near-syncope, orthopnea, palpitations, paroxysmal nocturnal dyspnea and syncope.   Respiratory: Negative for cough, hemoptysis, shortness of breath, snoring and wheezing.    Endocrine: Negative for cold intolerance and heat intolerance.   Hematologic/Lymphatic: Negative for bleeding problem. Does not bruise/bleed easily.   Skin: Negative for dry skin and poor wound healing.   Musculoskeletal: Negative for back pain, joint pain, muscle cramps, muscle weakness and myalgias.   Gastrointestinal: Negative for abdominal pain, hematemesis, hematochezia, melena, nausea and vomiting.   Genitourinary: Negative for hematuria, nocturia and urgency.   Neurological: Negative for excessive daytime sleepiness, dizziness,  focal weakness, headaches, light-headedness, vertigo and weakness.   Psychiatric/Behavioral: Negative for altered mental status, depression and substance abuse. The patient does not have insomnia and is not nervous/anxious.    Allergic/Immunologic: Negative for persistent infections.       PAST MEDICAL HISTORY:   Past Medical History:   Diagnosis Date   • Anemia    • ESRD (end stage renal disease) (CMS/[facility]) 2018   • Essential (primary) hypertension    • Kidney disease    • Pericardial effusion    • SOB (shortness of breath)        PAST SURGICAL HISTORY:   Past Surgical History:   Procedure Laterality Date   • Appendectomy     •  section, classic         FAMILY HISTORY:   Family History   Problem Relation Age of Onset   • Hypertension Mother    • Hypertension Father        SOCIAL HISTORY:   Social History     Tobacco Use   • Smoking status: Never Smoker   • Smokeless tobacco: Never Used   Substance Use Topics   • Alcohol use: Yes     Frequency: Monthly or less   • Drug use: Never       Drug Use:    Never             ALLERGIES:   ALLERGIES:   Allergen Reactions   • Aspirin Buffered HIVES and SWELLING     Tongue and lip swelling    • No Name Available Other (See Comments)     No known food allergies- Unknown   • Penicillins Other (See Comments)     Unknown, pt mentions happened when she was younger.        MEDICATIONS:   Current Outpatient Medications   Medication Sig Dispense Refill   • betamethasone dipropionate (DIPROSONE) 0.05 % ointment Apply 1 application topically daily.  3   • cinacalcet (SENSIPAR) 60 MG tablet Take 60 mg by mouth daily.     • cloNIDine (CATAPRES) 0.3 MG tablet Take 0.3 mg by mouth every 12 hours.     • carvedilol (COREG) 25 MG tablet Take 25 mg by mouth 2 times daily (with meals).     • amLODIPine (NORVASC) 10 MG tablet TAKE 1 TABLET DAILY     • hydrALAZINE (APRESOLINE) 100 MG tablet TAKE ONE TABLET BY MOUTH EVERY 8 HOURS       No current facility-administered medications for this  visit.        PHYSICAL EXAMINATION  There were no vitals taken for this visit.  A physical examination could not be taken at this time due to telephone visit.       Testing: The following were personally visualized and interpreted by me:      Labs:    Sodium (mmol/L)   Date Value   09/25/2014 140     Potassium (mmol/L)   Date Value   09/25/2014 3.3 (L)     No results found for: CHLORIDE  Carbon Dioxide (mmol/L)   Date Value   09/25/2014 28     BUN (mg/dL)   Date Value   09/25/2014 35 (H)     Creatinine (mg/dL)   Date Value   09/25/2014 5.34 (H)     Glucose (mg/dL)   Date Value   09/25/2014 104 (H)       No results found for: HGBA1C    No results found for: CHOLESTEROL  No results found for: HDL  No results found for: TRIGLYCERIDE  CALCULATED LDL (mg/dL)   Date Value   09/25/2014 80       TSH (mcUnits/mL)   Date Value   03/23/2020 0.754       No results found for: INR    No results found for: WBC  No results found for: RBC  No results found for: HCT  No results found for: HGB  No results found for: PLT      IMPRESSION/PLAN:    There are no diagnoses linked to this encounter.    1. Patient's medications and most recent lab work was reviewed with patient and family.   2. Continue current management.  3. Discussed risk factor intervention.   4.Encouraged to monitor blood pressure at home and keep a log.  5.Weight loss, dietary modifications, and regular exercise were discussed and encouraged.    6.Future Stress Test, Echocardiogram, and Blood tests will be helpful for follow-up care.   7.Patient instructed to call the office if there are any changes or questions  8.Conintue to follow with Dr. Anna    Close clinical follow up is recommended.    Scribe Attestation: Entered by Jodie Mclaughlin, acting as scribe for Dr. Khanh Hdez    Provider Attestation: The documentation recorded by the scribe accurately reflects the service I personally performed and the decisions made by me, MD Khanh Downey,  MD  Cardiology

## 2025-07-28 NOTE — CONSULTS
[facility]  (A Division of [facility])  Maria R Lopez MD   Office Tel:  [phone] Option #8                                                               Date of Admission: 7/27/2025       ID Consult  for cervical spine abnormality- discitis? Received from Dr Fung  PCP: Shanika Powell, APRN - NP    C/C: worsening numbness-left side and bilateral LE    Current Antimicrobials:    Prior Antimicrobials:    None          Allergy to Antibiotics: PCN-hives       Assessment:     Chronically ill looking patient with:    Cervical C5-6 endplate erosion/ sclerosis- concerning for discitis per radiology report on CT neck 7/28/25. This may or may not be infectious    Left sided and LE numbness- worse per patient    Old imaging reports reviewed  3/9/25- MRI cervical--C5-6 abnormal marrow changes  DDX includes degenerative changes versus discitis osteomyelitis.  No epidural abscess.  Findings are progressed compare with 10/12/2024.  Clinical correlation is recommended.  Continued follow up imaging is recommended.   2. Multilevel degenerative changes as detailed in the body of the report, worst at C5-6 where there is moderate to severe spinal canal narrowing and severe left neural foraminal narrowing.   3/7/25: CT neck WO contrast:  1. Changed may represent severe degenerative changes or chronic osteomyelitis/discitis at the C5-C6 level and left C5-C6 facet joint.   2. There is no evidence acute fracture.     10/12/24: CT neck WO:  Alignment and vertebral body height in the cervical spine are maintained. Straightened cervical lordosis.  There is no evidence of an acute fracture or traumatic malalignment.  Moderate to severe loss of disc height C5-C6.  Small endplate osteophytes.  Multilevel facet arthropathy, worse on the left.  No significant bony spinal canal or neural foraminal narrowing.  The prevertebral soft tissues are unremarkable.     Micro:  7/28- blood culture X2-

## 2025-07-28 NOTE — H&P
History & Physical      Patient: Sabi Armenta MRN: [id number]  [facility]: [id number]    YOB: 1950  Age: 75 y.o.  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         -  ESR/ Crp pending, blood cx sent.       - consult DR Sharyn Lopez to evaluate for bacterial discitis deferred to ID to request biopsy of C4-C5.      - hold off abx at this time until cx can be obtained.  May need IV dye in setting of CKD stage III.      - I tried calling the IR department at [phone]. No answer, I couldn't get through.     2.  History of autoimmune hepatitis; continue tacrolimus 1 mg twice daily.     3.:  History of gastro antral vascular ectasia.       - Stable.    4. Type II DM: Insulin sliding scale.     -stable.    5.  History of chronic kidney disease stage III: stable.       6.  History of COPD.      - Stable continue Symbicort 15/0.25 mg nebulizer twice daily,    7.  History of diabetic neuropathy.      - Continue gabapentin 300 mg 1 p.o. 3 times daily 3 times daily.    8.  History of iron deficiency.   - Continue ferrous sulfate 2025 mg daily.    9.  History of primary sclerosing cholangitis with cirrhosis and hepatitis C     - Continue lactulose 30 g 3 times daily make sure the patient has at least 1-2 bowel movements on a daily basis.     Chronic medical issues:            Code Status:       POA/Proxy:  DVT Prophylaxis:  []Lovenox  []Hep SQ   [] Eliquis, Xarelto   []Coumadin  []Heparin Drip   [x]SCDs   Case discussed with:  []Patient  []Family [] Consultants  []Nursing  []Case Management    Expect the patient needing more than 2 midnights stay Yes []  No []          History of Present Illness:     Sabi Armenta is a 75 y.o. female with

## 2025-07-28 NOTE — CONSULTS
Patient with concern for discitis at C5-C6. IR asked to biopsy. There is involvement of posterior elements on the left side and anterolisthesis. IR does not typically perform biopsy for discitis in the cervical spine. Would suggest spine surgery input.     Thank you for the opportunity to care for this patient.     Jorge Saenz M.D.  Interventional Radiology  [facility]

## 2025-07-28 NOTE — PROGRESS NOTES
7/28/2025 PT note: consult received and chart reviewed with IR note noted (see progress notes for details). Pt may benefit from Ortho Consult prior to PT intervention. Will hold PT evaluation and f/up accordingly following above.  Thank you for this referral.   Tomasa, PT

## 2025-07-29 ENCOUNTER — APPOINTMENT (OUTPATIENT)
Facility: HOSPITAL | Age: 75
DRG: 552 | End: 2025-07-29
Payer: MEDICARE

## 2025-07-29 VITALS
RESPIRATION RATE: 16 BRPM | DIASTOLIC BLOOD PRESSURE: 77 MMHG | BODY MASS INDEX: 23.92 KG/M2 | SYSTOLIC BLOOD PRESSURE: 153 MMHG | WEIGHT: 135 LBS | OXYGEN SATURATION: 100 % | HEIGHT: 63 IN | HEART RATE: 96 BPM | TEMPERATURE: 98.4 F

## 2025-07-29 LAB
ACB COMPLEX DNA BLD POS QL NAA+NON-PROBE: NOT DETECTED
ACCESSION NUMBER, LLC1M: ABNORMAL
ANION GAP SERPL CALC-SCNC: 12 MMOL/L (ref 3–18)
B FRAGILIS DNA BLD POS QL NAA+NON-PROBE: NOT DETECTED
BIOFIRE TEST COMMENT: ABNORMAL
BUN SERPL-MCNC: 24 MG/DL (ref 6–23)
BUN/CREAT SERPL: 17 (ref 12–20)
C ALBICANS DNA BLD POS QL NAA+NON-PROBE: NOT DETECTED
C AURIS DNA BLD POS QL NAA+NON-PROBE: NOT DETECTED
C GATTII+NEOFOR DNA BLD POS QL NAA+N-PRB: NOT DETECTED
C GLABRATA DNA BLD POS QL NAA+NON-PROBE: NOT DETECTED
C KRUSEI DNA BLD POS QL NAA+NON-PROBE: NOT DETECTED
C PARAP DNA BLD POS QL NAA+NON-PROBE: NOT DETECTED
C TROPICLS DNA BLD POS QL NAA+NON-PROBE: NOT DETECTED
CALCIUM SERPL-MCNC: 8.9 MG/DL (ref 8.5–10.1)
CHLORIDE SERPL-SCNC: 103 MMOL/L (ref 98–107)
CO2 SERPL-SCNC: 18 MMOL/L (ref 21–32)
CREAT SERPL-MCNC: 1.44 MG/DL (ref 0.6–1.3)
E CLOAC COMP DNA BLD POS NAA+NON-PROBE: NOT DETECTED
E COLI DNA BLD POS QL NAA+NON-PROBE: NOT DETECTED
E FAECALIS DNA BLD POS QL NAA+NON-PROBE: NOT DETECTED
E FAECIUM DNA BLD POS QL NAA+NON-PROBE: NOT DETECTED
ENTEROBACTERALES DNA BLD POS NAA+N-PRB: NOT DETECTED
GLUCOSE BLD STRIP.AUTO-MCNC: 127 MG/DL (ref 70–110)
GLUCOSE BLD STRIP.AUTO-MCNC: 164 MG/DL (ref 70–110)
GLUCOSE BLD STRIP.AUTO-MCNC: 223 MG/DL (ref 70–110)
GLUCOSE BLD STRIP.AUTO-MCNC: 237 MG/DL (ref 70–110)
GLUCOSE BLD STRIP.AUTO-MCNC: 269 MG/DL (ref 70–110)
GLUCOSE SERPL-MCNC: 234 MG/DL (ref 74–108)
GP B STREP DNA BLD POS QL NAA+NON-PROBE: NOT DETECTED
HAEM INFLU DNA BLD POS QL NAA+NON-PROBE: NOT DETECTED
K OXYTOCA DNA BLD POS QL NAA+NON-PROBE: NOT DETECTED
KLEBSIELLA SP DNA BLD POS QL NAA+NON-PRB: NOT DETECTED
KLEBSIELLA SP DNA BLD POS QL NAA+NON-PRB: NOT DETECTED
L MONOCYTOG DNA BLD POS QL NAA+NON-PROBE: NOT DETECTED
MECA+MECC ISLT/SPM QL: DETECTED
N MEN DNA BLD POS QL NAA+NON-PROBE: NOT DETECTED
P AERUGINOSA DNA BLD POS NAA+NON-PROBE: NOT DETECTED
POTASSIUM SERPL-SCNC: 5 MMOL/L (ref 3.5–5.5)
PROCALCITONIN SERPL-MCNC: 0.13 NG/ML
PROTEUS SP DNA BLD POS QL NAA+NON-PROBE: NOT DETECTED
RESISTANT GENE TARGETS: ABNORMAL
S AUREUS DNA BLD POS QL NAA+NON-PROBE: NOT DETECTED
S AUREUS+CONS DNA BLD POS NAA+NON-PROBE: DETECTED
S EPIDERMIDIS DNA BLD POS QL NAA+NON-PRB: DETECTED
S LUGDUNENSIS DNA BLD POS QL NAA+NON-PRB: NOT DETECTED
S MALTOPHILIA DNA BLD POS QL NAA+NON-PRB: NOT DETECTED
S MARCESCENS DNA BLD POS NAA+NON-PROBE: NOT DETECTED
S PNEUM DNA BLD POS QL NAA+NON-PROBE: NOT DETECTED
S PYO DNA BLD POS QL NAA+NON-PROBE: NOT DETECTED
SALMONELLA DNA BLD POS QL NAA+NON-PROBE: NOT DETECTED
SODIUM SERPL-SCNC: 133 MMOL/L (ref 136–145)
STREPTOCOCCUS DNA BLD POS NAA+NON-PROBE: NOT DETECTED

## 2025-07-29 PROCEDURE — 94640 AIRWAY INHALATION TREATMENT: CPT

## 2025-07-29 PROCEDURE — 6370000000 HC RX 637 (ALT 250 FOR IP): Performed by: HOSPITALIST

## 2025-07-29 PROCEDURE — 82962 GLUCOSE BLOOD TEST: CPT

## 2025-07-29 PROCEDURE — 6360000002 HC RX W HCPCS: Performed by: INTERNAL MEDICINE

## 2025-07-29 PROCEDURE — 6360000004 HC RX CONTRAST MEDICATION: Performed by: HOSPITALIST

## 2025-07-29 PROCEDURE — 2580000003 HC RX 258: Performed by: HOSPITALIST

## 2025-07-29 PROCEDURE — A9579 GAD-BASE MR CONTRAST NOS,1ML: HCPCS | Performed by: HOSPITALIST

## 2025-07-29 PROCEDURE — 36415 COLL VENOUS BLD VENIPUNCTURE: CPT

## 2025-07-29 PROCEDURE — 80048 BASIC METABOLIC PNL TOTAL CA: CPT

## 2025-07-29 PROCEDURE — 87040 BLOOD CULTURE FOR BACTERIA: CPT

## 2025-07-29 PROCEDURE — 72156 MRI NECK SPINE W/O & W/DYE: CPT

## 2025-07-29 PROCEDURE — 84145 PROCALCITONIN (PCT): CPT

## 2025-07-29 PROCEDURE — 6370000000 HC RX 637 (ALT 250 FOR IP): Performed by: INTERNAL MEDICINE

## 2025-07-29 RX ORDER — GADOTERIDOL 279.3 MG/ML
10 INJECTION INTRAVENOUS
Status: COMPLETED | OUTPATIENT
Start: 2025-07-29 | End: 2025-07-29

## 2025-07-29 RX ORDER — LORAZEPAM 0.5 MG/1
0.5 TABLET ORAL ONCE
Status: COMPLETED | OUTPATIENT
Start: 2025-07-29 | End: 2025-07-29

## 2025-07-29 RX ADMIN — INSULIN LISPRO 6 UNITS: 100 INJECTION, SOLUTION INTRAVENOUS; SUBCUTANEOUS at 16:39

## 2025-07-29 RX ADMIN — GADOTERIDOL 10 ML: 279.3 INJECTION, SOLUTION INTRAVENOUS at 12:27

## 2025-07-29 RX ADMIN — INSULIN LISPRO 6 UNITS: 100 INJECTION, SOLUTION INTRAVENOUS; SUBCUTANEOUS at 08:37

## 2025-07-29 RX ADMIN — LACTULOSE 30 G: 10 SOLUTION ORAL at 08:32

## 2025-07-29 RX ADMIN — ARFORMOTEROL TARTRATE: 15 SOLUTION RESPIRATORY (INHALATION) at 19:29

## 2025-07-29 RX ADMIN — INSULIN GLARGINE 10 UNITS: 100 INJECTION, SOLUTION SUBCUTANEOUS at 08:37

## 2025-07-29 RX ADMIN — FUROSEMIDE 40 MG: 40 TABLET ORAL at 08:31

## 2025-07-29 RX ADMIN — GABAPENTIN 300 MG: 300 CAPSULE ORAL at 16:39

## 2025-07-29 RX ADMIN — INSULIN LISPRO 6 UNITS: 100 INJECTION, SOLUTION INTRAVENOUS; SUBCUTANEOUS at 13:13

## 2025-07-29 RX ADMIN — SPIRONOLACTONE 100 MG: 25 TABLET ORAL at 08:31

## 2025-07-29 RX ADMIN — LACTULOSE 30 G: 10 SOLUTION ORAL at 16:39

## 2025-07-29 RX ADMIN — KETOROLAC TROMETHAMINE 15 MG: 15 INJECTION, SOLUTION INTRAMUSCULAR; INTRAVENOUS at 20:41

## 2025-07-29 RX ADMIN — METHOCARBAMOL TABLETS 500 MG: 500 TABLET, COATED ORAL at 08:31

## 2025-07-29 RX ADMIN — TACROLIMUS 1 MG: 1 CAPSULE ORAL at 20:41

## 2025-07-29 RX ADMIN — INSULIN LISPRO 2 UNITS: 100 INJECTION, SOLUTION INTRAVENOUS; SUBCUTANEOUS at 13:14

## 2025-07-29 RX ADMIN — TACROLIMUS 1 MG: 1 CAPSULE ORAL at 08:31

## 2025-07-29 RX ADMIN — LORAZEPAM 0.5 MG: 0.5 TABLET ORAL at 11:20

## 2025-07-29 RX ADMIN — PANTOPRAZOLE SODIUM 40 MG: 40 TABLET, DELAYED RELEASE ORAL at 06:31

## 2025-07-29 RX ADMIN — METHOCARBAMOL TABLETS 500 MG: 500 TABLET, COATED ORAL at 20:41

## 2025-07-29 RX ADMIN — SODIUM CHLORIDE, SODIUM LACTATE, POTASSIUM CHLORIDE, AND CALCIUM CHLORIDE: .6; .31; .03; .02 INJECTION, SOLUTION INTRAVENOUS at 08:28

## 2025-07-29 RX ADMIN — INSULIN LISPRO 2 UNITS: 100 INJECTION, SOLUTION INTRAVENOUS; SUBCUTANEOUS at 20:41

## 2025-07-29 RX ADMIN — ARFORMOTEROL TARTRATE: 15 SOLUTION RESPIRATORY (INHALATION) at 08:13

## 2025-07-29 RX ADMIN — GABAPENTIN 300 MG: 300 CAPSULE ORAL at 08:31

## 2025-07-29 RX ADMIN — LACTULOSE 30 G: 10 SOLUTION ORAL at 20:41

## 2025-07-29 RX ADMIN — METHOCARBAMOL TABLETS 500 MG: 500 TABLET, COATED ORAL at 16:39

## 2025-07-29 ASSESSMENT — PAIN DESCRIPTION - LOCATION: LOCATION: NECK;KNEE;LEG

## 2025-07-29 ASSESSMENT — PAIN DESCRIPTION - ONSET: ONSET: ON-GOING

## 2025-07-29 ASSESSMENT — PAIN DESCRIPTION - PAIN TYPE: TYPE: ACUTE PAIN

## 2025-07-29 ASSESSMENT — PAIN SCALES - GENERAL
PAINLEVEL_OUTOF10: 8
PAINLEVEL_OUTOF10: 9

## 2025-07-29 ASSESSMENT — PAIN - FUNCTIONAL ASSESSMENT: PAIN_FUNCTIONAL_ASSESSMENT: PREVENTS OR INTERFERES WITH MANY ACTIVE NOT PASSIVE ACTIVITIES

## 2025-07-29 ASSESSMENT — PAIN DESCRIPTION - ORIENTATION: ORIENTATION: LEFT

## 2025-07-29 ASSESSMENT — PAIN DESCRIPTION - FREQUENCY: FREQUENCY: CONTINUOUS

## 2025-07-29 ASSESSMENT — PAIN DESCRIPTION - DESCRIPTORS: DESCRIPTORS: ACHING

## 2025-07-29 NOTE — PROGRESS NOTES
Also your surgery team recommended to hold IV antibiotics.  Discussed with Dr. John temple to hold it.

## 2025-07-29 NOTE — PROGRESS NOTES
Case discussed with Dr Daija Fonseca hospitalist at [facility] . He accepts pt. Daughter Lauren is updated.

## 2025-07-29 NOTE — PROGRESS NOTES
[facility]   Pharmacy Pharmacokinetic Monitoring Service - Vancomycin     Sabi Armenta is a 75 y.o. female starting on vancomycin therapy for Bone and Joint Infection. Pharmacy consulted by Dr. Lopez for monitoring and adjustment.    Target Concentration: Dosing based on anticipated concentration <15 mg/L due to renal impairment/insufficiency    Pertinent Laboratory Values:   Wt Readings from Last 1 Encounters:   07/27/25 61.2 kg (135 lb)     Temp Readings from Last 1 Encounters:   07/29/25 98.8 °F (37.1 °C) (Oral)     Estimated Creatinine Clearance: 27 mL/min (A) (based on SCr of 1.44 mg/dL (H)).  Recent Labs     07/28/25  0121 07/29/25  1330   CREATININE 1.35* 1.44*   BUN 29* 24*   WBC 5.9  --      Plan:  Dosing recommendations based on Bayesian software  Start vancomycin 1500 mg IV x1 (25 mg/kg) tonight after blood draw  Renal labs as indicated   Vancomycin concentration ordered for 7/30/25 @ 1500   Pharmacy will continue to monitor patient and adjust therapy as indicated    Thank you for the consult,  Kyle Cordero RPH  7/29/2025 5:04 PM

## 2025-07-29 NOTE — PROGRESS NOTES
Talked with daughter Lauren per phone, she is ok to looking for other place . Case discussed with neurosurgeon team VERONICA Hernandez neurosurgeon will do the biopsy when pt arrives [facility] , waiting for Hospitalist call back.

## 2025-07-29 NOTE — PROGRESS NOTES
1443  Secure message sent to Dr. Salcedo:  Hi patient took Toradol at 1209 and is still having 10/10. Patient has pain in RLE, LLE. Stronger pain medications are needed. Can diluladid be ordered due to patients allergies?     1449  Secure message sent to Dr. Salcedo:  Heat packs placed on painful extremities. Pt. has not recieved any relief.     1451  Secure message received from Dr. Salcedo:   Cannot put any other meds due to long list of allergies     1614  Secure message sent to Dr. Salcedo:  This pt. is in a lot of discomfort with leg cramping. Can an order be placed for muscle cramps?     1616  Secure message received from Dr. Salcedo:  Ordered robaxin

## 2025-07-29 NOTE — PROGRESS NOTES
[address] [facility] [address], Report Caelia [id number]. [facility] [address] . 1935 Mahi from [facility] called to update ETA to 2300.      1938 Report called to facility, given number [phone]. Called and gave report to Delfino WALKER.

## 2025-07-29 NOTE — PROGRESS NOTES
[facility]  (A Division of [facility])  Maria R Lopez MD   Office Tel:  [phone] Option #8                                                               Date of Admission: 7/27/2025       ID Consult FU  for cervical spine abnormality- discitis? Received from Dr Fung  PCP: Shanika Powell, MICHAEL - NP    C/C: worsening numbness-left side and bilateral LE    Current Antimicrobials:    Prior Antimicrobials:    None          Allergy to Antibiotics: PCN-hives       Assessment:     Chronically ill looking patient with:    Bacteremia- with GPR/GPC in cluster- clinically same/ stable-- likely procurement contamination. Await ID    Cervical C5-6 discitis/OM on MRI 7/29-- report reviewed. Finding seem to suggest progressive change on reading compared to 3/9/25    Left sided and LE numbness- worse per patient    Old imaging reports reviewed  3/9/25- MRI cervical--C5-6 abnormal marrow changes  DDX includes degenerative changes versus discitis osteomyelitis.  No epidural abscess.  Findings are progressed compare with 10/12/2024.  Clinical correlation is recommended.  Continued follow up imaging is recommended.   2. Multilevel degenerative changes as detailed in the body of the report, worst at C5-6 where there is moderate to severe spinal canal narrowing and severe left neural foraminal narrowing.   3/7/25: CT neck WO contrast:  1. Changed may represent severe degenerative changes or chronic osteomyelitis/discitis at the C5-C6 level and left C5-C6 facet joint.   2. There is no evidence acute fracture.     10/12/24: CT neck WO:  Alignment and vertebral body height in the cervical spine are maintained. Straightened cervical lordosis.  There is no evidence of an acute fracture or traumatic malalignment.  Moderate to severe loss of disc height C5-C6.  Small endplate osteophytes.  Multilevel facet arthropathy, worse on the left.  No significant bony spinal canal or neural

## 2025-07-29 NOTE — PROGRESS NOTES
1915-Bedside report received from Charlee/Cleopatra Lopez. Pt A & O X 4. Pt without any issues. Call light within reach.  2135-Pt resting in bed at this time. IV site to RAC  patent and intact. Pt A & O x 4. LS clear, on RA. + CSM. Pain at 8,  muscle relaxant given. + BS to all 4 quadrants. Pt denies nausea. Call light within raech. Pt denies any needs at this time.  0710-MRI form completed with pt. Pt unable to sign with the mouse(Pad not working). Initialed in her presence.  Pt had an uneventful shift.  Pain remained well-controlled with the ordered medication. No issues/concerns at this time. Call bell within reach

## 2025-07-29 NOTE — PROGRESS NOTES
0Case discussed with transfer center, stated Dr. Duong reviewed the chart and stated no need to have emergency surgery and recommend to try other facility for biopsy. Talked with pt  and she is ok to go anywhere  she can go for biopsy.

## 2025-07-29 NOTE — PROGRESS NOTES
Remote chart review    GPC and GPR in blood culture 7/28- likely procurement contamination   She is afebrile, stable- await ID of bacteremia, no need to start Abx( Vanco) or to repeat blood culture    Monitor off abx    Maria R Lopez MD  [facility])  Office: [phone] -Option #8  Office fax:  [phone]

## 2025-07-29 NOTE — PROGRESS NOTES
Received call back from transfer center, per stated, [place] transfer center will not call neurosurgeon because pt not meet criteria for urgent transfer. But transfer center will try one more time the request.

## 2025-07-29 NOTE — PROGRESS NOTES
Hospitalist Progress Note    Patient: Sabi Armenta MRN: [id number]  [facility]: [id number]    YOB: 1950  Age: 75 y.o.  Sex: female    DOA: 7/27/2025 LOS:  LOS: 0 days          Chief Complain :  Neck pain, left arm pain, left leg pain, back pain.  75 y.o. female with PMHx of  who presented to [facility] ED 7/27/2025 with left sided paresthesias.  She states that she has had numbness in her left arm as well as left lower extremity that started about a week and a half ago.  She states that she has had chills and progressive weakness for the past week and a half.   Subjective:   Patient laying in bed, complains of left shoulder pain neck pain.    Assessment/Plan     Active Hospital Problems    Diagnosis     Neck pain [M54.2]       Admitted by Dr. Fung this morning.    CT findings of C4-C5 discitis.  Initially IR was consulted for biopsy however per IR they do not typically perform biopsy for discitis in the cervical spine.     She had MRI of cervical spine with without contrast at [facility] on 03/09/2025 that showed abnormal marrow changes with disc height loss at C5 and 6.  Differential includes degenerative changes versus discitis/osteomyelitis.  No epidural abscess.  Findings are progressed compared with 10/12/2024.  Multilevel degenerative changes worst at C5-6 where there is moderate to severe spinal canal narrowing and severe left neural foraminal narrowing.  Patient was seen by Dr. Kadyen Duong, neurosurgery at [facility].  She was seen on 03/27/2025.  He first saw patient in the hospital at [facility] when she was admitted for diabetic ketoacidosis.  She has extensive history of spinal surgeries including lumbar spinal

## 2025-07-29 NOTE — PROGRESS NOTES
0850 Spoke with MRI, pt requires meds before scan. They will call 30 minutes prior for RN to administer meds.     1031 MRI called for ETA on pt scan, currently one being done and a STAT ED after. MRI will call when pt is next to administer meds.     1124 Pt down for MRI, Meal not eaten yet. Will re-check sugar.    1517 Pt sleeping, requested I come back for lactulose and robaxin after she wakes.     1738 Pharmacy called for clarification on Vancomycin. Per MD Brizuela, Vanc on hold at this time.    1856 MD Brizuela requested CDs of MRI and CT be made for transfer with patient. Attempted to call Radiology with no success.     1901 Radiology making CD of images.

## 2025-07-29 NOTE — PLAN OF CARE
Problem: Chronic Conditions and Co-morbidities  Goal: Patient's chronic conditions and co-morbidity symptoms are monitored and maintained or improved  7/29/2025 1520 by Paty Gifford RN  Outcome: Progressing  7/29/2025 0155 by Kriss Brian RN  Outcome: Progressing     Problem: Pain  Goal: Verbalizes/displays adequate comfort level or baseline comfort level  7/29/2025 1520 by Paty Gifford RN  Outcome: Progressing  7/29/2025 0155 by Kriss Brian RN  Outcome: Progressing     Problem: Safety - Adult  Goal: Free from fall injury  7/29/2025 1520 by Paty Gifford RN  Outcome: Progressing  7/29/2025 0155 by Kriss Brian RN  Outcome: Progressing     Problem: Skin/Tissue Integrity  Goal: Skin integrity remains intact  Description: 1.  Monitor for areas of redness and/or skin breakdown  2.  Assess vascular access sites hourly  3.  Every 4-6 hours minimum:  Change oxygen saturation probe site  4.  Every 4-6 hours:  If on nasal continuous positive airway pressure, respiratory therapy assess nares and determine need for appliance change or resting period  7/29/2025 1520 by Paty Gifford RN  Outcome: Progressing  7/29/2025 0155 by Kriss Brian RN  Outcome: Progressing     Problem: ABCDS Injury Assessment  Goal: Absence of physical injury  Outcome: Progressing

## 2025-07-29 NOTE — DISCHARGE SUMMARY
please bring it with you to your next PCP visit.  Medical notes are meant to be a communication between medical professionals.  Additionally, portion of this note were created using voice recognition function, syntax and phonetic over may have escaped proofreading.      Suma Brizuela MD  CC: Shanika Powell, MICHAEL - NP

## 2025-07-29 NOTE — PROGRESS NOTES
Occupational Therapy  Consult received and chart reviewed.  Results of MRI noted \"Findings at C5-6 consistent with discitis/osteomyelitis, also extending into the left C5-6 facet joint. Moderate to severe spinal canal and severe left foraminal stenosis without cord signal abnormality or epidural abscess\".  Appears pt awaiting potential transfer to [facility].  OT orders will be discontinued at this time.  Please re-consult if appropriate.    Thank you for this referral.   Zo Craven OTD, OTR/L

## 2025-07-29 NOTE — PROGRESS NOTES
7/29/2025 PT note: Consult received and chart reviewed.  Results of MRI noted \"Findings at C5-6 consistent with discitis/osteomyelitis, also extending into the left C5-6 facet joint. Moderate to severe spinal canal and severe left foraminal stenosis without cord signal abnormality or epidural abscess\".  Appears pt awaiting potential transfer to [facility].  In view on above, will discontinue PT orders at this time.  Please re-consult should pt status change with regard to skilled PT intervention.  Thank you for this referral.   Tomasa, PT

## 2025-07-29 NOTE — PROGRESS NOTES
Hospitalist Progress Note    Patient: Sabi Armenta MRN: [id number]  CSN: [id number]    YOB: 1950  Age: 75 y.o.  Sex: female    DOA: 7/27/2025 LOS:  LOS: 1 day          Chief Complain :  Neck pain, left arm pain, left leg pain, back pain.  75 y.o. female with PMHx of  who presented to [facility] ED 7/27/2025 with left sided paresthesias.  She states that she has had numbness in her left arm as well as left lower extremity that started about a week and a half ago.  She states that she has had chills and progressive weakness for the past week and a half.   Subjective:   Patient lying in bed, complains of left shoulder pain neck pain.  Rt leg cramping   She stated her pain and the numbness tingling sensation was worse than much.  I was referred to another doctor at [facility].  daughter was at the phone during the visit.   Mri still pending     Rn : needs to medication for MRI    Assessment/Plan     Active Hospital Problems    Diagnosis     Neck pain [M54.2]     Long-term use of immunosuppressant medication [Z79.60]     Chronic renal disease, stage III (HCC) [N18.30]     Autoimmune hepatitis (HCC) [K75.4]         Neck pain  CT scan indicated disc ascites c5-6  However ID has been consulted.  Recommended biopsy and transfer to [facility]   MRI spine still pending results  Patient follow-up with neurosurgeon at [place]- [facility]   Waiting for mri c spine results   Dr. Salcedo initiate the  consult. -waiting for mri results  CT findings of C5-6 discitis.  Initially IR was consulted for biopsy however per IR they do not typically perform biopsy for discitis in the cervical spine.       Htn   Continue current meds     Dm ssi

## 2025-07-29 NOTE — PROGRESS NOTES
Mri results discussed with dr. Lopez, recommend biopsy per neurosurgeon. Talked with transfer center per [facility] . \"Pt does not meet emergency criteria for transfer and are on capacity restriction.\"  . Called transfer center and request to talk with dr. Duong. Waiting for call back

## 2025-07-29 NOTE — PLAN OF CARE
Problem: Chronic Conditions and Co-morbidities  Goal: Patient's chronic conditions and co-morbidity symptoms are monitored and maintained or improved  7/29/2025 0155 by Kriss Brian RN  Outcome: Progressing  7/28/2025 1905 by Charlee De La Fuente RN  Outcome: Progressing     Problem: Pain  Goal: Verbalizes/displays adequate comfort level or baseline comfort level  7/29/2025 0155 by Kriss Brian RN  Outcome: Progressing  7/28/2025 1905 by Charlee De La Fuente RN  Outcome: Progressing     Problem: Safety - Adult  Goal: Free from fall injury  7/29/2025 0155 by Kriss Brian RN  Outcome: Progressing  7/28/2025 1905 by Charlee De La Fuente RN  Outcome: Progressing     Problem: Skin/Tissue Integrity  Goal: Skin integrity remains intact  Description: 1.  Monitor for areas of redness and/or skin breakdown  2.  Assess vascular access sites hourly  3.  Every 4-6 hours minimum:  Change oxygen saturation probe site  4.  Every 4-6 hours:  If on nasal continuous positive airway pressure, respiratory therapy assess nares and determine need for appliance change or resting period  7/29/2025 0155 by Kriss Brian RN  Outcome: Progressing  7/28/2025 1905 by Charlee De La Fuente RN  Outcome: Progressing

## 2025-07-29 NOTE — PROGRESS NOTES
Mixed bacteremia- GPR in 1/4 bottles and GPC in clusters in 2/4 bottle  FU call to [facility]  [employer] will be out in 1 hr or more    We have significant contamination rate at this facility with blood draw    If it takes longer - will place IV abx -vancomycin IV pharmacy to dose    Transfer notes from Dr Brizuela -reviewed, and ongoing-but not found yet    Imp:  This is a complex case, with subacute process over many weeks. She is at risk for infection , that could be atypical. Bacteremia may or may not be true infection- only significance would be with S aureus typically highly associated as etiology for discitis    Plan:  Blood culture x1 stat, bmp and procal stat ordered    Start vancomycin once blood draw is complete    Tissue for microlab indicated if gpc turns out to be non [facility]- [facility] center with NS back up    Thanks    Maria R Lopez MD  [facility])  Office: [phone] -Option #8  Office fax:  [phone]

## 2025-07-30 LAB — TACROLIMUS BLD-MCNC: 8.4 NG/ML (ref 5–20)

## 2025-07-31 LAB
BACTERIA SPEC CULT: ABNORMAL
GRAM STN SPEC: ABNORMAL
SERVICE CMNT-IMP: ABNORMAL
SERVICE CMNT-IMP: ABNORMAL

## 2025-08-02 LAB — VIT B1 BLD-SCNC: 69.1 NMOL/L (ref 66.5–200)

## 2025-08-03 LAB
BACTERIA SPEC CULT: NORMAL
SERVICE CMNT-IMP: NORMAL

## 2025-08-04 LAB
BACTERIA SPEC CULT: NORMAL
SERVICE CMNT-IMP: NORMAL

## (undated) DEVICE — KENDALL RADIOLUCENT FOAM MONITORING ELECTRODE RECTANGULAR SHAPE: Brand: KENDALL

## (undated) DEVICE — SPONGE GZ W4XL4IN COT 12 PLY TYP VII WVN C FLD DSGN

## (undated) DEVICE — TRAP SPEC COLL POLYP POLYSTYR --

## (undated) DEVICE — SNARE POLYP SM W13MMXL240CM SHTH DIA2.4MM OVL FLX DISP

## (undated) DEVICE — GLOVE ORTHO 8   MSG9480

## (undated) DEVICE — (D)LIGATOR BND RMFG 2.8 8.6- -- DISC BY MFR USE ITEM 138204

## (undated) DEVICE — ENDO CARRY-ON PROCEDURE KIT INCLUDES ENZYMATIC SPONGE, GAUZE, BIOHAZARD LABEL, TRAY, LUBRICANT, DIRTY SCOPE LABEL, WATER LABEL, TRAY, DRAWSTRING PAD, AND DEFENDO 4-PIECE KIT.: Brand: ENDO CARRY-ON PROCEDURE KIT

## (undated) DEVICE — MOUTHPIECE ENDOSCP 20X27MM --

## (undated) DEVICE — MEDI-VAC NON-CONDUCTIVE SUCTION TUBING: Brand: CARDINAL HEALTH

## (undated) DEVICE — SNARE POLYP MIC OVL 13X2.4MM -- CAPTIVATOR BX/10

## (undated) DEVICE — MAJ-1414 SINGLE USE ADPATER BIOPSY VALV: Brand: SINGLE USE ADAPTOR BIOPSY VALVE

## (undated) DEVICE — SINGLE PORT MANIFOLD: Brand: NEPTUNE 2

## (undated) DEVICE — MOUTHPIECE ENDOSCP L CTRL OPN AND SIDE PORTS DISP

## (undated) DEVICE — TUBING SUCT L12FT DIA0.25IN CLR W/ MAXI-GRIP AND M/M CONN

## (undated) DEVICE — TRAP SPEC POLYP REM STRNR CLN DSGN MAGNIFYING WIND DISP

## (undated) DEVICE — FIAPC® PROBE W/ FILTER 2200 A OD 2.3MM/6.9FR; L 2.2M/7.2FT: Brand: ERBE

## (undated) DEVICE — NET RETRV FB ENDOSCP 2.5MMX230 -- ROTH NET

## (undated) DEVICE — Z INACTIVE USE 2854097 SPONGE GZ W4XL4IN COT 12 PLY TYP VII WVN C FLD DSGN

## (undated) DEVICE — ERBE NESSY®PLATE 170 SPLIT; 168CM²; CABLE 3M: Brand: ERBE